# Patient Record
Sex: MALE | Race: WHITE | NOT HISPANIC OR LATINO | Employment: UNEMPLOYED | URBAN - METROPOLITAN AREA
[De-identification: names, ages, dates, MRNs, and addresses within clinical notes are randomized per-mention and may not be internally consistent; named-entity substitution may affect disease eponyms.]

---

## 2019-03-01 ENCOUNTER — HOSPITAL ENCOUNTER (EMERGENCY)
Facility: HOSPITAL | Age: 69
Discharge: HOME | End: 2019-03-01
Attending: EMERGENCY MEDICINE
Payer: MEDICARE

## 2019-03-01 VITALS
WEIGHT: 176 LBS | BODY MASS INDEX: 23.33 KG/M2 | RESPIRATION RATE: 20 BRPM | HEIGHT: 73 IN | TEMPERATURE: 98 F | OXYGEN SATURATION: 99 % | SYSTOLIC BLOOD PRESSURE: 125 MMHG | HEART RATE: 81 BPM | DIASTOLIC BLOOD PRESSURE: 75 MMHG

## 2019-03-01 DIAGNOSIS — S61.452A CAT BITE OF LEFT HAND, INITIAL ENCOUNTER: Primary | ICD-10-CM
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
## 2019-03-01 PROCEDURE — 99283 EMERGENCY DEPT VISIT LOW MDM: CPT

## 2019-03-01 RX ORDER — METFORMIN HYDROCHLORIDE 1000 MG/1
1000 TABLET ORAL 2 TIMES DAILY
COMMUNITY
End: 2022-04-25 | Stop reason: SDUPTHER

## 2019-03-01 RX ORDER — FENOFIBRATE 145 MG/1
145 TABLET, FILM COATED ORAL DAILY
COMMUNITY

## 2019-03-01 RX ORDER — ZOLPIDEM TARTRATE 10 MG/1
10 TABLET ORAL NIGHTLY
COMMUNITY
End: 2022-03-18 | Stop reason: SDUPTHER

## 2019-03-01 RX ORDER — AMOXICILLIN AND CLAVULANATE POTASSIUM 875; 125 MG/1; MG/1
1 TABLET, FILM COATED ORAL 2 TIMES DAILY
Qty: 14 TABLET | Refills: 0 | Status: ON HOLD | OUTPATIENT
Start: 2019-03-01 | End: 2019-08-20 | Stop reason: CLARIF

## 2019-03-01 RX ORDER — EFAVIRENZ 600 MG/1
600 TABLET, FILM COATED ORAL NIGHTLY
COMMUNITY
End: 2022-03-07 | Stop reason: SDUPTHER

## 2019-03-01 RX ORDER — CLONAZEPAM 0.5 MG/1
0.5 TABLET ORAL 2 TIMES DAILY
COMMUNITY
End: 2022-03-18 | Stop reason: SDUPTHER

## 2019-03-01 RX ORDER — SERTRALINE HYDROCHLORIDE 100 MG/1
100 TABLET, FILM COATED ORAL DAILY
COMMUNITY
End: 2022-07-12 | Stop reason: SDUPTHER

## 2019-03-01 NOTE — ED PROVIDER NOTES
Encounter Date: 3/1/2019       History     Chief Complaint   Patient presents with    Animal Bite     cat bite to left palm, stray cat     Brandon Parson is a 68 y.o male with PMHx including GERD and HIV. He presents to ED with bite from feral cat to left palm approx 2 hours prior to arrival  Full ROM of hand and digits  TD up to date          Review of patient's allergies indicates:   Allergen Reactions    Chantix [varenicline]      Past Medical History:   Diagnosis Date    Anxiety     Depression     GERD (gastroesophageal reflux disease)     HIV (human immunodeficiency virus infection)      History reviewed. No pertinent surgical history.  History reviewed. No pertinent family history.  Social History     Tobacco Use    Smoking status: Current Some Day Smoker   Substance Use Topics    Alcohol use: No     Frequency: Never    Drug use: No     Review of Systems   Constitutional: Negative for fever.   HENT: Negative for sore throat.    Respiratory: Negative for shortness of breath.    Cardiovascular: Negative for chest pain.   Gastrointestinal: Negative for nausea.   Genitourinary: Negative for dysuria.   Musculoskeletal: Negative for back pain.   Skin: Positive for wound (multiple puncture wound to left palm). Negative for rash.   Neurological: Negative for weakness.   Hematological: Does not bruise/bleed easily.   All other systems reviewed and are negative.      Physical Exam     Initial Vitals [03/01/19 1258]   BP Pulse Resp Temp SpO2   125/75 81 20 98.3 °F (36.8 °C) 99 %      MAP       --         Physical Exam    Nursing note and vitals reviewed.  Constitutional: He appears well-developed and well-nourished.   HENT:   Head: Normocephalic.   Eyes: Pupils are equal, round, and reactive to light.   Neck: Normal range of motion.   Cardiovascular: Normal rate and regular rhythm.   Pulmonary/Chest: Breath sounds normal.   Musculoskeletal: He exhibits tenderness.        Left hand: He exhibits tenderness. He  exhibits normal range of motion, no bony tenderness, normal capillary refill, no deformity and no swelling.        Hands:  Neurological: He is alert and oriented to person, place, and time.   Skin: Skin is warm and dry. There is erythema.   Psychiatric: He has a normal mood and affect. His behavior is normal. Judgment and thought content normal.         ED Course   Procedures  Labs Reviewed - No data to display       Imaging Results    None          Medical Decision Making:   Initial Assessment:   Patient with bit from feral cat to left palm approx 2 hours prior to arrival  Full ROM of hand and digits  TD up to date    1 cm area of erythema  Multiple puncture wounds consistent with cat bite  Full ROM hand and digits    Differential Diagnosis:   Wound infection  Bone injury    ED Management:  Wound cleaned and dressing applied  Will treat with Augmentin prophylactically    Discussed physical exam findings with patient  No acute emergent medical condition identified at this time to warrant further testing/diagnostics.  Plan to discharge patient home with instructions per AVS.  Follow-up with PCP in 2-5 days or return to ED if symptoms worsen.  Patient verbalized agreement to discharge treatment plan.                              Clinical Impression:       ICD-10-CM ICD-9-CM   1. Cat bite of left hand, initial encounter S61.452A 882.0    W55.01XA E906.3                                Bina Maravilla NP  03/01/19 3339

## 2019-03-01 NOTE — DISCHARGE INSTRUCTIONS
Keep wound clean with antimicrobial soap and apply thin layer of over the counter antibiotic ointment  Take medication as prescribed    Monitor for infection

## 2019-08-15 ENCOUNTER — HOSPITAL ENCOUNTER (EMERGENCY)
Facility: HOSPITAL | Age: 69
Discharge: ANOTHER HOSPITAL | End: 2019-08-16
Attending: EMERGENCY MEDICINE
Payer: MEDICARE

## 2019-08-15 DIAGNOSIS — L03.90 CELLULITIS: ICD-10-CM

## 2019-08-15 DIAGNOSIS — L97.522 DIABETIC ULCER OF TOE OF LEFT FOOT ASSOCIATED WITH TYPE 2 DIABETES MELLITUS, WITH FAT LAYER EXPOSED: Primary | ICD-10-CM

## 2019-08-15 DIAGNOSIS — E11.621 DIABETIC ULCER OF TOE OF LEFT FOOT ASSOCIATED WITH TYPE 2 DIABETES MELLITUS, WITH FAT LAYER EXPOSED: Primary | ICD-10-CM

## 2019-08-15 LAB
ALBUMIN SERPL BCP-MCNC: 4.1 G/DL (ref 3.5–5.2)
ALP SERPL-CCNC: 24 U/L (ref 55–135)
ALT SERPL W/O P-5'-P-CCNC: 16 U/L (ref 10–44)
ANION GAP SERPL CALC-SCNC: 9 MMOL/L (ref 8–16)
AST SERPL-CCNC: 16 U/L (ref 10–40)
BASOPHILS # BLD AUTO: 0.03 K/UL (ref 0–0.2)
BASOPHILS NFR BLD: 0.3 % (ref 0–1.9)
BILIRUB SERPL-MCNC: 0.5 MG/DL (ref 0.1–1)
BUN SERPL-MCNC: 33 MG/DL (ref 8–23)
CALCIUM SERPL-MCNC: 9.5 MG/DL (ref 8.7–10.5)
CHLORIDE SERPL-SCNC: 108 MMOL/L (ref 95–110)
CO2 SERPL-SCNC: 17 MMOL/L (ref 23–29)
CREAT SERPL-MCNC: 1.3 MG/DL (ref 0.5–1.4)
CRP SERPL-MCNC: 1.54 MG/DL (ref 0–0.75)
DIFFERENTIAL METHOD: ABNORMAL
EOSINOPHIL # BLD AUTO: 0.1 K/UL (ref 0–0.5)
EOSINOPHIL NFR BLD: 1.5 % (ref 0–8)
ERYTHROCYTE [DISTWIDTH] IN BLOOD BY AUTOMATED COUNT: 13.2 % (ref 11.5–14.5)
ERYTHROCYTE [SEDIMENTATION RATE] IN BLOOD BY WESTERGREN METHOD: 40 MM/HR (ref 0–10)
EST. GFR  (AFRICAN AMERICAN): >60 ML/MIN/1.73 M^2
EST. GFR  (NON AFRICAN AMERICAN): 55.7 ML/MIN/1.73 M^2
GLUCOSE SERPL-MCNC: 102 MG/DL (ref 70–110)
HCT VFR BLD AUTO: 34.8 % (ref 40–54)
HGB BLD-MCNC: 11.8 G/DL (ref 14–18)
IMM GRANULOCYTES # BLD AUTO: 0.04 K/UL (ref 0–0.04)
IMM GRANULOCYTES NFR BLD AUTO: 0.5 % (ref 0–0.5)
LYMPHOCYTES # BLD AUTO: 2.5 K/UL (ref 1–4.8)
LYMPHOCYTES NFR BLD: 28.1 % (ref 18–48)
MCH RBC QN AUTO: 31.3 PG (ref 27–31)
MCHC RBC AUTO-ENTMCNC: 33.9 G/DL (ref 32–36)
MCV RBC AUTO: 92 FL (ref 82–98)
MONOCYTES # BLD AUTO: 0.7 K/UL (ref 0.3–1)
MONOCYTES NFR BLD: 7.7 % (ref 4–15)
NEUTROPHILS # BLD AUTO: 5.5 K/UL (ref 1.8–7.7)
NEUTROPHILS NFR BLD: 61.9 % (ref 38–73)
NRBC BLD-RTO: 0 /100 WBC
PLATELET # BLD AUTO: 289 K/UL (ref 150–350)
PMV BLD AUTO: 9 FL (ref 9.2–12.9)
POTASSIUM SERPL-SCNC: 3.8 MMOL/L (ref 3.5–5.1)
PROT SERPL-MCNC: 8 G/DL (ref 6–8.4)
RBC # BLD AUTO: 3.77 M/UL (ref 4.6–6.2)
SODIUM SERPL-SCNC: 134 MMOL/L (ref 136–145)
WBC # BLD AUTO: 8.85 K/UL (ref 3.9–12.7)

## 2019-08-15 PROCEDURE — 87070 CULTURE OTHR SPECIMN AEROBIC: CPT

## 2019-08-15 PROCEDURE — 73630 X-RAY EXAM OF FOOT: CPT | Mod: 26,LT,, | Performed by: RADIOLOGY

## 2019-08-15 PROCEDURE — 87077 CULTURE AEROBIC IDENTIFY: CPT

## 2019-08-15 PROCEDURE — 85025 COMPLETE CBC W/AUTO DIFF WBC: CPT

## 2019-08-15 PROCEDURE — 73630 XR FOOT COMPLETE 3 VIEW LEFT: ICD-10-PCS | Mod: 26,LT,, | Performed by: RADIOLOGY

## 2019-08-15 PROCEDURE — 73630 X-RAY EXAM OF FOOT: CPT | Mod: TC,FY,LT

## 2019-08-15 PROCEDURE — 63600175 PHARM REV CODE 636 W HCPCS: Performed by: PHYSICIAN ASSISTANT

## 2019-08-15 PROCEDURE — 87186 SC STD MICRODIL/AGAR DIL: CPT | Mod: 59

## 2019-08-15 PROCEDURE — 99285 EMERGENCY DEPT VISIT HI MDM: CPT | Mod: 25

## 2019-08-15 PROCEDURE — 87147 CULTURE TYPE IMMUNOLOGIC: CPT

## 2019-08-15 PROCEDURE — 96365 THER/PROPH/DIAG IV INF INIT: CPT

## 2019-08-15 PROCEDURE — 85651 RBC SED RATE NONAUTOMATED: CPT

## 2019-08-15 PROCEDURE — 80053 COMPREHEN METABOLIC PANEL: CPT

## 2019-08-15 PROCEDURE — 86140 C-REACTIVE PROTEIN: CPT

## 2019-08-15 RX ORDER — CEFTRIAXONE 1 G/1
1 INJECTION, POWDER, FOR SOLUTION INTRAMUSCULAR; INTRAVENOUS
Status: DISCONTINUED | OUTPATIENT
Start: 2019-08-15 | End: 2019-08-15

## 2019-08-15 RX ADMIN — CEFTRIAXONE 1 G: 1 INJECTION, SOLUTION INTRAVENOUS at 07:08

## 2019-08-15 NOTE — ED NOTES
LEFT great toe twice the size of right great toe. Red, swollen, ulceration to plantar side of toe.

## 2019-08-15 NOTE — ED TRIAGE NOTES
Patient reports that he removed a callous from his left great toe x 2 weeks ago. The entire toe is red/swollen and painful now. Patient is HIV +.

## 2019-08-15 NOTE — ED PROVIDER NOTES
Encounter Date: 8/15/2019       History     Chief Complaint   Patient presents with    Cellulitis     Patient presents to ER with complaint of cellulitis to left great toe.  Patient states has been there for approximately 2 weeks and gotten worse with more swelling and more erythema patient states was trying to file a callus off the bottom of his toe stated now it is not healing stated now is toe is swollen and red and mildly painful.  Denies any radiation of pain bearing weight makes worse patient states does have history of HIV and diabetes.    The history is provided by the patient.     Review of patient's allergies indicates:   Allergen Reactions    Chantix [varenicline]      Past Medical History:   Diagnosis Date    Anxiety     Depression     GERD (gastroesophageal reflux disease)     HIV (human immunodeficiency virus infection)      History reviewed. No pertinent surgical history.  History reviewed. No pertinent family history.  Social History     Tobacco Use    Smoking status: Current Some Day Smoker    Smokeless tobacco: Never Used   Substance Use Topics    Alcohol use: No     Frequency: Never    Drug use: No     Review of Systems   Constitutional: Negative for appetite change and fever.   HENT: Negative for sore throat.    Respiratory: Negative for shortness of breath, wheezing and stridor.    Cardiovascular: Positive for leg swelling (Left). Negative for chest pain and palpitations.   Gastrointestinal: Negative for constipation, diarrhea, nausea and vomiting.   Genitourinary: Negative for dysuria.   Musculoskeletal: Negative for back pain.   Skin: Positive for color change ( left great toe and distal foot) and wound ( left great toe). Negative for rash.   Neurological: Negative for dizziness, syncope, weakness, light-headedness and headaches.   Hematological: Does not bruise/bleed easily.   All other systems reviewed and are negative.      Physical Exam     Initial Vitals [08/15/19 1608]   BP  Pulse Resp Temp SpO2   130/89 77 18 98.1 °F (36.7 °C) 100 %      MAP       --         Physical Exam    Nursing note and vitals reviewed.  Constitutional: He appears well-developed and well-nourished. He is not diaphoretic. No distress.   HENT:   Head: Atraumatic.   Right Ear: External ear normal.   Left Ear: External ear normal.   Nose: Nose normal.   Mouth/Throat: Oropharynx is clear and moist. No oropharyngeal exudate.   Eyes: Right eye exhibits no discharge. Left eye exhibits no discharge.   Neck: Normal range of motion. Neck supple.   Cardiovascular: Normal rate, regular rhythm, normal heart sounds and intact distal pulses. Exam reveals no gallop and no friction rub.    No murmur heard.  Pulmonary/Chest: Breath sounds normal. No respiratory distress. He has no wheezes. He has no rhonchi. He has no rales. He exhibits no tenderness.   Musculoskeletal: Normal range of motion. He exhibits edema (Left distal lower extremity) and tenderness ( left distal lower extremity to the plantar surface of the left great toe).        Right foot: There is tenderness and swelling. There is normal range of motion, normal capillary refill, no crepitus and no deformity.        Feet:    Neurological: He is alert and oriented to person, place, and time. GCS score is 15. GCS eye subscore is 4. GCS verbal subscore is 5. GCS motor subscore is 6.   Skin: Skin is warm and dry. Capillary refill takes less than 2 seconds. No rash noted. There is erythema ( left great toe and distal foot).   Skin ulceration to left great toe appears to have tunnel with purulent drainage unable to see how deep the tunnel goes at this time.   Psychiatric: He has a normal mood and affect. Thought content normal.         ED Course   Procedures  Labs Reviewed   CBC W/ AUTO DIFFERENTIAL - Abnormal; Notable for the following components:       Result Value    RBC 3.77 (*)     Hemoglobin 11.8 (*)     Hematocrit 34.8 (*)     Mean Corpuscular Hemoglobin 31.3 (*)     MPV  9.0 (*)     All other components within normal limits   COMPREHENSIVE METABOLIC PANEL - Abnormal; Notable for the following components:    Sodium 134 (*)     CO2 17 (*)     BUN, Bld 33 (*)     Alkaline Phosphatase 24 (*)     eGFR if non  55.7 (*)     All other components within normal limits   SEDIMENTATION RATE - Abnormal; Notable for the following components:    Sed Rate 40 (*)     All other components within normal limits   C-REACTIVE PROTEIN - Abnormal; Notable for the following components:    CRP 1.54 (*)     All other components within normal limits   CULTURE, AEROBIC  (SPECIFY SOURCE)          Imaging Results          X-Ray Foot Complete Left (Final result)  Result time 08/15/19 17:54:10    Final result by Mickey Han MD (08/15/19 17:54:10)                 Impression:      1. Mild-to-moderate degenerative osteoarthrosis of the 1st toe.  2. Small Achilles enthesophyte.      Electronically signed by: Mickey Han  Date:    08/15/2019  Time:    17:54             Narrative:    EXAMINATION:  XR FOOT COMPLETE 3 VIEW LEFT    CLINICAL HISTORY:  . Cellulitis, unspecified    TECHNIQUE:  AP, lateral, and oblique views of the left foot were performed.    COMPARISON:  None    FINDINGS:  No acute fracture or dislocation.  No significant soft tissue swelling.    Mild degenerative osteoarthrosis involving the 1st MTP joint.  Moderate degenerative osteoarthrosis involving the IP joint of the 1st toe.  The remaining joint spaces are preserved.    The tarsal bones are normal in appearance.  Normal tarsometatarsal alignment.  Small Achilles enthesophyte.                                 Medical Decision Making:   Differential Diagnosis:   Osteomyelitis ulcer cellulitis  Clinical Tests:   Lab Tests: Ordered and Reviewed  Radiological Study: Ordered and Reviewed  ED Management:  Discussed with patient plan of care patient states understand does not have any questions.    Dr. Martin saw and evaluated the  patient agrees with need for admission for IV antibiotics Dr. Martin spoke with Dr. Felix and stated does not feel comfortable with keeping this patient at this facility due to the fact we do not have Infectious Disease and the patient's history of diabetes and HIV    Spoke with transfer center will arrange transfer to higher level of care has infectious disease.    I spoke with Dr. Garcia stated accepts patient for transfer to [place] agrees with [facility].  Other:   I have discussed this case with another health care provider.                      Clinical Impression:       ICD-10-CM ICD-9-CM   1. Diabetic ulcer of toe of left foot associated with type 2 diabetes mellitus, with fat layer exposed E11.621 250.80    L97.522 707.15   2. Cellulitis L03.90 682.9                                FORTINO Tompkins  08/15/19 2001

## 2019-08-16 ENCOUNTER — TELEPHONE (OUTPATIENT)
Dept: PODIATRY | Facility: CLINIC | Age: 69
End: 2019-08-16

## 2019-08-16 ENCOUNTER — CLINICAL SUPPORT (OUTPATIENT)
Dept: SMOKING CESSATION | Facility: CLINIC | Age: 69
End: 2019-08-16

## 2019-08-16 ENCOUNTER — HOSPITAL ENCOUNTER (INPATIENT)
Facility: HOSPITAL | Age: 69
LOS: 5 days | Discharge: HOME HEALTH | DRG: 988 | End: 2019-08-21
Attending: FAMILY MEDICINE | Admitting: FAMILY MEDICINE
Payer: MEDICARE

## 2019-08-16 VITALS
HEIGHT: 73 IN | TEMPERATURE: 98 F | OXYGEN SATURATION: 98 % | WEIGHT: 200 LBS | BODY MASS INDEX: 26.51 KG/M2 | SYSTOLIC BLOOD PRESSURE: 118 MMHG | RESPIRATION RATE: 16 BRPM | DIASTOLIC BLOOD PRESSURE: 70 MMHG | HEART RATE: 82 BPM

## 2019-08-16 DIAGNOSIS — L97.526 DIABETIC ULCER OF TOE OF LEFT FOOT ASSOCIATED WITH TYPE 2 DIABETES MELLITUS, WITH BONE INVOLVEMENT WITHOUT EVIDENCE OF NECROSIS: Primary | ICD-10-CM

## 2019-08-16 DIAGNOSIS — L03.032 CELLULITIS OF GREAT TOE OF LEFT FOOT: ICD-10-CM

## 2019-08-16 DIAGNOSIS — E11.621 DIABETIC FOOT ULCER: ICD-10-CM

## 2019-08-16 DIAGNOSIS — F17.210 CIGARETTE SMOKER: Primary | ICD-10-CM

## 2019-08-16 DIAGNOSIS — E11.621 DIABETIC ULCER OF TOE OF LEFT FOOT ASSOCIATED WITH TYPE 2 DIABETES MELLITUS, WITH BONE INVOLVEMENT WITHOUT EVIDENCE OF NECROSIS: Primary | ICD-10-CM

## 2019-08-16 DIAGNOSIS — L97.509 DIABETIC FOOT ULCER: ICD-10-CM

## 2019-08-16 PROBLEM — E87.1 HYPONATREMIA: Status: ACTIVE | Noted: 2019-08-16

## 2019-08-16 PROBLEM — B20 HIV DISEASE: Status: ACTIVE | Noted: 2019-08-16

## 2019-08-16 PROBLEM — E11.9 DIABETES MELLITUS: Status: ACTIVE | Noted: 2019-08-16

## 2019-08-16 PROBLEM — L03.119 CELLULITIS OF FOOT: Status: ACTIVE | Noted: 2019-08-16

## 2019-08-16 PROBLEM — D64.9 ANEMIA: Status: ACTIVE | Noted: 2019-08-16

## 2019-08-16 PROBLEM — L03.119 CELLULITIS OF FOOT: Status: RESOLVED | Noted: 2019-08-16 | Resolved: 2019-08-16

## 2019-08-16 PROBLEM — N18.30 CHRONIC KIDNEY DISEASE, STAGE III (MODERATE): Status: ACTIVE | Noted: 2019-08-16

## 2019-08-16 PROBLEM — F32.A DEPRESSION: Status: ACTIVE | Noted: 2019-08-16

## 2019-08-16 LAB
ANION GAP SERPL CALC-SCNC: 9 MMOL/L (ref 8–16)
APTT BLDCRRT: 30.1 SEC (ref 21–32)
BASOPHILS # BLD AUTO: 0.01 K/UL (ref 0–0.2)
BASOPHILS NFR BLD: 0.1 % (ref 0–1.9)
BUN SERPL-MCNC: 26 MG/DL (ref 8–23)
CALCIUM SERPL-MCNC: 9.4 MG/DL (ref 8.7–10.5)
CHLORIDE SERPL-SCNC: 107 MMOL/L (ref 95–110)
CO2 SERPL-SCNC: 19 MMOL/L (ref 23–29)
CREAT SERPL-MCNC: 1.4 MG/DL (ref 0.5–1.4)
DIFFERENTIAL METHOD: ABNORMAL
EOSINOPHIL # BLD AUTO: 0.2 K/UL (ref 0–0.5)
EOSINOPHIL NFR BLD: 2.7 % (ref 0–8)
ERYTHROCYTE [DISTWIDTH] IN BLOOD BY AUTOMATED COUNT: 13.5 % (ref 11.5–14.5)
EST. GFR  (AFRICAN AMERICAN): 59 ML/MIN/1.73 M^2
EST. GFR  (NON AFRICAN AMERICAN): 51 ML/MIN/1.73 M^2
ESTIMATED AVG GLUCOSE: 166 MG/DL (ref 68–131)
GLUCOSE SERPL-MCNC: 161 MG/DL (ref 70–110)
GRAM STN SPEC: NORMAL
GRAM STN SPEC: NORMAL
HBA1C MFR BLD HPLC: 7.4 % (ref 4–5.6)
HCT VFR BLD AUTO: 33.4 % (ref 40–54)
HGB BLD-MCNC: 11 G/DL (ref 14–18)
INR PPP: 1 (ref 0.8–1.2)
LYMPHOCYTES # BLD AUTO: 2.3 K/UL (ref 1–4.8)
LYMPHOCYTES NFR BLD: 31.8 % (ref 18–48)
MCH RBC QN AUTO: 30.4 PG (ref 27–31)
MCHC RBC AUTO-ENTMCNC: 32.9 G/DL (ref 32–36)
MCV RBC AUTO: 92 FL (ref 82–98)
MONOCYTES # BLD AUTO: 0.7 K/UL (ref 0.3–1)
MONOCYTES NFR BLD: 9.3 % (ref 4–15)
NEUTROPHILS # BLD AUTO: 4 K/UL (ref 1.8–7.7)
NEUTROPHILS NFR BLD: 56.1 % (ref 38–73)
PLATELET # BLD AUTO: 264 K/UL (ref 150–350)
PMV BLD AUTO: 8.8 FL (ref 9.2–12.9)
POCT GLUCOSE: 165 MG/DL (ref 70–110)
POCT GLUCOSE: 172 MG/DL (ref 70–110)
POCT GLUCOSE: 251 MG/DL (ref 70–110)
POTASSIUM SERPL-SCNC: 3.9 MMOL/L (ref 3.5–5.1)
PROTHROMBIN TIME: 10.8 SEC (ref 9–12.5)
RBC # BLD AUTO: 3.62 M/UL (ref 4.6–6.2)
SODIUM SERPL-SCNC: 135 MMOL/L (ref 136–145)
WBC # BLD AUTO: 7.08 K/UL (ref 3.9–12.7)

## 2019-08-16 PROCEDURE — 99222 1ST HOSP IP/OBS MODERATE 55: CPT | Mod: 25,,, | Performed by: PODIATRIST

## 2019-08-16 PROCEDURE — 36415 COLL VENOUS BLD VENIPUNCTURE: CPT

## 2019-08-16 PROCEDURE — 87070 CULTURE OTHR SPECIMN AEROBIC: CPT | Mod: 59

## 2019-08-16 PROCEDURE — 99406 PT REFUSED TOBACCO CESSATION: ICD-10-PCS | Mod: S$GLB,,,

## 2019-08-16 PROCEDURE — 99999 PR PBB SHADOW E&M-EST. PATIENT-LVL I: ICD-10-PCS | Mod: PBBFAC,,,

## 2019-08-16 PROCEDURE — 87205 SMEAR GRAM STAIN: CPT

## 2019-08-16 PROCEDURE — A9585 GADOBUTROL INJECTION: HCPCS | Performed by: FAMILY MEDICINE

## 2019-08-16 PROCEDURE — 87186 SC STD MICRODIL/AGAR DIL: CPT

## 2019-08-16 PROCEDURE — 80048 BASIC METABOLIC PNL TOTAL CA: CPT

## 2019-08-16 PROCEDURE — 25000003 PHARM REV CODE 250: Performed by: NURSE PRACTITIONER

## 2019-08-16 PROCEDURE — 38221 DX BONE MARROW BIOPSIES: CPT | Performed by: PODIATRIST

## 2019-08-16 PROCEDURE — 99406 BEHAV CHNG SMOKING 3-10 MIN: CPT | Mod: S$GLB,,,

## 2019-08-16 PROCEDURE — 94761 N-INVAS EAR/PLS OXIMETRY MLT: CPT

## 2019-08-16 PROCEDURE — 87075 CULTR BACTERIA EXCEPT BLOOD: CPT

## 2019-08-16 PROCEDURE — 63600175 PHARM REV CODE 636 W HCPCS: Performed by: FAMILY MEDICINE

## 2019-08-16 PROCEDURE — 11000001 HC ACUTE MED/SURG PRIVATE ROOM

## 2019-08-16 PROCEDURE — 20220 PR BONE BIOPSY,TROCAR/NEEDLE SUPERF: ICD-10-PCS | Mod: ,,, | Performed by: PODIATRIST

## 2019-08-16 PROCEDURE — 87077 CULTURE AEROBIC IDENTIFY: CPT | Mod: 59

## 2019-08-16 PROCEDURE — 85025 COMPLETE CBC W/AUTO DIFF WBC: CPT

## 2019-08-16 PROCEDURE — 63600175 PHARM REV CODE 636 W HCPCS: Performed by: NURSE PRACTITIONER

## 2019-08-16 PROCEDURE — 83036 HEMOGLOBIN GLYCOSYLATED A1C: CPT

## 2019-08-16 PROCEDURE — 20220 BONE BIOPSY TROCAR/NDL SUPFC: CPT | Mod: ,,, | Performed by: PODIATRIST

## 2019-08-16 PROCEDURE — 87076 CULTURE ANAEROBE IDENT EACH: CPT

## 2019-08-16 PROCEDURE — 85730 THROMBOPLASTIN TIME PARTIAL: CPT

## 2019-08-16 PROCEDURE — 25500020 PHARM REV CODE 255: Performed by: FAMILY MEDICINE

## 2019-08-16 PROCEDURE — 99222 PR INITIAL HOSPITAL CARE,LEVL II: ICD-10-PCS | Mod: 25,,, | Performed by: PODIATRIST

## 2019-08-16 PROCEDURE — 27000221 HC OXYGEN, UP TO 24 HOURS

## 2019-08-16 PROCEDURE — 99999 PR PBB SHADOW E&M-EST. PATIENT-LVL I: CPT | Mod: PBBFAC,,,

## 2019-08-16 PROCEDURE — 85610 PROTHROMBIN TIME: CPT

## 2019-08-16 RX ORDER — AMOXICILLIN 500 MG
2 CAPSULE ORAL DAILY
COMMUNITY

## 2019-08-16 RX ORDER — INSULIN ASPART 100 [IU]/ML
0-5 INJECTION, SOLUTION INTRAVENOUS; SUBCUTANEOUS
Status: DISCONTINUED | OUTPATIENT
Start: 2019-08-16 | End: 2019-08-21 | Stop reason: HOSPADM

## 2019-08-16 RX ORDER — SODIUM CHLORIDE 9 MG/ML
1000 INJECTION, SOLUTION INTRAVENOUS
Status: DISCONTINUED | OUTPATIENT
Start: 2019-08-16 | End: 2019-08-16

## 2019-08-16 RX ORDER — EFAVIRENZ 200 MG/1
600 CAPSULE ORAL NIGHTLY
Status: DISCONTINUED | OUTPATIENT
Start: 2019-08-16 | End: 2019-08-21 | Stop reason: HOSPADM

## 2019-08-16 RX ORDER — ACETAMINOPHEN 325 MG/1
650 TABLET ORAL EVERY 4 HOURS PRN
Status: DISCONTINUED | OUTPATIENT
Start: 2019-08-16 | End: 2019-08-21 | Stop reason: HOSPADM

## 2019-08-16 RX ORDER — SODIUM CHLORIDE 9 MG/ML
1000 INJECTION, SOLUTION INTRAVENOUS CONTINUOUS
Status: ACTIVE | OUTPATIENT
Start: 2019-08-16 | End: 2019-08-16

## 2019-08-16 RX ORDER — INSULIN ASPART 100 [IU]/ML
16 INJECTION, SUSPENSION SUBCUTANEOUS DAILY
COMMUNITY
End: 2022-07-12 | Stop reason: SDUPTHER

## 2019-08-16 RX ORDER — INSULIN ASPART 100 [IU]/ML
18 INJECTION, SUSPENSION SUBCUTANEOUS NIGHTLY
COMMUNITY
End: 2022-07-12 | Stop reason: SDUPTHER

## 2019-08-16 RX ORDER — ONDANSETRON 2 MG/ML
4 INJECTION INTRAMUSCULAR; INTRAVENOUS EVERY 8 HOURS PRN
Status: DISCONTINUED | OUTPATIENT
Start: 2019-08-16 | End: 2019-08-21 | Stop reason: HOSPADM

## 2019-08-16 RX ORDER — IBUPROFEN 200 MG
24 TABLET ORAL
Status: DISCONTINUED | OUTPATIENT
Start: 2019-08-16 | End: 2019-08-21 | Stop reason: HOSPADM

## 2019-08-16 RX ORDER — SODIUM CHLORIDE 0.9 % (FLUSH) 0.9 %
10 SYRINGE (ML) INJECTION
Status: DISCONTINUED | OUTPATIENT
Start: 2019-08-16 | End: 2019-08-21 | Stop reason: HOSPADM

## 2019-08-16 RX ORDER — ZOLPIDEM TARTRATE 5 MG/1
10 TABLET ORAL NIGHTLY
Status: DISCONTINUED | OUTPATIENT
Start: 2019-08-16 | End: 2019-08-21 | Stop reason: HOSPADM

## 2019-08-16 RX ORDER — INSULIN ASPART 100 [IU]/ML
22 INJECTION, SUSPENSION SUBCUTANEOUS DAILY
Status: DISCONTINUED | OUTPATIENT
Start: 2019-08-16 | End: 2019-08-21 | Stop reason: HOSPADM

## 2019-08-16 RX ORDER — INSULIN ASPART 100 [IU]/ML
18 INJECTION, SUSPENSION SUBCUTANEOUS NIGHTLY
Status: DISCONTINUED | OUTPATIENT
Start: 2019-08-16 | End: 2019-08-16

## 2019-08-16 RX ORDER — SERTRALINE HYDROCHLORIDE 25 MG/1
25 TABLET, FILM COATED ORAL DAILY
Status: DISCONTINUED | OUTPATIENT
Start: 2019-08-16 | End: 2019-08-21 | Stop reason: HOSPADM

## 2019-08-16 RX ORDER — GADOBUTROL 604.72 MG/ML
8 INJECTION INTRAVENOUS
Status: COMPLETED | OUTPATIENT
Start: 2019-08-16 | End: 2019-08-16

## 2019-08-16 RX ORDER — BENAZEPRIL/HYDROCHLOROTHIAZIDE 20 MG-25MG
1 TABLET ORAL DAILY
COMMUNITY
End: 2022-02-23

## 2019-08-16 RX ORDER — ATORVASTATIN CALCIUM 10 MG/1
10 TABLET, FILM COATED ORAL DAILY
Status: DISCONTINUED | OUTPATIENT
Start: 2019-08-16 | End: 2019-08-21 | Stop reason: HOSPADM

## 2019-08-16 RX ORDER — CIPROFLOXACIN 2 MG/ML
400 INJECTION, SOLUTION INTRAVENOUS
Status: DISCONTINUED | OUTPATIENT
Start: 2019-08-16 | End: 2019-08-17

## 2019-08-16 RX ORDER — BENAZEPRIL HYDROCHLORIDE 5 MG/1
5 TABLET ORAL DAILY
Status: DISCONTINUED | OUTPATIENT
Start: 2019-08-16 | End: 2019-08-21 | Stop reason: HOSPADM

## 2019-08-16 RX ORDER — IBUPROFEN 200 MG
16 TABLET ORAL
Status: DISCONTINUED | OUTPATIENT
Start: 2019-08-16 | End: 2019-08-21 | Stop reason: HOSPADM

## 2019-08-16 RX ORDER — GLUCAGON 1 MG
1 KIT INJECTION
Status: DISCONTINUED | OUTPATIENT
Start: 2019-08-16 | End: 2019-08-21 | Stop reason: HOSPADM

## 2019-08-16 RX ORDER — INSULIN ASPART 100 [IU]/ML
18 INJECTION, SUSPENSION SUBCUTANEOUS NIGHTLY
Status: DISCONTINUED | OUTPATIENT
Start: 2019-08-17 | End: 2019-08-20

## 2019-08-16 RX ORDER — FENOFIBRATE 145 MG/1
145 TABLET, FILM COATED ORAL DAILY
Status: DISCONTINUED | OUTPATIENT
Start: 2019-08-16 | End: 2019-08-21 | Stop reason: HOSPADM

## 2019-08-16 RX ORDER — KETOROLAC TROMETHAMINE 30 MG/ML
15 INJECTION, SOLUTION INTRAMUSCULAR; INTRAVENOUS EVERY 6 HOURS PRN
Status: DISPENSED | OUTPATIENT
Start: 2019-08-16 | End: 2019-08-19

## 2019-08-16 RX ADMIN — PIPERACILLIN AND TAZOBACTAM 4.5 G: 4; .5 INJECTION, POWDER, LYOPHILIZED, FOR SOLUTION INTRAVENOUS; PARENTERAL at 04:08

## 2019-08-16 RX ADMIN — KETOROLAC TROMETHAMINE 15 MG: 30 INJECTION, SOLUTION INTRAMUSCULAR at 04:08

## 2019-08-16 RX ADMIN — CIPROFLOXACIN 400 MG: 2 INJECTION, SOLUTION INTRAVENOUS at 09:08

## 2019-08-16 RX ADMIN — ATORVASTATIN CALCIUM 10 MG: 10 TABLET, FILM COATED ORAL at 09:08

## 2019-08-16 RX ADMIN — BENAZEPRIL HYDROCHLORIDE 5 MG: 5 TABLET, COATED ORAL at 09:08

## 2019-08-16 RX ADMIN — FENOFIBRATE 145 MG: 145 TABLET ORAL at 09:08

## 2019-08-16 RX ADMIN — GADOBUTROL 8 ML: 604.72 INJECTION INTRAVENOUS at 12:08

## 2019-08-16 RX ADMIN — VANCOMYCIN HYDROCHLORIDE 2250 MG: 100 INJECTION, POWDER, LYOPHILIZED, FOR SOLUTION INTRAVENOUS at 09:08

## 2019-08-16 RX ADMIN — EMTRICITABINE AND TENOFOVIR ALAFENAMIDE 1 TABLET: 200; 25 TABLET ORAL at 10:08

## 2019-08-16 RX ADMIN — SERTRALINE HYDROCHLORIDE 25 MG: 25 TABLET ORAL at 09:08

## 2019-08-16 RX ADMIN — SODIUM CHLORIDE 1000 ML: 0.9 INJECTION, SOLUTION INTRAVENOUS at 08:08

## 2019-08-16 RX ADMIN — ZOLPIDEM TARTRATE 10 MG: 5 TABLET ORAL at 10:08

## 2019-08-16 RX ADMIN — EFAVIRENZ 600 MG: 200 CAPSULE ORAL at 10:08

## 2019-08-16 NOTE — ASSESSMENT & PLAN NOTE
Diabetic foot ulcer    WBC wnl, pt afebrile  Vancomycin IV and Cipro IV  Podiatry consult pending  Keep affected extremity elevated

## 2019-08-16 NOTE — CONSULTS
[facility] Disease Consult     Primary Team: Dr. Roman  Consultant Attending: Dr. Pham  Date of Admit: 8/16/2019    Reason for Consult     Diabetic foot ulcer with concern for osteomyelitis     History of Present Illness   Brandon Parson is a 68 y.o. male who  has a past medical history of Anxiety, Depression, Diabetes mellitus, GERD (gastroesophageal reflux disease), and HIV (human immunodeficiency virus infection).     Patient Admited for Left LE cellulitis. Consulted to Podiatry for Left toe wound.  Patient complains wound on plantar left hallux toe that he discovered about a month ago after he took off some callus. States he has been soaking it in epson salts and washing it with hydrogen peroxide.  Came to the ER with concerns of swelling and redness to the foot. Afebrile, WBC wnl, denies F/C/N/V/night sweats. Ulcer appearing to reach bone, MRI concerning for osteomyelitis. Pt admitted for antibiotic treatment and possible surgical management. ID consulted.    Pt seen and evaluated by ID at 1400. Pt sitting comfortably in bedside chair. Afebrile, VSS, NAD. Confirms history as given above. Pt with HIV on Efavirenz and Descovy(emtricitibine/tenofovir), states that the virus hasnt been detected in 4 years. Reports that this is the first foot ulcer that he has ever been aware of. Denies any fever, chills, or night sweats. Also denies any dysuria, diarrhea, or respiratory symptoms. Other than foot/toe, feels in usual state of health.  Denies CP/SOB, N/V, WL/WG, HA/dizziness.    Review of Systems   Constitutional: Negative for appetite change and fever.   HENT: Negative for sore throat.    Respiratory: Negative for shortness of breath, wheezing and stridor.   Cardiovascular: Positive for leg swelling (Left). Negative for chest pain and palpitations.   Gastrointestinal: Negative for constipation, diarrhea, nausea and vomiting.   Genitourinary: Negative for dysuria.   Musculoskeletal: Negative for back pain.    Skin: Positive for color change ( left great toe and distal foot) and wound ( left great toe). Negative for rash.   Neurological: Negative for dizziness, syncope, weakness, light-headedness and headaches.   Hematological: Does not bruise/bleed easily.   All other systems reviewed and are negative.    Allergies:  Review of patient's allergies indicates:   Allergen Reactions    Chantix [varenicline]        Medications:   In-Hospital Scheduled Medications:   atorvastatin  10 mg Oral Daily    benazepril  5 mg Oral Daily    ciprofloxacin  400 mg Intravenous Q12H    fenofibrate  145 mg Oral Daily    insulin aspart protamine-insulin aspart  22 Units Subcutaneous Daily    sertraline  25 mg Oral Daily    [START ON 8/17/2019] vancomycin (VANCOCIN) IVPB (custom)  1,750 mg Intravenous Q24H      In-Hospital PRN Medications:  acetaminophen, cadexomer iodine, Dextrose 10% Bolus, Dextrose 10% Bolus, glucagon (human recombinant), glucose, glucose, insulin aspart U-100, ketorolac, ondansetron, promethazine (PHENERGAN) IVPB, sodium chloride 0.9%   In-Hospital IV Infusion Medications:   sodium chloride 0.9% 1,000 mL (08/16/19 0800)     Relevant Home Medications:    Antibiotics and Day Number of Therapy:  Ceftriaxone:  8/15 once  Zosyn:  8/15 once  Cipro:  8/16 -   Vanc:  8/16 -     Past Medical History:  Past Medical History:   Diagnosis Date    Anxiety     Depression     Diabetes mellitus     GERD (gastroesophageal reflux disease)     HIV (human immunodeficiency virus infection)      Past Surgical History/ObGyn Hx if gender appropriate:  History reviewed. No pertinent surgical history.  Family History:  History reviewed. No pertinent family history.  Social History:  Social History     Tobacco Use    Smoking status: Current Some Day Smoker     Years: 49.00     Types: Cigarettes     Start date: 1970    Smokeless tobacco: Never Used    Tobacco comment: Handout provided for Ambulatory Smoking Cessation program  "  Substance Use Topics    Alcohol use: No     Frequency: Never    Drug use: No       Objective   Last 24 Hour Vital Signs:  BP  Min: 112/60  Max: 155/72  Temp  Av °F (36.7 °C)  Min: 97.4 °F (36.3 °C)  Max: 98.5 °F (36.9 °C)  Pulse  Av.9  Min: 58  Max: 82  Resp  Av.1  Min: 16  Max: 18  SpO2  Av %  Min: 98 %  Max: 100 %  Height  Av' 0.5" (184.2 cm)  Min: 6' (182.9 cm)  Max: 6' 1" (185.4 cm)  Weight  Av kg (185 lb 4.4 oz)  Min: 80 kg (176 lb 5.9 oz)  Max: 90.7 kg (200 lb)    Lines, Drains, Airway:  Peripheral IV left forearm    Physical Examination:  Examination  General: Well-developed, well- nourished. Calm, NAD.  HEENT: Normocephalic, atraumatic. PERRL, EOMI, MMM.  Neck: Full ROM, supple. No lymphadenopathy  Cardiac: RRR, no MRG  Pulmonary/Chest: CTAB, NLB. No w/r/r  GI/ Rectal: BS+ in all quadrants. NTND.   Msk: Normal ROM. No arthralgia or myalgia.  Lymph nodes: None palpable  Skin/ Extremities: Ulcer to left planar hallux, hyperkeratotic borders with minimal serosanguinous drainage but no purulence noted. Per podiatry note, probes to bone.  Neurology/Psych: AaOx3, mood/behavior normal    Laboratory:  CBC:   Lab Results   Component Value Date    WBC 7.08 2019    HGB 11.0 (L) 2019     2019    MCV 92 2019    RDW 13.5 2019       Estimated Creatinine Clearance: 54.7 mL/min (based on SCr of 1.4 mg/dL).      Microbiology Data:  Microbiology Results (last 7 days)     Procedure Component Value Units Date/Time    Culture, Anaerobe [[id number]] Collected:  19 0700    Order Status:  Sent Specimen:  Wound from Toe, Left Foot Updated:  19    Aerobic culture [[id number]] Collected:  19 0700    Order Status:  Sent Specimen:  Wound from Toe, Left Foot Updated:  19 4305            Other Results:    Radiology:  Xray foot 8/15  1. Mild-to-moderate degenerative osteoarthrosis of the 1st toe.  2. Small Achilles enthesophyte.    MRI foot " 8/16  Examination suggestive of osteomyelitis distal 1st phalanx, early changes of 1st interphalangeal joint septic arthritis not excluded.  Small soft tissue defect plantar aspect of the 1st toe.      Assessment     Brandon Parson is a 68 y.o. male with a past medical history of Anxiety, Depression, Diabetes mellitus, GERD (gastroesophageal reflux disease), and HIV (human immunodeficiency virus infection) who was admitted for antibiotic treatment and possible surgical management of a diabetic foot ulcer with concern for osteomyelitis.        Recommendations     - Continue IV vancomycin.  - Recommend switching IV cipro to IV cefepime and metronidazole  - Will follow-up on blood and wound cultures and adjust recommendations accordingly  - Awaiting podiatry recs following today's MRI result concerning for osteomyelitis.    Thank you for this consult. We will follow.      Rickey Mason JR., MD  [facility], PGY1

## 2019-08-16 NOTE — H&P
[facility] Medicine  History & Physical    Patient Name: Brandon Parson  MRN: [id number]  Admission Date: 8/16/2019  Attending Physician: Bethanie Baker*   Primary Care Provider: Dawn Helm MD         Patient information was obtained from patient, past medical records and ER records.     Subjective:     Principal Problem:Cellulitis of great toe of left foot    Chief Complaint: foot swelling.       HPI: Brandon Parson is a 68-year-old male with a past medical history of HIV, Diabetes mellitus, Depression, GERD, and Anxiety. He lives in [place], [place]. His PCP is Dr. Dawn Helm.    He presented to [facility] ED 8/15/2019 with a complaint of cellulitis to his left great toe.  Patient states has been there for approximately 2 weeks and gotten worse with more swelling and more erythema patient states was trying to file a callus off the bottom of his toe stated now it is not healing stated now is toe is swollen and red and mildly painful. ED workup revealed Hgb (11.8), Hct (34.8), CO2 (17), BUN (33), Alk Phos (24), Sed rate (40), CRP (1.54), foot x-ray revealed Mild-to-moderate degenerative osteoarthrosis of the 1st toe and Small Achilles enthesophyte. [facility] did not feel comfortable with keeping this patient at their facility due to the fact they do not have Infectious Disease and the patient's history of diabetes and HIV. Transferred to [facility] for further management.     Past Medical History:   Diagnosis Date    Anxiety     Depression     Diabetes mellitus     GERD (gastroesophageal reflux disease)     HIV (human immunodeficiency virus infection)        History reviewed. No pertinent surgical history.    Review of patient's allergies indicates:   Allergen Reactions    Chantix [varenicline]        Current Facility-Administered Medications on File Prior to Encounter   Medication    [COMPLETED] cefTRIAXone (ROCEPHIN) 1 g in dextrose 5 % 50 mL IVPB     [DISCONTINUED] cefTRIAXone injection 1 g    [DISCONTINUED] cefTRIAXone injection 1 g     Current Outpatient Medications on File Prior to Encounter   Medication Sig    atorvastatin calcium (ATORVASTATIN ORAL) Take by mouth.    benazepril HCl (BENAZEPRIL ORAL) Take by mouth.    CLONAZEPAM ORAL Take by mouth.    EFAVIRENZ ORAL Take by mouth.    emtricitabine/tenofov alafenam (DESCOVY ORAL) Take by mouth.    fenofibrate (TRICOR) 145 MG tablet Take 145 mg by mouth once daily.    insulin aspart protamine-insulin aspart (NOVOLOG 70/30) 100 unit/mL (70-30) InPn pen Inject 22 Units into the skin once daily.    metformin HCl (METFORMIN ORAL) Take by mouth.    sertraline HCl (SERTRALINE ORAL) Take by mouth.    zolpidem tartrate (AMBIEN ORAL) Take by mouth.    [DISCONTINUED] EFAVIRENZ ORAL Take by mouth.    amoxicillin-clavulanate 875-125mg (AUGMENTIN) 875-125 mg per tablet Take 1 tablet by mouth 2 (two) times daily.     Family History     None        Tobacco Use    Smoking status: Current Some Day Smoker     Types: Cigarettes    Smokeless tobacco: Never Used   Substance and Sexual Activity    Alcohol use: No     Frequency: Never    Drug use: No    Sexual activity: Not Currently     Review of Systems   Constitutional: Negative for chills and fever.   HENT: Negative for dental problem and nosebleeds.    Eyes: Negative for visual disturbance.   Respiratory: Negative for chest tightness and shortness of breath.    Cardiovascular: Positive for leg swelling. Negative for chest pain and palpitations.   Gastrointestinal: Negative for abdominal distention, abdominal pain, nausea and vomiting.   Endocrine: Negative for polydipsia and polyuria.   Genitourinary: Negative for dysuria, frequency and hematuria.   Musculoskeletal: Negative for back pain, gait problem and joint swelling.   Skin: Positive for color change and wound. Negative for rash.   Allergic/Immunologic: Negative for food allergies.   Neurological:  Negative for dizziness, tremors, seizures and headaches.   Psychiatric/Behavioral: Negative for agitation.     Objective:     Vital Signs (Most Recent):  Temp: 97.6 °F (36.4 °C) (08/16/19 0458)  Pulse: 62 (08/16/19 0458)  Resp: 18 (08/16/19 0458)  BP: (!) 119/56 (08/16/19 0458)  SpO2: 100 % (08/16/19 0315) Vital Signs (24h Range):  Temp:  [97.6 °F (36.4 °C)-98.5 °F (36.9 °C)] 97.6 °F (36.4 °C)  Pulse:  [62-82] 62  Resp:  [16-18] 18  SpO2:  [98 %-100 %] 100 %  BP: (118-155)/(56-89) 119/56     Weight: 80 kg (176 lb 5.9 oz)  Body mass index is 23.92 kg/m².    Physical Exam   Constitutional: He is oriented to person, place, and time. He appears well-developed and well-nourished. No distress.   HENT:   Head: Normocephalic and atraumatic.   Eyes: Pupils are equal, round, and reactive to light.   Neck: Normal range of motion. No JVD present.   Cardiovascular: Normal rate and regular rhythm.   Pulmonary/Chest: Effort normal and breath sounds normal. No respiratory distress.   Abdominal: Soft. Bowel sounds are normal. He exhibits no distension.   Musculoskeletal: Normal range of motion. He exhibits edema and tenderness.   He exhibits edema (Left distal lower extremity) and tenderness ( left distal lower extremity to the plantar surface of the left great toe).    Neurological: He is alert and oriented to person, place, and time.   Skin: Skin is warm and dry. Capillary refill takes less than 2 seconds. He is not diaphoretic. There is erythema.   Skin ulceration to left great toe appears to have tunnel with purulent drainage    Psychiatric: He has a normal mood and affect.         CRANIAL NERVES     CN III, IV, VI   Pupils are equal, round, and reactive to light.       Significant Labs:   A1C:   Recent Labs   Lab 08/16/19  0545   HGBA1C 7.4*     Bilirubin:   Recent Labs   Lab 08/15/19  1653   BILITOT 0.5     BMP:   Recent Labs   Lab 08/15/19  1653      *   K 3.8      CO2 17*   BUN 33*   CREATININE 1.3    CALCIUM 9.5     CBC:   Recent Labs   Lab 08/15/19  1653 08/16/19  0545   WBC 8.85 7.08   HGB 11.8* 11.0*   HCT 34.8* 33.4*    264     CMP:   Recent Labs   Lab 08/15/19  1653   *   K 3.8      CO2 17*      BUN 33*   CREATININE 1.3   CALCIUM 9.5   PROT 8.0   ALBUMIN 4.1   BILITOT 0.5   ALKPHOS 24*   AST 16   ALT 16   ANIONGAP 9   EGFRNONAA 55.7*     All pertinent labs within the past 24 hours have been reviewed.    Significant Imaging: I have reviewed all pertinent imaging results/findings within the past 24 hours.             Assessment/Plan:     * Cellulitis of great toe of left foot  Diabetic foot ulcer    WBC wnl, pt afebrile  Vancomycin IV and Cipro IV  Podiatry consult pending  Keep affected extremity elevated        Diabetes mellitus  Glucose (102) on admit  Hgb A1C (7.4)  Hold metformin  Low dose SSI  Accu checks AC and HS  Insulin aspart 22 units SQ daily        Hyponatremia  Na (134) on admit  Gentle hydration, NS 75ml/hr  NPO for possible intervention      Anemia  Hgb (11.8), Hct (34.8) on admit  Continue to monitor      Chronic kidney disease, stage III (moderate)  Cr (1.3)  Avoid Nephrotoxic agents  Strict I&Os  Continue to monitor      Depression  Anxiety    Calm on exam, continue home meds      HIV disease  Viral load unknown, follows Dr. Helm        VTE Risk Mitigation (From admission, onward)        Ordered     Place sequential compression device  Until discontinued      08/16/19 0343     Place sequential compression device  Until discontinued      08/16/19 0342     IP VTE HIGH RISK PATIENT  Once      08/16/19 0342             Suzanne Hebert, APRN, FNP-C  Department of Hospital Medicine   [facility]

## 2019-08-16 NOTE — HPI
Brandon Parson is a 68 y.o. male who  has a past medical history of Anxiety, Depression, Diabetes mellitus, GERD (gastroesophageal reflux disease), and HIV (human immunodeficiency virus infection).    Patient Admited for Left LE cellulitis.  Consulted to Podiatry for Left toe wound.  Patient complains wound on plantar left hallux toe that he discovered about a month ago after he took off some callus.  States he has been soaking it in epson salts and washing it with hydrogen peroxide.  Came to the ER with concerns of swelling and redness to the foot.  Otherwise denies F/C/N/V

## 2019-08-16 NOTE — ED NOTES
"[facility] here for transport. Paramedic Janee Wallace states  "He doesn't meet medical necessity. We are not able transport him unless the hospital is going to pay for it." Dada Graham, house supervisor notified.   "

## 2019-08-16 NOTE — SUBJECTIVE & OBJECTIVE
Scheduled Meds:   atorvastatin  10 mg Oral Daily    benazepril  5 mg Oral Daily    ciprofloxacin  400 mg Intravenous Q12H    fenofibrate  145 mg Oral Daily    insulin aspart protamine-insulin aspart  22 Units Subcutaneous Daily    sertraline  25 mg Oral Daily    [START ON 8/17/2019] vancomycin (VANCOCIN) IVPB (custom)  1,750 mg Intravenous Q24H    vancomycin (VANCOCIN) IVPB  2,250 mg Intravenous Once     Continuous Infusions:   sodium chloride 0.9% 1,000 mL (08/16/19 0800)     PRN Meds:acetaminophen, cadexomer iodine, Dextrose 10% Bolus, Dextrose 10% Bolus, glucagon (human recombinant), glucose, glucose, insulin aspart U-100, ketorolac, ondansetron, promethazine (PHENERGAN) IVPB, sodium chloride 0.9%    Review of patient's allergies indicates:   Allergen Reactions    Chantix [varenicline]         Past Medical History:   Diagnosis Date    Anxiety     Depression     Diabetes mellitus     GERD (gastroesophageal reflux disease)     HIV (human immunodeficiency virus infection)      History reviewed. No pertinent surgical history.    Family History     None        Tobacco Use    Smoking status: Current Some Day Smoker     Types: Cigarettes    Smokeless tobacco: Never Used   Substance and Sexual Activity    Alcohol use: No     Frequency: Never    Drug use: No    Sexual activity: Not Currently     Review of Systems   Constitutional: Negative for chills and fever.   Cardiovascular: Positive for leg swelling.   Gastrointestinal: Negative for nausea and vomiting.   Musculoskeletal: Negative for arthralgias and joint swelling.   Skin: Positive for wound. Negative for rash.   Psychiatric/Behavioral: Negative for agitation and confusion.     Objective:     Vital Signs (Most Recent):  Temp: 97.4 °F (36.3 °C) (08/16/19 0752)  Pulse: 61 (08/16/19 0752)  Resp: 18 (08/16/19 0752)  BP: 122/71 (08/16/19 0752)  SpO2: 100 % (08/16/19 0315) Vital Signs (24h Range):  Temp:  [97.4 °F (36.3 °C)-98.5 °F (36.9 °C)] 97.4 °F  (36.3 °C)  Pulse:  [61-82] 61  Resp:  [16-18] 18  SpO2:  [98 %-100 %] 100 %  BP: (118-155)/(56-89) 122/71     Weight: 80 kg (176 lb 5.9 oz)  Body mass index is 23.92 kg/m².    Foot Exam    General  Orientation: alert and oriented to person, place, and time   Affect: appropriate       Right Foot/Ankle     Inspection and Palpation  Ecchymosis: none  Tenderness: none   Swelling: none     Neurovascular  Dorsalis pedis: 2+  Posterior tibial: 2+  Saphenous nerve sensation: diminished  Tibial nerve sensation: diminished  Superficial peroneal nerve sensation: diminished  Deep peroneal nerve sensation: diminished  Sural nerve sensation: diminished      Left Foot/Ankle      Inspection and Palpation  Ecchymosis: none  Tenderness: none     Neurovascular  Dorsalis pedis: 2+  Posterior tibial: 2+  Saphenous nerve sensation: diminished  Tibial nerve sensation: diminished  Superficial peroneal nerve sensation: diminished  Deep peroneal nerve sensation: diminished  Sural nerve sensation: diminished            Laboratory:  A1C:   Recent Labs   Lab 08/16/19  0545   HGBA1C 7.4*     CBC:   Recent Labs   Lab 08/16/19  0545   WBC 7.08   RBC 3.62*   HGB 11.0*   HCT 33.4*      MCV 92   MCH 30.4   MCHC 32.9     CMP:   Recent Labs   Lab 08/15/19  1653 08/16/19  0545    161*   CALCIUM 9.5 9.4   ALBUMIN 4.1  --    PROT 8.0  --    * 135*   K 3.8 3.9   CO2 17* 19*    107   BUN 33* 26*   CREATININE 1.3 1.4   ALKPHOS 24*  --    ALT 16  --    AST 16  --    BILITOT 0.5  --      CRP:   Recent Labs   Lab 08/15/19  1653   CRP 1.54*     ESR:   Recent Labs   Lab 08/15/19  1653   SEDRATE 40*     Wound Cultures: No results for input(s): LABAERO in the last 4320 hours.  Microbiology Results (last 7 days)     Procedure Component Value Units Date/Time    Aerobic culture [[id number]] Collected:  08/16/19 0700    Order Status:  Sent Specimen:  Wound from Toe, Left Foot Updated:  08/16/19 0956    Culture, Anaerobe [[id number]] Collected:   08/16/19 0700    Order Status:  Sent Specimen:  Wound from Toe, Left Foot Updated:  08/16/19 0714        Specimen (12h ago, onward)    None          Diagnostic Results:  X-ray: 1. Mild-to-moderate degenerative osteoarthrosis of the 1st toe.  2. Small Achilles enthesophyte.    MRI:  ordered    Clinical Findings:  Ulcer Location: Left plantar hallux  Measurements:  Periwound: hyperkeratotic  Drainage: serosanguinous.  Base:  fibrogranular  Signs of infection: resolving erythema and edema.  Positive probe to bone.  Negative malodor, no purulence    Ankle dorsiflexion decreased at <10 degrees bilateral with moderate increase with knee flexion bilateral.    B/l 1st MTPJ with Decreased ROM with weight bearing.    Normal pedal hair growth, good cap refill, pedal pulses palpable    .

## 2019-08-16 NOTE — PLAN OF CARE
Outside Transfer Acceptance Note     Patients name:      Transferring Physician or Mid-Level provider/Clinic giving report: FORTINO Paz at [facility]       Accepting Physician for admission to hospital: Juan Jose Garcia MD        Date of acceptance:  08/15/2019       Reason for transfer:  Podiatry evaluation     Report from Physician/Mid-Level Provider:    HPI: 68M with DM2 on metformin, HIV on ART with undetectable viral load presented to ED c/o 2 weeks of ulcer to L great toe and increasing swelling, redness, and pain spreading up L foot after filing down a callus on the toe that then ulcerated and got infected.  Needs Podiatry evaluation. Got ceftriaxone.    VS: wnl    Labs: WBC 8, ESR 40, CRP 1.5    Radiographs: XR foot without any concerning findings.    To Do List upon arrival:  Podiatry consult, cover with Vancomyicn and Cipro, consider THADDEUS as likely has accelerated PAD given chronic HIV which along with DM could be impairing wound healing.

## 2019-08-16 NOTE — ASSESSMENT & PLAN NOTE
Glucose (102) on admit  Hgb A1C (7.4)  Hold metformin  Low dose SSI  Accu checks AC and HS  Insulin aspart 22 units SQ daily

## 2019-08-16 NOTE — PROGRESS NOTES
Pharmacokinetic Initial Assessment: IV Vancomycin    Assessment/Plan:    Initiate intravenous vancomycin with loading dose of 2250 mg once followed by a maintenance dose of vancomycin 1750mg IV every 24 hours  Desired empiric serum trough concentration is 10 to 15 mcg/mL  Draw vancomycin trough level 30 min prior to third dose on 8-18 at approximately 07:00   Pharmacy will continue to follow and monitor vancomycin.      Please contact pharmacy at extension [phone] with any questions regarding this assessment.     Thank you for the consult,   Rajiv Ruth       Patient brief summary:  Brandon Parson is a 68 y.o. male initiated on antimicrobial therapy with IV Vancomycin for treatment of suspected skin & soft tissue infection    Drug Allergies:   Review of patient's allergies indicates:   Allergen Reactions    Chantix [varenicline]        Actual Body Weight:   80 kg      Renal Function:   Estimated Creatinine Clearance: 58.9 mL/min (based on SCr of 1.3 mg/dL).,     Dialysis Method (if applicable)N/A     CBC (last 72 hours):  Recent Labs   Lab Result Units 08/15/19  1653 08/16/19  0545   WBC K/uL 8.85 7.08   Hemoglobin g/dL 11.8* 11.0*   Hemoglobin A1C %  --  7.4*   Hematocrit % 34.8* 33.4*   Platelets K/uL 289 264   Gran% % 61.9 56.1   Lymph% % 28.1 31.8   Mono% % 7.7 9.3   Eosinophil% % 1.5 2.7   Basophil% % 0.3 0.1   Differential Method  Automated Automated       Metabolic Panel (last 72 hours):  Recent Labs   Lab Result Units 08/15/19  1653   Sodium mmol/L 134*   Potassium mmol/L 3.8   Chloride mmol/L 108   CO2 mmol/L 17*   Glucose mg/dL 102   BUN, Bld mg/dL 33*   Creatinine mg/dL 1.3   Albumin g/dL 4.1   Total Bilirubin mg/dL 0.5   Alkaline Phosphatase U/L 24*   AST U/L 16   ALT U/L 16       Drug levels (last 3 results):  No results for input(s): VANCOMYCINRA, VANCOMYCINPE, VANCOMYCINTR in the last 72 hours.    Microbiologic Results:  Microbiology Results (last 7 days)       ** No results found for the last 168  hours. **

## 2019-08-16 NOTE — SUBJECTIVE & OBJECTIVE
Past Medical History:   Diagnosis Date    Anxiety     Depression     Diabetes mellitus     GERD (gastroesophageal reflux disease)     HIV (human immunodeficiency virus infection)        History reviewed. No pertinent surgical history.    Review of patient's allergies indicates:   Allergen Reactions    Chantix [varenicline]        Current Facility-Administered Medications on File Prior to Encounter   Medication    [COMPLETED] cefTRIAXone (ROCEPHIN) 1 g in dextrose 5 % 50 mL IVPB    [DISCONTINUED] cefTRIAXone injection 1 g    [DISCONTINUED] cefTRIAXone injection 1 g     Current Outpatient Medications on File Prior to Encounter   Medication Sig    atorvastatin calcium (ATORVASTATIN ORAL) Take by mouth.    benazepril HCl (BENAZEPRIL ORAL) Take by mouth.    CLONAZEPAM ORAL Take by mouth.    EFAVIRENZ ORAL Take by mouth.    emtricitabine/tenofov alafenam (DESCOVY ORAL) Take by mouth.    fenofibrate (TRICOR) 145 MG tablet Take 145 mg by mouth once daily.    insulin aspart protamine-insulin aspart (NOVOLOG 70/30) 100 unit/mL (70-30) InPn pen Inject 22 Units into the skin once daily.    metformin HCl (METFORMIN ORAL) Take by mouth.    sertraline HCl (SERTRALINE ORAL) Take by mouth.    zolpidem tartrate (AMBIEN ORAL) Take by mouth.    [DISCONTINUED] EFAVIRENZ ORAL Take by mouth.    amoxicillin-clavulanate 875-125mg (AUGMENTIN) 875-125 mg per tablet Take 1 tablet by mouth 2 (two) times daily.     Family History     None        Tobacco Use    Smoking status: Current Some Day Smoker     Types: Cigarettes    Smokeless tobacco: Never Used   Substance and Sexual Activity    Alcohol use: No     Frequency: Never    Drug use: No    Sexual activity: Not Currently     Review of Systems   Constitutional: Negative for chills and fever.   HENT: Negative for dental problem and nosebleeds.    Eyes: Negative for visual disturbance.   Respiratory: Negative for chest tightness and shortness of breath.     Cardiovascular: Positive for leg swelling. Negative for chest pain and palpitations.   Gastrointestinal: Negative for abdominal distention, abdominal pain, nausea and vomiting.   Endocrine: Negative for polydipsia and polyuria.   Genitourinary: Negative for dysuria, frequency and hematuria.   Musculoskeletal: Negative for back pain, gait problem and joint swelling.   Skin: Positive for color change and wound. Negative for rash.   Allergic/Immunologic: Negative for food allergies.   Neurological: Negative for dizziness, tremors, seizures and headaches.   Psychiatric/Behavioral: Negative for agitation.     Objective:     Vital Signs (Most Recent):  Temp: 97.6 °F (36.4 °C) (08/16/19 0458)  Pulse: 62 (08/16/19 0458)  Resp: 18 (08/16/19 0458)  BP: (!) 119/56 (08/16/19 0458)  SpO2: 100 % (08/16/19 0315) Vital Signs (24h Range):  Temp:  [97.6 °F (36.4 °C)-98.5 °F (36.9 °C)] 97.6 °F (36.4 °C)  Pulse:  [62-82] 62  Resp:  [16-18] 18  SpO2:  [98 %-100 %] 100 %  BP: (118-155)/(56-89) 119/56     Weight: 80 kg (176 lb 5.9 oz)  Body mass index is 23.92 kg/m².    Physical Exam   Constitutional: He is oriented to person, place, and time. He appears well-developed and well-nourished. No distress.   HENT:   Head: Normocephalic and atraumatic.   Eyes: Pupils are equal, round, and reactive to light.   Neck: Normal range of motion. No JVD present.   Cardiovascular: Normal rate and regular rhythm.   Pulmonary/Chest: Effort normal and breath sounds normal. No respiratory distress.   Abdominal: Soft. Bowel sounds are normal. He exhibits no distension.   Musculoskeletal: Normal range of motion. He exhibits edema and tenderness.   He exhibits edema (Left distal lower extremity) and tenderness ( left distal lower extremity to the plantar surface of the left great toe).    Neurological: He is alert and oriented to person, place, and time.   Skin: Skin is warm and dry. Capillary refill takes less than 2 seconds. He is not diaphoretic. There is  erythema.   Skin ulceration to left great toe appears to have tunnel with purulent drainage    Psychiatric: He has a normal mood and affect.         CRANIAL NERVES     CN III, IV, VI   Pupils are equal, round, and reactive to light.       Significant Labs:   A1C:   Recent Labs   Lab 08/16/19  0545   HGBA1C 7.4*     Bilirubin:   Recent Labs   Lab 08/15/19  1653   BILITOT 0.5     BMP:   Recent Labs   Lab 08/15/19  1653      *   K 3.8      CO2 17*   BUN 33*   CREATININE 1.3   CALCIUM 9.5     CBC:   Recent Labs   Lab 08/15/19  1653 08/16/19  0545   WBC 8.85 7.08   HGB 11.8* 11.0*   HCT 34.8* 33.4*    264     CMP:   Recent Labs   Lab 08/15/19  1653   *   K 3.8      CO2 17*      BUN 33*   CREATININE 1.3   CALCIUM 9.5   PROT 8.0   ALBUMIN 4.1   BILITOT 0.5   ALKPHOS 24*   AST 16   ALT 16   ANIONGAP 9   EGFRNONAA 55.7*     All pertinent labs within the past 24 hours have been reviewed.    Significant Imaging: I have reviewed all pertinent imaging results/findings within the past 24 hours.

## 2019-08-16 NOTE — PROGRESS NOTES
Smoking cessation education and materials provided.  Pt states that he is a light smoker- smokes socially and is not ready to quit.  Handout provided for Ambulatory Smoking Cessation program in the event pt should require resources in the future.

## 2019-08-16 NOTE — PLAN OF CARE
[place] met with pt for discharge planning. Pt lives with his sister. Pt independent with ADLS, no DME or HH noted. Pt will have assistance from his sister and niece upon discharge. Pt states he may have trouble affording his medications upon discharge. Pt may need home IV abx due to possible dx of Osteomyelitis. Pt states he would be willing do Home IV abx if needed at home. Pt's family to provide transportation on discharge.    Discharge brochure and blue discharge folder given to pt. [place] updated contact information on [employer].       08/16/19 1513   Discharge Assessment   Assessment Type Discharge Planning Assessment   Confirmed/corrected address and phone number on facesheet? Yes   Assessment information obtained from? Patient   Communicated expected length of stay with patient/caregiver yes   Prior to hospitilization cognitive status: Alert/Oriented   Prior to hospitalization functional status: Independent   Current cognitive status: Alert/Oriented   Current Functional Status: Independent   Lives With sibling(s)   Able to Return to Prior Arrangements yes   Who are your caregiver(s) and their phone number(s)? Zackery (friend) [phone]   Patient's perception of discharge disposition home or selfcare   Readmission Within the Last 30 Days no previous admission in last 30 days   Patient currently being followed by outpatient case management? No   Patient currently receives any other outside agency services? No   Equipment Currently Used at Home glucometer   Do you have any problems affording any of your prescribed medications? TBD   Is the patient taking medications as prescribed? yes   Does the patient have transportation home? Yes   Transportation Anticipated family or friend will provide   Does the patient receive services at the [facility]? No   Discharge Plan A Home with family;Home   Discharge Plan B Home Health   DME Needed Upon Discharge    (TBD)   Patient/Family in Agreement with Plan yes   Lizbet  Vincenzo RN-BC  Transitional Navigator  [phone]

## 2019-08-16 NOTE — ED PROVIDER NOTES
Encounter Date: 8/15/2019       History     Chief Complaint   Patient presents with    Cellulitis     Please see NP note for HPI.         Review of patient's allergies indicates:   Allergen Reactions    Chantix [varenicline]      Past Medical History:   Diagnosis Date    Anxiety     Depression     GERD (gastroesophageal reflux disease)     HIV (human immunodeficiency virus infection)      History reviewed. No pertinent surgical history.  History reviewed. No pertinent family history.  Social History     Tobacco Use    Smoking status: Current Some Day Smoker    Smokeless tobacco: Never Used   Substance Use Topics    Alcohol use: No     Frequency: Never    Drug use: No     Review of Systems   Skin: Positive for wound.   please see NP ROS as well     Physical Exam     Initial Vitals [08/15/19 1608]   BP Pulse Resp Temp SpO2   130/89 77 18 98.1 °F (36.7 °C) 100 %      MAP       --         Physical Exam    Nursing note and vitals reviewed.  Constitutional: He appears well-developed and well-nourished.   Skin:   Please see NP note for full physical exam.          ED Course   Procedures  Labs Reviewed   CBC W/ AUTO DIFFERENTIAL - Abnormal; Notable for the following components:       Result Value    RBC 3.77 (*)     Hemoglobin 11.8 (*)     Hematocrit 34.8 (*)     Mean Corpuscular Hemoglobin 31.3 (*)     MPV 9.0 (*)     All other components within normal limits   COMPREHENSIVE METABOLIC PANEL - Abnormal; Notable for the following components:    Sodium 134 (*)     CO2 17 (*)     BUN, Bld 33 (*)     Alkaline Phosphatase 24 (*)     eGFR if non  55.7 (*)     All other components within normal limits   SEDIMENTATION RATE - Abnormal; Notable for the following components:    Sed Rate 40 (*)     All other components within normal limits   C-REACTIVE PROTEIN - Abnormal; Notable for the following components:    CRP 1.54 (*)     All other components within normal limits   CULTURE, AEROBIC  (SPECIFY SOURCE)           Imaging Results          X-Ray Foot Complete Left (Final result)  Result time 08/15/19 17:54:10    Final result by Mickey Han MD (08/15/19 17:54:10)                 Impression:      1. Mild-to-moderate degenerative osteoarthrosis of the 1st toe.  2. Small Achilles enthesophyte.      Electronically signed by: Mickey Han  Date:    08/15/2019  Time:    17:54             Narrative:    EXAMINATION:  XR FOOT COMPLETE 3 VIEW LEFT    CLINICAL HISTORY:  . Cellulitis, unspecified    TECHNIQUE:  AP, lateral, and oblique views of the left foot were performed.    COMPARISON:  None    FINDINGS:  No acute fracture or dislocation.  No significant soft tissue swelling.    Mild degenerative osteoarthrosis involving the 1st MTP joint.  Moderate degenerative osteoarthrosis involving the IP joint of the 1st toe.  The remaining joint spaces are preserved.    The tarsal bones are normal in appearance.  Normal tarsometatarsal alignment.  Small Achilles enthesophyte.                                 Medical Decision Making:   ED Management:  Nursing supervisor spoke with Dada Vidal, he has authorized transfer via EMS at this time.                    ED Course as of Aug 16 0133   Fri Aug 16, 2019   0128 Paramedic [facility] with [facility] is refusing to transport patient, states he does not meet medical necessity. Nursing supervisor notified, he is contacting administration for further instruction.    [MD]   0131 Pt has no medical indication for IVF, has hep lock in place. I do not feel comfortable sending pt via private vehicle due to his indwelling hep lock.    [MD]      ED Course User Index  [MD] Ivonne Martin MD     Clinical Impression:       ICD-10-CM ICD-9-CM   1. Diabetic ulcer of toe of left foot associated with type 2 diabetes mellitus, with fat layer exposed E11.621 250.80    L97.522 707.15   2. Cellulitis L03.90 682.9                                Ivonne Martin MD  08/16/19 0136

## 2019-08-16 NOTE — ED NOTES
Patient reclined back in chair watching tv. Drink beverage and dinner tray offered, patient refused. No needs or concerns at this time. Will continue to monitor.

## 2019-08-16 NOTE — PLAN OF CARE
Problem: Adult Inpatient Plan of Care  Goal: Plan of Care Review  Outcome: Ongoing (interventions implemented as appropriate)  Pt arrived to unit via stretcher accompanied by ambulance. Pt able to ambulate from strecher to bed without   any assistance. Pt orientated to hospital bed room and equipment. Verbalized understanding. Safety precautions   enforced Pt reminded to call for assistance prior to getting OOB as needed. Verbalized understanding.  Call bell within reach. Will continue to montior.

## 2019-08-16 NOTE — ED NOTES
Patient sitting in chair. Updated patient on waiting for [facility] for transport. No needs or concerns at this time. Observed rise and fall of chest. Will continue to monitor.

## 2019-08-16 NOTE — ASSESSMENT & PLAN NOTE
. Brandon Parson is a 68 y.o. male with left hallux ulcer with cellulitis, and positive probe to bone , suspicious for osteomyelitis  -ESR: 40, CRP: 1.5, WBC: 7.08  -Imaging: Xray showing arthritis.  ESR, CRP, and only mildly elevated, WBC normal.  Will order MRI to assess for osteo.  If positive for osteo recommend consult to ID.  -obtained deep wound cultures down to bone.  -applied betadine wet to dry gauze dressigns, nursing to do daily iodosorb dressing changes  -pedal pulses palpable  -Will follow up with MRI results.  No plans for surgical intervention at this time

## 2019-08-16 NOTE — CONSULTS
[facility]  Podiatry  Consult Note    Patient Name: Brandon Parson  MRN: [id number]  Admission Date: 8/16/2019  Hospital Length of Stay: 0 days  Attending Physician: Bethanie Baker*  Primary Care Provider: Dawn Helm MD     Podiatry  Consult performed by: Chris Borjas MD  Consult ordered by: Suzanne Hebert, FLIP        Subjective:     History of Present Illness:  Brandon Parson is a 68 y.o. male who  has a past medical history of Anxiety, Depression, Diabetes mellitus, GERD (gastroesophageal reflux disease), and HIV (human immunodeficiency virus infection).    Patient Admited for Left LE cellulitis.  Consulted to Podiatry for Left toe wound.  Patient complains wound on plantar left hallux toe that he discovered about a month ago after he took off some callus.  States he has been soaking it in epson salts and washing it with hydrogen peroxide.  Came to the ER with concerns of swelling and redness to the foot.  Otherwise denies F/C/N/V    Scheduled Meds:   atorvastatin  10 mg Oral Daily    benazepril  5 mg Oral Daily    ciprofloxacin  400 mg Intravenous Q12H    fenofibrate  145 mg Oral Daily    insulin aspart protamine-insulin aspart  22 Units Subcutaneous Daily    sertraline  25 mg Oral Daily    [START ON 8/17/2019] vancomycin (VANCOCIN) IVPB (custom)  1,750 mg Intravenous Q24H    vancomycin (VANCOCIN) IVPB  2,250 mg Intravenous Once     Continuous Infusions:   sodium chloride 0.9% 1,000 mL (08/16/19 0800)     PRN Meds:acetaminophen, cadexomer iodine, Dextrose 10% Bolus, Dextrose 10% Bolus, glucagon (human recombinant), glucose, glucose, insulin aspart U-100, ketorolac, ondansetron, promethazine (PHENERGAN) IVPB, sodium chloride 0.9%    Review of patient's allergies indicates:   Allergen Reactions    Chantix [varenicline]         Past Medical History:   Diagnosis Date    Anxiety     Depression     Diabetes mellitus     GERD (gastroesophageal reflux disease)     HIV (human  immunodeficiency virus infection)      History reviewed. No pertinent surgical history.    Family History     None        Tobacco Use    Smoking status: Current Some Day Smoker     Types: Cigarettes    Smokeless tobacco: Never Used   Substance and Sexual Activity    Alcohol use: No     Frequency: Never    Drug use: No    Sexual activity: Not Currently     Review of Systems   Constitutional: Negative for chills and fever.   Cardiovascular: Positive for leg swelling.   Gastrointestinal: Negative for nausea and vomiting.   Musculoskeletal: Negative for arthralgias and joint swelling.   Skin: Positive for wound. Negative for rash.   Psychiatric/Behavioral: Negative for agitation and confusion.     Objective:     Vital Signs (Most Recent):  Temp: 97.4 °F (36.3 °C) (08/16/19 0752)  Pulse: 61 (08/16/19 0752)  Resp: 18 (08/16/19 0752)  BP: 122/71 (08/16/19 0752)  SpO2: 100 % (08/16/19 0315) Vital Signs (24h Range):  Temp:  [97.4 °F (36.3 °C)-98.5 °F (36.9 °C)] 97.4 °F (36.3 °C)  Pulse:  [61-82] 61  Resp:  [16-18] 18  SpO2:  [98 %-100 %] 100 %  BP: (118-155)/(56-89) 122/71     Weight: 80 kg (176 lb 5.9 oz)  Body mass index is 23.92 kg/m².    Foot Exam    General  Orientation: alert and oriented to person, place, and time   Affect: appropriate       Right Foot/Ankle     Inspection and Palpation  Ecchymosis: none  Tenderness: none   Swelling: none     Neurovascular  Dorsalis pedis: 2+  Posterior tibial: 2+  Saphenous nerve sensation: diminished  Tibial nerve sensation: diminished  Superficial peroneal nerve sensation: diminished  Deep peroneal nerve sensation: diminished  Sural nerve sensation: diminished      Left Foot/Ankle      Inspection and Palpation  Ecchymosis: none  Tenderness: none     Neurovascular  Dorsalis pedis: 2+  Posterior tibial: 2+  Saphenous nerve sensation: diminished  Tibial nerve sensation: diminished  Superficial peroneal nerve sensation: diminished  Deep peroneal nerve sensation: diminished  Sural  nerve sensation: diminished            Laboratory:  A1C:   Recent Labs   Lab 08/16/19  0545   HGBA1C 7.4*     CBC:   Recent Labs   Lab 08/16/19 0545   WBC 7.08   RBC 3.62*   HGB 11.0*   HCT 33.4*      MCV 92   MCH 30.4   MCHC 32.9     CMP:   Recent Labs   Lab 08/15/19  1653 08/16/19 0545    161*   CALCIUM 9.5 9.4   ALBUMIN 4.1  --    PROT 8.0  --    * 135*   K 3.8 3.9   CO2 17* 19*    107   BUN 33* 26*   CREATININE 1.3 1.4   ALKPHOS 24*  --    ALT 16  --    AST 16  --    BILITOT 0.5  --      CRP:   Recent Labs   Lab 08/15/19  1653   CRP 1.54*     ESR:   Recent Labs   Lab 08/15/19  1653   SEDRATE 40*     Wound Cultures: No results for input(s): LABAERO in the last 4320 hours.  Microbiology Results (last 7 days)     Procedure Component Value Units Date/Time    Aerobic culture [[id number]] Collected:  08/16/19 0700    Order Status:  Sent Specimen:  Wound from Toe, Left Foot Updated:  08/16/19 0956    Culture, Anaerobe [[id number]] Collected:  08/16/19 0700    Order Status:  Sent Specimen:  Wound from Toe, Left Foot Updated:  08/16/19 0714        Specimen (12h ago, onward)    None          Diagnostic Results:  X-ray: 1. Mild-to-moderate degenerative osteoarthrosis of the 1st toe.  2. Small Achilles enthesophyte.    MRI:  ordered    Clinical Findings:  Ulcer Location: Left plantar hallux  Measurements:  Periwound: hyperkeratotic  Drainage: serosanguinous.  Base:  fibrogranular  Signs of infection: resolving erythema and edema.  Positive probe to bone.  Negative malodor, no purulence    Ankle dorsiflexion decreased at <10 degrees bilateral with moderate increase with knee flexion bilateral.    B/l 1st MTPJ with Decreased ROM with weight bearing.    Normal pedal hair growth, good cap refill, pedal pulses palpable    .       Assessment/Plan:     Diabetes mellitus  Per primary      Chronic kidney disease, stage III (moderate)  Per primary      HIV disease  Per primary      Diabetic ulcer of toe of  left foot associated with type 2 diabetes mellitus, with bone involvement without evidence of necrosis  . Brandon Parson is a 68 y.o. male with left hallux ulcer with cellulitis, and positive probe to bone , suspicious for osteomyelitis  -ESR: 40, CRP: 1.5, WBC: 7.08  -Imaging: Xray showing arthritis.  ESR, CRP, and only mildly elevated, WBC normal.  Will order MRI to assess for osteo.  If positive for osteo recommend consult to ID.  -obtained deep wound cultures down to bone.  -applied betadine wet to dry gauze dressigns, nursing to do daily iodosorb dressing changes  -pedal pulses palpable  -Will follow up with MRI results.  No plans for surgical intervention at this time        Thank you for your consult. I will follow-up with patient. Please contact us if you have any additional questions.    Chris Smith MD  Podiatry  [facility]

## 2019-08-16 NOTE — TELEPHONE ENCOUNTER
----- Message from Karlene Bowens sent at 8/16/2019  8:18 AM CDT -----  CONSULTS  Patient:  Brandon Parson  Facility:  [facility]    Room /bed number:  Room 512  Referring MD:  Dr. Roman  Diagnosis:  Cellulitis of foot  Person calling & phone number:  Glynn   No. [phone]

## 2019-08-16 NOTE — PLAN OF CARE
Problem: Adult Inpatient Plan of Care  Goal: Plan of Care Review  Outcome: Ongoing (interventions implemented as appropriate)  Pt awake and alert, no complaints of pain throughout shift. 1500 dora diabetic diet, tolerated well. Dressing to L foot clean, dry, and intact. Up to toilet independently; urinal within reach. IV fluids and IV ABx running per orders. Pt down to MRI today; bone biopsy collected and sent down to lab. Safety maintained. Bed locked and in lowest position. Call light and personal items within reach. Advised pt to call for assistance, pt verbalized understanding.

## 2019-08-16 NOTE — HPI
Brandon Parson is a 68-year-old male with a past medical history of HIV, Diabetes mellitus, Depression, GERD, and Anxiety. He lives in [place], [place]. His PCP is Dr. Dawn Helm.    He presented to [facility] ED 8/15/2019 with a complaint of cellulitis to his left great toe.  Patient states has been there for approximately 2 weeks and gotten worse with more swelling and more erythema patient states was trying to file a callus off the bottom of his toe stated now it is not healing stated now is toe is swollen and red and mildly painful. ED workup revealed Hgb (11.8), Hct (34.8), CO2 (17), BUN (33), Alk Phos (24), Sed rate (40), CRP (1.54), foot x-ray revealed Mild-to-moderate degenerative osteoarthrosis of the 1st toe and Small Achilles enthesophyte. [facility] did not feel comfortable with keeping this patient at their facility due to the fact they do not have Infectious Disease and the patient's history of diabetes and HIV. Transferred to [facility] for further management.

## 2019-08-16 NOTE — PROCEDURES
08/16/2019  Procedure note:   Surgeon:  Dr. Vizcaino  Assisting Surgeon: Chris Borjas DPM, PGY3  Pre op diagnosis: osteomyelitis left hallux distal phalanx  Post op diagnosis: same    Anesthesia:   Hemostasis: direct pressure.   EBL: < 1ml      Procedure: bone biopsy left hallux distal phalanx    Findings:  After consent was signed and witnessed 5ml of 1% lidocaine was given locally.  Left great toe was prepped in a sterile manner.  A Jamshidi needle was used to biopsy the Left hallux distal phalanx adjacent to the plantar wound site.  Hard bone was noted, and sent for pathology and c&s.  Patient tolerated procedure well, and hemostasis was controlled with compression.  Foot was dressed with betadine wet to dry dressings.

## 2019-08-17 PROBLEM — M86.172 ACUTE OSTEOMYELITIS OF TOE, LEFT: Status: ACTIVE | Noted: 2019-08-17

## 2019-08-17 PROBLEM — E11.628 TYPE 2 DIABETES MELLITUS WITH SKIN COMPLICATION, WITHOUT LONG-TERM CURRENT USE OF INSULIN: Status: ACTIVE | Noted: 2019-08-16

## 2019-08-17 LAB
ANION GAP SERPL CALC-SCNC: 7 MMOL/L (ref 8–16)
BASOPHILS # BLD AUTO: 0.01 K/UL (ref 0–0.2)
BASOPHILS NFR BLD: 0.2 % (ref 0–1.9)
BUN SERPL-MCNC: 20 MG/DL (ref 8–23)
CALCIUM SERPL-MCNC: 9 MG/DL (ref 8.7–10.5)
CHLORIDE SERPL-SCNC: 108 MMOL/L (ref 95–110)
CO2 SERPL-SCNC: 21 MMOL/L (ref 23–29)
CREAT SERPL-MCNC: 1.2 MG/DL (ref 0.5–1.4)
DIFFERENTIAL METHOD: ABNORMAL
EOSINOPHIL # BLD AUTO: 0.1 K/UL (ref 0–0.5)
EOSINOPHIL NFR BLD: 2.1 % (ref 0–8)
ERYTHROCYTE [DISTWIDTH] IN BLOOD BY AUTOMATED COUNT: 13.4 % (ref 11.5–14.5)
EST. GFR  (AFRICAN AMERICAN): >60 ML/MIN/1.73 M^2
EST. GFR  (NON AFRICAN AMERICAN): >60 ML/MIN/1.73 M^2
GLUCOSE SERPL-MCNC: 167 MG/DL (ref 70–110)
HCT VFR BLD AUTO: 34.2 % (ref 40–54)
HGB BLD-MCNC: 11.3 G/DL (ref 14–18)
LYMPHOCYTES # BLD AUTO: 1.9 K/UL (ref 1–4.8)
LYMPHOCYTES NFR BLD: 28.5 % (ref 18–48)
MCH RBC QN AUTO: 30.2 PG (ref 27–31)
MCHC RBC AUTO-ENTMCNC: 33 G/DL (ref 32–36)
MCV RBC AUTO: 91 FL (ref 82–98)
MONOCYTES # BLD AUTO: 0.6 K/UL (ref 0.3–1)
MONOCYTES NFR BLD: 9.8 % (ref 4–15)
NEUTROPHILS # BLD AUTO: 3.9 K/UL (ref 1.8–7.7)
NEUTROPHILS NFR BLD: 59.4 % (ref 38–73)
PLATELET # BLD AUTO: 283 K/UL (ref 150–350)
PMV BLD AUTO: 9 FL (ref 9.2–12.9)
POCT GLUCOSE: 159 MG/DL (ref 70–110)
POCT GLUCOSE: 171 MG/DL (ref 70–110)
POCT GLUCOSE: 181 MG/DL (ref 70–110)
POCT GLUCOSE: 211 MG/DL (ref 70–110)
POTASSIUM SERPL-SCNC: 4.2 MMOL/L (ref 3.5–5.1)
RBC # BLD AUTO: 3.74 M/UL (ref 4.6–6.2)
SODIUM SERPL-SCNC: 136 MMOL/L (ref 136–145)
WBC # BLD AUTO: 6.56 K/UL (ref 3.9–12.7)

## 2019-08-17 PROCEDURE — S0030 INJECTION, METRONIDAZOLE: HCPCS | Performed by: NURSE PRACTITIONER

## 2019-08-17 PROCEDURE — 99233 PR SUBSEQUENT HOSPITAL CARE,LEVL III: ICD-10-PCS | Mod: ,,, | Performed by: PODIATRIST

## 2019-08-17 PROCEDURE — 11000001 HC ACUTE MED/SURG PRIVATE ROOM

## 2019-08-17 PROCEDURE — 94761 N-INVAS EAR/PLS OXIMETRY MLT: CPT

## 2019-08-17 PROCEDURE — 25000003 PHARM REV CODE 250: Performed by: NURSE PRACTITIONER

## 2019-08-17 PROCEDURE — 99233 SBSQ HOSP IP/OBS HIGH 50: CPT | Mod: ,,, | Performed by: PODIATRIST

## 2019-08-17 PROCEDURE — 63600175 PHARM REV CODE 636 W HCPCS: Performed by: NURSE PRACTITIONER

## 2019-08-17 PROCEDURE — 80048 BASIC METABOLIC PNL TOTAL CA: CPT

## 2019-08-17 PROCEDURE — 63600175 PHARM REV CODE 636 W HCPCS: Performed by: FAMILY MEDICINE

## 2019-08-17 PROCEDURE — 85025 COMPLETE CBC W/AUTO DIFF WBC: CPT

## 2019-08-17 PROCEDURE — 36415 COLL VENOUS BLD VENIPUNCTURE: CPT

## 2019-08-17 RX ORDER — RAMELTEON 8 MG/1
8 TABLET ORAL NIGHTLY PRN
Status: DISCONTINUED | OUTPATIENT
Start: 2019-08-17 | End: 2019-08-21 | Stop reason: HOSPADM

## 2019-08-17 RX ORDER — METRONIDAZOLE 500 MG/100ML
500 INJECTION, SOLUTION INTRAVENOUS
Status: DISCONTINUED | OUTPATIENT
Start: 2019-08-17 | End: 2019-08-21 | Stop reason: HOSPADM

## 2019-08-17 RX ADMIN — ZOLPIDEM TARTRATE 10 MG: 5 TABLET ORAL at 08:08

## 2019-08-17 RX ADMIN — METRONIDAZOLE 500 MG: 500 INJECTION, SOLUTION INTRAVENOUS at 05:08

## 2019-08-17 RX ADMIN — FENOFIBRATE 145 MG: 145 TABLET ORAL at 08:08

## 2019-08-17 RX ADMIN — CEFEPIME 2 G: 2 INJECTION, POWDER, FOR SOLUTION INTRAVENOUS at 10:08

## 2019-08-17 RX ADMIN — EFAVIRENZ 600 MG: 200 CAPSULE ORAL at 08:08

## 2019-08-17 RX ADMIN — ATORVASTATIN CALCIUM 10 MG: 10 TABLET, FILM COATED ORAL at 08:08

## 2019-08-17 RX ADMIN — INSULIN ASPART 1 UNITS: 100 INJECTION, SOLUTION INTRAVENOUS; SUBCUTANEOUS at 09:08

## 2019-08-17 RX ADMIN — BENAZEPRIL HYDROCHLORIDE 5 MG: 5 TABLET, COATED ORAL at 08:08

## 2019-08-17 RX ADMIN — INSULIN ASPART 22 UNITS: 100 INJECTION, SUSPENSION SUBCUTANEOUS at 08:08

## 2019-08-17 RX ADMIN — THERA TABS 1 TABLET: TAB at 03:08

## 2019-08-17 RX ADMIN — VANCOMYCIN HYDROCHLORIDE 1750 MG: 100 INJECTION, POWDER, LYOPHILIZED, FOR SOLUTION INTRAVENOUS at 12:08

## 2019-08-17 RX ADMIN — EMTRICITABINE AND TENOFOVIR ALAFENAMIDE 1 TABLET: 200; 25 TABLET ORAL at 08:08

## 2019-08-17 RX ADMIN — CEFEPIME 2 G: 2 INJECTION, POWDER, FOR SOLUTION INTRAVENOUS at 09:08

## 2019-08-17 RX ADMIN — INSULIN ASPART 18 UNITS: 100 INJECTION, SUSPENSION SUBCUTANEOUS at 09:08

## 2019-08-17 RX ADMIN — METRONIDAZOLE 500 MG: 500 INJECTION, SOLUTION INTRAVENOUS at 08:08

## 2019-08-17 RX ADMIN — SERTRALINE HYDROCHLORIDE 25 MG: 25 TABLET ORAL at 08:08

## 2019-08-17 NOTE — PLAN OF CARE
Problem: Adult Inpatient Plan of Care  Goal: Plan of Care Review  Outcome: Ongoing (interventions implemented as appropriate)  Pt AAOx4, no family members at bedside throughout shift. Pt denies pain, n/v/d, and SOB. Pt is tolerating diabetic diet well. Pt ambulating independently and voiding without difficulties. Dressing to left foot CDI. Blood glucose checks continued. Safety maintained, will cont to monitor.

## 2019-08-17 NOTE — PLAN OF CARE
Problem: Adult Inpatient Plan of Care  Goal: Plan of Care Review  Outcome: Ongoing (interventions implemented as appropriate)  Patient awake and alert.  No complaints of pain, numbness, or tingling of the feet. Toes of left foot warm to touch.  Patient was able to ambulate to the bathroom independently. Blood glucose checks within sliding scale limits.

## 2019-08-17 NOTE — ASSESSMENT & PLAN NOTE
Hgb A1C (7.4)  Hold metformin while in-patient  Low dose SSI  Accu checks AC and HS  Insulin aspart 22 units SQ daily  Diabetic Diet

## 2019-08-17 NOTE — ASSESSMENT & PLAN NOTE
Acute osteomyelitis of toe, left  Diabetic foot ulcer  WBC wnl, pt afebrile  Vancomycin IV, Cefepime, Flagyl per ID recs  Left 1st Toe Bone Bx and Culture by Dr. Vizcaino  Appreciate Podiatry and ID assistance  Keep affected extremity elevated

## 2019-08-17 NOTE — NURSING
NP Chairs notified of patient's home medication list completed and request of restarting his HIV meds, insulin, and ambien.  New orders received.

## 2019-08-17 NOTE — SUBJECTIVE & OBJECTIVE
Interval History:   Feeling well this morning.  Right foot dressing with only a small amount of drainage.     Review of Systems   Constitutional: Negative for chills and fever.   Respiratory: Negative for chest tightness and shortness of breath.    Cardiovascular: Negative for chest pain, palpitations and leg swelling (Leg swelling is down).   Gastrointestinal: Negative for abdominal distention, abdominal pain, nausea and vomiting.   Skin: Positive for color change and wound. Negative for rash.   Neurological: Negative for weakness.     Objective:     Vital Signs (Most Recent):  Temp: 96.7 °F (35.9 °C) (08/17/19 1230)  Pulse: 73 (08/17/19 1230)  Resp: 18 (08/17/19 1230)  BP: 119/61 (08/17/19 1230)  SpO2: 100 % (08/17/19 0755) Vital Signs (24h Range):  Temp:  [96.3 °F (35.7 °C)-98.2 °F (36.8 °C)] 96.7 °F (35.9 °C)  Pulse:  [49-73] 73  Resp:  [18] 18  SpO2:  [99 %-100 %] 100 %  BP: ()/(50-78) 119/61     Weight: 79.4 kg (175 lb 0.7 oz)  Body mass index is 23.74 kg/m².    Intake/Output Summary (Last 24 hours) at 8/17/2019 1435  Last data filed at 8/16/2019 2132  Gross per 24 hour   Intake 1700 ml   Output --   Net 1700 ml      Physical Exam   Constitutional: He is oriented to person, place, and time. No distress.   Eyes: Pupils are equal, round, and reactive to light.   Neck: Normal range of motion. No JVD present.   Cardiovascular: Normal rate and regular rhythm.   Pulmonary/Chest: Effort normal and breath sounds normal. No respiratory distress.   Abdominal: Soft. Bowel sounds are normal. He exhibits no distension.   Musculoskeletal: Normal range of motion. He exhibits tenderness. He exhibits no edema.   Left Foot dressing with scant drainage   Neurological: He is alert and oriented to person, place, and time.   Skin: Skin is warm and dry. Capillary refill takes less than 2 seconds. He is not diaphoretic.   Skin ulceration to left great toe appears to have tunnel with purulent drainage    Psychiatric: He has a  normal mood and affect.       Significant Labs:   A1C:   Recent Labs   Lab 08/16/19  0545   HGBA1C 7.4*     CBC:   Recent Labs   Lab 08/15/19  1653 08/16/19  0545 08/17/19  0450   WBC 8.85 7.08 6.56   HGB 11.8* 11.0* 11.3*   HCT 34.8* 33.4* 34.2*    264 283     CMP:   Recent Labs   Lab 08/15/19  1653 08/16/19  0545 08/17/19  0450   * 135* 136   K 3.8 3.9 4.2    107 108   CO2 17* 19* 21*    161* 167*   BUN 33* 26* 20   CREATININE 1.3 1.4 1.2   CALCIUM 9.5 9.4 9.0   PROT 8.0  --   --    ALBUMIN 4.1  --   --    BILITOT 0.5  --   --    ALKPHOS 24*  --   --    AST 16  --   --    ALT 16  --   --    ANIONGAP 9 9 7*   EGFRNONAA 55.7* 51* >60     Coagulation:   Recent Labs   Lab 08/16/19  0545   INR 1.0   APTT 30.1     POCT Glucose:   Recent Labs   Lab 08/16/19 2001 08/17/19  0542 08/17/19  1103   POCTGLUCOSE 172* 181* 159*     All pertinent labs within the past 24 hours have been reviewed.    Significant Imaging: I have reviewed and interpreted all pertinent imaging results/findings within the past 24 hours.     MRI Left Foot  Impression       Examination suggestive of osteomyelitis distal 1st phalanx, early changes of 1st interphalangeal joint septic arthritis not excluded.    Small soft tissue defect plantar aspect of the 1st toe.      Electronically signed by: Mary Cottrell MD  Date: 08/16/2019  Time: 12:39

## 2019-08-17 NOTE — PROGRESS NOTES
Brief ID Plan of Care Note:  - pt now growing Morganella, Group B Strep, and Staph aureus, susceptibilities pending.  - bone biopsy yesterday pending  - If osteo confirmed, will likely need 6 weeks of antibiotics  - continue IV vanc, cefepime, and flagyl for now  - Will give final recs following susceptibilities and bone biopsy results.    Thank you for the consult. We will continue to follow.

## 2019-08-17 NOTE — ASSESSMENT & PLAN NOTE
Viral load unknown, follows Dr. Helm  Continue home emtricitabine-tenofovir alafen (DESCOVY) 200-25 mg and EFAVIRENZ

## 2019-08-17 NOTE — PROGRESS NOTES
[facility] Hospital Medicine  Progress Note    Patient Name: Brandon Parson  MRN: [id number]  Patient Class: IP- Inpatient   Admission Date: 8/16/2019  Length of Stay: 1 days  Attending Physician: Bethanie Baker*  Primary Care Provider: Dawn Helm MD        Subjective:     Principal Problem:Cellulitis of great toe of left foot        HPI:  Brandon Parson is a 68-year-old male with a past medical history of HIV, Diabetes mellitus, Depression, GERD, and Anxiety. He lives in [place], [place]. His PCP is Dr. Dawn Helm.    He presented to [facility] ED 8/15/2019 with a complaint of cellulitis to his left great toe.  Patient states has been there for approximately 2 weeks and gotten worse with more swelling and more erythema patient states was trying to file a callus off the bottom of his toe stated now it is not healing stated now is toe is swollen and red and mildly painful. ED workup revealed Hgb (11.8), Hct (34.8), CO2 (17), BUN (33), Alk Phos (24), Sed rate (40), CRP (1.54), foot x-ray revealed Mild-to-moderate degenerative osteoarthrosis of the 1st toe and Small Achilles enthesophyte. [facility] did not feel comfortable with keeping this patient at their facility due to the fact they do not have Infectious Disease and the patient's history of diabetes and HIV. Transferred to [facility] for further management.     Overview/Hospital Course:  He was admitted to [facility] and started on Vancomycin and Ciprofloxacin as well as his home HIV medications. MRI of left foot suggestive of osteomyelitis distal 1st phalanx, and possible early changes of 1st interphalangeal joint septic arthritis  Dr. Vizcaino (Podiatry) was consulted and did a Bone Bx. ID was consulted and he was Abx were escalated from Cipro to Cefepime, Flagyl and Vanc per their recommendations.     Interval History:   Feeling well this morning.  Right foot dressing with only a small amount of drainage.     Review of Systems    Constitutional: Negative for chills and fever.   Respiratory: Negative for chest tightness and shortness of breath.    Cardiovascular: Negative for chest pain, palpitations and leg swelling (Leg swelling is down).   Gastrointestinal: Negative for abdominal distention, abdominal pain, nausea and vomiting.   Skin: Positive for color change and wound. Negative for rash.   Neurological: Negative for weakness.     Objective:     Vital Signs (Most Recent):  Temp: 96.7 °F (35.9 °C) (08/17/19 1230)  Pulse: 73 (08/17/19 1230)  Resp: 18 (08/17/19 1230)  BP: 119/61 (08/17/19 1230)  SpO2: 100 % (08/17/19 0755) Vital Signs (24h Range):  Temp:  [96.3 °F (35.7 °C)-98.2 °F (36.8 °C)] 96.7 °F (35.9 °C)  Pulse:  [49-73] 73  Resp:  [18] 18  SpO2:  [99 %-100 %] 100 %  BP: ()/(50-78) 119/61     Weight: 79.4 kg (175 lb 0.7 oz)  Body mass index is 23.74 kg/m².    Intake/Output Summary (Last 24 hours) at 8/17/2019 1435  Last data filed at 8/16/2019 2132  Gross per 24 hour   Intake 1700 ml   Output --   Net 1700 ml      Physical Exam   Constitutional: He is oriented to person, place, and time. No distress.   Eyes: Pupils are equal, round, and reactive to light.   Neck: Normal range of motion. No JVD present.   Cardiovascular: Normal rate and regular rhythm.   Pulmonary/Chest: Effort normal and breath sounds normal. No respiratory distress.   Abdominal: Soft. Bowel sounds are normal. He exhibits no distension.   Musculoskeletal: Normal range of motion. He exhibits tenderness. He exhibits no edema.   Left Foot dressing with scant drainage   Neurological: He is alert and oriented to person, place, and time.   Skin: Skin is warm and dry. Capillary refill takes less than 2 seconds. He is not diaphoretic.   Skin ulceration to left great toe appears to have tunnel with purulent drainage    Psychiatric: He has a normal mood and affect.       Significant Labs:   A1C:   Recent Labs   Lab 08/16/19  0545   HGBA1C 7.4*     CBC:   Recent Labs    Lab 08/15/19  1653 08/16/19  0545 08/17/19  0450   WBC 8.85 7.08 6.56   HGB 11.8* 11.0* 11.3*   HCT 34.8* 33.4* 34.2*    264 283     CMP:   Recent Labs   Lab 08/15/19  1653 08/16/19  0545 08/17/19  0450   * 135* 136   K 3.8 3.9 4.2    107 108   CO2 17* 19* 21*    161* 167*   BUN 33* 26* 20   CREATININE 1.3 1.4 1.2   CALCIUM 9.5 9.4 9.0   PROT 8.0  --   --    ALBUMIN 4.1  --   --    BILITOT 0.5  --   --    ALKPHOS 24*  --   --    AST 16  --   --    ALT 16  --   --    ANIONGAP 9 9 7*   EGFRNONAA 55.7* 51* >60     Coagulation:   Recent Labs   Lab 08/16/19  0545   INR 1.0   APTT 30.1     POCT Glucose:   Recent Labs   Lab 08/16/19 2001 08/17/19  0542 08/17/19  1103   POCTGLUCOSE 172* 181* 159*     All pertinent labs within the past 24 hours have been reviewed.    Significant Imaging: I have reviewed and interpreted all pertinent imaging results/findings within the past 24 hours.     MRI Left Foot  Impression       Examination suggestive of osteomyelitis distal 1st phalanx, early changes of 1st interphalangeal joint septic arthritis not excluded.    Small soft tissue defect plantar aspect of the 1st toe.      Electronically signed by: Mary Cottrell MD  Date: 08/16/2019  Time: 12:39           Assessment/Plan:      * Cellulitis of great toe of left foot  Acute osteomyelitis of toe, left  Diabetic foot ulcer  WBC wnl, pt afebrile  Vancomycin IV, Cefepime, Flagyl per ID recs  Left 1st Toe Bone Bx and Culture by Dr. Vizcaino  Appreciate Podiatry and ID assistance  Keep affected extremity elevated        Type 2 diabetes mellitus with skin complication, without long-term current use of insulin  Hgb A1C (7.4)  Hold metformin while in-patient  Low dose SSI  Accu checks AC and HS  Insulin aspart 22 units SQ daily  Diabetic Diet        Hyponatremia  Na (134) on admit normal today   Taking PO. No IV FLuids        Anemia  Hgb (11.8), Hct (34.8) on admit. Stable  Continue to monitor      Chronic kidney  disease, stage III (moderate)  At Baseline  Avoid Nephrotoxic agents  Strict I&Os  Continue to monitor      Depression  Anxiety  Calm on exam, continue home meds      HIV disease  Viral load unknown, follows Dr. Helm  Continue home emtricitabine-tenofovir alafen (DESCOVY) 200-25 mg and EFAVIRENZ         VTE Risk Mitigation (From admission, onward)        Ordered     Place sequential compression device  Until discontinued      08/16/19 0342     IP VTE HIGH RISK PATIENT  Once      08/16/19 0342                Belkys Asif NP  Department of Hospital Medicine   [facility]

## 2019-08-17 NOTE — PLAN OF CARE
Problem: Adult Inpatient Plan of Care  Goal: Plan of Care Review  Outcome: Ongoing (interventions implemented as appropriate)  Cued into patient's room.  Permission received per patient to turn camera to view patient.  Introduced as VN for night shift that will be working with floor nurse and nursing assistant.  Educated patient on VN's role in patient care. Plan of care reviewed with patient. Education per flowsheet.  Opportunity given for questions and questions answered.  Reviewed home meds and updated medication reconciliation.  Requesting home HIV meds, insulin, and ambien to be restarted; will notify NP Chairs.  Instructed to call for assistance.  Will cont to monitor.    Labs, notes, and orders reviewed.

## 2019-08-17 NOTE — HOSPITAL COURSE
He was admitted to [facility] and started on Vancomycin and Ciprofloxacin as well as his home HIV medications. MRI of left foot suggestive of osteomyelitis distal 1st phalanx, and possible early changes of 1st interphalangeal joint septic arthritis  Dr. Vizcaino (Podiatry) was consulted and did a Bone Bx. ID was consulted and he was Abx were escalated from Cipro to Cefepime, Flagyl and Vanc per their recommendations.  Wound Cultures Growing Staph, Strep B, and Morganella.  8/20/19 Pending final ID recommendations, PICC line ordered.  8/21/19 ID final recommendations give, patient switched to po Augmentin and Bactrim for three weeks.  Patient hemodynamically stable and ready to discharge home with home health and outpatient podiatry follow up.

## 2019-08-18 PROBLEM — E87.1 HYPONATREMIA: Status: RESOLVED | Noted: 2019-08-16 | Resolved: 2019-08-18

## 2019-08-18 LAB
ANION GAP SERPL CALC-SCNC: 10 MMOL/L (ref 8–16)
BASOPHILS # BLD AUTO: 0.01 K/UL (ref 0–0.2)
BASOPHILS NFR BLD: 0.2 % (ref 0–1.9)
BUN SERPL-MCNC: 20 MG/DL (ref 8–23)
CALCIUM SERPL-MCNC: 8.7 MG/DL (ref 8.7–10.5)
CHLORIDE SERPL-SCNC: 108 MMOL/L (ref 95–110)
CO2 SERPL-SCNC: 20 MMOL/L (ref 23–29)
CREAT SERPL-MCNC: 1.2 MG/DL (ref 0.5–1.4)
DIFFERENTIAL METHOD: ABNORMAL
EOSINOPHIL # BLD AUTO: 0.2 K/UL (ref 0–0.5)
EOSINOPHIL NFR BLD: 2.4 % (ref 0–8)
ERYTHROCYTE [DISTWIDTH] IN BLOOD BY AUTOMATED COUNT: 13.4 % (ref 11.5–14.5)
EST. GFR  (AFRICAN AMERICAN): >60 ML/MIN/1.73 M^2
EST. GFR  (NON AFRICAN AMERICAN): >60 ML/MIN/1.73 M^2
GLUCOSE SERPL-MCNC: 131 MG/DL (ref 70–110)
HCT VFR BLD AUTO: 35 % (ref 40–54)
HGB BLD-MCNC: 11.6 G/DL (ref 14–18)
LYMPHOCYTES # BLD AUTO: 2 K/UL (ref 1–4.8)
LYMPHOCYTES NFR BLD: 32 % (ref 18–48)
MCH RBC QN AUTO: 30.1 PG (ref 27–31)
MCHC RBC AUTO-ENTMCNC: 33.1 G/DL (ref 32–36)
MCV RBC AUTO: 91 FL (ref 82–98)
MONOCYTES # BLD AUTO: 0.6 K/UL (ref 0.3–1)
MONOCYTES NFR BLD: 10 % (ref 4–15)
NEUTROPHILS # BLD AUTO: 3.5 K/UL (ref 1.8–7.7)
NEUTROPHILS NFR BLD: 55.4 % (ref 38–73)
PLATELET # BLD AUTO: 281 K/UL (ref 150–350)
PMV BLD AUTO: 8.8 FL (ref 9.2–12.9)
POCT GLUCOSE: 111 MG/DL (ref 70–110)
POCT GLUCOSE: 138 MG/DL (ref 70–110)
POCT GLUCOSE: 146 MG/DL (ref 70–110)
POCT GLUCOSE: 268 MG/DL (ref 70–110)
POTASSIUM SERPL-SCNC: 3.7 MMOL/L (ref 3.5–5.1)
RBC # BLD AUTO: 3.85 M/UL (ref 4.6–6.2)
SODIUM SERPL-SCNC: 138 MMOL/L (ref 136–145)
VANCOMYCIN TROUGH SERPL-MCNC: 11.2 UG/ML (ref 10–22)
WBC # BLD AUTO: 6.31 K/UL (ref 3.9–12.7)

## 2019-08-18 PROCEDURE — 11000001 HC ACUTE MED/SURG PRIVATE ROOM

## 2019-08-18 PROCEDURE — 85025 COMPLETE CBC W/AUTO DIFF WBC: CPT

## 2019-08-18 PROCEDURE — 25000003 PHARM REV CODE 250: Performed by: NURSE PRACTITIONER

## 2019-08-18 PROCEDURE — 99233 PR SUBSEQUENT HOSPITAL CARE,LEVL III: ICD-10-PCS | Mod: ,,, | Performed by: PODIATRIST

## 2019-08-18 PROCEDURE — 80048 BASIC METABOLIC PNL TOTAL CA: CPT

## 2019-08-18 PROCEDURE — 63600175 PHARM REV CODE 636 W HCPCS: Performed by: FAMILY MEDICINE

## 2019-08-18 PROCEDURE — 94761 N-INVAS EAR/PLS OXIMETRY MLT: CPT

## 2019-08-18 PROCEDURE — 99233 SBSQ HOSP IP/OBS HIGH 50: CPT | Mod: ,,, | Performed by: PODIATRIST

## 2019-08-18 PROCEDURE — 80202 ASSAY OF VANCOMYCIN: CPT

## 2019-08-18 PROCEDURE — S0030 INJECTION, METRONIDAZOLE: HCPCS | Performed by: NURSE PRACTITIONER

## 2019-08-18 PROCEDURE — 36415 COLL VENOUS BLD VENIPUNCTURE: CPT

## 2019-08-18 PROCEDURE — 63600175 PHARM REV CODE 636 W HCPCS: Performed by: NURSE PRACTITIONER

## 2019-08-18 RX ADMIN — ZOLPIDEM TARTRATE 10 MG: 5 TABLET ORAL at 10:08

## 2019-08-18 RX ADMIN — BENAZEPRIL HYDROCHLORIDE 5 MG: 5 TABLET, COATED ORAL at 08:08

## 2019-08-18 RX ADMIN — ATORVASTATIN CALCIUM 10 MG: 10 TABLET, FILM COATED ORAL at 08:08

## 2019-08-18 RX ADMIN — CEFEPIME 2 G: 2 INJECTION, POWDER, FOR SOLUTION INTRAVENOUS at 10:08

## 2019-08-18 RX ADMIN — CEFEPIME 2 G: 2 INJECTION, POWDER, FOR SOLUTION INTRAVENOUS at 08:08

## 2019-08-18 RX ADMIN — METRONIDAZOLE 500 MG: 500 INJECTION, SOLUTION INTRAVENOUS at 05:08

## 2019-08-18 RX ADMIN — INSULIN ASPART 3 UNITS: 100 INJECTION, SOLUTION INTRAVENOUS; SUBCUTANEOUS at 12:08

## 2019-08-18 RX ADMIN — EFAVIRENZ 600 MG: 200 CAPSULE ORAL at 10:08

## 2019-08-18 RX ADMIN — METRONIDAZOLE 500 MG: 500 INJECTION, SOLUTION INTRAVENOUS at 09:08

## 2019-08-18 RX ADMIN — FENOFIBRATE 145 MG: 145 TABLET ORAL at 08:08

## 2019-08-18 RX ADMIN — SERTRALINE HYDROCHLORIDE 25 MG: 25 TABLET ORAL at 08:08

## 2019-08-18 RX ADMIN — VANCOMYCIN HYDROCHLORIDE 2000 MG: 1 INJECTION, POWDER, LYOPHILIZED, FOR SOLUTION INTRAVENOUS at 12:08

## 2019-08-18 RX ADMIN — INSULIN ASPART 18 UNITS: 100 INJECTION, SUSPENSION SUBCUTANEOUS at 10:08

## 2019-08-18 RX ADMIN — EMTRICITABINE AND TENOFOVIR ALAFENAMIDE 1 TABLET: 200; 25 TABLET ORAL at 10:08

## 2019-08-18 RX ADMIN — METRONIDAZOLE 500 MG: 500 INJECTION, SOLUTION INTRAVENOUS at 01:08

## 2019-08-18 RX ADMIN — THERA TABS 1 TABLET: TAB at 08:08

## 2019-08-18 RX ADMIN — INSULIN ASPART 22 UNITS: 100 INJECTION, SUSPENSION SUBCUTANEOUS at 08:08

## 2019-08-18 NOTE — ASSESSMENT & PLAN NOTE
. Brandon Parson is a 68 y.o. male with left hallux ulcer with cellulitis, and positive probe to bone , suspicious for osteomyelitis  -On admission ESR: 40, CRP: 1.5, WBC WNL. CRP ESR only mildly elevated  -WBC remains WNL. Pt afebrile.  -Imaging: Xray showing arthritis.  MRI  positive for osteo in the distal 1st phalanx with possible 1st IPJ septic arthritis.  -Pt growing Morganella, Group B strep, Staph aureus, susceptibilities pending. Continue IV vanc, cefepine, flagyl per ID reccs.   -Bone biopsy pending; pt will most likely need long term IV abx  -Pending susceptibilities and bone biopsy results for final IV abx reccs from ID  .  -applied betadine wet to dry gauze dressings, nursing to do daily iodosorb dressing changes  -Continue to offload left foot  -No plans for surgical intervention at this time

## 2019-08-18 NOTE — ASSESSMENT & PLAN NOTE
Acute osteomyelitis of toe, left  Diabetic foot ulcer  WBC wnl, pt afebrile  Wound Cultures growing: Staph, Strep B, and Morganella  Vancomycin IV, Cefepime, Flagyl per ID recs  Goal Vanc Trough 15-20. Vanc increased today. Trough tomorrow.   Left 1st Toe Bone Bx and Culture by Dr. Vizcaino  Follow Bone Cultures and Wound Sensitives.   Appreciate Podiatry and ID assistance  Keep affected extremity elevated

## 2019-08-18 NOTE — PLAN OF CARE
Problem: Adult Inpatient Plan of Care  Goal: Plan of Care Review  Patient is currently on room air with acceptable O2 saturations. Will continue to monitor.

## 2019-08-18 NOTE — PROGRESS NOTES
Pharmacokinetic Assessment Follow Up: IV Vancomycin    Vancomycin serum concentration assessment(s):    The trough level was drawn correctly and can be used to guide therapy at this time. The measurement is below the desired definitive target range of 15 to 20 mcg/mL.    Vancomycin Regimen Plan:    Change regimen to Vancomycin 2000 mg IV every 24 hours with next serum trough concentration measured at 0900 prior to next dose on 8-19-19     Drug levels (last 3 results):  Recent Labs   Lab Result Units 08/18/19  0652   Vancomycin-Trough ug/mL 11.2       Pharmacy will continue to follow and monitor vancomycin.    Please contact pharmacy at extension [phone] for questions regarding this assessment.    Thank you for the consult,   Tamir Ta       Patient brief summary:  Brandon Parson is a 68 y.o. male initiated on antimicrobial therapy with IV Vancomycin for treatment of skin & soft tissue infection/Osteomyelitis    The patient's current regimen is vancomycin 1750 mg q24    Drug Allergies:   Review of patient's allergies indicates:   Allergen Reactions    Chantix [varenicline]        Actual Body Weight:   78.4 kg    Renal Function:   Estimated Creatinine Clearance: 63.8 mL/min (based on SCr of 1.2 mg/dL).,     Dialysis Method (if applicable):  N/A    CBC (last 72 hours):  Recent Labs   Lab Result Units 08/15/19  1653 08/16/19  0545 08/17/19  0450 08/18/19  0427   WBC K/uL 8.85 7.08 6.56 6.31   Hemoglobin g/dL 11.8* 11.0* 11.3* 11.6*   Hemoglobin A1C %  --  7.4*  --   --    Hematocrit % 34.8* 33.4* 34.2* 35.0*   Platelets K/uL 289 264 283 281   Gran% % 61.9 56.1 59.4 55.4   Lymph% % 28.1 31.8 28.5 32.0   Mono% % 7.7 9.3 9.8 10.0   Eosinophil% % 1.5 2.7 2.1 2.4   Basophil% % 0.3 0.1 0.2 0.2   Differential Method  Automated Automated Automated Automated       Metabolic Panel (last 72 hours):  Recent Labs   Lab Result Units 08/15/19  1653 08/16/19  0545 08/17/19  0450 08/18/19  0427   Sodium mmol/L 134* 135* 136 138    Potassium mmol/L 3.8 3.9 4.2 3.7   Chloride mmol/L 108 107 108 108   CO2 mmol/L 17* 19* 21* 20*   Glucose mg/dL 102 161* 167* 131*   BUN, Bld mg/dL 33* 26* 20 20   Creatinine mg/dL 1.3 1.4 1.2 1.2   Albumin g/dL 4.1  --   --   --    Total Bilirubin mg/dL 0.5  --   --   --    Alkaline Phosphatase U/L 24*  --   --   --    AST U/L 16  --   --   --    ALT U/L 16  --   --   --        Vancomycin Administrations:  vancomycin given in the last 96 hours                     vancomycin (VANCOCIN) 1,750 mg in dextrose 5 % 500 mL IVPB (mg) 1,750 mg New Bag 08/17/19 1246    vancomycin (VANCOCIN) 2,250 mg in dextrose 5 % 500 mL IVPB (mg) 2,250 mg New Bag 08/16/19 0913                      Microbiologic Results:  Microbiology Results (last 7 days)       Procedure Component Value Units Date/Time    Aerobic culture [[id number]]  (Abnormal) Collected:  08/16/19 0700    Order Status:  Completed Specimen:  Wound from Toe, Left Foot Updated:  08/17/19 1339     Aerobic Bacterial Culture MORGANELLA MORGANII  Moderate  Susceptibility pending  Skin tommie also present      Aerobic culture [[id number]] Collected:  08/16/19 1711    Order Status:  Completed Specimen:  Bone from Toe, Left Foot Updated:  08/17/19 0741     Aerobic Bacterial Culture No growth    Gram stain [[id number]] Collected:  08/16/19 1711    Order Status:  Completed Specimen:  Bone from Toe, Left Foot Updated:  08/16/19 2125     Gram Stain Result Rare WBC's      No organisms seen    Culture, Anaerobe [[id number]] Collected:  08/16/19 1711    Order Status:  Sent Specimen:  Bone from Toe, Left Foot Updated:  08/16/19 1906    Culture, Anaerobe [[id number]] Collected:  08/16/19 0700    Order Status:  Sent Specimen:  Wound from Toe, Left Foot Updated:  08/16/19 0943

## 2019-08-18 NOTE — SUBJECTIVE & OBJECTIVE
Interval History:   Patient is feeling well today. Not much drainage from wound. Wound cultures grew Staph, Strep B, and Morganella. Increased Vanc Dose.     Review of Systems   Constitutional: Negative for chills and fever.   Respiratory: Negative for chest tightness and shortness of breath.    Cardiovascular: Negative for chest pain, palpitations and leg swelling (Leg swelling is down).   Gastrointestinal: Negative for abdominal distention, abdominal pain, nausea and vomiting.   Musculoskeletal: Negative for arthralgias and joint swelling.   Skin: Positive for color change and wound. Negative for rash.   Neurological: Negative for weakness.   Psychiatric/Behavioral: Negative for agitation and confusion.     Objective:     Vital Signs (Most Recent):  Temp: 98 °F (36.7 °C) (08/18/19 0808)  Pulse: 69 (08/18/19 0848)  Resp: 18 (08/18/19 0848)  BP: 128/65 (08/18/19 0808)  SpO2: 97 % (08/18/19 0848) Vital Signs (24h Range):  Temp:  [96.7 °F (35.9 °C)-98.7 °F (37.1 °C)] 98 °F (36.7 °C)  Pulse:  [56-73] 69  Resp:  [18] 18  SpO2:  [97 %-99 %] 97 %  BP: (112-138)/(61-70) 128/65     Weight: 78.4 kg (172 lb 13.5 oz)  Body mass index is 23.44 kg/m².    Intake/Output Summary (Last 24 hours) at 8/18/2019 0926  Last data filed at 8/18/2019 0600  Gross per 24 hour   Intake 1245 ml   Output 525 ml   Net 720 ml      Physical Exam   Constitutional: He is oriented to person, place, and time. No distress.   Eyes: Pupils are equal, round, and reactive to light.   Neck: Normal range of motion. No JVD present.   Cardiovascular: Normal rate and regular rhythm.   Pulmonary/Chest: Effort normal and breath sounds normal. No respiratory distress.   Abdominal: Soft. Bowel sounds are normal. He exhibits no distension.   Musculoskeletal: Normal range of motion. He exhibits tenderness. He exhibits no edema.   Left Foot dressing with scant drainage   Neurological: He is alert and oriented to person, place, and time.   Skin: Skin is warm and dry.  Capillary refill takes less than 2 seconds. He is not diaphoretic.   Left Foot Dressing dry. See picture from yesterdays dressing change.    Psychiatric: He has a normal mood and affect.           Significant Labs:   Blood Culture: No results for input(s): LABBLOO in the last 48 hours.  CBC:   Recent Labs   Lab 08/17/19 0450 08/18/19  0427   WBC 6.56 6.31   HGB 11.3* 11.6*   HCT 34.2* 35.0*    281     CMP:   Recent Labs   Lab 08/17/19 0450 08/18/19  0427    138   K 4.2 3.7    108   CO2 21* 20*   * 131*   BUN 20 20   CREATININE 1.2 1.2   CALCIUM 9.0 8.7   ANIONGAP 7* 10   EGFRNONAA >60 >60     POCT Glucose:   Recent Labs   Lab 08/17/19  1557 08/17/19 2017 08/18/19  0454   POCTGLUCOSE 171* 211* 138*     Vanc Trough: 11.2    Left 1st Toe Wound Culture:  Morganella Morganii, Staph Aureus, Strep Agalactiae (B)    All pertinent labs within the past 24 hours have been reviewed.    Significant Imaging:  No New

## 2019-08-18 NOTE — PLAN OF CARE
VN cued into patients room for rounding. VN role explained and informed pt that VN will be working alongside the bedside care team throughout the day. Pt verbalized understanding that VN is available for any questions and education, and nurse and PCT will continue hourly rounding at bedside. Fall education provided. Patient denies any pain at this time. States dressing to left foot was changed 8/17. Denies any drainage or discomfort. Allotted time given for questions - all questions answered. Will continue to monitor and intervene PRN.       08/18/19 1040   Type of Frequent Check   Type Other (see comments)  (VN rounds)   Safety/Activity   Patient Rounds bed in low position;bed wheels locked;visualized patient   Safety Promotion/Fall Prevention side rails raised x 2   Positioning   Body Position positioned/repositioned independently   Head of Bed (HOB) HOB elevated   Pain/Comfort/Sleep   Preferred Pain Scale number (Numeric Rating Pain Scale)   Pain Rating (0-10): Rest 0   Pain Rating (0-10): Activity 0   Sleep/Rest/Relaxation awake

## 2019-08-18 NOTE — PROGRESS NOTES
[facility]  Podiatry  Progress Note    Patient Name: Brandon Parson  MRN: [id number]  Admission Date: 8/16/2019  Hospital Length of Stay: 2 days  Attending Physician: Bethanie Baker*  Primary Care Provider: Dawn Helm MD     Subjective:     Interval History: Patient evaluated bedside 1 day s/p bone biopsy of the left distal phalanx of the hallux. No acute distress. Denies any pain. No other complaints. Dressings to left hallux remain c/d/i. Denies nausea, vomiting, fever, chills.         Scheduled Meds:   atorvastatin  10 mg Oral Daily    benazepril  5 mg Oral Daily    ceFEPime (MAXIPIME) IVPB  2 g Intravenous Q12H    efavirenz  600 mg Oral QHS    emtricitabine-tenofovir alafen  1 tablet Oral QHS    fenofibrate  145 mg Oral Daily    insulin aspart protamine-insulin aspart  22 Units Subcutaneous Daily    insulin aspart protamine-insulin aspart  18 Units Subcutaneous QHS    metronidazole  500 mg Intravenous Q8H    multivitamin  1 tablet Oral Daily    sertraline  25 mg Oral Daily    vancomycin (VANCOCIN) IVPB (custom)  1,750 mg Intravenous Q24H    zolpidem  10 mg Oral QHS     Continuous Infusions:  PRN Meds:acetaminophen, cadexomer iodine, Dextrose 10% Bolus, Dextrose 10% Bolus, glucagon (human recombinant), glucose, glucose, insulin aspart U-100, ketorolac, ondansetron, promethazine (PHENERGAN) IVPB, ramelteon, sodium chloride 0.9%    Review of Systems   Constitutional: Negative for chills and fever.   Cardiovascular: Positive for leg swelling.   Gastrointestinal: Negative for nausea and vomiting.   Musculoskeletal: Negative for arthralgias and joint swelling.   Skin: Positive for wound. Negative for rash.   Psychiatric/Behavioral: Negative for agitation and confusion.     Objective:     Vital Signs (Most Recent):  Temp: 97.8 °F (36.6 °C) (08/18/19 0034)  Pulse: 60 (08/18/19 0034)  Resp: 18 (08/18/19 0034)  BP: 118/67 (08/18/19 0034)  SpO2: 98 % (08/18/19 0047) Vital Signs  (24h Range):  Temp:  [96.3 °F (35.7 °C)-98.7 °F (37.1 °C)] 97.8 °F (36.6 °C)  Pulse:  [60-73] 60  Resp:  [18] 18  SpO2:  [98 %-100 %] 98 %  BP: (112-138)/(61-68) 118/67     Weight: 79.4 kg (175 lb 0.7 oz)  Body mass index is 23.74 kg/m².    Foot Exam    General  Orientation: alert and oriented to person, place, and time   Affect: appropriate       Right Foot/Ankle     Inspection and Palpation  Ecchymosis: none  Tenderness: none   Swelling: none     Neurovascular  Dorsalis pedis: 2+  Posterior tibial: 2+  Saphenous nerve sensation: diminished  Tibial nerve sensation: diminished  Superficial peroneal nerve sensation: diminished  Deep peroneal nerve sensation: diminished  Sural nerve sensation: diminished      Left Foot/Ankle      Inspection and Palpation  Ecchymosis: none  Tenderness: none     Neurovascular  Dorsalis pedis: 2+  Posterior tibial: 2+  Saphenous nerve sensation: diminished  Tibial nerve sensation: diminished  Superficial peroneal nerve sensation: diminished  Deep peroneal nerve sensation: diminished  Sural nerve sensation: diminished            Laboratory:  A1C:   Recent Labs   Lab 08/16/19  0545   HGBA1C 7.4*     CBC:   Recent Labs   Lab 08/17/19  0450   WBC 6.56   RBC 3.74*   HGB 11.3*   HCT 34.2*      MCV 91   MCH 30.2   MCHC 33.0     CMP:   Recent Labs   Lab 08/15/19  1653  08/17/19  0450      < > 167*   CALCIUM 9.5   < > 9.0   ALBUMIN 4.1  --   --    PROT 8.0  --   --    *   < > 136   K 3.8   < > 4.2   CO2 17*   < > 21*      < > 108   BUN 33*   < > 20   CREATININE 1.3   < > 1.2   ALKPHOS 24*  --   --    ALT 16  --   --    AST 16  --   --    BILITOT 0.5  --   --     < > = values in this interval not displayed.     CRP:   Recent Labs   Lab 08/15/19  1653   CRP 1.54*     ESR:   Recent Labs   Lab 08/15/19  1653   SEDRATE 40*     Wound Cultures:   Recent Labs   Lab 08/15/19  1755 08/16/19  0700 08/16/19  1711   LABAERO MORGANELLA MORGANII  Moderate  Susceptibility pending  *   STAPHYLOCOCCUS AUREUS  Moderate  Susceptibility pending  *  STREPTOCOCCUS AGALACTIAE (GROUP B)  Moderate  Beta-hemolytic streptococci are routinely susceptible to   penicillins,cephalosporins and carbapenems.  Susceptibility testing not routinely performed  Skin tommie also present  * MORGANELLA MORGANII  Moderate  Susceptibility pending  Skin tommie also present  * No growth     Microbiology Results (last 7 days)     Procedure Component Value Units Date/Time    Aerobic culture [[id number]]  (Abnormal) Collected:  08/16/19 0700    Order Status:  Completed Specimen:  Wound from Toe, Left Foot Updated:  08/17/19 1339     Aerobic Bacterial Culture MORGANELLA MORGANII  Moderate  Susceptibility pending  Skin tommie also present      Aerobic culture [[id number]] Collected:  08/16/19 1711    Order Status:  Completed Specimen:  Bone from Toe, Left Foot Updated:  08/17/19 0741     Aerobic Bacterial Culture No growth    Gram stain [[id number]] Collected:  08/16/19 1711    Order Status:  Completed Specimen:  Bone from Toe, Left Foot Updated:  08/16/19 2125     Gram Stain Result Rare WBC's      No organisms seen    Culture, Anaerobe [[id number]] Collected:  08/16/19 1711    Order Status:  Sent Specimen:  Bone from Toe, Left Foot Updated:  08/16/19 1906    Culture, Anaerobe [[id number]] Collected:  08/16/19 0700    Order Status:  Sent Specimen:  Wound from Toe, Left Foot Updated:  08/16/19 0958        Specimen (12h ago, onward)    None          Diagnostic Results:  I have reviewed all pertinent imaging results/findings within the past 24 hours.   MRI left forefoot 8/16/2019:  Abnormal edema signal involving the distal 1st phalanx concerning for osteomyelitis. The proximal 1st phalanx appear spared, lower changes of a septic arthritis cannot be excluded.     Clinical Findings:  Ulcer location: left plantar hallux  Measurements:  Periwound: viable; intact;   Drainage: serous  Base:fibrogranular  Signs of infection: resolving erythema and  edema. Positive probe to bone. No malodor or purulence.     Ankle DF <10 degrees bilateral with moderate increase with knee flexion bilateral.  Decreased 1st MTPJ ROM with loading. Palpable pedal pulses.             Assessment/Plan:     Type 2 diabetes mellitus with skin complication, without long-term current use of insulin  Per primary      Chronic kidney disease, stage III (moderate)  Per primary      HIV disease  Per primary      Diabetic ulcer of toe of left foot associated with type 2 diabetes mellitus, with bone involvement without evidence of necrosis  . Brandon Parson is a 68 y.o. male with left hallux ulcer with cellulitis, and positive probe to bone , suspicious for osteomyelitis  -On admission ESR: 40, CRP: 1.5, WBC WNL. CRP ESR only mildly elevated  -WBC remains WNL. Pt afebrile.  -Imaging: Xray showing arthritis.  MRI  positive for osteo in the distal 1st phalanx with possible 1st IPJ septic arthritis.  -Pt growing Morganella, Group B strep, Staph aureus, susceptibilities pending. Continue IV vanc, cefepine, flagyl per ID reccs.   -Bone biopsy pending; pt will most likely need long term IV abx  -Pending susceptibilities and bone biopsy results for final IV abx reccs from ID  .  -applied betadine wet to dry gauze dressings, nursing to do daily iodosorb dressing changes  -Continue to offload left foot  -No plans for surgical intervention at this time      Gloria Dennis MD  Podiatry  [facility]

## 2019-08-18 NOTE — SUBJECTIVE & OBJECTIVE
Subjective:     Interval History: Patient evaluated bedside 1 day s/p bone biopsy of the left distal phalanx of the hallux. No acute distress. Denies any pain. No other complaints. Dressings to left hallux remain c/d/i. Denies nausea, vomiting, fever, chills.         Scheduled Meds:   atorvastatin  10 mg Oral Daily    benazepril  5 mg Oral Daily    ceFEPime (MAXIPIME) IVPB  2 g Intravenous Q12H    efavirenz  600 mg Oral QHS    emtricitabine-tenofovir alafen  1 tablet Oral QHS    fenofibrate  145 mg Oral Daily    insulin aspart protamine-insulin aspart  22 Units Subcutaneous Daily    insulin aspart protamine-insulin aspart  18 Units Subcutaneous QHS    metronidazole  500 mg Intravenous Q8H    multivitamin  1 tablet Oral Daily    sertraline  25 mg Oral Daily    vancomycin (VANCOCIN) IVPB (custom)  1,750 mg Intravenous Q24H    zolpidem  10 mg Oral QHS     Continuous Infusions:  PRN Meds:acetaminophen, cadexomer iodine, Dextrose 10% Bolus, Dextrose 10% Bolus, glucagon (human recombinant), glucose, glucose, insulin aspart U-100, ketorolac, ondansetron, promethazine (PHENERGAN) IVPB, ramelteon, sodium chloride 0.9%    Review of Systems   Constitutional: Negative for chills and fever.   Cardiovascular: Positive for leg swelling.   Gastrointestinal: Negative for nausea and vomiting.   Musculoskeletal: Negative for arthralgias and joint swelling.   Skin: Positive for wound. Negative for rash.   Psychiatric/Behavioral: Negative for agitation and confusion.     Objective:     Vital Signs (Most Recent):  Temp: 97.8 °F (36.6 °C) (08/18/19 0034)  Pulse: 60 (08/18/19 0034)  Resp: 18 (08/18/19 0034)  BP: 118/67 (08/18/19 0034)  SpO2: 98 % (08/18/19 0047) Vital Signs (24h Range):  Temp:  [96.3 °F (35.7 °C)-98.7 °F (37.1 °C)] 97.8 °F (36.6 °C)  Pulse:  [60-73] 60  Resp:  [18] 18  SpO2:  [98 %-100 %] 98 %  BP: (112-138)/(61-68) 118/67     Weight: 79.4 kg (175 lb 0.7 oz)  Body mass index is 23.74 kg/m².    Foot  Exam    General  Orientation: alert and oriented to person, place, and time   Affect: appropriate       Right Foot/Ankle     Inspection and Palpation  Ecchymosis: none  Tenderness: none   Swelling: none     Neurovascular  Dorsalis pedis: 2+  Posterior tibial: 2+  Saphenous nerve sensation: diminished  Tibial nerve sensation: diminished  Superficial peroneal nerve sensation: diminished  Deep peroneal nerve sensation: diminished  Sural nerve sensation: diminished      Left Foot/Ankle      Inspection and Palpation  Ecchymosis: none  Tenderness: none     Neurovascular  Dorsalis pedis: 2+  Posterior tibial: 2+  Saphenous nerve sensation: diminished  Tibial nerve sensation: diminished  Superficial peroneal nerve sensation: diminished  Deep peroneal nerve sensation: diminished  Sural nerve sensation: diminished            Laboratory:  A1C:   Recent Labs   Lab 08/16/19  0545   HGBA1C 7.4*     CBC:   Recent Labs   Lab 08/17/19  0450   WBC 6.56   RBC 3.74*   HGB 11.3*   HCT 34.2*      MCV 91   MCH 30.2   MCHC 33.0     CMP:   Recent Labs   Lab 08/15/19  1653  08/17/19  0450      < > 167*   CALCIUM 9.5   < > 9.0   ALBUMIN 4.1  --   --    PROT 8.0  --   --    *   < > 136   K 3.8   < > 4.2   CO2 17*   < > 21*      < > 108   BUN 33*   < > 20   CREATININE 1.3   < > 1.2   ALKPHOS 24*  --   --    ALT 16  --   --    AST 16  --   --    BILITOT 0.5  --   --     < > = values in this interval not displayed.     CRP:   Recent Labs   Lab 08/15/19  1653   CRP 1.54*     ESR:   Recent Labs   Lab 08/15/19  1653   SEDRATE 40*     Wound Cultures:   Recent Labs   Lab 08/15/19  1755 08/16/19  0700 08/16/19  1711   LABAERO MORGANELLA MORGANII  Moderate  Susceptibility pending  *  STAPHYLOCOCCUS AUREUS  Moderate  Susceptibility pending  *  STREPTOCOCCUS AGALACTIAE (GROUP B)  Moderate  Beta-hemolytic streptococci are routinely susceptible to   penicillins,cephalosporins and carbapenems.  Susceptibility testing not routinely  performed  Skin tommie also present  * MORGANELLA MORGANII  Moderate  Susceptibility pending  Skin tommie also present  * No growth     Microbiology Results (last 7 days)     Procedure Component Value Units Date/Time    Aerobic culture [[id number]]  (Abnormal) Collected:  08/16/19 0700    Order Status:  Completed Specimen:  Wound from Toe, Left Foot Updated:  08/17/19 1339     Aerobic Bacterial Culture MORGANELLA MORGANII  Moderate  Susceptibility pending  Skin tommie also present      Aerobic culture [[id number]] Collected:  08/16/19 1711    Order Status:  Completed Specimen:  Bone from Toe, Left Foot Updated:  08/17/19 0741     Aerobic Bacterial Culture No growth    Gram stain [[id number]] Collected:  08/16/19 1711    Order Status:  Completed Specimen:  Bone from Toe, Left Foot Updated:  08/16/19 2125     Gram Stain Result Rare WBC's      No organisms seen    Culture, Anaerobe [[id number]] Collected:  08/16/19 1711    Order Status:  Sent Specimen:  Bone from Toe, Left Foot Updated:  08/16/19 1906    Culture, Anaerobe [[id number]] Collected:  08/16/19 0700    Order Status:  Sent Specimen:  Wound from Toe, Left Foot Updated:  08/16/19 0958        Specimen (12h ago, onward)    None          Diagnostic Results:  I have reviewed all pertinent imaging results/findings within the past 24 hours.   MRI left forefoot 8/16/2019:  Abnormal edema signal involving the distal 1st phalanx concerning for osteomyelitis. The proximal 1st phalanx appear spared, lower changes of a septic arthritis cannot be excluded.     Clinical Findings:  Ulcer location: left plantar hallux  Measurements:  Periwound: viable; intact;   Drainage: serous  Base:fibrogranular  Signs of infection: resolving erythema and edema. Positive probe to bone. No malodor or purulence.     Ankle DF <10 degrees bilateral with moderate increase with knee flexion bilateral.  Decreased 1st MTPJ ROM with loading. Palpable pedal pulses.

## 2019-08-18 NOTE — PROGRESS NOTES
[facility] Hospital Medicine  Progress Note    Patient Name: Brandon Parson  MRN: [id number]  Patient Class: IP- Inpatient   Admission Date: 8/16/2019  Length of Stay: 2 days  Attending Physician: Bethanie Baker*  Primary Care Provider: Dawn Helm MD        Subjective:     Principal Problem:Cellulitis of great toe of left foot        HPI:  Brandon Parson is a 68-year-old male with a past medical history of HIV, Diabetes mellitus, Depression, GERD, and Anxiety. He lives in [place], [place]. His PCP is Dr. Dawn Helm.    He presented to [facility] ED 8/15/2019 with a complaint of cellulitis to his left great toe.  Patient states has been there for approximately 2 weeks and gotten worse with more swelling and more erythema patient states was trying to file a callus off the bottom of his toe stated now it is not healing stated now is toe is swollen and red and mildly painful. ED workup revealed Hgb (11.8), Hct (34.8), CO2 (17), BUN (33), Alk Phos (24), Sed rate (40), CRP (1.54), foot x-ray revealed Mild-to-moderate degenerative osteoarthrosis of the 1st toe and Small Achilles enthesophyte. [facility] did not feel comfortable with keeping this patient at their facility due to the fact they do not have Infectious Disease and the patient's history of diabetes and HIV. Transferred to [facility] for further management.     Overview/Hospital Course:  He was admitted to [facility] and started on Vancomycin and Ciprofloxacin as well as his home HIV medications. MRI of left foot suggestive of osteomyelitis distal 1st phalanx, and possible early changes of 1st interphalangeal joint septic arthritis  Dr. Vizcaino (Podiatry) was consulted and did a Bone Bx. ID was consulted and he was Abx were escalated from Cipro to Cefepime, Flagyl and Vanc per their recommendations.  Wound Cultures Growing Staph, Strep B, and Morganella.      Interval History:   Patient is feeling well today. Not much drainage  from wound. Wound cultures grew Staph, Strep B, and Morganella. Increased Vanc Dose.     Review of Systems   Constitutional: Negative for chills and fever.   Respiratory: Negative for chest tightness and shortness of breath.    Cardiovascular: Negative for chest pain, palpitations and leg swelling (Leg swelling is down).   Gastrointestinal: Negative for abdominal distention, abdominal pain, nausea and vomiting.   Musculoskeletal: Negative for arthralgias and joint swelling.   Skin: Positive for color change and wound. Negative for rash.   Neurological: Negative for weakness.   Psychiatric/Behavioral: Negative for agitation and confusion.     Objective:     Vital Signs (Most Recent):  Temp: 98 °F (36.7 °C) (08/18/19 0808)  Pulse: 69 (08/18/19 0848)  Resp: 18 (08/18/19 0848)  BP: 128/65 (08/18/19 0808)  SpO2: 97 % (08/18/19 0848) Vital Signs (24h Range):  Temp:  [96.7 °F (35.9 °C)-98.7 °F (37.1 °C)] 98 °F (36.7 °C)  Pulse:  [56-73] 69  Resp:  [18] 18  SpO2:  [97 %-99 %] 97 %  BP: (112-138)/(61-70) 128/65     Weight: 78.4 kg (172 lb 13.5 oz)  Body mass index is 23.44 kg/m².    Intake/Output Summary (Last 24 hours) at 8/18/2019 0926  Last data filed at 8/18/2019 0600  Gross per 24 hour   Intake 1245 ml   Output 525 ml   Net 720 ml      Physical Exam   Constitutional: He is oriented to person, place, and time. No distress.   Eyes: Pupils are equal, round, and reactive to light.   Neck: Normal range of motion. No JVD present.   Cardiovascular: Normal rate and regular rhythm.   Pulmonary/Chest: Effort normal and breath sounds normal. No respiratory distress.   Abdominal: Soft. Bowel sounds are normal. He exhibits no distension.   Musculoskeletal: Normal range of motion. He exhibits tenderness. He exhibits no edema.   Left Foot dressing with scant drainage   Neurological: He is alert and oriented to person, place, and time.   Skin: Skin is warm and dry. Capillary refill takes less than 2 seconds. He is not diaphoretic.    Left Foot Dressing dry. See picture from yesterdays dressing change.    Psychiatric: He has a normal mood and affect.           Significant Labs:   Blood Culture: No results for input(s): LABBLOO in the last 48 hours.  CBC:   Recent Labs   Lab 08/17/19  0450 08/18/19  0427   WBC 6.56 6.31   HGB 11.3* 11.6*   HCT 34.2* 35.0*    281     CMP:   Recent Labs   Lab 08/17/19  0450 08/18/19  0427    138   K 4.2 3.7    108   CO2 21* 20*   * 131*   BUN 20 20   CREATININE 1.2 1.2   CALCIUM 9.0 8.7   ANIONGAP 7* 10   EGFRNONAA >60 >60     POCT Glucose:   Recent Labs   Lab 08/17/19  1557 08/17/19 2017 08/18/19 0454   POCTGLUCOSE 171* 211* 138*     Vanc Trough: 11.2    Left 1st Toe Wound Culture:  Morganella Morganii, Staph Aureus, Strep Agalactiae (B)    All pertinent labs within the past 24 hours have been reviewed.    Significant Imaging:  No New      Assessment/Plan:      * Cellulitis of great toe of left foot  Acute osteomyelitis of toe, left  Diabetic foot ulcer  WBC wnl, pt afebrile  Wound Cultures growing: Staph, Strep B, and Morganella  Vancomycin IV, Cefepime, Flagyl per ID recs  Goal Vanc Trough 15-20. Vanc increased today. Trough tomorrow.   Left 1st Toe Bone Bx and Culture by Dr. Vizcaino  Follow Bone Cultures and Wound Sensitives.   Appreciate Podiatry and ID assistance  Keep affected extremity elevated        Type 2 diabetes mellitus with skin complication, without long-term current use of insulin  Hgb A1C (7.4). POC Glucoses at goal  Hold metformin while in-patient  Low dose SSI  Accu checks AC and HS  Insulin aspart 22 units SQ daily  Diabetic Diet        Anemia  Hgb (11.8), Hct (34.8) on admit. Stable  Continue to monitor      Chronic kidney disease, stage III (moderate)  At Baseline  Avoid Nephrotoxic agents  Strict I&Os  Continue to monitor      Depression  Anxiety  Calm on exam, continue home meds      HIV disease  Viral load unknown, follows Dr. Helm  Continue home  emtricitabine-tenofovir alafen (DESCOVY) 200-25 mg and EFAVIRENZ         VTE Risk Mitigation (From admission, onward)        Ordered     Place sequential compression device  Until discontinued      08/16/19 0342     IP VTE HIGH RISK PATIENT  Once      08/16/19 0342                Belkys Asif NP  Department of Hospital Medicine   [facility]

## 2019-08-18 NOTE — PLAN OF CARE
Problem: Adult Inpatient Plan of Care  Goal: Plan of Care Review  Outcome: Ongoing (interventions implemented as appropriate)  Patient awake and alert. Scheduled medications administered per MD order. Denies any pain, nausea, or discomfort. Antibiotic therapy in progress. Afebrile. No adverse reactions to antibiotics. Voiding without difficulty. Dressing to left foot dry, clean, and intact. Blood glucose monitored. Prn and scheduled insulin administered. Patient safety maintained. Bed in lowest position. Call light in reach. Instructed to call for assistance. Will continue to monitor.

## 2019-08-18 NOTE — PROGRESS NOTES
[facility] Infect[facility] Disease Progress Note     Primary Team: Dr. Roman  Consultant Attending: Dr. Pham  Date of Admit: 8/16/2019    Summary of history     Brandon Parson is a 68 y.o. male who  has a past medical history of Anxiety, Depression, Diabetes mellitus, GERD (gastroesophageal reflux disease), and HIV (human immunodeficiency virus infection).     Patient Admited for Left LE cellulitis. Consulted to Podiatry for Left toe wound.  Patient complains wound on plantar left hallux toe that he discovered about a month ago after he took off some callus. States he has been soaking it in epson salts and washing it with hydrogen peroxide.  Came to the ER with concerns of swelling and redness to the foot. Afebrile, WBC wnl, denies F/C/N/V/night sweats. Ulcer appearing to reach bone, MRI concerning for osteomyelitis. Pt admitted for antibiotic treatment and possible surgical management. ID consulted.     Patient confirmed history as given above. Pt with HIV on Efavirenz and Descovy(emtricitibine/tenofovir), states that the virus hasnt been detected in 4 years. Reports that this is the first foot ulcer that he has ever been aware of.    Subjective     Patient is doing well today and has no complaints. No systemic symptoms noted.     Current Medications:     Infusions:       Scheduled:   atorvastatin  10 mg Oral Daily    benazepril  5 mg Oral Daily    ceFEPime (MAXIPIME) IVPB  2 g Intravenous Q12H    efavirenz  600 mg Oral QHS    emtricitabine-tenofovir alafen  1 tablet Oral QHS    fenofibrate  145 mg Oral Daily    insulin aspart protamine-insulin aspart  22 Units Subcutaneous Daily    insulin aspart protamine-insulin aspart  18 Units Subcutaneous QHS    metronidazole  500 mg Intravenous Q8H    multivitamin  1 tablet Oral Daily    sertraline  25 mg Oral Daily    vancomycin (VANCOCIN) IVPB  2,000 mg Intravenous Q24H    zolpidem  10 mg Oral QHS        PRN:  acetaminophen, cadexomer iodine, Dextrose 10% Bolus,  Dextrose 10% Bolus, glucagon (human recombinant), glucose, glucose, insulin aspart U-100, ketorolac, ondansetron, promethazine (PHENERGAN) IVPB, ramelteon, sodium chloride 0.9%    Antibiotics and Day Number of Therapy:  Vanc -current  Zosyn 8/15, one time  Metronidazole -current  Ciprofloxacin , one time  Ceftriaxone 8/15, one time  Cefepime -current  Objective   Last 24 Hour Vital Signs:  BP  Min: 112/64  Max: 138/67  Temp  Av.9 °F (36.6 °C)  Min: 97.3 °F (36.3 °C)  Max: 98.7 °F (37.1 °C)  Pulse  Av.5  Min: 56  Max: 69  Resp  Av  Min: 18  Max: 18  SpO2  Av.2 %  Min: 97 %  Max: 99 %  Weight  Av.4 kg (172 lb 13.5 oz)  Min: 78.4 kg (172 lb 13.5 oz)  Max: 78.4 kg (172 lb 13.5 oz)    Lines, drains, airway:  Peripheral IV, right antecubital    Physical Examination:  Examination  General: well appearing, comfortable, bandaged left foot noted  HEENT: normal oral mucosa, normal dentition, conjunctiva normal, pupils normal, extraocular motion normal  Neck: no thyromegaly, no JVD   Cardiac: no murmurs, pulse regular    Pulmonary/Chest: chest clear, no respiratory distress   GI/Rectal: no organomegaly, no masses, non tender, normal bowel sounds, rectal exam deferred   : deferred   Msk: normal motor screening exam  Vascular: normal peripheral perfusion   Lymph nodes: none palpated  Skin/ Extremities: no rash, no pedal edema, no ulceration    Neurology/ Mental status: alert oriented     Lab data:  CBC:   Lab Results   Component Value Date    WBC 6.31 2019    HGB 11.6 (L) 2019     2019    MCV 91 2019    RDW 13.4 2019       Estimated Creatinine Clearance: 63.8 mL/min (based on SCr of 1.2 mg/dL).    Microbiology Data  Microbiology Results (last 7 days)     Procedure Component Value Units Date/Time    Aerobic culture [[id number]]  (Abnormal)  (Susceptibility) Collected:  19 0700    Order Status:  Completed Specimen:  Wound from Toe, Left Foot  Updated:  08/18/19 1320     Aerobic Bacterial Culture MORGANELLA MORGANII  Moderate  Skin tommie also present      Aerobic culture [[id number]] Collected:  08/16/19 1711    Order Status:  Completed Specimen:  Bone from Toe, Left Foot Updated:  08/17/19 0741     Aerobic Bacterial Culture No growth    Gram stain [[id number]] Collected:  08/16/19 1711    Order Status:  Completed Specimen:  Bone from Toe, Left Foot Updated:  08/16/19 2125     Gram Stain Result Rare WBC's      No organisms seen    Culture, Anaerobe [[id number]] Collected:  08/16/19 1711    Order Status:  Sent Specimen:  Bone from Toe, Left Foot Updated:  08/16/19 1906    Culture, Anaerobe [[id number]] Collected:  08/16/19 0700    Order Status:  Sent Specimen:  Wound from Toe, Left Foot Updated:  08/16/19 0958          Assessment     Brandon Parson is a 68 y.o.male with past medical history as above. Patient has an infection of the distal 1st phalanx of left foot, imaging suggestive of osteomyelitis and patient had a bone biopsy performed by Podiatry service on 8/16, pathology results pending. We are continuing empiric coverage cefepime, metronidazole and vancomycin.      Recommendations     Infection of left 1st toe  - Osteomyelitis vs soft tissue infection  - Continue IV vancomycin, cefepime and metronidazole  - Morganella, staph aureus and Group B strep growing thus far in aerobic left toe wound cultures from 8/15 and 8/16  - Awaiting results of bone biopsy, if determined to have bone involvement 6 weeks of antibiotics needed    Thank you for this consult  Carl Ingram  [employer] ID Fellow

## 2019-08-18 NOTE — ASSESSMENT & PLAN NOTE
Hgb A1C (7.4). POC Glucoses at goal  Hold metformin while in-patient  Low dose SSI  Accu checks AC and HS  Insulin aspart 22 units SQ daily  Diabetic Diet

## 2019-08-19 LAB
ANION GAP SERPL CALC-SCNC: 8 MMOL/L (ref 8–16)
BACTERIA SPEC AEROBE CULT: ABNORMAL
BACTERIA SPEC AEROBE CULT: NORMAL
BASOPHILS # BLD AUTO: 0.01 K/UL (ref 0–0.2)
BASOPHILS NFR BLD: 0.1 % (ref 0–1.9)
BUN SERPL-MCNC: 20 MG/DL (ref 8–23)
CALCIUM SERPL-MCNC: 8.7 MG/DL (ref 8.7–10.5)
CHLORIDE SERPL-SCNC: 111 MMOL/L (ref 95–110)
CO2 SERPL-SCNC: 19 MMOL/L (ref 23–29)
CREAT SERPL-MCNC: 1.2 MG/DL (ref 0.5–1.4)
DIFFERENTIAL METHOD: ABNORMAL
EOSINOPHIL # BLD AUTO: 0.2 K/UL (ref 0–0.5)
EOSINOPHIL NFR BLD: 2.8 % (ref 0–8)
ERYTHROCYTE [DISTWIDTH] IN BLOOD BY AUTOMATED COUNT: 13.4 % (ref 11.5–14.5)
EST. GFR  (AFRICAN AMERICAN): >60 ML/MIN/1.73 M^2
EST. GFR  (NON AFRICAN AMERICAN): >60 ML/MIN/1.73 M^2
GLUCOSE SERPL-MCNC: 89 MG/DL (ref 70–110)
HCT VFR BLD AUTO: 34.8 % (ref 40–54)
HGB BLD-MCNC: 11.5 G/DL (ref 14–18)
LYMPHOCYTES # BLD AUTO: 2.1 K/UL (ref 1–4.8)
LYMPHOCYTES NFR BLD: 30.5 % (ref 18–48)
MCH RBC QN AUTO: 30.2 PG (ref 27–31)
MCHC RBC AUTO-ENTMCNC: 33 G/DL (ref 32–36)
MCV RBC AUTO: 91 FL (ref 82–98)
MONOCYTES # BLD AUTO: 0.8 K/UL (ref 0.3–1)
MONOCYTES NFR BLD: 11.3 % (ref 4–15)
NEUTROPHILS # BLD AUTO: 3.7 K/UL (ref 1.8–7.7)
NEUTROPHILS NFR BLD: 55.3 % (ref 38–73)
PLATELET # BLD AUTO: 258 K/UL (ref 150–350)
PMV BLD AUTO: 9 FL (ref 9.2–12.9)
POCT GLUCOSE: 100 MG/DL (ref 70–110)
POCT GLUCOSE: 148 MG/DL (ref 70–110)
POCT GLUCOSE: 183 MG/DL (ref 70–110)
POCT GLUCOSE: 186 MG/DL (ref 70–110)
POTASSIUM SERPL-SCNC: 3.8 MMOL/L (ref 3.5–5.1)
RBC # BLD AUTO: 3.81 M/UL (ref 4.6–6.2)
SODIUM SERPL-SCNC: 138 MMOL/L (ref 136–145)
VANCOMYCIN TROUGH SERPL-MCNC: 11.8 UG/ML (ref 10–22)
WBC # BLD AUTO: 6.81 K/UL (ref 3.9–12.7)

## 2019-08-19 PROCEDURE — 94761 N-INVAS EAR/PLS OXIMETRY MLT: CPT

## 2019-08-19 PROCEDURE — 25000003 PHARM REV CODE 250: Performed by: NURSE PRACTITIONER

## 2019-08-19 PROCEDURE — 80048 BASIC METABOLIC PNL TOTAL CA: CPT

## 2019-08-19 PROCEDURE — 11000001 HC ACUTE MED/SURG PRIVATE ROOM

## 2019-08-19 PROCEDURE — S0030 INJECTION, METRONIDAZOLE: HCPCS | Performed by: NURSE PRACTITIONER

## 2019-08-19 PROCEDURE — 63600175 PHARM REV CODE 636 W HCPCS: Performed by: FAMILY MEDICINE

## 2019-08-19 PROCEDURE — 63600175 PHARM REV CODE 636 W HCPCS: Performed by: NURSE PRACTITIONER

## 2019-08-19 PROCEDURE — 36415 COLL VENOUS BLD VENIPUNCTURE: CPT

## 2019-08-19 PROCEDURE — 80202 ASSAY OF VANCOMYCIN: CPT

## 2019-08-19 PROCEDURE — 85025 COMPLETE CBC W/AUTO DIFF WBC: CPT

## 2019-08-19 RX ADMIN — INSULIN ASPART 18 UNITS: 100 INJECTION, SUSPENSION SUBCUTANEOUS at 09:08

## 2019-08-19 RX ADMIN — CEFEPIME 2 G: 2 INJECTION, POWDER, FOR SOLUTION INTRAVENOUS at 08:08

## 2019-08-19 RX ADMIN — BENAZEPRIL HYDROCHLORIDE 5 MG: 5 TABLET, COATED ORAL at 08:08

## 2019-08-19 RX ADMIN — ATORVASTATIN CALCIUM 10 MG: 10 TABLET, FILM COATED ORAL at 08:08

## 2019-08-19 RX ADMIN — ZOLPIDEM TARTRATE 10 MG: 5 TABLET ORAL at 09:08

## 2019-08-19 RX ADMIN — EMTRICITABINE AND TENOFOVIR ALAFENAMIDE 1 TABLET: 200; 25 TABLET ORAL at 09:08

## 2019-08-19 RX ADMIN — VANCOMYCIN HYDROCHLORIDE 2000 MG: 1 INJECTION, POWDER, LYOPHILIZED, FOR SOLUTION INTRAVENOUS at 11:08

## 2019-08-19 RX ADMIN — SERTRALINE HYDROCHLORIDE 25 MG: 25 TABLET ORAL at 08:08

## 2019-08-19 RX ADMIN — INSULIN ASPART 22 UNITS: 100 INJECTION, SUSPENSION SUBCUTANEOUS at 08:08

## 2019-08-19 RX ADMIN — THERA TABS 1 TABLET: TAB at 08:08

## 2019-08-19 RX ADMIN — EFAVIRENZ 600 MG: 200 CAPSULE ORAL at 09:08

## 2019-08-19 RX ADMIN — METRONIDAZOLE 500 MG: 500 INJECTION, SOLUTION INTRAVENOUS at 08:08

## 2019-08-19 RX ADMIN — METRONIDAZOLE 500 MG: 500 INJECTION, SOLUTION INTRAVENOUS at 12:08

## 2019-08-19 RX ADMIN — METRONIDAZOLE 500 MG: 500 INJECTION, SOLUTION INTRAVENOUS at 04:08

## 2019-08-19 RX ADMIN — CEFEPIME 2 G: 2 INJECTION, POWDER, FOR SOLUTION INTRAVENOUS at 09:08

## 2019-08-19 RX ADMIN — FENOFIBRATE 145 MG: 145 TABLET ORAL at 08:08

## 2019-08-19 NOTE — ASSESSMENT & PLAN NOTE
Hgb A1C (7.4).   Current BS 89  POC glucose checks ac meals and nightly  Low dose SSI PRN  Hypoglycemia protocol PRN  Diabetic Diet  Insulin aspart 22 units SQ daily

## 2019-08-19 NOTE — PROGRESS NOTES
"[facility] Disease Progress Note     Primary Team: Dr. Roman  Consultant Attending: Dr. Pham  Date of Admit: 8/16/2019    Summary of history   Brandon Parson is a 68 y.o. male who  has a past medical history of Anxiety, Depression, Diabetes mellitus, GERD (gastroesophageal reflux disease), and HIV (human immunodeficiency virus infection).     Patient Admited for Left LE cellulitis. Consulted to Podiatry for Left toe wound.  Patient complains wound on plantar left hallux toe that he discovered about a month ago after he took off some callus. States he has been soaking it in epson salts and washing it with hydrogen peroxide.  Came to the ER with concerns of swelling and redness to the foot. Afebrile, WBC wnl, denies F/C/N/V/night sweats. Ulcer appearing to reach bone, MRI concerning for osteomyelitis. Pt admitted for antibiotic treatment and possible surgical management. ID consulted.     Patient confirmed history as given above. Pt with HIV on Efavirenz and Descovy(emtricitibine/tenofovir), states that the virus hasnt been detected in 4 years. Reports that this is the first foot ulcer that he has ever been aware of.      Subjective     Pt seen and evaluated at 1130. Pt remains afebrile and with stable vitals. Continues to feel "great," and in usual state of health. Has no questions or complaints at this time, feels ready to go as soon as possible.   Denies any CP/SOB, N/V, F/C, HA/dizziness.  All other systems reviewed are negative.    Current Medications:     Infusions:       Scheduled:   atorvastatin  10 mg Oral Daily    benazepril  5 mg Oral Daily    ceFEPime (MAXIPIME) IVPB  2 g Intravenous Q12H    efavirenz  600 mg Oral QHS    emtricitabine-tenofovir alafen  1 tablet Oral QHS    fenofibrate  145 mg Oral Daily    insulin aspart protamine-insulin aspart  22 Units Subcutaneous Daily    insulin aspart protamine-insulin aspart  18 Units Subcutaneous QHS    metronidazole  500 mg Intravenous Q8H    " multivitamin  1 tablet Oral Daily    sertraline  25 mg Oral Daily    vancomycin (VANCOCIN) IVPB  2,000 mg Intravenous Q24H    zolpidem  10 mg Oral QHS        PRN:  acetaminophen, cadexomer iodine, Dextrose 10% Bolus, Dextrose 10% Bolus, glucagon (human recombinant), glucose, glucose, insulin aspart U-100, ondansetron, promethazine (PHENERGAN) IVPB, ramelteon, sodium chloride 0.9%    Antibiotics and Day Number of Therapy:  Vanc -current  Zosyn 8/15, one time  Metronidazole -current  Ciprofloxacin , one time  Ceftriaxone 8/15, one time  Cefepime -current    Objective   Last 24 Hour Vital Signs:  BP  Min: 116/63  Max: 135/63  Temp  Av.5 °F (36.4 °C)  Min: 97 °F (36.1 °C)  Max: 97.9 °F (36.6 °C)  Pulse  Av.7  Min: 50  Max: 101  Resp  Av.4  Min: 15  Max: 20  SpO2  Av.7 %  Min: 97 %  Max: 98 %    Lines, drains, airway:  Peripheral IV, right antecubital    Physical Examination:  Examination  General: Well-developed, well- nourished. Calm, NAD.  HEENT: Normocephalic, atraumatic. PERRL, EOMI, MMM.  Neck: Full ROM, supple. No lymphadenopathy  Cardiac: RRR, no MRG  Pulmonary/Chest: CTAB, NLB. No w/r/r  GI/ Rectal: BS+ in all quadrants. NTND.   Msk: Normal ROM. No arthralgia or myalgia.  Lymph nodes: None palpable  Skin/ Extremities: Ulcer to left planar hallux, hyperkeratotic borders with minimal serosanguinous drainage but no purulence noted. Per podiatry note, probes to bone.  Neurology/Psych: AaOx3, mood/behavior normal    Lab data:  CBC:   Lab Results   Component Value Date    WBC 6.81 2019    HGB 11.5 (L) 2019     2019    MCV 91 2019    RDW 13.4 2019       Estimated Creatinine Clearance: 63.8 mL/min (based on SCr of 1.2 mg/dL).    Microbiology Data  Microbiology Results (last 7 days)     Procedure Component Value Units Date/Time    Aerobic culture [[id number]]  (Abnormal)  (Susceptibility) Collected:  19 0700    Order Status:  Completed  Specimen:  Wound from Toe, Left Foot Updated:  08/19/19 1105     Aerobic Bacterial Culture MORGANELLA MORGANII  Moderate        STAPHYLOCOCCUS AUREUS  Many  Susceptibility pending        ENTEROCOCCUS SPECIES  Many  Identification and susceptibility pending  Skin tommie also present      Aerobic culture [[id number]] Collected:  08/16/19 1711    Order Status:  Completed Specimen:  Bone from Toe, Left Foot Updated:  08/19/19 1029     Aerobic Bacterial Culture Skin tommie,  no predominant organism    Gram stain [[id number]] Collected:  08/16/19 1711    Order Status:  Completed Specimen:  Bone from Toe, Left Foot Updated:  08/16/19 2125     Gram Stain Result Rare WBC's      No organisms seen    Culture, Anaerobe [[id number]] Collected:  08/16/19 1711    Order Status:  Sent Specimen:  Bone from Toe, Left Foot Updated:  08/16/19 1906    Culture, Anaerobe [[id number]] Collected:  08/16/19 0700    Order Status:  Sent Specimen:  Wound from Toe, Left Foot Updated:  08/16/19 0958        Susceptibility      Morganella morganii Staphylococcus aureus     CULTURE, AEROBIC  (SPECIFY SOURCE) CULTURE, AEROBIC  (SPECIFY SOURCE)     Amp/Sulbactam >16/8 mcg/mL Resistant       Cefepime <=8 mcg/mL Sensitive       Ceftriaxone <=8 mcg/mL Sensitive       Ciprofloxacin <=1 mcg/mL Sensitive       Clindamycin   <=0.5 mcg/mL Sensitive     Erythromycin   <=0.5 mcg/mL Sensitive     Gentamicin <=4 mcg/mL Sensitive       Levofloxacin <=2 mcg/mL Sensitive       Oxacillin   0.5 mcg/mL Sensitive     Penicillin   2 mcg/mL Resistant     Piperacillin/Tazo <=16 mcg/mL Sensitive       Tetracycline   <=4 mcg/mL Sensitive     Tobramycin <=4 mcg/mL Sensitive       Trimeth/Sulfa <=2/38 mcg/mL Sensitive <=0.5/9.5 m... Sensitive        Other Results:  Xray foot 8/15  1. Mild-to-moderate degenerative osteoarthrosis of the 1st toe.  2. Small Achilles enthesophyte.     MRI foot 8/16  Examination suggestive of osteomyelitis distal 1st phalanx, early changes of 1st  interphalangeal joint septic arthritis not excluded.  Small soft tissue defect plantar aspect of the 1st toe.    Assessment     Brandon Parson is a 68 y.o.male with past medical history as above. Patient has an infection of the distal 1st phalanx of left foot, imaging suggestive of osteomyelitis and patient had a bone biopsy performed by Podiatry service on 8/16, pathology results pending. We are continuing empiric coverage cefepime, metronidazole and vancomycin.     Recommendations     - Bone cultures NGTD at three days  - Wound cultures now growing Morganella morganii, Staph aureus, Group B Strep, and enterococcus (speciation and susceptibilities pending)  - Will give final recs pending remaining susceptibilities  - Will likely be recommending two-week course of antibiotics with close followup and potential for extension to 6 weeks should bone cultures reveal any organisms    Thank you for this consult. We will follow.    Rickey Mason Jr., MD  \[facility]\"" IM Intern, PGY1

## 2019-08-19 NOTE — PLAN OF CARE
Problem: Adult Inpatient Plan of Care  Goal: Plan of Care Review  Outcome: Ongoing (interventions implemented as appropriate)  Patient AAOx4, VSS, Blood glucose monitored throughout the day.  Patient denies any pain. Patient tolerating diet, no nausea or vomiting. Patient tolerating activity, free from falls. Dressing changed on L foot per MD order. Clean, dry, and intact. Patient in NAD. Receiving IV antibiotics per MD order. Call bell in reach. Safety maintained, will continue to monitor.     Problem: Diabetes Comorbidity  Goal: Blood Glucose Level Within Desired Range  Outcome: Ongoing (interventions implemented as appropriate)       Problem: Pain Acute  Goal: Optimal Pain Control  Outcome: Ongoing (interventions implemented as appropriate)       Problem: Fall Injury Risk  Goal: Absence of Fall and Fall-Related Injury  Outcome: Ongoing (interventions implemented as appropriate)       Problem: Wound  Goal: Optimal Wound Healing  Outcome: Ongoing (interventions implemented as appropriate)       Problem: Infection  Goal: Infection Symptom Resolution  Outcome: Ongoing (interventions implemented as appropriate)

## 2019-08-19 NOTE — PROGRESS NOTES
Pharmacokinetic Assessment Follow Up: IV Vancomycin    Vancomycin Regimen Plan:    Vancomycin trough ordered this morning was only afterone  dose of 2000mg thus cannot use to guide therapy appropriateness at this time.  Continue regimen to Vancomycin 2000 mg IV every 24 hours with next serum trough concentration measured at 0930 prior to 4th dose on 8/21     Drug levels (last 3 results):  Recent Labs   Lab Result Units 08/18/19  0652 08/19/19  0921   Vancomycin-Trough ug/mL 11.2 11.8       Pharmacy will continue to follow and monitor vancomycin.    Please contact pharmacy at extension [phone] for questions regarding this assessment.    Thank you for the consult,   Liana Murray       Patient brief summary:  Brandon Parson is a 68 y.o. male initiated on antimicrobial therapy with IV Vancomycin for treatment of skin & soft tissue infection    The patient's current regimen is 8042    Drug Allergies:   Review of patient's allergies indicates:   Allergen Reactions    Chantix [varenicline]        Actual Body Weight:   78kg    Renal Function:   Estimated Creatinine Clearance: 63.8 mL/min (based on SCr of 1.2 mg/dL).,     Dialysis Method (if applicable):  N/A    CBC (last 72 hours):  Recent Labs   Lab Result Units 08/17/19  0450 08/18/19  0427 08/19/19  0436   WBC K/uL 6.56 6.31 6.81   Hemoglobin g/dL 11.3* 11.6* 11.5*   Hematocrit % 34.2* 35.0* 34.8*   Platelets K/uL 283 281 258   Gran% % 59.4 55.4 55.3   Lymph% % 28.5 32.0 30.5   Mono% % 9.8 10.0 11.3   Eosinophil% % 2.1 2.4 2.8   Basophil% % 0.2 0.2 0.1   Differential Method  Automated Automated Automated       Metabolic Panel (last 72 hours):  Recent Labs   Lab Result Units 08/17/19  0450 08/18/19  0427 08/19/19  0436   Sodium mmol/L 136 138 138   Potassium mmol/L 4.2 3.7 3.8   Chloride mmol/L 108 108 111*   CO2 mmol/L 21* 20* 19*   Glucose mg/dL 167* 131* 89   BUN, Bld mg/dL 20 20 20   Creatinine mg/dL 1.2 1.2 1.2       Vancomycin Administrations:  vancomycin given in the  last 96 hours                     vancomycin (VANCOCIN) 2,000 mg in dextrose 5 % 500 mL IVPB (mg) 2,000 mg New Bag 08/19/19 1130     2,000 mg New Bag 08/18/19 1219    vancomycin (VANCOCIN) 1,750 mg in dextrose 5 % 500 mL IVPB (mg) 1,750 mg New Bag 08/17/19 1246                      Microbiologic Results:  Microbiology Results (last 7 days)       Procedure Component Value Units Date/Time    Aerobic culture [[id number]]  (Abnormal)  (Susceptibility) Collected:  08/16/19 0700    Order Status:  Completed Specimen:  Wound from Toe, Left Foot Updated:  08/19/19 1105     Aerobic Bacterial Culture MORGANELLA MORGANII  Moderate        STAPHYLOCOCCUS AUREUS  Many  Susceptibility pending        ENTEROCOCCUS SPECIES  Many  Identification and susceptibility pending  Skin tommie also present      Aerobic culture [[id number]] Collected:  08/16/19 1711    Order Status:  Completed Specimen:  Bone from Toe, Left Foot Updated:  08/19/19 1029     Aerobic Bacterial Culture Skin tommie,  no predominant organism    Gram stain [[id number]] Collected:  08/16/19 1711    Order Status:  Completed Specimen:  Bone from Toe, Left Foot Updated:  08/16/19 2125     Gram Stain Result Rare WBC's      No organisms seen    Culture, Anaerobe [[id number]] Collected:  08/16/19 1711    Order Status:  Sent Specimen:  Bone from Toe, Left Foot Updated:  08/16/19 1906    Culture, Anaerobe [[id number]] Collected:  08/16/19 0700    Order Status:  Sent Specimen:  Wound from Toe, Left Foot Updated:  08/16/19 0997

## 2019-08-19 NOTE — PLAN OF CARE
Problem: Adult Inpatient Plan of Care  Goal: Plan of Care Review  Outcome: Ongoing (interventions implemented as appropriate)  Patient awake and alert.  No complaints of pain, numbness, or tingling of the feet.  Patient was able to ambulate to the bathroom independently. Was given 2 units of sliding scale insulin x1 due to blood sugar being high.

## 2019-08-19 NOTE — SUBJECTIVE & OBJECTIVE
Interval History: Pleasant, no complaints or concerns    Review of Systems   Constitutional: Negative for chills and fever.   HENT: Negative for congestion, postnasal drip and rhinorrhea.    Eyes: Negative for visual disturbance.   Respiratory: Negative for shortness of breath.    Cardiovascular: Negative for chest pain.   Gastrointestinal: Negative for abdominal distention and abdominal pain.   Genitourinary: Negative for difficulty urinating.   Musculoskeletal: Negative for arthralgias and myalgias.   Skin: Positive for wound.   Neurological: Negative for headaches.   Psychiatric/Behavioral: Negative for agitation.     Objective:     Vital Signs (Most Recent):  Temp: 97.7 °F (36.5 °C) (08/19/19 1157)  Pulse: (!) 50 (08/19/19 1157)  Resp: 16 (08/19/19 1157)  BP: 120/88 (08/19/19 1157)  SpO2: 97 % (08/19/19 1209) Vital Signs (24h Range):  Temp:  [97 °F (36.1 °C)-97.9 °F (36.6 °C)] 97.7 °F (36.5 °C)  Pulse:  [] 50  Resp:  [15-20] 16  SpO2:  [97 %-98 %] 97 %  BP: (116-135)/(63-88) 120/88     Weight: 78.4 kg (172 lb 13.5 oz)  Body mass index is 23.44 kg/m².    Intake/Output Summary (Last 24 hours) at 8/19/2019 1340  Last data filed at 8/18/2019 1900  Gross per 24 hour   Intake 855 ml   Output 150 ml   Net 705 ml      Physical Exam   Constitutional: He is oriented to person, place, and time. He appears well-developed and well-nourished.   HENT:   Head: Normocephalic.   Eyes: Pupils are equal, round, and reactive to light. EOM are normal.   Neck: Normal range of motion. Neck supple.   Cardiovascular: Normal rate.   Pulmonary/Chest: Effort normal and breath sounds normal.   Abdominal: Soft. Bowel sounds are normal.   Musculoskeletal: Normal range of motion.   Neurological: He is alert and oriented to person, place, and time.   Skin: Skin is warm and dry.   Psychiatric: He has a normal mood and affect.       Significant Labs:   Blood Culture: No results for input(s): LABBLOO in the last 48 hours.  BMP:   Recent Labs    Lab 08/19/19  0436   GLU 89      K 3.8   *   CO2 19*   BUN 20   CREATININE 1.2   CALCIUM 8.7     CBC:   Recent Labs   Lab 08/18/19 0427 08/19/19  0436   WBC 6.31 6.81   HGB 11.6* 11.5*   HCT 35.0* 34.8*    258     CMP:   Recent Labs   Lab 08/18/19 0427 08/19/19  0436    138   K 3.7 3.8    111*   CO2 20* 19*   * 89   BUN 20 20   CREATININE 1.2 1.2   CALCIUM 8.7 8.7   ANIONGAP 10 8   EGFRNONAA >60 >60     Significant Imaging: I have reviewed all pertinent imaging results/findings within the past 24 hours.

## 2019-08-19 NOTE — PLAN OF CARE
TN met with patient for continued discharge planning, pt will need home IV abx upon discharge, pt aware,  [facility] on board and have accepted patient. Bone bx results pending. Awaiting final rec from I/D.       08/19/19 4921   Discharge Reassessment   Assessment Type Discharge Planning Reassessment   Provided patient/caregiver education on the expected discharge date and the discharge plan Yes   Do you have any problems affording any of your prescribed medications? TBD   Discharge Plan A Home Health;Home   Discharge Plan B Home Health;Home   DME Needed Upon Discharge  none   Patient choice form signed by patient/caregiver No   Anticipated Discharge Disposition Home-Health

## 2019-08-19 NOTE — PROGRESS NOTES
[facility] Hospital Medicine  Progress Note    Patient Name: Brandon Parson  MRN: [id number]  Patient Class: IP- Inpatient   Admission Date: 8/16/2019  Length of Stay: 3 days  Attending Physician: Bethanie Baker*  Primary Care Provider: Dawn Helm MD        Subjective:     Principal Problem:Cellulitis of great toe of left foot        HPI:  Brandon Parson is a 68-year-old male with a past medical history of HIV, Diabetes mellitus, Depression, GERD, and Anxiety. He lives in [place], [place]. His PCP is Dr. Dawn Helm.    He presented to [facility] ED 8/15/2019 with a complaint of cellulitis to his left great toe.  Patient states has been there for approximately 2 weeks and gotten worse with more swelling and more erythema patient states was trying to file a callus off the bottom of his toe stated now it is not healing stated now is toe is swollen and red and mildly painful. ED workup revealed Hgb (11.8), Hct (34.8), CO2 (17), BUN (33), Alk Phos (24), Sed rate (40), CRP (1.54), foot x-ray revealed Mild-to-moderate degenerative osteoarthrosis of the 1st toe and Small Achilles enthesophyte. [facility] did not feel comfortable with keeping this patient at their facility due to the fact they do not have Infectious Disease and the patient's history of diabetes and HIV. Transferred to [facility] for further management.     Overview/Hospital Course:  He was admitted to [facility] and started on Vancomycin and Ciprofloxacin as well as his home HIV medications. MRI of left foot suggestive of osteomyelitis distal 1st phalanx, and possible early changes of 1st interphalangeal joint septic arthritis  Dr. Vizcaino (Podiatry) was consulted and did a Bone Bx. ID was consulted and he was Abx were escalated from Cipro to Cefepime, Flagyl and Vanc per their recommendations.  Wound Cultures Growing Staph, Strep B, and Morganella.  8/19/19 Bone biopsy pending; pt will most likely need long term IV abx.   Pending bone biopsy results for final IV abx recs from ID.    Interval History: Pleasant, no complaints or concerns    Review of Systems   Constitutional: Negative for chills and fever.   HENT: Negative for congestion, postnasal drip and rhinorrhea.    Eyes: Negative for visual disturbance.   Respiratory: Negative for shortness of breath.    Cardiovascular: Negative for chest pain.   Gastrointestinal: Negative for abdominal distention and abdominal pain.   Genitourinary: Negative for difficulty urinating.   Musculoskeletal: Negative for arthralgias and myalgias.   Skin: Positive for wound.   Neurological: Negative for headaches.   Psychiatric/Behavioral: Negative for agitation.     Objective:     Vital Signs (Most Recent):  Temp: 97.7 °F (36.5 °C) (08/19/19 1157)  Pulse: (!) 50 (08/19/19 1157)  Resp: 16 (08/19/19 1157)  BP: 120/88 (08/19/19 1157)  SpO2: 97 % (08/19/19 1209) Vital Signs (24h Range):  Temp:  [97 °F (36.1 °C)-97.9 °F (36.6 °C)] 97.7 °F (36.5 °C)  Pulse:  [] 50  Resp:  [15-20] 16  SpO2:  [97 %-98 %] 97 %  BP: (116-135)/(63-88) 120/88     Weight: 78.4 kg (172 lb 13.5 oz)  Body mass index is 23.44 kg/m².    Intake/Output Summary (Last 24 hours) at 8/19/2019 1340  Last data filed at 8/18/2019 1900  Gross per 24 hour   Intake 855 ml   Output 150 ml   Net 705 ml      Physical Exam   Constitutional: He is oriented to person, place, and time. He appears well-developed and well-nourished.   HENT:   Head: Normocephalic.   Eyes: Pupils are equal, round, and reactive to light. EOM are normal.   Neck: Normal range of motion. Neck supple.   Cardiovascular: Normal rate.   Pulmonary/Chest: Effort normal and breath sounds normal.   Abdominal: Soft. Bowel sounds are normal.   Musculoskeletal: Normal range of motion.   Neurological: He is alert and oriented to person, place, and time.   Skin: Skin is warm and dry.   Psychiatric: He has a normal mood and affect.       Significant Labs:   Blood Culture: No results  for input(s): LABBLOO in the last 48 hours.  BMP:   Recent Labs   Lab 08/19/19  0436   GLU 89      K 3.8   *   CO2 19*   BUN 20   CREATININE 1.2   CALCIUM 8.7     CBC:   Recent Labs   Lab 08/18/19  0427 08/19/19 0436   WBC 6.31 6.81   HGB 11.6* 11.5*   HCT 35.0* 34.8*    258     CMP:   Recent Labs   Lab 08/18/19  0427 08/19/19  0436    138   K 3.7 3.8    111*   CO2 20* 19*   * 89   BUN 20 20   CREATININE 1.2 1.2   CALCIUM 8.7 8.7   ANIONGAP 10 8   EGFRNONAA >60 >60     Significant Imaging: I have reviewed all pertinent imaging results/findings within the past 24 hours.      Assessment/Plan:      * Cellulitis of great toe of left foot  Acute osteomyelitis of toe, left  Diabetic foot ulcer  WBC wnl, pt afebrile  Wound Cultures growing: Staph, Strep B, and Morganella  Vancomycin IV, Cefepime, Flagyl per ID recs  Left 1st Toe Bone Bx and Culture by Dr. Vizcaino  Follow Bone Cultures and Wound Sensitives, pending ID final recommendations.   Appreciate Podiatry and ID assistance  Keep affected extremity elevated        Type 2 diabetes mellitus with skin complication, without long-term current use of insulin  Hgb A1C (7.4).   Current BS 89  POC glucose checks ac meals and nightly  Low dose SSI PRN  Hypoglycemia protocol PRN  Diabetic Diet  Insulin aspart 22 units SQ daily          Anemia  Hgb 11.5, stable, monitor  Continue to monitor      Chronic kidney disease, stage III (moderate)  At Baseline  Avoid Nephrotoxic agents  Renally dose medications  Strict I&Os  Continue to monitor      Depression  Anxiety  Calm on exam, continue home meds      HIV disease  Viral load unknown, follows Dr. Helm  Continue home emtricitabine-tenofovir alafen (DESCOVY) 200-25 mg and EFAVIRENZ         VTE Risk Mitigation (From admission, onward)        Ordered     Place sequential compression device  Until discontinued      08/16/19 0342     IP VTE HIGH RISK PATIENT  Once      08/16/19 0342                 Laverne Beth NP  Department of Hospital Medicine   [facility]

## 2019-08-19 NOTE — PLAN OF CARE
Home IV abx referral sent to [email or web address] home infusion; TN also spoke with Merritt from [email or web address] regarding pt referral.       08/19/19 1251   Post-Acute Status   Post-Acute Authorization Medications   Medication Status Rx fund Referral

## 2019-08-19 NOTE — NURSING
VN cued into room for rounding, VIP model explained, VN working alongside bedside treatment team. Pt sitting up in bed, IV fluids infusing. Plan of care reviewed with patient. Patient verbalized understanding. Pt informed of fall risk and fall precautions, call light within reach, 2x bed rails, pt wearing slip resistant sock to Right foot, Left foot dressing clean, BLE elevated. Patient notified to ask staff for assistance and pt verbalized complete understanding. Will continue to monitor as needed.

## 2019-08-19 NOTE — PLAN OF CARE
Problem: Adult Inpatient Plan of Care  Goal: Plan of Care Review  Outcome: Ongoing (interventions implemented as appropriate)  Pt on RA with documented sats.  Will continue to monitor.  \

## 2019-08-19 NOTE — PLAN OF CARE
Problem: Adult Inpatient Plan of Care  Goal: Plan of Care Review  Outcome: Ongoing (interventions implemented as appropriate)  Pt vitals were maintained, pt gets up with minimal assist, pt tolerated IV antibiotics well. Pt denied any pain. Mo other complaints from pt

## 2019-08-19 NOTE — ASSESSMENT & PLAN NOTE
At Baseline  Avoid Nephrotoxic agents  Renally dose medications  Strict I&Os  Continue to monitor

## 2019-08-19 NOTE — ASSESSMENT & PLAN NOTE
Acute osteomyelitis of toe, left  Diabetic foot ulcer  WBC wnl, pt afebrile  Wound Cultures growing: Staph, Strep B, and Morganella  Vancomycin IV, Cefepime, Flagyl per ID recs  Left 1st Toe Bone Bx and Culture by Dr. Vizcaino  Follow Bone Cultures and Wound Sensitives, pending ID final recommendations.   Appreciate Podiatry and ID assistance  Keep affected extremity elevated

## 2019-08-20 LAB
ANION GAP SERPL CALC-SCNC: 9 MMOL/L (ref 8–16)
BACTERIA SPEC AEROBE CULT: ABNORMAL
BACTERIA SPEC ANAEROBE CULT: ABNORMAL
BACTERIA SPEC ANAEROBE CULT: NORMAL
BASOPHILS # BLD AUTO: 0.02 K/UL (ref 0–0.2)
BASOPHILS NFR BLD: 0.3 % (ref 0–1.9)
BUN SERPL-MCNC: 19 MG/DL (ref 8–23)
CALCIUM SERPL-MCNC: 8.8 MG/DL (ref 8.7–10.5)
CHLORIDE SERPL-SCNC: 110 MMOL/L (ref 95–110)
CO2 SERPL-SCNC: 19 MMOL/L (ref 23–29)
CREAT SERPL-MCNC: 1.1 MG/DL (ref 0.5–1.4)
DIFFERENTIAL METHOD: ABNORMAL
EOSINOPHIL # BLD AUTO: 0.2 K/UL (ref 0–0.5)
EOSINOPHIL NFR BLD: 2.8 % (ref 0–8)
ERYTHROCYTE [DISTWIDTH] IN BLOOD BY AUTOMATED COUNT: 13.4 % (ref 11.5–14.5)
EST. GFR  (AFRICAN AMERICAN): >60 ML/MIN/1.73 M^2
EST. GFR  (NON AFRICAN AMERICAN): >60 ML/MIN/1.73 M^2
GLUCOSE SERPL-MCNC: 149 MG/DL (ref 70–110)
HCT VFR BLD AUTO: 35 % (ref 40–54)
HGB BLD-MCNC: 11.5 G/DL (ref 14–18)
LYMPHOCYTES # BLD AUTO: 1.7 K/UL (ref 1–4.8)
LYMPHOCYTES NFR BLD: 27.7 % (ref 18–48)
MCH RBC QN AUTO: 30.2 PG (ref 27–31)
MCHC RBC AUTO-ENTMCNC: 32.9 G/DL (ref 32–36)
MCV RBC AUTO: 92 FL (ref 82–98)
MONOCYTES # BLD AUTO: 0.7 K/UL (ref 0.3–1)
MONOCYTES NFR BLD: 11.4 % (ref 4–15)
NEUTROPHILS # BLD AUTO: 3.5 K/UL (ref 1.8–7.7)
NEUTROPHILS NFR BLD: 57.8 % (ref 38–73)
PLATELET # BLD AUTO: 260 K/UL (ref 150–350)
PMV BLD AUTO: 9 FL (ref 9.2–12.9)
POCT GLUCOSE: 142 MG/DL (ref 70–110)
POCT GLUCOSE: 193 MG/DL (ref 70–110)
POCT GLUCOSE: 196 MG/DL (ref 70–110)
POCT GLUCOSE: 88 MG/DL (ref 70–110)
POTASSIUM SERPL-SCNC: 3.9 MMOL/L (ref 3.5–5.1)
RBC # BLD AUTO: 3.81 M/UL (ref 4.6–6.2)
SODIUM SERPL-SCNC: 138 MMOL/L (ref 136–145)
WBC # BLD AUTO: 6.13 K/UL (ref 3.9–12.7)

## 2019-08-20 PROCEDURE — 63600175 PHARM REV CODE 636 W HCPCS: Performed by: NURSE PRACTITIONER

## 2019-08-20 PROCEDURE — 85025 COMPLETE CBC W/AUTO DIFF WBC: CPT

## 2019-08-20 PROCEDURE — 11000001 HC ACUTE MED/SURG PRIVATE ROOM

## 2019-08-20 PROCEDURE — 25000003 PHARM REV CODE 250: Performed by: NURSE PRACTITIONER

## 2019-08-20 PROCEDURE — 63600175 PHARM REV CODE 636 W HCPCS: Performed by: FAMILY MEDICINE

## 2019-08-20 PROCEDURE — S0030 INJECTION, METRONIDAZOLE: HCPCS | Performed by: NURSE PRACTITIONER

## 2019-08-20 PROCEDURE — 36415 COLL VENOUS BLD VENIPUNCTURE: CPT

## 2019-08-20 PROCEDURE — 80048 BASIC METABOLIC PNL TOTAL CA: CPT

## 2019-08-20 PROCEDURE — 25000003 PHARM REV CODE 250: Performed by: HOSPITALIST

## 2019-08-20 PROCEDURE — 94761 N-INVAS EAR/PLS OXIMETRY MLT: CPT

## 2019-08-20 RX ORDER — AMOXICILLIN AND CLAVULANATE POTASSIUM 875; 125 MG/1; MG/1
1 TABLET, FILM COATED ORAL EVERY 12 HOURS
Status: DISCONTINUED | OUTPATIENT
Start: 2019-08-20 | End: 2019-08-21 | Stop reason: HOSPADM

## 2019-08-20 RX ORDER — SULFAMETHOXAZOLE AND TRIMETHOPRIM 800; 160 MG/1; MG/1
1 TABLET ORAL 2 TIMES DAILY
Status: DISCONTINUED | OUTPATIENT
Start: 2019-08-20 | End: 2019-08-21 | Stop reason: HOSPADM

## 2019-08-20 RX ADMIN — THERA TABS 1 TABLET: TAB at 09:08

## 2019-08-20 RX ADMIN — SULFAMETHOXAZOLE AND TRIMETHOPRIM 1 TABLET: 800; 160 TABLET ORAL at 10:08

## 2019-08-20 RX ADMIN — AMOXICILLIN AND CLAVULANATE POTASSIUM 1 TABLET: 875; 125 TABLET, FILM COATED ORAL at 10:08

## 2019-08-20 RX ADMIN — CEFEPIME 2 G: 2 INJECTION, POWDER, FOR SOLUTION INTRAVENOUS at 10:08

## 2019-08-20 RX ADMIN — ZOLPIDEM TARTRATE 10 MG: 5 TABLET ORAL at 10:08

## 2019-08-20 RX ADMIN — ATORVASTATIN CALCIUM 10 MG: 10 TABLET, FILM COATED ORAL at 09:08

## 2019-08-20 RX ADMIN — VANCOMYCIN HYDROCHLORIDE 2000 MG: 1 INJECTION, POWDER, LYOPHILIZED, FOR SOLUTION INTRAVENOUS at 12:08

## 2019-08-20 RX ADMIN — METRONIDAZOLE 500 MG: 500 INJECTION, SOLUTION INTRAVENOUS at 01:08

## 2019-08-20 RX ADMIN — BENAZEPRIL HYDROCHLORIDE 5 MG: 5 TABLET, COATED ORAL at 12:08

## 2019-08-20 RX ADMIN — METRONIDAZOLE 500 MG: 500 INJECTION, SOLUTION INTRAVENOUS at 05:08

## 2019-08-20 RX ADMIN — METRONIDAZOLE 500 MG: 500 INJECTION, SOLUTION INTRAVENOUS at 09:08

## 2019-08-20 RX ADMIN — INSULIN ASPART 22 UNITS: 100 INJECTION, SUSPENSION SUBCUTANEOUS at 12:08

## 2019-08-20 RX ADMIN — EMTRICITABINE AND TENOFOVIR ALAFENAMIDE 1 TABLET: 200; 25 TABLET ORAL at 10:08

## 2019-08-20 RX ADMIN — FENOFIBRATE 145 MG: 145 TABLET ORAL at 09:08

## 2019-08-20 RX ADMIN — SERTRALINE HYDROCHLORIDE 25 MG: 25 TABLET ORAL at 09:08

## 2019-08-20 RX ADMIN — EFAVIRENZ 600 MG: 200 CAPSULE ORAL at 10:08

## 2019-08-20 NOTE — PLAN OF CARE
TN met with pt for continued discharging, still awaiting final ID recs for possible home IV abx. Pt still agreeable to home IV abx if recommended. [facility] home infusion still on board.

## 2019-08-20 NOTE — PROGRESS NOTES
Brief ID Plan of Care Note:  - Bone cultures final with NGTD, path specimen seemingly not sent  - Podiatry with low suspicion for osteo  - Wound cultures now growing Morganella morganii, MSSA, Group B Strep, Enterococcus faecalis, and Bacteroides vulgatus  - Have reviewed all sensitivities for above  - Final recommendations are to discharge on a three-week course of the following:   Augmentin 875-125 mg, 1 tablet PO BID   Bactrim-DS (800-160mg), 1 tablet PO BID  - ID is signing off, please call with any questions.    Thank you for this consult.    Rickey Mason Jr., MD  [employer] IM Intern, PGY1

## 2019-08-20 NOTE — SUBJECTIVE & OBJECTIVE
Interval History: Resting in bed, no complaints or concerns.    Review of Systems   Constitutional: Negative for chills and fever.   HENT: Negative for congestion, postnasal drip and rhinorrhea.    Respiratory: Negative for cough and shortness of breath.    Cardiovascular: Negative for chest pain.   Gastrointestinal: Negative for abdominal distention and abdominal pain.   Genitourinary: Negative for difficulty urinating.   Musculoskeletal: Negative for arthralgias and myalgias.   Skin: Negative for color change.   Neurological: Negative for weakness, light-headedness and headaches.   Hematological: Does not bruise/bleed easily.   Psychiatric/Behavioral: Negative for agitation.     Objective:     Vital Signs (Most Recent):  Temp: 98 °F (36.7 °C) (08/20/19 1223)  Pulse: (!) 53 (08/20/19 1223)  Resp: 18 (08/20/19 1223)  BP: 120/65 (08/20/19 1223)  SpO2: 99 % (08/20/19 1249) Vital Signs (24h Range):  Temp:  [97.4 °F (36.3 °C)-98.1 °F (36.7 °C)] 98 °F (36.7 °C)  Pulse:  [49-60] 53  Resp:  [16-18] 18  SpO2:  [97 %-100 %] 99 %  BP: (110-129)/(58-70) 120/65     Weight: 75.7 kg (166 lb 14.2 oz)  Body mass index is 22.63 kg/m².    Intake/Output Summary (Last 24 hours) at 8/20/2019 1357  Last data filed at 8/20/2019 1200  Gross per 24 hour   Intake 1360 ml   Output 1175 ml   Net 185 ml      Physical Exam   Constitutional: He is oriented to person, place, and time. He appears well-developed and well-nourished.   HENT:   Head: Normocephalic and atraumatic.   Eyes: EOM are normal.   Neck: Normal range of motion. Neck supple.   Cardiovascular: Regular rhythm.   Pulmonary/Chest: Effort normal and breath sounds normal.   Abdominal: Soft. Bowel sounds are normal.   Musculoskeletal: Normal range of motion. He exhibits no edema.   Neurological: He is alert and oriented to person, place, and time.   Skin: Skin is warm and dry.   Psychiatric: He has a normal mood and affect.       Significant Labs:   BMP:   Recent Labs   Lab  08/20/19  0806   *      K 3.9      CO2 19*   BUN 19   CREATININE 1.1   CALCIUM 8.8     CBC:   Recent Labs   Lab 08/19/19  0436 08/20/19  0806   WBC 6.81 6.13   HGB 11.5* 11.5*   HCT 34.8* 35.0*    260     CMP:   Recent Labs   Lab 08/19/19  0436 08/20/19  0806    138   K 3.8 3.9   * 110   CO2 19* 19*   GLU 89 149*   BUN 20 19   CREATININE 1.2 1.1   CALCIUM 8.7 8.8   ANIONGAP 8 9   EGFRNONAA >60 >60       Significant Imaging: I have reviewed all pertinent imaging results/findings within the past 24 hours.

## 2019-08-20 NOTE — PROGRESS NOTES
Pharmacy New Medication Education    Patient and/or Caregiver ACCEPTED medication education.    Pharmacy has provided education on the name, indication, and possible side effects of the medication(s) prescribed, using teach-back method.     Learners of pharmacy medication education includes:  patient    Medication Indication Side Effects   cefepime infection nausea, vomiting and diarrhea    vancomycin infection nausea, vomiting and diarrhea        The following medications have also been discussed, during this admission.     Current Facility-Administered Medications   Medication Frequency    acetaminophen tablet 650 mg Q4H PRN    atorvastatin tablet 10 mg Daily    benazepril tablet 5 mg Daily    cadexomer iodine 0.9 % gel Daily PRN    cefepime 2 g in dextrose 5% 50 mL IVPB (ready to mix system) Q12H    dextrose 10% (D10W) Bolus PRN    dextrose 10% (D10W) Bolus PRN    efavirenz capsule 600 mg QHS    emtricitabine-tenofovir alafen 200-25 mg Tab 1 tablet QHS    fenofibrate tablet 145 mg Daily    glucagon (human recombinant) injection 1 mg PRN    glucose chewable tablet 16 g PRN    glucose chewable tablet 24 g PRN    insulin aspart protamine-insulin aspart (NovoLOG 70/30) injection Daily    insulin aspart U-100 pen 0-5 Units QID (AC + HS) PRN    metronidazole IVPB 500 mg Q8H    multivitamin tablet 1 tablet Daily    ondansetron injection 4 mg Q8H PRN    promethazine (PHENERGAN) 6.25 mg in dextrose 5 % 50 mL IVPB Q6H PRN    ramelteon tablet 8 mg Nightly PRN    sertraline tablet 25 mg Daily    sodium chloride 0.9% flush 10 mL PRN    vancomycin (VANCOCIN) 2,000 mg in dextrose 5 % 500 mL IVPB Q24H    zolpidem tablet 10 mg QHS          Thank you  Liana Murray, SantanaD

## 2019-08-20 NOTE — PROGRESS NOTES
[facility] Hospital Medicine  Progress Note    Patient Name: Brandon Parson  MRN: [id number]  Patient Class: IP- Inpatient   Admission Date: 8/16/2019  Length of Stay: 4 days  Attending Physician: Joseph Sandhu DO  Primary Care Provider: Dawn Helm MD        Subjective:     Principal Problem:Cellulitis of great toe of left foot        HPI:  Brandon Parson is a 68-year-old male with a past medical history of HIV, Diabetes mellitus, Depression, GERD, and Anxiety. He lives in [place], [place]. His PCP is Dr. Dawn Helm.    He presented to [facility] ED 8/15/2019 with a complaint of cellulitis to his left great toe.  Patient states has been there for approximately 2 weeks and gotten worse with more swelling and more erythema patient states was trying to file a callus off the bottom of his toe stated now it is not healing stated now is toe is swollen and red and mildly painful. ED workup revealed Hgb (11.8), Hct (34.8), CO2 (17), BUN (33), Alk Phos (24), Sed rate (40), CRP (1.54), foot x-ray revealed Mild-to-moderate degenerative osteoarthrosis of the 1st toe and Small Achilles enthesophyte. [facility] did not feel comfortable with keeping this patient at their facility due to the fact they do not have Infectious Disease and the patient's history of diabetes and HIV. Transferred to [facility] for further management.     Overview/Hospital Course:  He was admitted to [facility] and started on Vancomycin and Ciprofloxacin as well as his home HIV medications. MRI of left foot suggestive of osteomyelitis distal 1st phalanx, and possible early changes of 1st interphalangeal joint septic arthritis  Dr. Vizcaino (Podiatry) was consulted and did a Bone Bx. ID was consulted and he was Abx were escalated from Cipro to Cefepime, Flagyl and Vanc per their recommendations.  Wound Cultures Growing Staph, Strep B, and Morganella.  8/20/19 Pending final ID recommendations, PICC line ordered.    Interval History:  Resting in bed, no complaints or concerns.    Review of Systems   Constitutional: Negative for chills and fever.   HENT: Negative for congestion, postnasal drip and rhinorrhea.    Respiratory: Negative for cough and shortness of breath.    Cardiovascular: Negative for chest pain.   Gastrointestinal: Negative for abdominal distention and abdominal pain.   Genitourinary: Negative for difficulty urinating.   Musculoskeletal: Negative for arthralgias and myalgias.   Skin: Negative for color change.   Neurological: Negative for weakness, light-headedness and headaches.   Hematological: Does not bruise/bleed easily.   Psychiatric/Behavioral: Negative for agitation.     Objective:     Vital Signs (Most Recent):  Temp: 98 °F (36.7 °C) (08/20/19 1223)  Pulse: (!) 53 (08/20/19 1223)  Resp: 18 (08/20/19 1223)  BP: 120/65 (08/20/19 1223)  SpO2: 99 % (08/20/19 1249) Vital Signs (24h Range):  Temp:  [97.4 °F (36.3 °C)-98.1 °F (36.7 °C)] 98 °F (36.7 °C)  Pulse:  [49-60] 53  Resp:  [16-18] 18  SpO2:  [97 %-100 %] 99 %  BP: (110-129)/(58-70) 120/65     Weight: 75.7 kg (166 lb 14.2 oz)  Body mass index is 22.63 kg/m².    Intake/Output Summary (Last 24 hours) at 8/20/2019 1357  Last data filed at 8/20/2019 1200  Gross per 24 hour   Intake 1360 ml   Output 1175 ml   Net 185 ml      Physical Exam   Constitutional: He is oriented to person, place, and time. He appears well-developed and well-nourished.   HENT:   Head: Normocephalic and atraumatic.   Eyes: EOM are normal.   Neck: Normal range of motion. Neck supple.   Cardiovascular: Regular rhythm.   Pulmonary/Chest: Effort normal and breath sounds normal.   Abdominal: Soft. Bowel sounds are normal.   Musculoskeletal: Normal range of motion. He exhibits no edema.   Neurological: He is alert and oriented to person, place, and time.   Skin: Skin is warm and dry.   Psychiatric: He has a normal mood and affect.       Significant Labs:   BMP:   Recent Labs   Lab 08/20/19  0806   *   NA  138   K 3.9      CO2 19*   BUN 19   CREATININE 1.1   CALCIUM 8.8     CBC:   Recent Labs   Lab 08/19/19  0436 08/20/19  0806   WBC 6.81 6.13   HGB 11.5* 11.5*   HCT 34.8* 35.0*    260     CMP:   Recent Labs   Lab 08/19/19  0436 08/20/19  0806    138   K 3.8 3.9   * 110   CO2 19* 19*   GLU 89 149*   BUN 20 19   CREATININE 1.2 1.1   CALCIUM 8.7 8.8   ANIONGAP 8 9   EGFRNONAA >60 >60       Significant Imaging: I have reviewed all pertinent imaging results/findings within the past 24 hours.      Assessment/Plan:      * Cellulitis of great toe of left foot  Acute osteomyelitis of toe, left  Diabetic foot ulcer  Toe ulcer  WBC wnl, pt afebrile  Wound Cultures growing: Staph, Strep B, and Morganella  Vancomycin IV, Cefepime, Flagyl per ID recs  Left 1st Toe Bone Bx and Culture by Dr. Vizcaino  Follow Bone Cultures and Wound Sensitives, pending ID final recommendations.   Appreciate Podiatry and ID assistance  Keep affected extremity elevated        Type 2 diabetes mellitus with skin complication, without long-term current use of insulin  Diabetic ulcer of toe of left foot associated with type 2 diabetes mellitus, with bone involvement without evidence of necrosis      Hgb A1C (7.4).   Current   POC glucose checks ac meals and nightly  Low dose SSI PRN  Hypoglycemia protocol PRN  Diabetic Diet  Insulin aspart 22 units SQ daily          Anemia  Hgb 11.5, stable, monitor  Continue to monitor      Chronic kidney disease, stage III (moderate)  Creatinine 1.1  Avoid Nephrotoxic agents  Renally dose medications  Strict I&Os  Continue to monitor      Depression  Anxiety    Stable, continue home meds      HIV disease  Continue home emtricitabine-tenofovir alafen (DESCOVY) 200-25 mg and EFAVIRENZ         VTE Risk Mitigation (From admission, onward)        Ordered     Place sequential compression device  Until discontinued      08/16/19 0342     IP VTE HIGH RISK PATIENT  Once      08/16/19 0342                 Laverne Beth NP  Department of Hospital Medicine   [facility]

## 2019-08-20 NOTE — PHYSICIAN QUERY
"PT Name: Brandon Parson  MR #: [id number]    Physician Query Form - Hematology Clarification      CDS/: Karon Velazquez RN CDI             Contact information: [email or web address]    This form is a permanent document in the medical record.      Query Date: August 20, 2019    By submitting this query, we are merely seeking further clarification of documentation. Please utilize your independent clinical judgment when addressing the question(s) below.    The Medical record contains the following:   Indicators  Supporting Clinical Findings Location in Medical Record   X "Anemia" documented Anemia  Hgb (11.8), Hct (34.8) on admit  Continue to monitor   Hospital medicine H&P  8/16 649 pm  J Chairs NP / ZAHRA Roman MD   X H & H = 8/16 - 11.0/33.4  8/18 - 11.6/35.0  8/19 - 11.5/34.8  8/19 - 11.5/35.0   Labs   X BP =                     HR= /72 initial vital signs 8/16  HR 67  initial vital signs 8/16   Vitals 8/16    "GI bleeding" documented      Acute bleeding (Non GI site)      Transfusion(s)      Treatment:     X Other:  Chronic kidney disease, stage III (moderate)  Cr (1.3)  Avoid Nephrotoxic agents  Strict I&Os  Continue to monitor  GERD  HIV Hospital medicine H&P  8/16 649 pm  J Chairs FLIP / ZAHRA Roman MD     Provider, please specify diagnosis or diagnoses associated with above clinical findings.    [  ] Chronic blood loss anemia     [  x] Anemia of chronic disease ( Specify chronic disease)       [  ] CKD (specify stage):     [  ] Other (Specify):   [  ] Clinically Undetermined       [  ] Other Hematological Diagnosis (please specify):     [  ] Clinically Undetermined       Please document in your progress notes daily for the duration of treatment, until resolved, and include in your discharge summary.                                                                                                      "

## 2019-08-20 NOTE — ASSESSMENT & PLAN NOTE
Diabetic ulcer of toe of left foot associated with type 2 diabetes mellitus, with bone involvement without evidence of necrosis      Hgb A1C (7.4).   Current   POC glucose checks ac meals and nightly  Low dose SSI PRN  Hypoglycemia protocol PRN  Diabetic Diet  Insulin aspart 22 units SQ daily

## 2019-08-20 NOTE — PLAN OF CARE
Problem: Adult Inpatient Plan of Care  Goal: Plan of Care Review  Outcome: Ongoing (interventions implemented as appropriate)  Patient awake and alert. Scheduled medications administered per MD order. Denies any pain or nausea. Dressing to left foot clean, dry and intact. Voiding without difficulty. Patient safety maintained. Bed in lowest position. Side rails raised x2. Call light in reach. Instructed to call for assistance. Will continue to monitor.

## 2019-08-20 NOTE — ASSESSMENT & PLAN NOTE
Acute osteomyelitis of toe, left  Diabetic foot ulcer  Toe ulcer  WBC wnl, pt afebrile  Wound Cultures growing: Staph, Strep B, and Morganella  Vancomycin IV, Cefepime, Flagyl per ID recs  Left 1st Toe Bone Bx and Culture by Dr. Vizcaino  Follow Bone Cultures and Wound Sensitives, pending ID final recommendations.   Appreciate Podiatry and ID assistance  Keep affected extremity elevated

## 2019-08-20 NOTE — ASSESSMENT & PLAN NOTE
Creatinine 1.1  Avoid Nephrotoxic agents  Renally dose medications  Strict I&Os  Continue to monitor

## 2019-08-21 ENCOUNTER — TELEPHONE (OUTPATIENT)
Dept: PODIATRY | Facility: CLINIC | Age: 69
End: 2019-08-21

## 2019-08-21 VITALS
OXYGEN SATURATION: 98 % | BODY MASS INDEX: 22.54 KG/M2 | RESPIRATION RATE: 16 BRPM | HEIGHT: 72 IN | WEIGHT: 166.44 LBS | TEMPERATURE: 98 F | DIASTOLIC BLOOD PRESSURE: 62 MMHG | HEART RATE: 58 BPM | SYSTOLIC BLOOD PRESSURE: 113 MMHG

## 2019-08-21 LAB
ANION GAP SERPL CALC-SCNC: 7 MMOL/L (ref 8–16)
BASOPHILS # BLD AUTO: 0.02 K/UL (ref 0–0.2)
BASOPHILS NFR BLD: 0.3 % (ref 0–1.9)
BUN SERPL-MCNC: 19 MG/DL (ref 8–23)
CALCIUM SERPL-MCNC: 8.6 MG/DL (ref 8.7–10.5)
CHLORIDE SERPL-SCNC: 112 MMOL/L (ref 95–110)
CO2 SERPL-SCNC: 18 MMOL/L (ref 23–29)
CREAT SERPL-MCNC: 1.1 MG/DL (ref 0.5–1.4)
DIFFERENTIAL METHOD: ABNORMAL
EOSINOPHIL # BLD AUTO: 0.2 K/UL (ref 0–0.5)
EOSINOPHIL NFR BLD: 2.9 % (ref 0–8)
ERYTHROCYTE [DISTWIDTH] IN BLOOD BY AUTOMATED COUNT: 13.5 % (ref 11.5–14.5)
EST. GFR  (AFRICAN AMERICAN): >60 ML/MIN/1.73 M^2
EST. GFR  (NON AFRICAN AMERICAN): >60 ML/MIN/1.73 M^2
GLUCOSE SERPL-MCNC: 166 MG/DL (ref 70–110)
HCT VFR BLD AUTO: 33.3 % (ref 40–54)
HGB BLD-MCNC: 11.2 G/DL (ref 14–18)
LYMPHOCYTES # BLD AUTO: 2 K/UL (ref 1–4.8)
LYMPHOCYTES NFR BLD: 32.3 % (ref 18–48)
MCH RBC QN AUTO: 30.4 PG (ref 27–31)
MCHC RBC AUTO-ENTMCNC: 33.6 G/DL (ref 32–36)
MCV RBC AUTO: 90 FL (ref 82–98)
MONOCYTES # BLD AUTO: 0.6 K/UL (ref 0.3–1)
MONOCYTES NFR BLD: 9.1 % (ref 4–15)
NEUTROPHILS # BLD AUTO: 3.5 K/UL (ref 1.8–7.7)
NEUTROPHILS NFR BLD: 55.4 % (ref 38–73)
PLATELET # BLD AUTO: 244 K/UL (ref 150–350)
PMV BLD AUTO: 9.1 FL (ref 9.2–12.9)
POCT GLUCOSE: 146 MG/DL (ref 70–110)
POCT GLUCOSE: 151 MG/DL (ref 70–110)
POTASSIUM SERPL-SCNC: 3.9 MMOL/L (ref 3.5–5.1)
RBC # BLD AUTO: 3.69 M/UL (ref 4.6–6.2)
SODIUM SERPL-SCNC: 137 MMOL/L (ref 136–145)
WBC # BLD AUTO: 6.26 K/UL (ref 3.9–12.7)

## 2019-08-21 PROCEDURE — S0030 INJECTION, METRONIDAZOLE: HCPCS | Performed by: NURSE PRACTITIONER

## 2019-08-21 PROCEDURE — 85025 COMPLETE CBC W/AUTO DIFF WBC: CPT

## 2019-08-21 PROCEDURE — 36415 COLL VENOUS BLD VENIPUNCTURE: CPT

## 2019-08-21 PROCEDURE — 63600175 PHARM REV CODE 636 W HCPCS: Performed by: NURSE PRACTITIONER

## 2019-08-21 PROCEDURE — 94761 N-INVAS EAR/PLS OXIMETRY MLT: CPT

## 2019-08-21 PROCEDURE — 25000003 PHARM REV CODE 250: Performed by: NURSE PRACTITIONER

## 2019-08-21 PROCEDURE — 80048 BASIC METABOLIC PNL TOTAL CA: CPT

## 2019-08-21 PROCEDURE — 25000003 PHARM REV CODE 250: Performed by: HOSPITALIST

## 2019-08-21 RX ORDER — AMOXICILLIN AND CLAVULANATE POTASSIUM 875; 125 MG/1; MG/1
1 TABLET, FILM COATED ORAL EVERY 12 HOURS
Qty: 42 TABLET | Refills: 0 | Status: SHIPPED | OUTPATIENT
Start: 2019-08-21 | End: 2019-09-11

## 2019-08-21 RX ORDER — SULFAMETHOXAZOLE AND TRIMETHOPRIM 800; 160 MG/1; MG/1
1 TABLET ORAL 2 TIMES DAILY
Qty: 42 TABLET | Refills: 0 | Status: SHIPPED | OUTPATIENT
Start: 2019-08-21 | End: 2019-09-11

## 2019-08-21 RX ADMIN — THERA TABS 1 TABLET: TAB at 09:08

## 2019-08-21 RX ADMIN — SULFAMETHOXAZOLE AND TRIMETHOPRIM 1 TABLET: 800; 160 TABLET ORAL at 09:08

## 2019-08-21 RX ADMIN — BENAZEPRIL HYDROCHLORIDE 5 MG: 5 TABLET, COATED ORAL at 09:08

## 2019-08-21 RX ADMIN — CEFEPIME 2 G: 2 INJECTION, POWDER, FOR SOLUTION INTRAVENOUS at 09:08

## 2019-08-21 RX ADMIN — INSULIN ASPART 22 UNITS: 100 INJECTION, SUSPENSION SUBCUTANEOUS at 09:08

## 2019-08-21 RX ADMIN — SERTRALINE HYDROCHLORIDE 25 MG: 25 TABLET ORAL at 09:08

## 2019-08-21 RX ADMIN — FENOFIBRATE 145 MG: 145 TABLET ORAL at 09:08

## 2019-08-21 RX ADMIN — ATORVASTATIN CALCIUM 10 MG: 10 TABLET, FILM COATED ORAL at 09:08

## 2019-08-21 RX ADMIN — METRONIDAZOLE 500 MG: 500 INJECTION, SOLUTION INTRAVENOUS at 09:08

## 2019-08-21 RX ADMIN — METRONIDAZOLE 500 MG: 500 INJECTION, SOLUTION INTRAVENOUS at 12:08

## 2019-08-21 RX ADMIN — AMOXICILLIN AND CLAVULANATE POTASSIUM 1 TABLET: 875; 125 TABLET, FILM COATED ORAL at 09:08

## 2019-08-21 NOTE — PLAN OF CARE
Problem: Adult Inpatient Plan of Care  Goal: Plan of Care Review  Outcome: Ongoing (interventions implemented as appropriate)  Mr. Parson is resting and medications were given per orders in MAR. No complaints of N/V/PAIN during shift. Dressing to LLE remains CDI. Antibiotics infusing. Blood glucose monitoring with sliding scale orders in place. Safety maintained, remained free from falls, and explained that hourly rounding will continue.

## 2019-08-21 NOTE — ASSESSMENT & PLAN NOTE
Acute osteomyelitis of toe, left  Diabetic foot ulcer  Toe ulcer  WBC wnl, pt afebrile  Wound Cultures growing: Staph, Strep B, and Morganella  Left 1st Toe Bone Bx and Culture by Dr. Vizcaino  Keep affected extremity elevated  Appreciate ID final recommendations  Switched to po Augmentin and Bactrim x 3 weeks

## 2019-08-21 NOTE — ASSESSMENT & PLAN NOTE
Diabetic ulcer of toe of left foot associated with type 2 diabetes mellitus, with bone involvement without evidence of necrosis    Hgb A1C (7.4).   Current   POC glucose checks ac meals and nightly  Continue home regimen  Home health to assist with education and medication management  Diabetic Diet

## 2019-08-21 NOTE — PLAN OF CARE
TAB referral/orders signed by MD faxed to [facility] per Tia at [facility] request.       08/21/19 9491   Post-Acute Status   Post-Acute Authorization Home Health/Hospice   Home Health/Hospice Status Referrals Sent

## 2019-08-21 NOTE — NURSING
IV removed with catheter tip intact, pressure dressing applied. L foot dressing changed prior to D/C home. VN to review d/c instructions. Pt received prescriptions from [facility], beside delivery.

## 2019-08-21 NOTE — DISCHARGE SUMMARY
[facility] Hospital Medicine  Discharge Summary      Patient Name: Brandon Parson  MRN: [id number]  Admission Date: 8/16/2019  Hospital Length of Stay: 5 days  Discharge Date and Time:  08/21/2019 6:40 PM  Attending Physician: No att. providers found   Discharging Provider: Laverne Beth NP  Primary Care Provider: Dawn Helm MD      HPI:   Brandon Parson is a 68-year-old male with a past medical history of HIV, Diabetes mellitus, Depression, GERD, and Anxiety. He lives in [place], [place]. His PCP is Dr. Dawn Helm.    He presented to [facility] ED 8/15/2019 with a complaint of cellulitis to his left great toe.  Patient states has been there for approximately 2 weeks and gotten worse with more swelling and more erythema patient states was trying to file a callus off the bottom of his toe stated now it is not healing stated now is toe is swollen and red and mildly painful. ED workup revealed Hgb (11.8), Hct (34.8), CO2 (17), BUN (33), Alk Phos (24), Sed rate (40), CRP (1.54), foot x-ray revealed Mild-to-moderate degenerative osteoarthrosis of the 1st toe and Small Achilles enthesophyte. [facility] did not feel comfortable with keeping this patient at their facility due to the fact they do not have Infectious Disease and the patient's history of diabetes and HIV. Transferred to [facility] for further management.     * No surgery found *      Hospital Course:   He was admitted to [facility] and started on Vancomycin and Ciprofloxacin as well as his home HIV medications. MRI of left foot suggestive of osteomyelitis distal 1st phalanx, and possible early changes of 1st interphalangeal joint septic arthritis  Dr. Vizcaino (Podiatry) was consulted and did a Bone Bx. ID was consulted and he was Abx were escalated from Cipro to Cefepime, Flagyl and Vanc per their recommendations.  Wound Cultures Growing Staph, Strep B, and Morganella.  8/20/19 Pending final ID recommendations, PICC line ordered.   8/21/19 ID final recommendations received, patient switched to po Augmentin and Bactrim for three weeks.  Patient hemodynamically stable and ready to discharge home with home health and outpatient podiatry follow up.     Consults:   Consults (From admission, onward)        Status Ordering Provider     Inpatient consult to Infectious Diseases  Once     Provider:  (Not yet assigned)    Completed TYREE NORTON     Inpatient consult to Infectious Diseases  Once     Provider:  Sylvia Tirado MD    Completed COBY GRANT     Podiatry  Once     Provider:  (Not yet assigned)    Completed TERESA WATSON          * Cellulitis of great toe of left foot  Acute osteomyelitis of toe, left  Diabetic foot ulcer  Toe ulcer  WBC wnl, pt afebrile  Wound Cultures growing: Staph, Strep B, and Morganella  Left 1st Toe Bone Bx and Culture by Dr. Carrillo Moreno affected extremity elevated  Appreciate ID final recommendations  Switched to po Augmentin and Bactrim x 3 weeks        Type 2 diabetes mellitus with skin complication, without long-term current use of insulin  Diabetic ulcer of toe of left foot associated with type 2 diabetes mellitus, with bone involvement without evidence of necrosis    Hgb A1C (7.4).   Current   POC glucose checks ac meals and nightly  Continue home regimen  Home health to assist with education and medication management  Diabetic Diet            Anemia  Hgb 11.5 >11.2  Stable, no visual signs of bleeding      Chronic kidney disease, stage III (moderate)  Creatinine 1.1  Avoid Nephrotoxic agents  Renally dose medications      Depression  Anxiety    Stable, continue home meds      HIV disease  Continue home emtricitabine-tenofovir alafen (DESCOVY) 200-25 mg and EFAVIRENZ       Hyponatremia-resolved as of 8/18/2019  Resolved          Final Active Diagnoses:    Diagnosis Date Noted POA    PRINCIPAL PROBLEM:  Cellulitis of great toe of left foot [L03.032] 08/16/2019 Yes    Acute  osteomyelitis of toe, left [M86.172] 08/17/2019 Yes    Diabetic ulcer of toe of left foot associated with type 2 diabetes mellitus, with bone involvement without evidence of necrosis [E11.621, L97.526] 08/16/2019 Yes    HIV disease [B20] 08/16/2019 Yes    Depression [F32.9] 08/16/2019 Yes    Chronic kidney disease, stage III (moderate) [N18.3] 08/16/2019 Yes    Anemia [D64.9] 08/16/2019 Yes    Type 2 diabetes mellitus with skin complication, without long-term current use of insulin [E11.628] 08/16/2019 Yes    Toe ulcer [L97.509] 08/16/2019 Yes      Problems Resolved During this Admission:    Diagnosis Date Noted Date Resolved POA    Cellulitis of foot [L03.119] 08/16/2019 08/16/2019 Yes    Hyponatremia [E87.1] 08/16/2019 08/18/2019 Yes       Discharged Condition: stable    Disposition: Home-Health Care [facility]    Follow Up:  Follow-up Information     Dawn Helm MD On 8/27/2019.    Specialty:  Infectious Diseases  Why:  time: 2:30pm Hospital follow up  Contact information:  [address]  [facility] [address]  [phone]             Slim Vizcaino DPM.    Specialty:  Podiatry  Why:  Message sent to Dr. Vizcaino 's office to schedule hospital follow up, Office to call patient with follow up appointment  Contact information:  [address]  [facility] [id number] [phone]             [facility].    Specialties:  DME Provider, Home Health Services  Contact information:  [address]  [facility] [id number] [phone]                 Patient Instructions:      Diet diabetic     Notify your health care provider if you experience any of the following:  persistent nausea and vomiting or diarrhea     Notify your health care provider if you experience any of the following:  temperature >100.4     Notify your health care provider if you experience any of the following:  severe uncontrolled pain     Notify your health care provider if you experience any of the  following:  redness, tenderness, or signs of infection (pain, swelling, redness, odor or green/yellow discharge around incision site)     Notify your health care provider if you experience any of the following:  difficulty breathing or increased cough     Notify your health care provider if you experience any of the following:  severe persistent headache     Notify your health care provider if you experience any of the following:  worsening rash     Notify your health care provider if you experience any of the following:  persistent dizziness, light-headedness, or visual disturbances     Notify your health care provider if you experience any of the following:  increased confusion or weakness     Activity as tolerated       Significant Diagnostic Studies: Labs:   BMP:   Recent Labs   Lab 08/20/19  0806 08/21/19  0434   * 166*    137   K 3.9 3.9    112*   CO2 19* 18*   BUN 19 19   CREATININE 1.1 1.1   CALCIUM 8.8 8.6*   , CMP   Recent Labs   Lab 08/20/19  0806 08/21/19  0434    137   K 3.9 3.9    112*   CO2 19* 18*   * 166*   BUN 19 19   CREATININE 1.1 1.1   CALCIUM 8.8 8.6*   ANIONGAP 9 7*   ESTGFRAFRICA >60 >60   EGFRNONAA >60 >60    and CBC   Recent Labs   Lab 08/20/19  0806 08/21/19  0434   WBC 6.13 6.26   HGB 11.5* 11.2*   HCT 35.0* 33.3*    244       Pending Diagnostic Studies:     None         Medications:  Reconciled Home Medications:      Medication List      START taking these medications    amoxicillin-clavulanate 875-125mg 875-125 mg per tablet  Commonly known as:  AUGMENTIN  Take 1 tablet by mouth every 12 (twelve) hours. for 21 days     sulfamethoxazole-trimethoprim 800-160mg 800-160 mg Tab  Commonly known as:  BACTRIM DS  Take 1 tablet by mouth 2 (two) times daily. for 21 days        CONTINUE taking these medications    AMBIEN ORAL  Take 10 mg by mouth every evening.     ATORVASTATIN ORAL  Take 40 mg by mouth once daily.     benazepril-hydrochlorthiazide  20-25 mg Tab  Commonly known as:  LOTENSIN HCT  Take 1 tablet by mouth once daily.     CLONAZEPAM ORAL  Take 1 mg by mouth 3 (three) times daily as needed.     DESCOVY 200-25 mg Tab  Generic drug:  emtricitabine-tenofovir alafen  Take 1 tablet by mouth every evening.     EFAVIRENZ ORAL  Take 600 mg by mouth every evening.     fenofibrate 145 MG tablet  Commonly known as:  TRICOR  Take 145 mg by mouth once daily.     fish oil-omega-3 fatty acids 300-1,000 mg capsule  Take 2 capsules by mouth once daily.     * insulin aspart protamine-insulin aspart 100 unit/mL (70-30) Inpn pen  Commonly known as:  NovoLOG 70/30  Inject 22 Units into the skin once daily.     * insulin aspart protamine-insulin aspart 100 unit/mL (70-30) Inpn pen  Commonly known as:  NovoLOG 70/30  Inject 18 Units into the skin nightly.     METFORMIN ORAL  Take 1,000 mg by mouth 2 (two) times daily.     multivitamin tablet  Commonly known as:  THERAGRAN  Take 1 tablet by mouth once daily.     SERTRALINE ORAL  Take 100 mg by mouth once daily.         * This list has 2 medication(s) that are the same as other medications prescribed for you. Read the directions carefully, and ask your doctor or other care provider to review them with you.                Indwelling Lines/Drains at time of discharge:   Lines/Drains/Airways          None          Time spent on the discharge of patient: 45 minutes  Patient was seen and examined on the date of discharge and determined to be suitable for discharge.         Laverne Beth NP  Department of Hospital Medicine  [facility]

## 2019-08-21 NOTE — PLAN OF CARE
Discharge teaching given via VN. Pt demonstrated understanding utilizing the teachback method. All questions answered. Floor nurse notified. Awaiting his ride home.

## 2019-08-21 NOTE — NURSING
Notified Charge Nurse that patient had PICC line orders in place 1236PM; however, no PICC was placed. Later on Contacted FLIP Delgado while in patient's room about orders for patient to receive PICC Line. Informed that to hold off on placement and wait until morning for TEAM to clarify order. Upon exiting room, PICC line nurse was waiting outside for placement. FLIP Delgado and BIRD Oconnor noted that TEAM recommended PO medications on D/C. It was noted that Consult for PICC LINE Team was D/C; however, orders were still in place for insertion and x-ray that would follow PICC Line placement. No PICC line was placed since FLIP Delgado stated to wait for clarification from TEAMS in the morning.

## 2019-08-21 NOTE — PLAN OF CARE
Progress notes reviewed. Rounds completed. Introduced self as VN for this shift. Educated pt on VN's role in pt care. Educated pt about VTE and fall precautions.  Opportunity given for pt's questions. No questions or concerns expressed at this time.      08/21/19 0986   Type of Frequent Check   Type Other (see comments)  (vn rounds)   Safety/Activity   Patient Rounds visualized patient   Safety Promotion/Fall Prevention Fall Risk reviewed with patient/family;instructed to call staff for mobility   Activity Management sitting at edge of bed - L2   Pain/Comfort/Sleep   Pain Rating (0-10): Rest 0

## 2019-08-21 NOTE — PLAN OF CARE
Discharge order noted, pt accepted by [facility], no DME noted.     Discharge rounds on patient. Discussed followup appointments, blue discharge folder, discharge nurse will go over home medications and reasons for medications and final discharge instructions. All patient/caregiver questions answered. Patient verbalized understanding.    Future Appointments   Date Time Provider Department Center   9/12/2019  8:30 AM Slim Vizcaino DPM [facility]        08/21/19 1503   Final Note   Assessment Type Final Discharge Note   Anticipated Discharge Disposition Home-Health  ([employer])   What phone number can be called within the next 1-3 days to see how you are doing after discharge? [phone]   Hospital Follow Up  Appt(s) scheduled? Yes   Discharge plans and expectations educations in teach back method with documentation complete? Yes   Right Care Referral Info   Post Acute Recommendation No Care   Facility Name [facility]   Lizbet Loya RN-BC  Transitional Navigator  [phone]

## 2019-08-21 NOTE — PLAN OF CARE
[facility] HOME HEALTH ORDERS  FACE TO FACE ENCOUNTER    Patient Name: Brandon Parson  YOB: 1950    PCP: Dawn Helm MD   PCP Address: [address] [place], [place] [address] [place] [facility] [address]  PCP Phone Number: [phone]  PCP Fax: [phone]    Encounter Date: 08/21/2019    Admit to Home Health    Diagnoses:  Active Hospital Problems    Diagnosis  POA    *Cellulitis of great toe of left foot [L03.032]  Yes    Acute osteomyelitis of toe, left [M86.172]  Yes    Diabetic ulcer of toe of left foot associated with type 2 diabetes mellitus, with bone involvement without evidence of necrosis [E11.621, L97.526]  Yes    HIV disease [B20]  Yes    Depression [F32.9]  Yes    Chronic kidney disease, stage III (moderate) [N18.3]  Yes    Anemia [D64.9]  Yes    Type 2 diabetes mellitus with skin complication, without long-term current use of insulin [E11.628]  Yes    Toe ulcer [L97.509]  Yes      Resolved Hospital Problems    Diagnosis Date Resolved POA    Cellulitis of foot [L03.119] 08/16/2019 Yes    Hyponatremia [E87.1] 08/18/2019 Yes       No future appointments.  Follow-up Information     Dawn Helm MD On 8/27/2019.    Specialty:  Infectious Diseases  Why:  time: 2:30pm Hospital follow up  Contact information:  [facility] [address] [place] [address] [phone]                     I have seen and examined this patient face to face today. My clinical findings that support the need for the home health skilled services and home bound status are the following:  Requiring assistive device to leave home due to unsteady gait caused by  Surgery.    Allergies:  Review of patient's allergies indicates:   Allergen Reactions    Chantix [varenicline]        Diet: diabetic diet: 2000 calorie    Activities: activity as tolerated    Nursing:   SN to complete comprehensive assessment including routine vital signs. Instruct on disease process and s/s of complications to report to MD.  Review/verify medication list sent home with the patient at time of discharge  and instruct patient/caregiver as needed. Frequency may be adjusted depending on start of care date.    Notify MD if SBP > 160 or < 90; DBP > 90 or < 50; HR > 120 or < 50; Temp > 101; Other:            MISCELLANEOUS CARE:  Diabetic Care:   SN to perform and educate Diabetic management with blood glucose monitoring:, Fingerstick blood sugar AC and HS and Report CBG < 60 or > 350 to physician.    WOUND CARE ORDERS  yes:  Surgical Wound:  Location: left foot,  apply the following to wound: iodosorb dressing changes daily  -Continue to offload left foot      Medications: Review discharge medications with patient and family and provide education.      Current Discharge Medication List      START taking these medications    Details   amoxicillin-clavulanate 875-125mg (AUGMENTIN) 875-125 mg per tablet Take 1 tablet by mouth every 12 (twelve) hours. for 21 days  Qty: 42 tablet, Refills: 0      sulfamethoxazole-trimethoprim 800-160mg (BACTRIM DS) 800-160 mg Tab Take 1 tablet by mouth 2 (two) times daily. for 21 days  Qty: 42 tablet, Refills: 0         CONTINUE these medications which have NOT CHANGED    Details   atorvastatin calcium (ATORVASTATIN ORAL) Take 40 mg by mouth once daily.       benazepril-hydrochlorthiazide (LOTENSIN HCT) 20-25 mg Tab Take 1 tablet by mouth once daily.      CLONAZEPAM ORAL Take 1 mg by mouth 3 (three) times daily as needed.       EFAVIRENZ ORAL Take 600 mg by mouth every evening.       emtricitabine-tenofovir alafen (DESCOVY) 200-25 mg Tab Take 1 tablet by mouth every evening.       fenofibrate (TRICOR) 145 MG tablet Take 145 mg by mouth once daily.      fish oil-omega-3 fatty acids 300-1,000 mg capsule Take 2 capsules by mouth once daily.      !! insulin aspart protamine-insulin aspart (NOVOLOG 70/30) 100 unit/mL (70-30) InPn pen Inject 22 Units into the skin once daily.      !! insulin aspart protamine-insulin  aspart (NOVOLOG 70/30) 100 unit/mL (70-30) InPn pen Inject 18 Units into the skin nightly.      metformin HCl (METFORMIN ORAL) Take 1,000 mg by mouth 2 (two) times daily.       multivitamin (THERAGRAN) tablet Take 1 tablet by mouth once daily.      sertraline HCl (SERTRALINE ORAL) Take 100 mg by mouth once daily.       zolpidem tartrate (AMBIEN ORAL) Take 10 mg by mouth every evening.        !! - Potential duplicate medications found. Please discuss with provider.      STOP taking these medications       benazepril HCl (BENAZEPRIL ORAL) Comments:   Reason for Stopping:               I certify that this patient is confined to his home and needs intermittent skilled nursing care     ARUN Jimenez FNP-C   Nurse Practitioner- Hospitalist  Department of [facility] Medicine  [facility]

## 2019-08-21 NOTE — TELEPHONE ENCOUNTER
Spoke with pt and offered him an appt for 8/30/19 pt stated he will call back to see if this is ok with his ride.

## 2019-08-21 NOTE — TELEPHONE ENCOUNTER
----- Message from Annita Kelly sent at 8/21/2019 10:23 AM CDT -----  Contact: Self  Pt is calling to speak with Staff because he is scheduled to be seen 9/12/19 for a diabetic foot ulcer and would like to be seen sooner.    He can be reached at [phone].    Thank you.

## 2019-08-21 NOTE — NURSING
Consulted for PICC placement. Upon arrival to unit, informed that PICC consult was discontinued. Informed staff that if a decision was made that the pt would need a PICC in the future to call us back. Thank you for this consult.

## 2019-08-21 NOTE — PLAN OF CARE
Problem: Adult Inpatient Plan of Care  Goal: Plan of Care Review  Outcome: Ongoing (interventions implemented as appropriate)  Pt AAOx4, VSS, NAD. No complaints of pain, N/V, or headache.IV antibiotics infusing per MAR. Dressing on left foot changed. Tolerating diet. Blood glucose monitored, scheduled insulin given per MAR. No other complaints at this time. Safety maintained. Will continue to monitor.

## 2019-08-22 ENCOUNTER — PATIENT OUTREACH (OUTPATIENT)
Dept: ADMINISTRATIVE | Facility: CLINIC | Age: 69
End: 2019-08-22

## 2019-08-22 NOTE — TELEPHONE ENCOUNTER
C3 nurse attempted to contact patient. The following occurred:   C3 nurse attempted to contact Brandon Parson  for a TCC post hospital discharge follow up call. The patient is unable to conduct the call @ this time. The patient requested a callback.    The patient has a scheduled HOSFU appointment with Dawn Helm MD on 8/27/2019 AT 2:30pm

## 2019-08-23 NOTE — PATIENT INSTRUCTIONS
Discharge Instructions for Cellulitis  You have been diagnosed with cellulitis. This is an infection in the deepest layer of the skin. In some cases, the infection also affects the muscle. Cellulitis is caused by bacteria. The bacteria can enter the body through broken skin. This can happen with a cut, scratch, animal bite, or an insect bite that has been scratched. You may have been treated in the hospital with antibiotics and fluids. You will likely be given a prescription for antibiotics to take at home. This sheet will help you take care of yourself at home.  Home care  When you are home:  · Take the prescribed antibiotic medicine you are given as directed until it is gone. Take it even if you feel better. It treats the infection and stops it from returning. Not taking all the medicine can make future infections hard to treat.  · Keep the infected area clean.  · When possible, raise the infected area above the level of your heart. This helps keep swelling down.  · Talk with your healthcare provider if you are in pain. Ask what kind of over-the-counter medicine you can take for pain.  · Apply clean bandages as advised.  · Take your temperature once a day for a week.  · Wash your hands often to prevent spreading the infection.  In the future, wash your hands before and after you touch cuts, scratches, or bandages. This will help prevent infection.   When to call your healthcare provider  Call your healthcare provider immediately if you have any of the following:  · Difficulty or pain when moving the joints above or below the infected area  · Discharge or pus draining from the area  · Fever of 100.4°F (38°C) or higher, or as directed by your healthcare provider  · Pain that gets worse in or around the infected   · Redness that gets worse in or around the infected area, particularly if the area of redness expands to a wider area  · Shaking chills  · Swelling of the infected area  · Vomiting   Date Last Reviewed:  8/1/2016  © 1499-3327 [employer], [employer]. [address] [place] [address], [place] [address]. All rights reserved. This information is not intended as a substitute for professional medical care. Always follow your healthcare professional's instructions.

## 2019-10-07 ENCOUNTER — EXTERNAL HOME HEALTH (OUTPATIENT)
Dept: HOME HEALTH SERVICES | Facility: HOSPITAL | Age: 69
End: 2019-10-07
Payer: MEDICAID

## 2022-02-22 ENCOUNTER — HOSPITAL ENCOUNTER (INPATIENT)
Facility: HOSPITAL | Age: 72
LOS: 1 days | Discharge: HOME | DRG: 315 | End: 2022-02-23
Attending: EMERGENCY MEDICINE | Admitting: FAMILY MEDICINE
Payer: MEDICARE

## 2022-02-22 DIAGNOSIS — E86.0 DEHYDRATION: ICD-10-CM

## 2022-02-22 DIAGNOSIS — R55 SYNCOPE: ICD-10-CM

## 2022-02-22 DIAGNOSIS — K40.90 RIGHT INGUINAL HERNIA: ICD-10-CM

## 2022-02-22 DIAGNOSIS — N17.9 ACUTE KIDNEY INJURY: Primary | ICD-10-CM

## 2022-02-22 DIAGNOSIS — R07.9 CHEST PAIN: ICD-10-CM

## 2022-02-22 DIAGNOSIS — L97.526 DIABETIC ULCER OF TOE OF LEFT FOOT ASSOCIATED WITH TYPE 2 DIABETES MELLITUS, WITH BONE INVOLVEMENT WITHOUT EVIDENCE OF NECROSIS: ICD-10-CM

## 2022-02-22 DIAGNOSIS — I95.9 HYPOTENSION: ICD-10-CM

## 2022-02-22 DIAGNOSIS — E11.621 DIABETIC ULCER OF TOE OF LEFT FOOT ASSOCIATED WITH TYPE 2 DIABETES MELLITUS, WITH BONE INVOLVEMENT WITHOUT EVIDENCE OF NECROSIS: ICD-10-CM

## 2022-02-22 LAB
ALBUMIN SERPL BCP-MCNC: 3.8 G/DL (ref 3.5–5.2)
ALLENS TEST: ABNORMAL
ALP SERPL-CCNC: 28 U/L (ref 55–135)
ALT SERPL W/O P-5'-P-CCNC: 18 U/L (ref 10–44)
ANION GAP SERPL CALC-SCNC: 11 MMOL/L (ref 8–16)
AST SERPL-CCNC: 20 U/L (ref 10–40)
BASOPHILS # BLD AUTO: 0.03 K/UL (ref 0–0.2)
BASOPHILS NFR BLD: 0.5 % (ref 0–1.9)
BILIRUB SERPL-MCNC: 0.6 MG/DL (ref 0.1–1)
BILIRUB UR QL STRIP: NEGATIVE
BNP SERPL-MCNC: 17 PG/ML (ref 0–99)
BUN SERPL-MCNC: 55 MG/DL (ref 8–23)
CALCIUM SERPL-MCNC: 9.3 MG/DL (ref 8.7–10.5)
CHLORIDE SERPL-SCNC: 112 MMOL/L (ref 95–110)
CK MB SERPL-MCNC: 5.4 NG/ML (ref 0.1–6.5)
CK SERPL-CCNC: 81 U/L (ref 20–200)
CLARITY UR: CLEAR
CO2 SERPL-SCNC: 13 MMOL/L (ref 23–29)
COLOR UR: YELLOW
CREAT SERPL-MCNC: 2.1 MG/DL (ref 0.5–1.4)
DELSYS: ABNORMAL
DIFFERENTIAL METHOD: ABNORMAL
EOSINOPHIL # BLD AUTO: 0.2 K/UL (ref 0–0.5)
EOSINOPHIL NFR BLD: 2.7 % (ref 0–8)
ERYTHROCYTE [DISTWIDTH] IN BLOOD BY AUTOMATED COUNT: 13.8 % (ref 11.5–14.5)
ERYTHROCYTE [SEDIMENTATION RATE] IN BLOOD BY WESTERGREN METHOD: 19 MM/H
EST. GFR  (AFRICAN AMERICAN): 35.3 ML/MIN/1.73 M^2
EST. GFR  (NON AFRICAN AMERICAN): 30.5 ML/MIN/1.73 M^2
FIO2: 21
GLUCOSE SERPL-MCNC: 187 MG/DL (ref 70–110)
GLUCOSE SERPL-MCNC: 187 MG/DL (ref 70–110)
GLUCOSE UR QL STRIP: NEGATIVE
HCO3 UR-SCNC: 17.4 MMOL/L (ref 24–28)
HCT VFR BLD AUTO: 32.1 % (ref 40–54)
HGB BLD-MCNC: 10.5 G/DL (ref 14–18)
HGB UR QL STRIP: NEGATIVE
IMM GRANULOCYTES # BLD AUTO: 0.02 K/UL (ref 0–0.04)
IMM GRANULOCYTES NFR BLD AUTO: 0.3 % (ref 0–0.5)
KETONES UR QL STRIP: NEGATIVE
LACTATE SERPL-SCNC: 1.1 MMOL/L (ref 0.5–1.9)
LEUKOCYTE ESTERASE UR QL STRIP: NEGATIVE
LYMPHOCYTES # BLD AUTO: 2.4 K/UL (ref 1–4.8)
LYMPHOCYTES NFR BLD: 36.5 % (ref 18–48)
MCH RBC QN AUTO: 32 PG (ref 27–31)
MCHC RBC AUTO-ENTMCNC: 32.7 G/DL (ref 32–36)
MCV RBC AUTO: 98 FL (ref 82–98)
MODE: ABNORMAL
MONOCYTES # BLD AUTO: 0.5 K/UL (ref 0.3–1)
MONOCYTES NFR BLD: 7.6 % (ref 4–15)
NEUTROPHILS # BLD AUTO: 3.5 K/UL (ref 1.8–7.7)
NEUTROPHILS NFR BLD: 52.4 % (ref 38–73)
NITRITE UR QL STRIP: NEGATIVE
NRBC BLD-RTO: 0 /100 WBC
OB PNL STL: NEGATIVE
PCO2 BLDA: 36.9 MMHG (ref 35–45)
PH SMN: 7.28 [PH] (ref 7.35–7.45)
PH UR STRIP: 6 [PH] (ref 5–8)
PLATELET # BLD AUTO: 262 K/UL (ref 150–450)
PMV BLD AUTO: 9.9 FL (ref 9.2–12.9)
PO2 BLDA: 21 MMHG (ref 40–60)
POC BE: -9 MMOL/L
POC PCO2 TEMP: 36.9 MMHG
POC PH TEMP: 7.28
POC PO2 TEMP: 21 MMHG
POC SATURATED O2: 28 % (ref 95–100)
POC TCO2: 18 MMOL/L (ref 24–29)
POC TEMPERATURE: ABNORMAL
POTASSIUM SERPL-SCNC: 4.7 MMOL/L (ref 3.5–5.1)
PROCALCITONIN SERPL IA-MCNC: <0.05 NG/ML (ref 0–0.5)
PROT SERPL-MCNC: 7.1 G/DL (ref 6–8.4)
PROT UR QL STRIP: ABNORMAL
RBC # BLD AUTO: 3.28 M/UL (ref 4.6–6.2)
SAMPLE: ABNORMAL
SITE: ABNORMAL
SODIUM SERPL-SCNC: 136 MMOL/L (ref 136–145)
SP GR UR STRIP: 1.01 (ref 1–1.03)
SP02: 100
TROPONIN I SERPL DL<=0.01 NG/ML-MCNC: <0.03 NG/ML
TROPONIN I SERPL DL<=0.01 NG/ML-MCNC: <0.03 NG/ML
TSH SERPL DL<=0.005 MIU/L-ACNC: 3.41 UIU/ML (ref 0.34–5.6)
URN SPEC COLLECT METH UR: ABNORMAL
UROBILINOGEN UR STRIP-ACNC: NEGATIVE EU/DL
WBC # BLD AUTO: 6.58 K/UL (ref 3.9–12.7)

## 2022-02-22 PROCEDURE — 84484 ASSAY OF TROPONIN QUANT: CPT | Performed by: EMERGENCY MEDICINE

## 2022-02-22 PROCEDURE — 96360 HYDRATION IV INFUSION INIT: CPT

## 2022-02-22 PROCEDURE — 93010 EKG 12-LEAD: ICD-10-PCS | Mod: ,,, | Performed by: INTERNAL MEDICINE

## 2022-02-22 PROCEDURE — 93010 ELECTROCARDIOGRAM REPORT: CPT | Mod: ,,, | Performed by: INTERNAL MEDICINE

## 2022-02-22 PROCEDURE — 99285 EMERGENCY DEPT VISIT HI MDM: CPT | Mod: 25

## 2022-02-22 PROCEDURE — 87040 BLOOD CULTURE FOR BACTERIA: CPT | Mod: 59 | Performed by: EMERGENCY MEDICINE

## 2022-02-22 PROCEDURE — 83880 ASSAY OF NATRIURETIC PEPTIDE: CPT | Performed by: EMERGENCY MEDICINE

## 2022-02-22 PROCEDURE — 85025 COMPLETE CBC W/AUTO DIFF WBC: CPT | Performed by: NURSE PRACTITIONER

## 2022-02-22 PROCEDURE — 80053 COMPREHEN METABOLIC PANEL: CPT | Performed by: NURSE PRACTITIONER

## 2022-02-22 PROCEDURE — 93005 ELECTROCARDIOGRAM TRACING: CPT | Performed by: INTERNAL MEDICINE

## 2022-02-22 PROCEDURE — 63600175 PHARM REV CODE 636 W HCPCS: Performed by: EMERGENCY MEDICINE

## 2022-02-22 PROCEDURE — 82553 CREATINE MB FRACTION: CPT | Performed by: EMERGENCY MEDICINE

## 2022-02-22 PROCEDURE — U0002 COVID-19 LAB TEST NON-CDC: HCPCS | Performed by: FAMILY MEDICINE

## 2022-02-22 PROCEDURE — 82272 OCCULT BLD FECES 1-3 TESTS: CPT | Performed by: EMERGENCY MEDICINE

## 2022-02-22 PROCEDURE — 83605 ASSAY OF LACTIC ACID: CPT | Performed by: EMERGENCY MEDICINE

## 2022-02-22 PROCEDURE — 81003 URINALYSIS AUTO W/O SCOPE: CPT | Performed by: EMERGENCY MEDICINE

## 2022-02-22 PROCEDURE — 82962 GLUCOSE BLOOD TEST: CPT

## 2022-02-22 PROCEDURE — 82550 ASSAY OF CK (CPK): CPT | Performed by: EMERGENCY MEDICINE

## 2022-02-22 PROCEDURE — 84145 PROCALCITONIN (PCT): CPT | Performed by: EMERGENCY MEDICINE

## 2022-02-22 PROCEDURE — 84443 ASSAY THYROID STIM HORMONE: CPT | Performed by: EMERGENCY MEDICINE

## 2022-02-22 PROCEDURE — 21000000 HC CCU ICU ROOM CHARGE

## 2022-02-22 RX ORDER — MAGNESIUM SULFATE HEPTAHYDRATE 40 MG/ML
2 INJECTION, SOLUTION INTRAVENOUS
Status: DISCONTINUED | OUTPATIENT
Start: 2022-02-23 | End: 2022-02-23 | Stop reason: HOSPADM

## 2022-02-22 RX ORDER — ZOLPIDEM TARTRATE 5 MG/1
10 TABLET ORAL NIGHTLY PRN
Status: CANCELLED | OUTPATIENT
Start: 2022-02-23

## 2022-02-22 RX ORDER — IPRATROPIUM BROMIDE AND ALBUTEROL SULFATE 2.5; .5 MG/3ML; MG/3ML
3 SOLUTION RESPIRATORY (INHALATION) EVERY 4 HOURS PRN
Status: DISCONTINUED | OUTPATIENT
Start: 2022-02-23 | End: 2022-02-23 | Stop reason: HOSPADM

## 2022-02-22 RX ORDER — ASPIRIN 81 MG/1
81 TABLET ORAL DAILY
Status: ON HOLD | COMMUNITY
End: 2022-02-23 | Stop reason: HOSPADM

## 2022-02-22 RX ORDER — POTASSIUM CHLORIDE 20 MEQ/1
20 TABLET, EXTENDED RELEASE ORAL
Status: DISCONTINUED | OUTPATIENT
Start: 2022-02-23 | End: 2022-02-23 | Stop reason: HOSPADM

## 2022-02-22 RX ORDER — MAGNESIUM SULFATE HEPTAHYDRATE 40 MG/ML
4 INJECTION, SOLUTION INTRAVENOUS
Status: DISCONTINUED | OUTPATIENT
Start: 2022-02-23 | End: 2022-02-23 | Stop reason: HOSPADM

## 2022-02-22 RX ORDER — SODIUM CHLORIDE 9 MG/ML
INJECTION, SOLUTION INTRAVENOUS CONTINUOUS
Status: DISCONTINUED | OUTPATIENT
Start: 2022-02-23 | End: 2022-02-23

## 2022-02-22 RX ORDER — ONDANSETRON 2 MG/ML
4 INJECTION INTRAMUSCULAR; INTRAVENOUS EVERY 6 HOURS PRN
Status: DISCONTINUED | OUTPATIENT
Start: 2022-02-23 | End: 2022-02-23 | Stop reason: HOSPADM

## 2022-02-22 RX ORDER — AMOXICILLIN 250 MG
1 CAPSULE ORAL 2 TIMES DAILY PRN
Status: DISCONTINUED | OUTPATIENT
Start: 2022-02-23 | End: 2022-02-23 | Stop reason: HOSPADM

## 2022-02-22 RX ORDER — ACETAMINOPHEN 325 MG/1
650 TABLET ORAL EVERY 8 HOURS PRN
Status: DISCONTINUED | OUTPATIENT
Start: 2022-02-23 | End: 2022-02-23 | Stop reason: HOSPADM

## 2022-02-22 RX ORDER — SODIUM CHLORIDE 0.9 % (FLUSH) 0.9 %
10 SYRINGE (ML) INJECTION
Status: DISCONTINUED | OUTPATIENT
Start: 2022-02-23 | End: 2022-02-23 | Stop reason: HOSPADM

## 2022-02-22 RX ORDER — SERTRALINE HYDROCHLORIDE 50 MG/1
100 TABLET, FILM COATED ORAL DAILY
Status: DISCONTINUED | OUTPATIENT
Start: 2022-02-23 | End: 2022-02-23 | Stop reason: HOSPADM

## 2022-02-22 RX ORDER — POTASSIUM CHLORIDE 20 MEQ/1
40 TABLET, EXTENDED RELEASE ORAL
Status: DISCONTINUED | OUTPATIENT
Start: 2022-02-23 | End: 2022-02-23 | Stop reason: HOSPADM

## 2022-02-22 RX ORDER — MAGNESIUM SULFATE 1 G/100ML
1 INJECTION INTRAVENOUS
Status: DISCONTINUED | OUTPATIENT
Start: 2022-02-23 | End: 2022-02-23 | Stop reason: HOSPADM

## 2022-02-22 RX ORDER — ATORVASTATIN CALCIUM 40 MG/1
40 TABLET, FILM COATED ORAL DAILY
Status: DISCONTINUED | OUTPATIENT
Start: 2022-02-23 | End: 2022-02-23 | Stop reason: HOSPADM

## 2022-02-22 RX ORDER — IBUPROFEN 200 MG
24 TABLET ORAL
Status: DISCONTINUED | OUTPATIENT
Start: 2022-02-23 | End: 2022-02-23 | Stop reason: HOSPADM

## 2022-02-22 RX ORDER — TALC
6 POWDER (GRAM) TOPICAL NIGHTLY PRN
Status: DISCONTINUED | OUTPATIENT
Start: 2022-02-23 | End: 2022-02-23 | Stop reason: HOSPADM

## 2022-02-22 RX ORDER — ENOXAPARIN SODIUM 100 MG/ML
40 INJECTION SUBCUTANEOUS EVERY 24 HOURS
Status: DISCONTINUED | OUTPATIENT
Start: 2022-02-23 | End: 2022-02-23 | Stop reason: HOSPADM

## 2022-02-22 RX ORDER — SIMETHICONE 80 MG
1 TABLET,CHEWABLE ORAL 4 TIMES DAILY PRN
Status: DISCONTINUED | OUTPATIENT
Start: 2022-02-23 | End: 2022-02-23 | Stop reason: HOSPADM

## 2022-02-22 RX ORDER — NALOXONE HCL 0.4 MG/ML
0.02 VIAL (ML) INJECTION
Status: DISCONTINUED | OUTPATIENT
Start: 2022-02-23 | End: 2022-02-23 | Stop reason: HOSPADM

## 2022-02-22 RX ORDER — IBUPROFEN 200 MG
16 TABLET ORAL
Status: DISCONTINUED | OUTPATIENT
Start: 2022-02-23 | End: 2022-02-23 | Stop reason: HOSPADM

## 2022-02-22 RX ORDER — GLUCAGON 1 MG
1 KIT INJECTION
Status: DISCONTINUED | OUTPATIENT
Start: 2022-02-23 | End: 2022-02-23 | Stop reason: HOSPADM

## 2022-02-22 RX ORDER — ASPIRIN 81 MG/1
81 TABLET ORAL DAILY
Status: DISCONTINUED | OUTPATIENT
Start: 2022-02-23 | End: 2022-02-23 | Stop reason: HOSPADM

## 2022-02-22 RX ORDER — POLYETHYLENE GLYCOL 3350 17 G/17G
17 POWDER, FOR SOLUTION ORAL 2 TIMES DAILY PRN
Status: DISCONTINUED | OUTPATIENT
Start: 2022-02-23 | End: 2022-02-23 | Stop reason: HOSPADM

## 2022-02-22 RX ORDER — EFAVIRENZ 600 MG/1
600 TABLET, FILM COATED ORAL NIGHTLY
Status: DISCONTINUED | OUTPATIENT
Start: 2022-02-23 | End: 2022-02-23 | Stop reason: HOSPADM

## 2022-02-22 RX ORDER — INSULIN ASPART 100 [IU]/ML
1-10 INJECTION, SOLUTION INTRAVENOUS; SUBCUTANEOUS
Status: DISCONTINUED | OUTPATIENT
Start: 2022-02-23 | End: 2022-02-23 | Stop reason: HOSPADM

## 2022-02-22 RX ADMIN — SODIUM CHLORIDE, SODIUM LACTATE, POTASSIUM CHLORIDE, AND CALCIUM CHLORIDE 1000 ML: .6; .31; .03; .02 INJECTION, SOLUTION INTRAVENOUS at 05:02

## 2022-02-23 ENCOUNTER — CLINICAL SUPPORT (OUTPATIENT)
Dept: CARDIOLOGY | Facility: HOSPITAL | Age: 72
DRG: 315 | End: 2022-02-23
Attending: FAMILY MEDICINE
Payer: MEDICARE

## 2022-02-23 ENCOUNTER — TELEPHONE (OUTPATIENT)
Dept: BARIATRICS | Facility: CLINIC | Age: 72
End: 2022-02-23
Payer: MEDICARE

## 2022-02-23 VITALS
HEART RATE: 67 BPM | SYSTOLIC BLOOD PRESSURE: 121 MMHG | BODY MASS INDEX: 22.27 KG/M2 | WEIGHT: 164.44 LBS | OXYGEN SATURATION: 100 % | DIASTOLIC BLOOD PRESSURE: 65 MMHG | TEMPERATURE: 98 F | RESPIRATION RATE: 18 BRPM | HEIGHT: 72 IN

## 2022-02-23 PROBLEM — R55 SYNCOPE: Status: RESOLVED | Noted: 2022-02-22 | Resolved: 2022-02-23

## 2022-02-23 PROBLEM — I95.9 HYPOTENSION: Status: ACTIVE | Noted: 2022-02-23

## 2022-02-23 PROBLEM — I95.9 HYPOTENSION: Status: RESOLVED | Noted: 2022-02-22 | Resolved: 2022-02-23

## 2022-02-23 PROBLEM — I95.9 HYPOTENSION: Status: RESOLVED | Noted: 2022-02-23 | Resolved: 2022-02-23

## 2022-02-23 LAB
ALBUMIN SERPL BCP-MCNC: 3.4 G/DL (ref 3.5–5.2)
ALP SERPL-CCNC: 26 U/L (ref 55–135)
ALT SERPL W/O P-5'-P-CCNC: 18 U/L (ref 10–44)
AMPHET+METHAMPHET UR QL: NEGATIVE
ANION GAP SERPL CALC-SCNC: 7 MMOL/L (ref 8–16)
AORTIC ROOT ANNULUS: 3.02 CM
AORTIC VALVE CUSP SEPERATION: 1.85 CM
AST SERPL-CCNC: 17 U/L (ref 10–40)
AV INDEX (PROSTH): 0.72
AV MEAN GRADIENT: 4 MMHG
AV PEAK GRADIENT: 7 MMHG
AV VALVE AREA: 2.46 CM2
AV VELOCITY RATIO: 69.13
B-OH-BUTYR BLD STRIP-SCNC: 0.3 MMOL/L (ref 0–0.5)
BARBITURATES UR QL SCN>200 NG/ML: NEGATIVE
BASOPHILS # BLD AUTO: 0.02 K/UL (ref 0–0.2)
BASOPHILS NFR BLD: 0.5 % (ref 0–1.9)
BENZODIAZ UR QL SCN>200 NG/ML: NEGATIVE
BILIRUB SERPL-MCNC: 0.6 MG/DL (ref 0.1–1)
BSA FOR ECHO PROCEDURE: 1.95 M2
BUN SERPL-MCNC: 41 MG/DL (ref 8–23)
BZE UR QL SCN: NEGATIVE
CALCIUM SERPL-MCNC: 8.9 MG/DL (ref 8.7–10.5)
CANNABINOIDS UR QL SCN: ABNORMAL
CHLORIDE SERPL-SCNC: 113 MMOL/L (ref 95–110)
CO2 SERPL-SCNC: 17 MMOL/L (ref 23–29)
CORTIS SERPL-MCNC: 20.2 UG/DL
CREAT SERPL-MCNC: 1.6 MG/DL (ref 0.5–1.4)
CREAT UR-MCNC: 76 MG/DL (ref 23–375)
CV ECHO LV RWT: 0.46 CM
DIFFERENTIAL METHOD: ABNORMAL
DOP CALC AO PEAK VEL: 1.28 M/S
DOP CALC AO VTI: 24.71 CM
DOP CALC LVOT AREA: 3.4 CM2
DOP CALC LVOT DIAMETER: 2.09 CM
DOP CALC LVOT PEAK VEL: 88.49 M/S
DOP CALC LVOT STROKE VOLUME: 60.83 CM3
DOP CALCLVOT PEAK VEL VTI: 17.74 CM
E WAVE DECELERATION TIME: 203.05 MSEC
E/A RATIO: 1.16
E/E' RATIO: 11 M/S
ECHO LV POSTERIOR WALL: 1.05 CM (ref 0.6–1.1)
EJECTION FRACTION: 58 %
EOSINOPHIL # BLD AUTO: 0.2 K/UL (ref 0–0.5)
EOSINOPHIL NFR BLD: 3.9 % (ref 0–8)
ERYTHROCYTE [DISTWIDTH] IN BLOOD BY AUTOMATED COUNT: 13.6 % (ref 11.5–14.5)
EST. GFR  (AFRICAN AMERICAN): 49 ML/MIN/1.73 M^2
EST. GFR  (NON AFRICAN AMERICAN): 42.4 ML/MIN/1.73 M^2
ESTIMATED AVG GLUCOSE: 143 MG/DL (ref 68–131)
ETHANOL SERPL-MCNC: <5 MG/DL
FRACTIONAL SHORTENING: 25 % (ref 28–44)
GLUCOSE SERPL-MCNC: 143 MG/DL (ref 70–110)
GLUCOSE SERPL-MCNC: 166 MG/DL (ref 70–110)
GLUCOSE SERPL-MCNC: 200 MG/DL (ref 70–110)
HBA1C MFR BLD: 6.6 % (ref 4.5–6.2)
HCT VFR BLD AUTO: 30 % (ref 40–54)
HGB BLD-MCNC: 10 G/DL (ref 14–18)
IMM GRANULOCYTES # BLD AUTO: 0.01 K/UL (ref 0–0.04)
IMM GRANULOCYTES NFR BLD AUTO: 0.2 % (ref 0–0.5)
INTERVENTRICULAR SEPTUM: 1.02 CM (ref 0.6–1.1)
LEFT ATRIUM SIZE: 3.91 CM
LEFT INTERNAL DIMENSION IN SYSTOLE: 3.4 CM (ref 2.1–4)
LEFT VENTRICLE MASS INDEX: 83 G/M2
LEFT VENTRICULAR INTERNAL DIMENSION IN DIASTOLE: 4.55 CM (ref 3.5–6)
LEFT VENTRICULAR MASS: 163.61 G
LV LATERAL E/E' RATIO: 14.67 M/S
LV SEPTAL E/E' RATIO: 8.8 M/S
LYMPHOCYTES # BLD AUTO: 1.8 K/UL (ref 1–4.8)
LYMPHOCYTES NFR BLD: 42.2 % (ref 18–48)
MAGNESIUM SERPL-MCNC: 1.7 MG/DL (ref 1.6–2.6)
MCH RBC QN AUTO: 31.4 PG (ref 27–31)
MCHC RBC AUTO-ENTMCNC: 33.3 G/DL (ref 32–36)
MCV RBC AUTO: 94 FL (ref 82–98)
MONOCYTES # BLD AUTO: 0.4 K/UL (ref 0.3–1)
MONOCYTES NFR BLD: 8.7 % (ref 4–15)
MV PEAK A VEL: 0.76 M/S
MV PEAK E VEL: 0.88 M/S
NEUTROPHILS # BLD AUTO: 1.9 K/UL (ref 1.8–7.7)
NEUTROPHILS NFR BLD: 44.5 % (ref 38–73)
NRBC BLD-RTO: 0 /100 WBC
OPIATES UR QL SCN: NEGATIVE
PCP UR QL SCN>25 NG/ML: NEGATIVE
PISA TR MAX VEL: 2.48 M/S
PLATELET # BLD AUTO: 224 K/UL (ref 150–450)
PMV BLD AUTO: 9.5 FL (ref 9.2–12.9)
POTASSIUM SERPL-SCNC: 3.9 MMOL/L (ref 3.5–5.1)
PROT SERPL-MCNC: 6.7 G/DL (ref 6–8.4)
RA PRESSURE: 3 MMHG
RBC # BLD AUTO: 3.18 M/UL (ref 4.6–6.2)
RIGHT VENTRICULAR END-DIASTOLIC DIMENSION: 204 CM
SARS-COV-2 RDRP RESP QL NAA+PROBE: NEGATIVE
SODIUM SERPL-SCNC: 137 MMOL/L (ref 136–145)
TDI LATERAL: 0.06 M/S
TDI SEPTAL: 0.1 M/S
TDI: 0.08 M/S
TOXICOLOGY INFORMATION: ABNORMAL
TR MAX PG: 25 MMHG
TROPONIN I SERPL DL<=0.01 NG/ML-MCNC: <0.03 NG/ML
TV REST PULMONARY ARTERY PRESSURE: 28 MMHG
WBC # BLD AUTO: 4.36 K/UL (ref 3.9–12.7)

## 2022-02-23 PROCEDURE — 85025 COMPLETE CBC W/AUTO DIFF WBC: CPT | Performed by: FAMILY MEDICINE

## 2022-02-23 PROCEDURE — 83036 HEMOGLOBIN GLYCOSYLATED A1C: CPT | Performed by: FAMILY MEDICINE

## 2022-02-23 PROCEDURE — 80307 DRUG TEST PRSMV CHEM ANLYZR: CPT | Performed by: FAMILY MEDICINE

## 2022-02-23 PROCEDURE — 82010 KETONE BODYS QUAN: CPT | Performed by: FAMILY MEDICINE

## 2022-02-23 PROCEDURE — 63600175 PHARM REV CODE 636 W HCPCS: Performed by: FAMILY MEDICINE

## 2022-02-23 PROCEDURE — 82077 ASSAY SPEC XCP UR&BREATH IA: CPT | Performed by: FAMILY MEDICINE

## 2022-02-23 PROCEDURE — 93306 TTE W/DOPPLER COMPLETE: CPT

## 2022-02-23 PROCEDURE — 93306 TTE W/DOPPLER COMPLETE: CPT | Mod: 26,,, | Performed by: INTERNAL MEDICINE

## 2022-02-23 PROCEDURE — 97597 DBRDMT OPN WND 1ST 20 CM/<: CPT | Mod: ,,, | Performed by: PODIATRIST

## 2022-02-23 PROCEDURE — 86361 T CELL ABSOLUTE COUNT: CPT | Performed by: FAMILY MEDICINE

## 2022-02-23 PROCEDURE — 99222 PR INITIAL HOSPITAL CARE,LEVL II: ICD-10-PCS | Mod: 25,,, | Performed by: PODIATRIST

## 2022-02-23 PROCEDURE — 99900035 HC TECH TIME PER 15 MIN (STAT)

## 2022-02-23 PROCEDURE — 93306 ECHO (CUPID ONLY): ICD-10-PCS | Mod: 26,,, | Performed by: INTERNAL MEDICINE

## 2022-02-23 PROCEDURE — 94761 N-INVAS EAR/PLS OXIMETRY MLT: CPT

## 2022-02-23 PROCEDURE — 36415 COLL VENOUS BLD VENIPUNCTURE: CPT | Performed by: FAMILY MEDICINE

## 2022-02-23 PROCEDURE — 82533 TOTAL CORTISOL: CPT | Performed by: INTERNAL MEDICINE

## 2022-02-23 PROCEDURE — 80053 COMPREHEN METABOLIC PANEL: CPT | Performed by: FAMILY MEDICINE

## 2022-02-23 PROCEDURE — 36415 COLL VENOUS BLD VENIPUNCTURE: CPT | Performed by: INTERNAL MEDICINE

## 2022-02-23 PROCEDURE — 99222 1ST HOSP IP/OBS MODERATE 55: CPT | Mod: 25,,, | Performed by: PODIATRIST

## 2022-02-23 PROCEDURE — 84484 ASSAY OF TROPONIN QUANT: CPT | Performed by: FAMILY MEDICINE

## 2022-02-23 PROCEDURE — 99900031 HC PATIENT EDUCATION (STAT)

## 2022-02-23 PROCEDURE — 83735 ASSAY OF MAGNESIUM: CPT | Performed by: FAMILY MEDICINE

## 2022-02-23 PROCEDURE — 25000003 PHARM REV CODE 250: Performed by: FAMILY MEDICINE

## 2022-02-23 PROCEDURE — 97597 PR DEBRIDEMENT OPEN WOUND 20 SQ CM<: ICD-10-PCS | Mod: ,,, | Performed by: PODIATRIST

## 2022-02-23 RX ORDER — ASPIRIN 325 MG
325 TABLET ORAL DAILY
Qty: 30 TABLET | Refills: 0 | Status: SHIPPED | OUTPATIENT
Start: 2022-02-23 | End: 2024-03-18

## 2022-02-23 RX ADMIN — PIPERACILLIN AND TAZOBACTAM 3.38 G: 3; .375 INJECTION, POWDER, LYOPHILIZED, FOR SOLUTION INTRAVENOUS; PARENTERAL at 02:02

## 2022-02-23 RX ADMIN — EFAVIRENZ 600 MG: 600 TABLET, FILM COATED ORAL at 01:02

## 2022-02-23 RX ADMIN — ATORVASTATIN CALCIUM 40 MG: 40 TABLET, FILM COATED ORAL at 08:02

## 2022-02-23 RX ADMIN — SERTRALINE HYDROCHLORIDE 100 MG: 50 TABLET ORAL at 08:02

## 2022-02-23 RX ADMIN — SODIUM CHLORIDE: 0.9 INJECTION, SOLUTION INTRAVENOUS at 02:02

## 2022-02-23 RX ADMIN — ASPIRIN 81 MG: 81 TABLET, DELAYED RELEASE ORAL at 08:02

## 2022-02-23 NOTE — H&P
"[facility] Medicine   History & Physical   Patient Name: Brandon Parson  MRN: [id number]  Admission Date: 2/22/2022  4:03 PM  Attending Physician: Kiara Jack MD  Primary Care Provider: Teresita Last NP  Face-to-Face encounter date: 02/22/2022    Patient information was obtained from patient, past medical records, ER physician, and ER records.     HISTORY OF PRESENT ILLNESS:     Brandon Parson is a 71 y.o. White male   With PMH of HIV (with undetectable viral load),  HTN, DM2,  who presents with syncope.    Onset today  Occurred 3x   Witnessed by a friend who is not present at bedside  Unsure how long LOC persisted  No tongue biting  No b/b incontinence  No CP  No SOB  No palpitations or heart racing  No headaches  No n/v  No fever or chills  No abdominal pain  +diarrhea with watery stool at least 4x a day, occurring "for a while"  +progressively worsening generalized weakness x weeks  +intermittent syncopal episodes previously  He has not followed with a physician for this before  No focal weakness, no numbness    BP 66/45 at presentation to ER today  Pt reports he doesn't check his BP at home  He reports compliance with his HTN and other medication  He says he "couldn't tell me" what his BP was during his last physician visit  Reports he saw his PCP 3 months ago  BP responded with IVFs in the ER  BP now 117/72    REVIEW OF SYSTEMS:     All systems reviewed and are negative except as noted per above.    PAST MEDICAL HISTORY:     Past Medical History:   Diagnosis Date    Anxiety     Depression     Diabetes mellitus     GERD (gastroesophageal reflux disease)     HIV (human immunodeficiency virus infection)        PAST SURGICAL HISTORY:   None significant.    ALLERGIES:   Chantix [varenicline]    FAMILY HISTORY:   HTN    SOCIAL HISTORY:     Social History     Tobacco Use    Smoking status: Current Some Day Smoker     Years: 49.00     Types: Cigarettes     Start date: 1970    " Smokeless tobacco: Never Used    Tobacco comment: Handout provided for Ambulatory Smoking Cessation program   Substance Use Topics    Alcohol use: No        Social History     Substance and Sexual Activity   Sexual Activity Not Currently        HOME MEDICATIONS:     Prior to Admission medications    Medication Sig Start Date End Date Taking? Authorizing Provider   aspirin (ECOTRIN) 81 MG EC tablet Take 81 mg by mouth once daily.   Yes Historical Provider   atorvastatin calcium (ATORVASTATIN ORAL) Take 40 mg by mouth once daily.    Yes Historical Provider   benazepril-hydrochlorthiazide (LOTENSIN HCT) 20-25 mg Tab Take 1 tablet by mouth once daily.   Yes Historical Provider   clonazePAM (KLONOPIN) 0.5 MG tablet Take 1 mg by mouth 3 (three) times daily as needed.    Yes Historical Provider   efavirenz (SUSTIVA) 600 mg Tab Take 600 mg by mouth every evening.    Yes Historical Provider   emtricitabine-tenofovir alafen (DESCOVY) 200-25 mg Tab Take 1 tablet by mouth every evening.    Yes Historical Provider   fenofibrate (TRICOR) 145 MG tablet Take 145 mg by mouth once daily.   Yes Historical Provider   fish oil-omega-3 fatty acids 300-1,000 mg capsule Take 2 capsules by mouth once daily.   Yes Historical Provider   insulin aspart protamine-insulin aspart (NOVOLOG 70/30) 100 unit/mL (70-30) InPn pen Inject 16 Units into the skin once daily.   Yes Historical Provider   insulin aspart protamine-insulin aspart (NOVOLOG 70/30) 100 unit/mL (70-30) InPn pen Inject 18 Units into the skin nightly.   Yes Historical Provider   metFORMIN (GLUCOPHAGE) 1000 MG tablet Take 1,000 mg by mouth 2 (two) times daily.    Yes Historical Provider   multivitamin (THERAGRAN) tablet Take 1 tablet by mouth once daily.   Yes Historical Provider   sertraline (ZOLOFT) 100 MG tablet Take 100 mg by mouth once daily.    Yes Historical Provider   zolpidem (AMBIEN) 10 mg Tab Take 10 mg by mouth every evening.    Yes Historical Provider       PHYSICAL  EXAM:     /72   Pulse 71   Temp 98.5 °F (36.9 °C) (Oral)   Resp 18   Ht 6' (1.829 m)   Wt 72.6 kg (160 lb)   SpO2 98%   BMI 21.70 kg/m²     Gen: alert, responsive  HEENT:  Eyes - no pallor  External ears with no lesions  Nares patent  Mouth - lips chapped  CV: RRR  Lungs: CTA B/L  Abd: +BS, soft, NT, ND  Ext: no atrophy or edema; L great toe ulceration, appears to be well-healing, without surrounding erythema or edema  Skin: warm, dry  Neuro: alert, responds appropriately, MAEW, PERRL  Psych: pleasant     LABS AND IMAGING:     Labs Reviewed   CBC W/ AUTO DIFFERENTIAL - Abnormal; Notable for the following components:       Result Value    RBC 3.28 (*)     Hemoglobin 10.5 (*)     Hematocrit 32.1 (*)     MCH 32.0 (*)     All other components within normal limits   COMPREHENSIVE METABOLIC PANEL - Abnormal; Notable for the following components:    Chloride 112 (*)     CO2 13 (*)     Glucose 187 (*)     BUN 55 (*)     Creatinine 2.1 (*)     Alkaline Phosphatase 28 (*)     eGFR if  35.3 (*)     eGFR if non  30.5 (*)     All other components within normal limits   URINALYSIS, REFLEX TO URINE CULTURE - Abnormal; Notable for the following components:    Protein, UA Trace (*)     All other components within normal limits    Narrative:     Specimen Source->Urine   POCT GLUCOSE - Abnormal; Notable for the following components:    POC Glucose 187 (*)     All other components within normal limits   ISTAT PROCEDURE - Abnormal; Notable for the following components:    POC PH 7.281 (*)     POC PO2 21 (*)     POC HCO3 17.4 (*)     POC SATURATED O2 28 (*)     POC TCO2 18 (*)     All other components within normal limits   CULTURE, BLOOD   CULTURE, BLOOD   CULTURE, STOOL   TROPONIN I   TROPONIN I   B-TYPE NATRIURETIC PEPTIDE   CK   CK-MB   LACTIC ACID, PLASMA   PROCALCITONIN   TSH   CK   TSH   CK-MB   OCCULT BLOOD X 1, STOOL   BETA - HYDROXYBUTYRATE, SERUM   ALCOHOL,MEDICAL (ETHANOL)   DRUG  SCREEN PANEL, URINE EMERGENCY   SARS-COV-2 RNA AMPLIFICATION, QUAL   WBC, STOOL   STOOL EXAM-OVA,CYSTS,PARASITES   HEMOGLOBIN A1C   T-HELPER CELLS (CD4) COUNT     Imaging Results          CT Head Without Contrast (In process)                X-Ray Toe 2 or More Views Left (In process)                US Carotid Bilateral (In process)                X-Ray Chest AP Portable (Final result)  Result time 02/22/22 17:33:45    Final result by Rashaun Barrios MD (02/22/22 17:33:45)                 Narrative:    Chest single view    Clinical data:Chest pain.    FINDINGS: 2 AP views demonstrate no cardiac, pulmonary, or osseous abnormalities.    IMPRESSION:  1. Normal chest single view.    Electronically signed by:  Rashaun Barrios MD  2/22/2022 5:33 PM CST Workstation: [id number]                                ASSESSMENT & PLAN:   Brandon Parson is a 71 y.o. male admitted for    Active Hospital Problems    Diagnosis  POA    Hypotension [I95.9]  Unknown    Syncope [R55]  Yes    Diabetic ulcer of toe of left foot associated with type 2 diabetes mellitus, with bone involvement without evidence of necrosis [E11.621, L97.526]  Yes    HIV disease [B20]  Yes    Chronic kidney disease, stage III (moderate) [N18.30]  Yes    Type 2 diabetes mellitus with skin complication, without long-term current use of insulin [E11.628]  Yes      Resolved Hospital Problems   No resolved problems to display.        Plan    Syncope  Hypotension  MACIEL on CKD  Diarrhea   Metabolic acidosis  Diabetic ulcer  - IVFs  - empiric zosyn  - infection workup in progress  - XR L toe  - hold home HTN medication  - trending BMP  - CT head, echo, carotid US  - EKG reviewed  - trending troponin  - telemetry  - podiatry consulted, thank you    DM2  - SSI    Chronic conditions as noted above/below; home medications reviewed personally by me and restarted as appropriate  Electrolyte derangement:  Trending BMP; Mg; replacement prn  DVT ppx: lovenox  DNR  confirmed with pt and his sister at bedside    Kiara Jack MD  [facility] Hospitalist  02/22/2022

## 2022-02-23 NOTE — TELEPHONE ENCOUNTER
----- Message from Adri Monzon sent at 2/23/2022  1:50 PM CST -----  Regarding: advice  Contact: Fransisca/Edenilson/[phone]  Type: Needs Medical Advice  Who Called:  Fransisca/Edenilson/[phone]    Additional Information: Would like to make an appointment in [place] for a hernia in his stomach. I tried to schedule it but Dr. Ramirez's name would only come up in [place]. Please call to advise.

## 2022-02-23 NOTE — NURSING
Pt transferred to room from ER via stretcher. Pt able to ambulate with standby assist to bathroom and into the bed. Sister at bedside. Pt oriented to room. VSS, pt oriented to room, bed alarm on, and call light in reach. Pt instructed to call if assistance is needed.

## 2022-02-23 NOTE — ED PROVIDER NOTES
Encounter Date: 2/22/2022       History     Chief Complaint   Patient presents with    Fatigue     Family received call that pt hadnt been feeling well. Pt found on the floor of the porch.     71-year-old male with history of depression, anxiety, insulin-dependent diabetes, gastroesophageal reflux, HIV with undetectable viral load over last 5 years.  Patient presents emergency department with complaint of syncopal episodes x3 which was noted today by family.  Patient has been have been generalized weakness and increased fatigue over last 3 weeks.  According to patient's sisters he has been much more tired with his daily activities.  Patient denies any history of nausea, vomiting, fever, hematemesis, no melena, no headache, no chest pain, no shortness of breath, no abdominal pain.  Patient does admit that he has had some decreased p.o. intake however.  Upon arrival to the emergency department patient found with low blood pressure 80/60 with heart rate of 105.         Review of patient's allergies indicates:   Allergen Reactions    Chantix [varenicline]      Past Medical History:   Diagnosis Date    Anxiety     Depression     Diabetes mellitus     GERD (gastroesophageal reflux disease)     HIV (human immunodeficiency virus infection)      History reviewed. No pertinent surgical history.  No family history on file.  Social History     Tobacco Use    Smoking status: Current Some Day Smoker     Years: 49.00     Types: Cigarettes     Start date: 1970    Smokeless tobacco: Never Used    Tobacco comment: Handout provided for Ambulatory Smoking Cessation program   Substance Use Topics    Alcohol use: No    Drug use: No     Review of Systems   Constitutional: Positive for fatigue. Negative for fever.   HENT: Negative for sore throat.    Respiratory: Negative for chest tightness and shortness of breath.    Cardiovascular: Negative for chest pain and palpitations.   Gastrointestinal: Negative for abdominal pain,  anal bleeding, blood in stool, nausea and vomiting.   Genitourinary: Negative for decreased urine volume and dysuria.   Musculoskeletal: Negative for back pain.   Skin: Negative for rash.   Neurological: Positive for syncope, weakness and light-headedness. Negative for tremors, seizures, facial asymmetry, speech difficulty and headaches.   Hematological: Does not bruise/bleed easily.       Physical Exam     Initial Vitals   BP Pulse Resp Temp SpO2   02/22/22 1606 02/22/22 1606 02/22/22 1606 02/22/22 1630 02/22/22 1606   (!) 66/45 62 18 98.5 °F (36.9 °C) 97 %      MAP       --                Physical Exam    Nursing note and vitals reviewed.  Constitutional: He is not diaphoretic. No distress.   Frail appearing male no acute distress   HENT:   Head: Normocephalic and atraumatic.   Nose: Nose normal.   Dry mucous membrane   Eyes: Conjunctivae and EOM are normal. Pupils are equal, round, and reactive to light. No scleral icterus.   Neck: Neck supple.   Normal range of motion.  Cardiovascular: Normal rate, regular rhythm, normal heart sounds and intact distal pulses. Exam reveals no gallop and no friction rub.    No murmur heard.  Pulmonary/Chest: No stridor. No respiratory distress.   Course bilateral breath sounds no adventitious sounds   Abdominal: Abdomen is soft. Bowel sounds are normal. He exhibits no mass. There is no abdominal tenderness. Hernia confirmed positive in the right inguinal area. There is no rebound and no guarding.   Genitourinary:    Penis and rectum normal.   Rectum:      Guaiac result negative.      No rectal mass.   Guaiac negative stool. : Acceptable.Prostate is enlarged. Prostate is not tender. Circumcised.   Musculoskeletal:         General: No tenderness or edema. Normal range of motion.      Cervical back: Normal range of motion and neck supple.     Lymphadenopathy:     He has no cervical adenopathy. No inguinal adenopathy noted on the right or left side.   Neurological: He  is alert and oriented to person, place, and time. He has normal strength and normal reflexes. No cranial nerve deficit or sensory deficit. GCS score is 15. GCS eye subscore is 4. GCS verbal subscore is 5. GCS motor subscore is 6.   Skin: Skin is warm and dry. Capillary refill takes less than 2 seconds. No rash noted.   Psychiatric: He has a normal mood and affect. His behavior is normal. Judgment and thought content normal.         ED Course   Procedures  Labs Reviewed   CBC W/ AUTO DIFFERENTIAL - Abnormal; Notable for the following components:       Result Value    RBC 3.28 (*)     Hemoglobin 10.5 (*)     Hematocrit 32.1 (*)     MCH 32.0 (*)     All other components within normal limits   COMPREHENSIVE METABOLIC PANEL - Abnormal; Notable for the following components:    Chloride 112 (*)     CO2 13 (*)     Glucose 187 (*)     BUN 55 (*)     Creatinine 2.1 (*)     Alkaline Phosphatase 28 (*)     eGFR if  35.3 (*)     eGFR if non  30.5 (*)     All other components within normal limits   URINALYSIS, REFLEX TO URINE CULTURE - Abnormal; Notable for the following components:    Protein, UA Trace (*)     All other components within normal limits    Narrative:     Specimen Source->Urine   POCT GLUCOSE - Abnormal; Notable for the following components:    POC Glucose 187 (*)     All other components within normal limits   ISTAT PROCEDURE - Abnormal; Notable for the following components:    POC PH 7.281 (*)     POC PO2 21 (*)     POC HCO3 17.4 (*)     POC SATURATED O2 28 (*)     POC TCO2 18 (*)     All other components within normal limits   CULTURE, BLOOD   CULTURE, BLOOD   TROPONIN I   TROPONIN I   B-TYPE NATRIURETIC PEPTIDE   CK   CK-MB   LACTIC ACID, PLASMA   PROCALCITONIN   TSH   CK   TSH   CK-MB   OCCULT BLOOD X 1, STOOL   BETA - HYDROXYBUTYRATE, SERUM   ALCOHOL,MEDICAL (ETHANOL)   DRUG SCREEN PANEL, URINE EMERGENCY        ECG Results          EKG and show to ED MD (In process)  Result  time 02/22/22 16:41:26    In process by Interface, Lab In [facility] (02/22/22 16:41:26)                 Narrative:    Test Reason : R55,    Vent. Rate : 062 BPM     Atrial Rate : 062 BPM     P-R Int : 218 ms          QRS Dur : 110 ms      QT Int : 430 ms       P-R-T Axes : 061 -53 073 degrees     QTc Int : 436 ms    Sinus rhythm with 1st degree A-V block  Low voltage QRS  Incomplete right bundle branch block  Left anterior fascicular block  Abnormal ECG  No previous ECGs available    Referred By: [email or web address]   SELF           Confirmed By:                             Imaging Results          X-Ray Chest AP Portable (Final result)  Result time 02/22/22 17:33:45    Final result by Rashaun Barrios MD (02/22/22 17:33:45)                 Narrative:    Chest single view    Clinical data:Chest pain.    FINDINGS: 2 AP views demonstrate no cardiac, pulmonary, or osseous abnormalities.    IMPRESSION:  1. Normal chest single view.    Electronically signed by:  Rashaun Barrios MD  2/22/2022 5:33 PM CST Workstation: [id number]                               Medications   lactated ringers bolus 1,000 mL (has no administration in time range)   lactated ringers bolus 1,000 mL (0 mLs Intravenous Stopped 2/22/22 1840)     Medical Decision Making:   Initial Assessment:   71-year-old male with history of depression, anxiety, insulin-dependent diabetes, gastroesophageal reflux, HIV with undetectable viral load over last 5 years.  Patient presents emergency department with complaint of syncopal episodes x3 which was noted today by family.  Patient has been have been generalized weakness and increased fatigue over last 3 weeks.  According to patient's sisters he has been much more tired with his daily activities.  Patient denies any history of nausea, vomiting, fever, hematemesis, no melena, no headache, no chest pain, no shortness of breath, no abdominal pain.  Patient does admit that he has had some decreased p.o. intake however.  Upon  arrival to the emergency department patient found with low blood pressure 80/60 with heart rate of 105.         Differential Diagnosis:   Viral syndrome, acute kidney injury secondary to prerenal azotemia (dehydration), GI bleed,  Clinical Tests:   Lab Tests: Ordered and Reviewed  Radiological Study: Ordered and Reviewed  Medical Tests: Ordered and Reviewed             ED Course as of 02/22/22 2311 Tue Feb 22, 2022   5723 Patient seen evaluated emergency department.  Patient upon arrival to emergency department with history of recurrent syncope x3 today.  Patient found with blood pressure 66/45 at time of triage.  Patient was found to be orthostatic.  Patient with acute kidney injury with BUN 55, creatinine 2.1, bicarb 13.  Patient was given IV hydration with overall improvement in blood pressure.  Patient was guaiac negative.  At this time patient will be given IV hydration overnight and repeat CMP in a.m..  Patient remained hemodynamically adequate.  Awaiting admission to hospitalist service. [RM]      ED Course User Index  [RM] Brandon Arora MD             Clinical Impression:   Final diagnoses:  [R55] Syncope  [N17.9] Acute kidney injury (Primary)  [E86.0] Dehydration  [K40.90] Right inguinal hernia          ED Disposition Condition    Admit               Brandon Arora MD  02/22/22 2252       Brandon Arora MD  02/22/22 2311

## 2022-02-23 NOTE — PROGRESS NOTES
Patient wheeled off unit to personal vehicle. IV and tele removed. Patient had discharge instructions in hand and verbalized understanding of dc paperwork.

## 2022-02-23 NOTE — CONSULTS
[facility]  Podiatry  Consult Note    Patient Name: Brandon Parson  MRN: [id number]  Admission Date: 2/22/2022  Hospital Length of Stay: 1 days  Attending Physician: Álvaro Tirado MD  Primary Care Provider: Teresita Last NP     Inpatient consult to Podiatry  Consult performed by: Ernst Grijalva DPM  Consult ordered by: Kiara Jack MD        Subjective:  Ulcer plantar left 1st toe     History of Present Illness:  Patient has history of ulcer left 1st toe with infection and with base have been bone infection treated approximately 8 years ago now with buildup of keratotic tissue with subdermal bleeding and early ulcer formation.    Scheduled Meds:   aspirin  81 mg Oral Daily    atorvastatin  40 mg Oral Daily    efavirenz  600 mg Oral QHS    emtricitabine-tenofovir alafen  1 tablet Oral QHS    enoxaparin  40 mg Subcutaneous Daily    sertraline  100 mg Oral Daily     Continuous Infusions:   sodium chloride 0.9% 75 mL/hr at 02/23/22 0232     PRN Meds:acetaminophen, albuterol-ipratropium, dextrose 50%, dextrose 50%, glucagon (human recombinant), glucose, glucose, insulin aspart U-100, magnesium sulfate IVPB, magnesium sulfate IVPB, magnesium sulfate IVPB, magnesium sulfate IVPB, melatonin, naloxone, ondansetron, polyethylene glycol, potassium chloride, potassium chloride, potassium chloride, potassium chloride, senna-docusate 8.6-50 mg, simethicone, sodium chloride 0.9%    Review of patient's allergies indicates:   Allergen Reactions    Chantix [varenicline]         Past Medical History:   Diagnosis Date    Anxiety     Depression     Diabetes mellitus     GERD (gastroesophageal reflux disease)     HIV (human immunodeficiency virus infection)      History reviewed. No pertinent surgical history.    Family History    None       Tobacco Use    Smoking status: Current Some Day Smoker     Years: 49.00     Types: Cigarettes     Start date: 1970    Smokeless tobacco: Never Used    Tobacco  comment: Handout provided for Ambulatory Smoking Cessation program   Substance and Sexual Activity    Alcohol use: No    Drug use: No    Sexual activity: Not Currently     Review of Systems  Objective:     Vital Signs (Most Recent):  Temp: 98.5 °F (36.9 °C) (02/23/22 0735)  Pulse: 64 (02/23/22 0735)  Resp: 18 (02/23/22 0735)  BP: 113/64 (02/23/22 0735)  SpO2: 100 % (02/23/22 0735) Vital Signs (24h Range):  Temp:  [98.4 °F (36.9 °C)-98.5 °F (36.9 °C)] 98.5 °F (36.9 °C)  Pulse:  [62-73] 64  Resp:  [18-20] 18  SpO2:  [95 %-100 %] 100 %  BP: ()/(45-72) 113/64     Weight: 74.6 kg (164 lb 7.4 oz)  Body mass index is 22.31 kg/m².    Foot Exam  Vascular:  Palpable pedal pulses  Neurological:  Decreased sensation but pain sensation intact  Integument:  Grade 0 ulcer with callus tissue dry subdermal hemorrhage with no signs of infection plantar 1st toe approximately 2 cm in diameter and 1 mm deep              Laboratory:  All pertinent labs reviewed within the last 24 hours.    Diagnostic Results:  None    Clinical Findings:  There was mild an ulceration at the plantar hallux.    Assessment/Plan:  1. Diabetes with neuropathy  2. Grade 0 ulcer plantar left 1st toe      Active Diagnoses:    Diagnosis Date Noted POA    Hypotension [I95.9] 02/22/2022 Unknown    Syncope [R55] 02/22/2022 Yes    Diabetic ulcer of toe of left foot associated with type 2 diabetes mellitus, with bone involvement without evidence of necrosis [E11.621, L97.526] 08/16/2019 Yes    HIV disease [B20] 08/16/2019 Yes    Chronic kidney disease, stage III (moderate) [N18.30] 08/16/2019 Yes    Type 2 diabetes mellitus with skin complication, without long-term current use of insulin [E11.628] 08/16/2019 Yes      Problems Resolved During this Admission:       1. Evaluate the patient discussed with him the previous history of the ulceration with infection explained to him today he has a bed callus with superficial ulceration with dry subdermal  hemorrhage with no signs of infection.  I discussed with him debridement at the bedside which she consented to I used a disposable 10 blade and debrided the keratotic tissue down to normal tissue in the plantar aspect left 1st toe.  There was no bleeding no purulent drainage encountered.  No dressing was required.    2. When he is discharged she should follow-up in 1 of the clinics to have the foot evaluated periodic debridement.  No surgical procedures needed today.    Thank you for your consult. I will sign off. Please contact us if you have any additional questions.    Ernst Grijalva, ZHAOM  Podiatry  [facility]

## 2022-02-24 ENCOUNTER — OFFICE VISIT (OUTPATIENT)
Dept: SURGERY | Facility: CLINIC | Age: 72
End: 2022-02-24
Payer: MEDICARE

## 2022-02-24 VITALS
HEIGHT: 72 IN | SYSTOLIC BLOOD PRESSURE: 121 MMHG | WEIGHT: 159.31 LBS | TEMPERATURE: 99 F | RESPIRATION RATE: 16 BRPM | BODY MASS INDEX: 21.58 KG/M2 | HEART RATE: 68 BPM | DIASTOLIC BLOOD PRESSURE: 64 MMHG

## 2022-02-24 DIAGNOSIS — Z01.818 PRE-OP EVALUATION: Primary | ICD-10-CM

## 2022-02-24 DIAGNOSIS — K40.90 NON-RECURRENT UNILATERAL INGUINAL HERNIA WITHOUT OBSTRUCTION OR GANGRENE: ICD-10-CM

## 2022-02-24 LAB
BASOPHILS # BLD AUTO: 0 X10E3/UL (ref 0–0.2)
BASOPHILS NFR BLD AUTO: 0 %
CD3+CD4+ CELLS # BLD: 1216 /UL (ref 359–1519)
CD3+CD4+ CELLS NFR BLD: 60.8 % (ref 30.8–58.5)
EOSINOPHIL # BLD AUTO: 0.2 X10E3/UL (ref 0–0.4)
EOSINOPHIL NFR BLD AUTO: 3 %
ERYTHROCYTE [DISTWIDTH] IN BLOOD BY AUTOMATED COUNT: 13.1 % (ref 11.6–15.4)
HCT VFR BLD AUTO: 31.3 % (ref 37.5–51)
HGB BLD-MCNC: 10.1 G/DL (ref 13–17.7)
IMM GRANULOCYTES # BLD AUTO: 0 X10E3/UL (ref 0–0.1)
IMM GRANULOCYTES NFR BLD AUTO: 0 %
LYMPHOCYTES # BLD AUTO: 2 X10E3/UL (ref 0.7–3.1)
LYMPHOCYTES NFR BLD AUTO: 46 %
MCH RBC QN AUTO: 32.6 PG (ref 26.6–33)
MCHC RBC AUTO-ENTMCNC: 32.3 G/DL (ref 31.5–35.7)
MCV RBC AUTO: 101 FL (ref 79–97)
MONOCYTES # BLD AUTO: 0.4 X10E3/UL (ref 0.1–0.9)
MONOCYTES NFR BLD AUTO: 9 %
NEUTROPHILS # BLD AUTO: 1.9 X10E3/UL (ref 1.4–7)
NEUTROPHILS NFR BLD AUTO: 42 %
PLATELET # BLD AUTO: 227 X10E3/UL (ref 150–450)
RBC # BLD AUTO: 3.1 X10E6/UL (ref 4.14–5.8)
WBC # BLD AUTO: 4.5 X10E3/UL (ref 3.4–10.8)

## 2022-02-24 PROCEDURE — 1126F PR PAIN SEVERITY QUANTIFIED, NO PAIN PRESENT: ICD-10-PCS | Mod: CPTII,S$GLB,, | Performed by: SURGERY

## 2022-02-24 PROCEDURE — 3288F FALL RISK ASSESSMENT DOCD: CPT | Mod: CPTII,S$GLB,, | Performed by: SURGERY

## 2022-02-24 PROCEDURE — 99999 PR PBB SHADOW E&M-EST. PATIENT-LVL V: CPT | Mod: PBBFAC,,, | Performed by: SURGERY

## 2022-02-24 PROCEDURE — 3288F PR FALLS RISK ASSESSMENT DOCUMENTED: ICD-10-PCS | Mod: CPTII,S$GLB,, | Performed by: SURGERY

## 2022-02-24 PROCEDURE — 4010F ACE/ARB THERAPY RXD/TAKEN: CPT | Mod: CPTII,S$GLB,, | Performed by: SURGERY

## 2022-02-24 PROCEDURE — 3008F BODY MASS INDEX DOCD: CPT | Mod: CPTII,S$GLB,, | Performed by: SURGERY

## 2022-02-24 PROCEDURE — 99999 PR PBB SHADOW E&M-EST. PATIENT-LVL V: ICD-10-PCS | Mod: PBBFAC,,, | Performed by: SURGERY

## 2022-02-24 PROCEDURE — 1159F PR MEDICATION LIST DOCUMENTED IN MEDICAL RECORD: ICD-10-PCS | Mod: CPTII,S$GLB,, | Performed by: SURGERY

## 2022-02-24 PROCEDURE — 4010F PR ACE/ARB THEARPY RXD/TAKEN: ICD-10-PCS | Mod: CPTII,S$GLB,, | Performed by: SURGERY

## 2022-02-24 PROCEDURE — 1100F PTFALLS ASSESS-DOCD GE2>/YR: CPT | Mod: CPTII,S$GLB,, | Performed by: SURGERY

## 2022-02-24 PROCEDURE — 1111F PR DISCHARGE MEDS RECONCILED W/ CURRENT OUTPATIENT MED LIST: ICD-10-PCS | Mod: CPTII,S$GLB,, | Performed by: SURGERY

## 2022-02-24 PROCEDURE — 1100F PR PT FALLS ASSESS DOC 2+ FALLS/FALL W/INJURY/YR: ICD-10-PCS | Mod: CPTII,S$GLB,, | Performed by: SURGERY

## 2022-02-24 PROCEDURE — 3078F PR MOST RECENT DIASTOLIC BLOOD PRESSURE < 80 MM HG: ICD-10-PCS | Mod: CPTII,S$GLB,, | Performed by: SURGERY

## 2022-02-24 PROCEDURE — 3078F DIAST BP <80 MM HG: CPT | Mod: CPTII,S$GLB,, | Performed by: SURGERY

## 2022-02-24 PROCEDURE — 3044F HG A1C LEVEL LT 7.0%: CPT | Mod: CPTII,S$GLB,, | Performed by: SURGERY

## 2022-02-24 PROCEDURE — 3074F PR MOST RECENT SYSTOLIC BLOOD PRESSURE < 130 MM HG: ICD-10-PCS | Mod: CPTII,S$GLB,, | Performed by: SURGERY

## 2022-02-24 PROCEDURE — 99204 PR OFFICE/OUTPT VISIT, NEW, LEVL IV, 45-59 MIN: ICD-10-PCS | Mod: S$GLB,,, | Performed by: SURGERY

## 2022-02-24 PROCEDURE — 3044F PR MOST RECENT HEMOGLOBIN A1C LEVEL <7.0%: ICD-10-PCS | Mod: CPTII,S$GLB,, | Performed by: SURGERY

## 2022-02-24 PROCEDURE — 99204 OFFICE O/P NEW MOD 45 MIN: CPT | Mod: S$GLB,,, | Performed by: SURGERY

## 2022-02-24 PROCEDURE — 1111F DSCHRG MED/CURRENT MED MERGE: CPT | Mod: CPTII,S$GLB,, | Performed by: SURGERY

## 2022-02-24 PROCEDURE — 1126F AMNT PAIN NOTED NONE PRSNT: CPT | Mod: CPTII,S$GLB,, | Performed by: SURGERY

## 2022-02-24 PROCEDURE — 3074F SYST BP LT 130 MM HG: CPT | Mod: CPTII,S$GLB,, | Performed by: SURGERY

## 2022-02-24 PROCEDURE — 3008F PR BODY MASS INDEX (BMI) DOCUMENTED: ICD-10-PCS | Mod: CPTII,S$GLB,, | Performed by: SURGERY

## 2022-02-24 PROCEDURE — 1159F MED LIST DOCD IN RCRD: CPT | Mod: CPTII,S$GLB,, | Performed by: SURGERY

## 2022-02-24 NOTE — PROGRESS NOTES
Initial Consult    No chief complaint on file.      History of Present Illness:  Patient is a 71 y.o. male who is referred for inguinal hernia.  Presents as a follow-up from the hospital.  He was found have a right inguinal hernia.  He was admitted for dehydration from diarrhea.  He has a history of HIV.  He has not any treatment for this currently as he describes.    He reports having the hernia for many years.  He had a repair long time ago and then it returned.  It does cause him some pain.  No GI complaints no nausea no vomiting.    Review of patient's allergies indicates:   Allergen Reactions    Chantix [varenicline]        Current Outpatient Medications   Medication Sig Dispense Refill    aspirin 325 MG tablet Take 1 tablet (325 mg total) by mouth once daily. 30 tablet 0    atorvastatin calcium (ATORVASTATIN ORAL) Take 40 mg by mouth once daily.       clonazePAM (KLONOPIN) 0.5 MG tablet Take 1 mg by mouth 3 (three) times daily as needed.       efavirenz (SUSTIVA) 600 mg Tab Take 600 mg by mouth every evening.       emtricitabine-tenofovir alafen (DESCOVY) 200-25 mg Tab Take 1 tablet by mouth every evening.       fenofibrate (TRICOR) 145 MG tablet Take 145 mg by mouth once daily.      fish oil-omega-3 fatty acids 300-1,000 mg capsule Take 2 capsules by mouth once daily.      insulin aspart protamine-insulin aspart (NOVOLOG 70/30) 100 unit/mL (70-30) InPn pen Inject 16 Units into the skin once daily.      insulin aspart protamine-insulin aspart (NOVOLOG 70/30) 100 unit/mL (70-30) InPn pen Inject 18 Units into the skin nightly.      metFORMIN (GLUCOPHAGE) 1000 MG tablet Take 1,000 mg by mouth 2 (two) times daily.       multivitamin (THERAGRAN) tablet Take 1 tablet by mouth once daily.      sertraline (ZOLOFT) 100 MG tablet Take 100 mg by mouth once daily.       zolpidem (AMBIEN) 10 mg Tab Take 10 mg by mouth every evening.        No current facility-administered medications for this visit.        Past Medical History:   Diagnosis Date    Anxiety     Depression     Diabetes mellitus     GERD (gastroesophageal reflux disease)     HIV (human immunodeficiency virus infection)      No past surgical history on file.  No family history on file.  Social History     Tobacco Use    Smoking status: Current Some Day Smoker     Years: 49.00     Types: Cigarettes     Start date: 1970    Smokeless tobacco: Never Used    Tobacco comment: Handout provided for Ambulatory Smoking Cessation program   Substance Use Topics    Alcohol use: No    Drug use: No          Review of Systems:  Review of Systems   Gastrointestinal: Positive for diarrhea.       Physical:     Vital Signs (Most Recent)  Temp: 98.6 °F (37 °C) (02/24/22 1315)  Pulse: 68 (02/24/22 1315)  Resp: 16 (02/24/22 1315)  BP: 121/64 (02/24/22 1315)  6' (1.829 m)  72.3 kg (159 lb 4.5 oz)     Physical Exam:  Physical Exam  Abdominal:          Comments: Large incarcerated right inguinal hernia         ASSESSMENT/PLAN:        1. Pre-op evaluation  Ambulatory referral/consult to Cardiology   2. Non-recurrent unilateral inguinal hernia without obstruction or gangrene  CT Abdomen Pelvis  Without Contrast    Ambulatory referral/consult to Infectious Disease     He has a history of HIV along with a recurrent large incarcerated right inguinal hernia.  I would like to get a CT scan to evaluate the contents of the hernia.     In the meantime I would like to get him to see cardiology for clearance.  I would also like him to see an infectious disease doctor to evaluate his diarrhea as well as his HIV in the setting of elective major surgery.    He absolutely needs to stop smoking I discussed this with him and his sister.  They are in agreement that he will stop next week.

## 2022-02-24 NOTE — HOSPITAL COURSE
Patient with H/o HIV on HAART admitted with hypotension from diarrhhea and was managed conservatively  Patient was evaluated by Podiatrist for superficial callous of Toe and will again see pt in Office  Regarding Inguinal hernia and Carotid stenosis pt will see Surgeon/Vascular surgeon as an OP  Pts condition came back to baseline and was discharged back to home

## 2022-02-24 NOTE — PATIENT INSTRUCTIONS
STOP smoking  See cardiology  See infectious disease  See Vascular surgery  CT scan of abdomen and pelvis for hernia  Follow up with me in 1 month  Find a PCP

## 2022-02-24 NOTE — PLAN OF CARE
02/24/22 0931   Final Note   Assessment Type Final Discharge Note   Anticipated Discharge Disposition Home   What phone number can be called within the next 1-3 days to see how you are doing after discharge? [phone]   Post-Acute Status   Post-Acute Authorization Other   Other Status No Post-Acute Service Needs   Discharge Delays None known at this time

## 2022-02-24 NOTE — DISCHARGE SUMMARY
[facility] Medicine  Discharge Summary      Patient Name: Brandon Parson  MRN: [id number]  Patient Class: IP- Inpatient  Admission Date: 2/22/2022  Hospital Length of Stay: 1 days  Discharge Date and Time:  02/23/2022 8:58 PM  Attending Physician: No att. providers found   Discharging Provider: Álvaro Tirado MD  Primary Care Provider: Teresita Last NP      HPI:   No notes on file    * No surgery found *      Hospital Course:   Patient with H/o HIV on HAART admitted with hypotension from diarrhhea and was managed conservatively  Patient was evaluated by Podiatrist for superficial callous of Toe and will again see pt in Office  Regarding Inguinal hernia and Carotid stenosis pt will see Surgeon/Vascular surgeon as an OP  Pts condition came back to baseline and was discharged back to home        Goals of Care Treatment Preferences:  Code Status: DNR      Consults:   Consults (From admission, onward)        Status Ordering Provider     Inpatient consult to Podiatry  Once        Provider:  Ernst Grijalva DPM    Completed MANDY HANNA          No new Assessment & Plan notes have been filed under this hospital service since the last note was generated.  Service: Hospital Medicine    Final Active Diagnoses:    Diagnosis Date Noted POA    Diabetic ulcer of toe of left foot associated with type 2 diabetes mellitus, with bone involvement without evidence of necrosis [E11.621, L97.526] 08/16/2019 Yes    HIV disease [B20] 08/16/2019 Yes    Chronic kidney disease, stage III (moderate) [N18.30] 08/16/2019 Yes    Type 2 diabetes mellitus with skin complication, without long-term current use of insulin [E11.628] 08/16/2019 Yes      Problems Resolved During this Admission:    Diagnosis Date Noted Date Resolved POA    PRINCIPAL PROBLEM:  Hypotension [I95.9] 02/23/2022 02/23/2022 Unknown    Hypotension [I95.9] 02/22/2022 02/23/2022 Unknown    Syncope [R55] 02/22/2022 02/23/2022 Yes       Discharged  Condition: good    Disposition: Home or Self Care    Follow Up:   Follow-up Information     Ernst Grijalva DPM Follow up in 1 week(s).    Specialties: Podiatry, Surgery  Contact information:  [address]  [address]  [place] [address]  [phone]             Mack Ramirez MD Follow up in 1 month(s).    Specialties: General Surgery, Bariatrics, Surgery  Contact information:  [address]  [address]  [place] [address]  [phone]             Rickey Sepulveda MD Follow up in 2 week(s).    Specialties: Vascular Surgery, Cardiology  Contact information:  [address]  [address]  [place] [address]  [phone]                       Patient Instructions:      Diet diabetic       Significant Diagnostic Studies: Labs:   CMP   Recent Labs   Lab 02/22/22  1632 02/23/22 0614    137   K 4.7 3.9   * 113*   CO2 13* 17*   * 143*   BUN 55* 41*   CREATININE 2.1* 1.6*   CALCIUM 9.3 8.9   PROT 7.1 6.7   ALBUMIN 3.8 3.4*   BILITOT 0.6 0.6   ALKPHOS 28* 26*   AST 20 17   ALT 18 18   ANIONGAP 11 7*   ESTGFRAFRICA 35.3* 49.0*   EGFRNONAA 30.5* 42.4*    and CBC   Recent Labs   Lab 02/22/22  1632 02/23/22 0614   WBC 6.58 4.36   HGB 10.5* 10.0*   HCT 32.1* 30.0*    224       Pending Diagnostic Studies:     Procedure Component Value Units Date/Time    CD4 T-[place] Cells [[id number]] Collected: 02/23/22 0614    Order Status: Sent Lab Status: In process Updated: 02/23/22 0625    Specimen: Blood          Medications:  Reconciled Home Medications:      Medication List      START taking these medications    aspirin 325 MG tablet  Take 1 tablet (325 mg total) by mouth once daily.  Replaces: aspirin 81 MG EC tablet        CONTINUE taking these medications    ATORVASTATIN ORAL  Take 40 mg by mouth once daily.     clonazePAM 0.5 MG tablet  Commonly known as: KlonoPIN  Take 1 mg by mouth 3 (three) times daily as needed.     efavirenz 600 mg Tab  Commonly known as: SUSTIVA  Take 600 mg by mouth every  evening.     emtricitabine-tenofovir alafen 200-25 mg Tab  Commonly known as: DESCOVY  Take 1 tablet by mouth every evening.     fenofibrate 145 MG tablet  Commonly known as: TRICOR  Take 145 mg by mouth once daily.     fish oil-omega-3 fatty acids 300-1,000 mg capsule  Take 2 capsules by mouth once daily.     * insulin aspart protamine-insulin aspart 100 unit/mL (70-30) Inpn pen  Commonly known as: NovoLOG 70/30  Inject 16 Units into the skin once daily.     * insulin aspart protamine-insulin aspart 100 unit/mL (70-30) Inpn pen  Commonly known as: NovoLOG 70/30  Inject 18 Units into the skin nightly.     metFORMIN 1000 MG tablet  Commonly known as: GLUCOPHAGE  Take 1,000 mg by mouth 2 (two) times daily.     multivitamin tablet  Commonly known as: THERAGRAN  Take 1 tablet by mouth once daily.     sertraline 100 MG tablet  Commonly known as: ZOLOFT  Take 100 mg by mouth once daily.     zolpidem 10 mg Tab  Commonly known as: AMBIEN  Take 10 mg by mouth every evening.         * This list has 2 medication(s) that are the same as other medications prescribed for you. Read the directions carefully, and ask your doctor or other care provider to review them with you.            STOP taking these medications    aspirin 81 MG EC tablet  Commonly known as: ECOTRIN  Replaced by: aspirin 325 MG tablet     benazepril-hydrochlorthiazide 20-25 mg Tab  Commonly known as: LOTENSIN HCT            Indwelling Lines/Drains at time of discharge:   Lines/Drains/Airways     None               Physical Exam   Constitutional: He is oriented to person, place, and time.   Cardiovascular: Normal rate.   Neurological: He is alert and oriented to person, place, and time.     Time spent on the discharge of patient: 45  minutes         Álvaro Tirado MD  Department of Hospital Medicine  [facility]

## 2022-02-25 ENCOUNTER — TELEPHONE (OUTPATIENT)
Dept: FAMILY MEDICINE | Facility: CLINIC | Age: 72
End: 2022-02-25
Payer: MEDICARE

## 2022-02-27 LAB
BACTERIA BLD CULT: NORMAL
BACTERIA BLD CULT: NORMAL

## 2022-03-03 ENCOUNTER — OFFICE VISIT (OUTPATIENT)
Dept: CARDIOLOGY | Facility: CLINIC | Age: 72
End: 2022-03-03
Payer: MEDICARE

## 2022-03-03 VITALS
SYSTOLIC BLOOD PRESSURE: 134 MMHG | DIASTOLIC BLOOD PRESSURE: 74 MMHG | HEART RATE: 68 BPM | BODY MASS INDEX: 21.74 KG/M2 | HEIGHT: 72 IN | WEIGHT: 160.5 LBS

## 2022-03-03 DIAGNOSIS — Z01.810 PREOPERATIVE CARDIOVASCULAR EXAMINATION: Primary | ICD-10-CM

## 2022-03-03 DIAGNOSIS — R55 SYNCOPE, UNSPECIFIED SYNCOPE TYPE: ICD-10-CM

## 2022-03-03 DIAGNOSIS — R07.89 ATYPICAL CHEST PAIN: ICD-10-CM

## 2022-03-03 DIAGNOSIS — E78.2 MIXED HYPERLIPIDEMIA: ICD-10-CM

## 2022-03-03 DIAGNOSIS — Z01.818 PRE-OP EVALUATION: ICD-10-CM

## 2022-03-03 PROCEDURE — 1126F AMNT PAIN NOTED NONE PRSNT: CPT | Mod: CPTII,S$GLB,, | Performed by: INTERNAL MEDICINE

## 2022-03-03 PROCEDURE — 3044F PR MOST RECENT HEMOGLOBIN A1C LEVEL <7.0%: ICD-10-PCS | Mod: CPTII,S$GLB,, | Performed by: INTERNAL MEDICINE

## 2022-03-03 PROCEDURE — 3075F SYST BP GE 130 - 139MM HG: CPT | Mod: CPTII,S$GLB,, | Performed by: INTERNAL MEDICINE

## 2022-03-03 PROCEDURE — 4010F PR ACE/ARB THEARPY RXD/TAKEN: ICD-10-PCS | Mod: CPTII,S$GLB,, | Performed by: INTERNAL MEDICINE

## 2022-03-03 PROCEDURE — 1100F PTFALLS ASSESS-DOCD GE2>/YR: CPT | Mod: CPTII,S$GLB,, | Performed by: INTERNAL MEDICINE

## 2022-03-03 PROCEDURE — 3008F BODY MASS INDEX DOCD: CPT | Mod: CPTII,S$GLB,, | Performed by: INTERNAL MEDICINE

## 2022-03-03 PROCEDURE — 99204 OFFICE O/P NEW MOD 45 MIN: CPT | Mod: S$GLB,,, | Performed by: INTERNAL MEDICINE

## 2022-03-03 PROCEDURE — 99999 PR PBB SHADOW E&M-EST. PATIENT-LVL III: CPT | Mod: PBBFAC,,, | Performed by: INTERNAL MEDICINE

## 2022-03-03 PROCEDURE — 3078F PR MOST RECENT DIASTOLIC BLOOD PRESSURE < 80 MM HG: ICD-10-PCS | Mod: CPTII,S$GLB,, | Performed by: INTERNAL MEDICINE

## 2022-03-03 PROCEDURE — 1111F DSCHRG MED/CURRENT MED MERGE: CPT | Mod: CPTII,S$GLB,, | Performed by: INTERNAL MEDICINE

## 2022-03-03 PROCEDURE — 1111F PR DISCHARGE MEDS RECONCILED W/ CURRENT OUTPATIENT MED LIST: ICD-10-PCS | Mod: CPTII,S$GLB,, | Performed by: INTERNAL MEDICINE

## 2022-03-03 PROCEDURE — 1100F PR PT FALLS ASSESS DOC 2+ FALLS/FALL W/INJURY/YR: ICD-10-PCS | Mod: CPTII,S$GLB,, | Performed by: INTERNAL MEDICINE

## 2022-03-03 PROCEDURE — 99999 PR PBB SHADOW E&M-EST. PATIENT-LVL III: ICD-10-PCS | Mod: PBBFAC,,, | Performed by: INTERNAL MEDICINE

## 2022-03-03 PROCEDURE — 1126F PR PAIN SEVERITY QUANTIFIED, NO PAIN PRESENT: ICD-10-PCS | Mod: CPTII,S$GLB,, | Performed by: INTERNAL MEDICINE

## 2022-03-03 PROCEDURE — 3078F DIAST BP <80 MM HG: CPT | Mod: CPTII,S$GLB,, | Performed by: INTERNAL MEDICINE

## 2022-03-03 PROCEDURE — 1159F PR MEDICATION LIST DOCUMENTED IN MEDICAL RECORD: ICD-10-PCS | Mod: CPTII,S$GLB,, | Performed by: INTERNAL MEDICINE

## 2022-03-03 PROCEDURE — 3044F HG A1C LEVEL LT 7.0%: CPT | Mod: CPTII,S$GLB,, | Performed by: INTERNAL MEDICINE

## 2022-03-03 PROCEDURE — 3288F FALL RISK ASSESSMENT DOCD: CPT | Mod: CPTII,S$GLB,, | Performed by: INTERNAL MEDICINE

## 2022-03-03 PROCEDURE — 99204 PR OFFICE/OUTPT VISIT, NEW, LEVL IV, 45-59 MIN: ICD-10-PCS | Mod: S$GLB,,, | Performed by: INTERNAL MEDICINE

## 2022-03-03 PROCEDURE — 3288F PR FALLS RISK ASSESSMENT DOCUMENTED: ICD-10-PCS | Mod: CPTII,S$GLB,, | Performed by: INTERNAL MEDICINE

## 2022-03-03 PROCEDURE — 1159F MED LIST DOCD IN RCRD: CPT | Mod: CPTII,S$GLB,, | Performed by: INTERNAL MEDICINE

## 2022-03-03 PROCEDURE — 3008F PR BODY MASS INDEX (BMI) DOCUMENTED: ICD-10-PCS | Mod: CPTII,S$GLB,, | Performed by: INTERNAL MEDICINE

## 2022-03-03 PROCEDURE — 4010F ACE/ARB THERAPY RXD/TAKEN: CPT | Mod: CPTII,S$GLB,, | Performed by: INTERNAL MEDICINE

## 2022-03-03 PROCEDURE — 3075F PR MOST RECENT SYSTOLIC BLOOD PRESS GE 130-139MM HG: ICD-10-PCS | Mod: CPTII,S$GLB,, | Performed by: INTERNAL MEDICINE

## 2022-03-03 NOTE — PROGRESS NOTES
Subjective:    Patient ID:  Brandon Parson is a 71 y.o. male who presents for evaluation of Pre-op Exam      Problem List Items Addressed This Visit        Cardiac/Vascular    Mixed hyperlipidemia      Other Visit Diagnoses     Preoperative cardiovascular examination    -  Primary    Pre-op evaluation        Syncope, unspecified syncope type              HPI    Referred by Dr. Ramirez for preoperative cardiovascular evaluation prior to inguinal hernia surgery.    The patient states that he feels OK.  Had recent episode of syncope thought to be related to dehydration.    CT scan of the neck ordered with Dr. Sepulveda with Vascular surgery to evaluate carotid stenosis    No chest pain.  No shortness of breath.  Uses walker for activity  METs - Unable to be assessed     Had recent echocardiogram that showed preserved ejection fraction without acute abnormalities.    Personal history of heart attack or stroke - None that he is aware of  Family history of heart disease - dad and older brother with heart issues. Dad with mitral valve issues      Past Medical History:   Diagnosis Date    Anxiety     Depression     Diabetes mellitus     GERD (gastroesophageal reflux disease)     HIV (human immunodeficiency virus infection)        No past surgical history on file.    No family history on file.    Social History     Socioeconomic History    Marital status: Single   Tobacco Use    Smoking status: Former Smoker     Years: 49.00     Types: Cigarettes     Start date:      Quit date: 2022     Years since quittin.1    Smokeless tobacco: Never Used    Tobacco comment: Handout provided for Ambulatory Smoking Cessation program   Substance and Sexual Activity    Alcohol use: No    Drug use: No    Sexual activity: Not Currently       Review of patient's allergies indicates:   Allergen Reactions    Chantix [varenicline]        Review of Systems   Constitutional: Negative for decreased appetite, fever and  malaise/fatigue.   Eyes: Negative for blurred vision.   Cardiovascular: Negative for chest pain, dyspnea on exertion, irregular heartbeat and leg swelling.   Respiratory: Negative for cough, hemoptysis, shortness of breath and wheezing.    Endocrine: Negative for cold intolerance and heat intolerance.   Hematologic/Lymphatic: Negative for bleeding problem.   Musculoskeletal: Negative for muscle weakness and myalgias.   Gastrointestinal: Negative for abdominal pain, constipation and diarrhea.   Genitourinary: Negative for bladder incontinence.   Neurological: Negative for dizziness and weakness.   Psychiatric/Behavioral: Negative for depression.        Objective:     Vitals:    03/03/22 1444   BP: 134/74   BP Location: Left arm   Patient Position: Sitting   BP Method: Large (Automatic)   Pulse: 68   Weight: 72.8 kg (160 lb 7.9 oz)   Height: 6' (1.829 m)        Physical Exam  Constitutional:       Appearance: He is well-developed.   HENT:      Head: Normocephalic and atraumatic.   Neck:      Vascular: No JVD.   Cardiovascular:      Rate and Rhythm: Normal rate and regular rhythm.      Heart sounds: Normal heart sounds. No murmur heard.    No friction rub. No gallop.   Pulmonary:      Effort: Pulmonary effort is normal. No respiratory distress.      Breath sounds: Normal breath sounds. No wheezing or rales.   Abdominal:      General: Bowel sounds are normal.      Palpations: Abdomen is soft.      Tenderness: There is no abdominal tenderness. There is no guarding or rebound.   Musculoskeletal:      Cervical back: Normal range of motion and neck supple.   Skin:     General: Skin is warm and dry.   Neurological:      Mental Status: He is alert and oriented to person, place, and time.   Psychiatric:         Behavior: Behavior normal.             Current Outpatient Medications on File Prior to Visit   Medication Sig    aspirin 325 MG tablet Take 1 tablet (325 mg total) by mouth once daily.    atorvastatin calcium  (ATORVASTATIN ORAL) Take 40 mg by mouth once daily.     clonazePAM (KLONOPIN) 0.5 MG tablet Take 1 mg by mouth 3 (three) times daily as needed.     efavirenz (SUSTIVA) 600 mg Tab Take 600 mg by mouth every evening.     emtricitabine-tenofovir alafen (DESCOVY) 200-25 mg Tab Take 1 tablet by mouth every evening.     fenofibrate (TRICOR) 145 MG tablet Take 145 mg by mouth once daily.    fish oil-omega-3 fatty acids 300-1,000 mg capsule Take 2 capsules by mouth once daily.    insulin aspart protamine-insulin aspart (NOVOLOG 70/30) 100 unit/mL (70-30) InPn pen Inject 16 Units into the skin once daily.    insulin aspart protamine-insulin aspart (NOVOLOG 70/30) 100 unit/mL (70-30) InPn pen Inject 18 Units into the skin nightly.    metFORMIN (GLUCOPHAGE) 1000 MG tablet Take 1,000 mg by mouth 2 (two) times daily.     multivitamin (THERAGRAN) tablet Take 1 tablet by mouth once daily.    sertraline (ZOLOFT) 100 MG tablet Take 100 mg by mouth once daily.     zolpidem (AMBIEN) 10 mg Tab Take 10 mg by mouth every evening.     [DISCONTINUED] benazepril-hydrochlorthiazide (LOTENSIN HCT) 20-25 mg Tab Take 1 tablet by mouth once daily.     No current facility-administered medications on file prior to visit.       Lipid Panel:   Lab Results   Component Value Date    CHOL 217 (H) 10/12/2005    HDL 31.0 (L) 10/12/2005    LDLCALC 137.2 (H) 10/12/2005    TRIG 244 (H) 10/12/2005    CHOLHDL 14.3 (L) 10/12/2005         The ASCVD Risk score (Basim DC Jr., et al., 2013) failed to calculate for the following reasons:    Cannot find a previous HDL lab    Cannot find a previous total cholesterol lab    All pertinent labs, imaging, and EKGs reviewed.  Patient's most recent EKG tracing was personally interpreted by this provider.    Assessment:       1. Preoperative cardiovascular examination    2. Mixed hyperlipidemia    3. Pre-op evaluation    4. Syncope, unspecified syncope type         Plan:     Symptoms OK today  BP/Pulse OK  today  METs - Unable to be assessed   Recent echo reviewed    Nuclear stress test   Okay to hold aspirin 5-7 days prior to procedure, restart as soon as safe postprocedure   Continue atorvastatin 40 mg PO Daily  Would see what Dr. Sepulveda from Vascular surgery recommends regarding carotid stenosis, would recommend resolution of that issues prior to surgery    Continue other cardiac medications  Mediterranean Diet/Cardiovascular Exercise Program    As long as the above studies are without acute issues, with the above recommendations, the patient is optimized for the above mentioned procedure.    Patient queried and all questions were answered.    F/u in 6 months to reassess        Signed:    Paolo Webb MD  3/3/2022 8:56 AM

## 2022-03-04 ENCOUNTER — TELEPHONE (OUTPATIENT)
Dept: CARDIOLOGY | Facility: CLINIC | Age: 72
End: 2022-03-04
Payer: MEDICARE

## 2022-03-04 NOTE — TELEPHONE ENCOUNTER
----- Message from Rickey Chris sent at 3/4/2022  2:32 PM CST -----  Type: Needs Medical Advice  Who Called:  Tanja mccormick/ [employer]    Best Call Back Number: [phone]     Additional Information: Need additional information in regards to the PA.  Please advise -- Thank you

## 2022-03-07 ENCOUNTER — OFFICE VISIT (OUTPATIENT)
Dept: INFECTIOUS DISEASES | Facility: CLINIC | Age: 72
End: 2022-03-07
Payer: MEDICARE

## 2022-03-07 ENCOUNTER — LAB VISIT (OUTPATIENT)
Dept: LAB | Facility: HOSPITAL | Age: 72
End: 2022-03-07
Attending: INTERNAL MEDICINE
Payer: MEDICARE

## 2022-03-07 VITALS
OXYGEN SATURATION: 97 % | TEMPERATURE: 98 F | HEART RATE: 71 BPM | BODY MASS INDEX: 22.03 KG/M2 | WEIGHT: 162.63 LBS | DIASTOLIC BLOOD PRESSURE: 58 MMHG | SYSTOLIC BLOOD PRESSURE: 122 MMHG | HEIGHT: 72 IN

## 2022-03-07 DIAGNOSIS — R63.4 WEIGHT LOSS: ICD-10-CM

## 2022-03-07 DIAGNOSIS — Z12.11 ENCOUNTER FOR SCREENING COLONOSCOPY: ICD-10-CM

## 2022-03-07 DIAGNOSIS — R53.83 FATIGUE, UNSPECIFIED TYPE: ICD-10-CM

## 2022-03-07 DIAGNOSIS — Z71.89 EDUCATED ABOUT COVID-19 VIRUS INFECTION: ICD-10-CM

## 2022-03-07 DIAGNOSIS — N18.30 STAGE 3 CHRONIC KIDNEY DISEASE, UNSPECIFIED WHETHER STAGE 3A OR 3B CKD: ICD-10-CM

## 2022-03-07 DIAGNOSIS — B20 HUMAN IMMUNODEFICIENCY VIRUS I INFECTION: ICD-10-CM

## 2022-03-07 DIAGNOSIS — B20 HUMAN IMMUNODEFICIENCY VIRUS I INFECTION: Primary | ICD-10-CM

## 2022-03-07 DIAGNOSIS — K40.90 NON-RECURRENT UNILATERAL INGUINAL HERNIA WITHOUT OBSTRUCTION OR GANGRENE: ICD-10-CM

## 2022-03-07 LAB — 25(OH)D3+25(OH)D2 SERPL-MCNC: 30 NG/ML (ref 30–96)

## 2022-03-07 PROCEDURE — 1100F PR PT FALLS ASSESS DOC 2+ FALLS/FALL W/INJURY/YR: ICD-10-PCS | Mod: S$GLB,,, | Performed by: INTERNAL MEDICINE

## 2022-03-07 PROCEDURE — 36415 COLL VENOUS BLD VENIPUNCTURE: CPT | Performed by: INTERNAL MEDICINE

## 2022-03-07 PROCEDURE — 86480 TB TEST CELL IMMUN MEASURE: CPT | Performed by: INTERNAL MEDICINE

## 2022-03-07 PROCEDURE — 99205 OFFICE O/P NEW HI 60 MIN: CPT | Mod: S$GLB,,, | Performed by: INTERNAL MEDICINE

## 2022-03-07 PROCEDURE — 84403 ASSAY OF TOTAL TESTOSTERONE: CPT | Performed by: INTERNAL MEDICINE

## 2022-03-07 PROCEDURE — 3044F PR MOST RECENT HEMOGLOBIN A1C LEVEL <7.0%: ICD-10-PCS | Mod: S$GLB,,, | Performed by: INTERNAL MEDICINE

## 2022-03-07 PROCEDURE — 1111F DSCHRG MED/CURRENT MED MERGE: CPT | Mod: S$GLB,,, | Performed by: INTERNAL MEDICINE

## 2022-03-07 PROCEDURE — 4010F ACE/ARB THERAPY RXD/TAKEN: CPT | Mod: S$GLB,,, | Performed by: INTERNAL MEDICINE

## 2022-03-07 PROCEDURE — 3288F FALL RISK ASSESSMENT DOCD: CPT | Mod: S$GLB,,, | Performed by: INTERNAL MEDICINE

## 2022-03-07 PROCEDURE — 4010F PR ACE/ARB THEARPY RXD/TAKEN: ICD-10-PCS | Mod: S$GLB,,, | Performed by: INTERNAL MEDICINE

## 2022-03-07 PROCEDURE — 1126F AMNT PAIN NOTED NONE PRSNT: CPT | Mod: S$GLB,,, | Performed by: INTERNAL MEDICINE

## 2022-03-07 PROCEDURE — 82306 VITAMIN D 25 HYDROXY: CPT | Performed by: INTERNAL MEDICINE

## 2022-03-07 PROCEDURE — 86803 HEPATITIS C AB TEST: CPT | Performed by: INTERNAL MEDICINE

## 2022-03-07 PROCEDURE — 1159F MED LIST DOCD IN RCRD: CPT | Mod: S$GLB,,, | Performed by: INTERNAL MEDICINE

## 2022-03-07 PROCEDURE — 3044F HG A1C LEVEL LT 7.0%: CPT | Mod: S$GLB,,, | Performed by: INTERNAL MEDICINE

## 2022-03-07 PROCEDURE — 1126F PR PAIN SEVERITY QUANTIFIED, NO PAIN PRESENT: ICD-10-PCS | Mod: S$GLB,,, | Performed by: INTERNAL MEDICINE

## 2022-03-07 PROCEDURE — 99205 PR OFFICE/OUTPT VISIT, NEW, LEVL V, 60-74 MIN: ICD-10-PCS | Mod: S$GLB,,, | Performed by: INTERNAL MEDICINE

## 2022-03-07 PROCEDURE — 3288F PR FALLS RISK ASSESSMENT DOCUMENTED: ICD-10-PCS | Mod: S$GLB,,, | Performed by: INTERNAL MEDICINE

## 2022-03-07 PROCEDURE — 1111F PR DISCHARGE MEDS RECONCILED W/ CURRENT OUTPATIENT MED LIST: ICD-10-PCS | Mod: S$GLB,,, | Performed by: INTERNAL MEDICINE

## 2022-03-07 PROCEDURE — 1100F PTFALLS ASSESS-DOCD GE2>/YR: CPT | Mod: S$GLB,,, | Performed by: INTERNAL MEDICINE

## 2022-03-07 PROCEDURE — 86704 HEP B CORE ANTIBODY TOTAL: CPT | Performed by: INTERNAL MEDICINE

## 2022-03-07 PROCEDURE — 1159F PR MEDICATION LIST DOCUMENTED IN MEDICAL RECORD: ICD-10-PCS | Mod: S$GLB,,, | Performed by: INTERNAL MEDICINE

## 2022-03-07 PROCEDURE — 86708 HEPATITIS A ANTIBODY: CPT | Performed by: INTERNAL MEDICINE

## 2022-03-07 PROCEDURE — 87536 HIV-1 QUANT&REVRSE TRNSCRPJ: CPT | Performed by: INTERNAL MEDICINE

## 2022-03-07 RX ORDER — EFAVIRENZ 600 MG/1
600 TABLET, FILM COATED ORAL NIGHTLY
Qty: 30 TABLET | Refills: 5 | Status: SHIPPED | OUTPATIENT
Start: 2022-03-07 | End: 2022-03-18 | Stop reason: SDUPTHER

## 2022-03-07 NOTE — PROGRESS NOTES
Subjective:       Patient ID: Brandon Parson is a 71 y.o. male.    Chief Complaint:: New patient for HIV     HPI Transferring care to [facility]. Followed by Dr. Dawn Helm/Berhane for 20 years.   Gananda he was HIV positive over 20 yrs ago. He was admitted to [facility] for pneumonia (pneumocystis?). Does not recall his first T cell count. Last CD4 1215, 2/22/22,  But last viral load was 30 on 9/17/21.     He has been under Dr. Dawn Helm's care at [facility], then [facility], then [facility]). Reviewed available records in care everywhere.   He has been on Descovy/Sustiva for many years, cannot recall regimens prior to this  No episodes of shingles, received 2 shingles vaccines 2020  No history of hepatitis   Treated for latent TB 2006-07  No other STDs  Diabetes, carotid  Vascular disease. Recently admitted for syncope. Stopped smoking 2 months ago  DANK on TID clonazepam an dhs ambien, for years. (sister concerned about this). He lives with sister near [place], spends time with another sister in [place] and a friend on the [facility]. Does not drive. Has limited his life greatly due to COVID.   Preparing for an inguinal hernia repair    Current Outpatient Medications:     aspirin 325 MG tablet, Take 1 tablet (325 mg total) by mouth once daily., Disp: 30 tablet, Rfl: 0    atorvastatin calcium (ATORVASTATIN ORAL), Take 40 mg by mouth once daily. , Disp: , Rfl:     clonazePAM (KLONOPIN) 0.5 MG tablet, Take 1 mg by mouth 3 (three) times daily as needed. , Disp: , Rfl:     efavirenz (SUSTIVA) 600 mg Tab, Take 1 tablet (600 mg total) by mouth every evening., Disp: 30 tablet, Rfl: 5    emtricitabine-tenofovir alafen (DESCOVY) 200-25 mg Tab, Take 1 tablet by mouth every evening., Disp: 30 tablet, Rfl: 5    fenofibrate (TRICOR) 145 MG tablet, Take 145 mg by mouth once daily., Disp: , Rfl:     fish oil-omega-3 fatty acids 300-1,000 mg capsule, Take 2 capsules by mouth once daily., Disp: , Rfl:     insulin  aspart protamine-insulin aspart (NOVOLOG 70/30) 100 unit/mL (70-30) InPn pen, Inject 16 Units into the skin once daily., Disp: , Rfl:     insulin aspart protamine-insulin aspart (NOVOLOG 70/30) 100 unit/mL (70-30) InPn pen, Inject 18 Units into the skin nightly., Disp: , Rfl:     metFORMIN (GLUCOPHAGE) 1000 MG tablet, Take 1,000 mg by mouth 2 (two) times daily. , Disp: , Rfl:     multivitamin (THERAGRAN) tablet, Take 1 tablet by mouth once daily., Disp: , Rfl:     sertraline (ZOLOFT) 100 MG tablet, Take 100 mg by mouth once daily. , Disp: , Rfl:     zolpidem (AMBIEN) 10 mg Tab, Take 10 mg by mouth every evening. , Disp: , Rfl:   Review of patient's allergies indicates:   Allergen Reactions    Chantix [varenicline]      Past Medical History:   Diagnosis Date    Anxiety     Depression     Diabetes mellitus     GERD (gastroesophageal reflux disease)     HIV (human immunodeficiency virus infection)     Osteomyelitis of great toe of left foot 2019   pneumonia , probably pneumocystis  LTBI treated - with 9 months of INH  Diabetic foot ulcer  Inguinal hernia repair    History reviewed. No pertinent surgical history.  Social History     Socioeconomic History    Marital status: Single   Tobacco Use    Smoking status: Former Smoker     Years: 49.00     Types: Cigarettes     Start date:      Quit date: 2022     Years since quittin.1    Smokeless tobacco: Never Used    Tobacco comment: Handout provided for Ambulatory Smoking Cessation program   Substance and Sexual Activity    Alcohol use: No    Drug use: No    Sexual activity: Not Currently     History reviewed. No pertinent family history.    Travel History: [place], [place]    Vaccine History: see immunization tab. Pneumovax , . HAV and HBV vax -  Advanced Directive:   Safer Sex: abstinent since diagnosis  Bone Density:   Colonoscopy: , and 2 tubulovillous adenomas 2016,  postponed due to COVID   at public  [employer], retired 2000 at the time of illness    Review of Systems    Constitutional: No fever, chills, sweats, fatigue, weakness,  weight loss    Eyes: No change in vision, loss of vision, diplopia, photophobia. UTD eye exam    ENT: No sinus drainage, sore throat, mouth pain, or lesions. UTD dental exam    Cardiovascular: No chest pain, TRIANA, palpitations or pedal edema. Going to have a nuclear stress soon.    Respiratory: No shortness of breath, TRIANA, cough, wheeze, sputum, pleurisy, or hemoptysis    Gastrointestinal: No abdominal pain, nausea, vomiting, diarrhea, constipation, blood in stool, or focal abd pain.   Has an inguinal hernia that needs repair. He is having a CT abd/pelvis this week    Genitourinary: No dysuria, hematuria, incontinence,     Musculoskeletal: No new pain, joint swelling, or injuries    Integumentary: No new rashes, lesions, or wounds    Neurological: No dizziness, vertigo, unusual headaches, neuropathy, or falls. Having a CT of the carotid vessels and f/u Dr. Sepulveda.    Psychiatric: No anxiety, depression, memory loss, sleep disturbance or substance abuse    Endocrine: Blood sugars controlled, . Thyroid normal    Lymphatic: No lymphadenopathy, blood loss, anemia, or malignancy    Objective:      Blood pressure (!) 122/58, pulse 71, temperature 98.1 °F (36.7 °C), height 6' (1.829 m), weight 73.8 kg (162 lb 9.6 oz), SpO2 97 %. Body mass index is 22.05 kg/m².  Physical Exam      General: Alert and attentive, cooperative and in no distress. Thin.     Eyes: Pupils equal, round, reactive to light, anicteric, EOMI    Neck: Supple, non-tender, no thyromegaly or masses    ENT: EAC patent, TM normal, nares patent, no oral lesions,  , no thrush    Cardiovascular: Regular rate and rhythm, no murmurs, rubs, or gallop    Respiratory: Lungs clear without wheezes, no rales, rub or rhonci    Gastrointestinal: Active bowel sounds, soft, no mass or organomegaly, no tenderness or  distention    Genitourinary: No discharges, external lesions,  , flank tenderness. Large right inguinal hernia    Vascular: No peripheral edema, phlebitis, pulses normal. Warm and well perfused    Musculoskeletal: Ambulates without difficulty, no acute arthritis, synovitis or myositis. reduced muscle bulk and strength (likely due to isolation, fear, of COVID and decreased motivation)    Integumentary: Skin without rashes, lesions, or wounds    AnusRectum: DAVID 2/22/22 ED    Neurological: Normal LOC, cranial nerves, speech, reflexes, normal gait    Psychiatric: Normal mood, speech, demeanor    Lymphatic: No cervical, supraclavicular, axillary, or inguinal lymphadenopathy      Wound:     HIV Table:   DATE  result  Toxoplasma IgG   Positive  HLA     Negative (hop)  RPR   2018  Non reactive  Hep A total  3/16/09 positive  Hep B sAg  Hep B sAb  4/2007  Pos 37.4  Hep B cAb total  Hep B DNA  Hep C Ab  Hep C RNA  Chlamy/GC  2018  Negative  TB gold     Ppd pos 7/31/06 treated with 9 mo INH  Vitamin D  CD4   2/22/22 1215  PSA   9/2021  0.5  Hem A1c  9/2021  6.4%  UA prot  9/2021  neg      Recent Diagnostics:   2/22 echocardiogram  · There is no evidence of intracardiac shunting.  · The left ventricle is normal in size with concentric remodeling and normal systolic function.  · The estimated ejection fraction is 58%.  · Normal left ventricular diastolic function.  · Normal right ventricular size with normal right ventricular systolic function.  · Mild left atrial enlargement.  · Mild mitral regurgitation.  · Normal central venous pressure (3 mmHg).  · The estimated PA systolic pressure is 28 mmHg.        Assessment and Plan:           Human immunodeficiency virus I infection  -     HIV RNA, Quantitative, PCR; Future; Expected date: 03/07/2022  -     Hepatitis C Antibody; Future; Expected date: 03/07/2022  -     Hepatitis A Antibody, Total; Future; Expected date: 03/07/2022  -     QuantiFERON-TB Gold Plus; Future; Expected  date: 03/07/2022  -     Vitamin D; Future; Expected date: 03/07/2022  -     Testosterone; Future; Expected date: 03/07/2022  -     Hepatitis B Core Antibody, Total; Future; Expected date: 03/07/2022    Weight loss  -     Testosterone; Future; Expected date: 03/07/2022    Stage 3 chronic kidney disease, unspecified whether stage 3a or 3b CKD   -     Vitamin D; Future; Expected date: 03/07/2022    Non-recurrent unilateral inguinal hernia without obstruction or gangrene  -     Ambulatory referral/consult to Infectious Disease    Encounter for screening colonoscopy  -     Ambulatory referral/consult to Gastroenterology; Future; Expected date: 03/14/2022    Fatigue, unspecified type  -     Testosterone; Future; Expected date: 03/07/2022    Educated about COVID-19 virus infection    Other orders  -     emtricitabine-tenofovir alafen (DESCOVY) 200-25 mg Tab; Take 1 tablet by mouth every evening.  Dispense: 30 tablet; Refill: 5  -     efavirenz (SUSTIVA) 600 mg Tab; Take 1 tablet (600 mg total) by mouth every evening.  Dispense: 30 tablet; Refill: 5      Lab today  Walk the dog 30 minutes a day    Continue Descovy and Sustiva    Referral to gastroenterology for screening colonoscopy, Dr. Faiza Solitario. [phone]  History of tubular adenomatous polyp 2016    Vitamin D 5000 IU per day  Zinc 30 mg elemental or 220 mg of sulfate  Vitamin C 500-1000 mg per day  Quercetin 500 mg twice a day  The above protects you from severe COVID  Call me if you test positive.    Continue aspirin 325 mg unless Dr. Sepulveda tells you to stop and then 5-10 days before an operation or at the direction of your surgeon.       I would encourage you to try to get off the middle of the day clonazepam    Return in 2 months    This note was created using Dragon voice recognition software that occasionally misinterpreted phrases or words.

## 2022-03-07 NOTE — PATIENT INSTRUCTIONS
Lab today  Walk the dog 30 minutes a day    Continue Descovy and Sustiva    Referral to gastroenterology for screening colonoscopy, Dr. Faiza Solitario. [phone]  History of tubular adenomatous polyp 2016    Vitamin D 5000 IU per day  Zinc 30 mg elemental or 220 mg of sulfate  Vitamin C 500-1000 mg per day  Quercetin 500 mg twice a day  The above protects you from severe COVID  Call me if you test positive.    Continue aspirin 325 mg unless Dr. Sepulveda tells you to stop and then 5-10 days before an operation or at the direction of your surgeon.       I would encourage you to try to get off the middle of the day clonazepam    Return in 2 months

## 2022-03-09 LAB
HAV AB SER QL IA: POSITIVE
HBV CORE AB SERPL QL IA: NEGATIVE
HCV AB S/CO SERPL IA: 0.1 S/CO RATIO (ref 0–0.9)
TESTOST SERPL-MCNC: 538 NG/DL (ref 264–916)

## 2022-03-10 ENCOUNTER — HOSPITAL ENCOUNTER (OUTPATIENT)
Dept: RADIOLOGY | Facility: HOSPITAL | Age: 72
Discharge: HOME | End: 2022-03-10
Attending: SURGERY
Payer: MEDICARE

## 2022-03-10 DIAGNOSIS — K40.90 NON-RECURRENT UNILATERAL INGUINAL HERNIA WITHOUT OBSTRUCTION OR GANGRENE: ICD-10-CM

## 2022-03-10 LAB
GAMMA INTERFERON BACKGROUND BLD IA-ACNC: 0.09 IU/ML
HIV1 RNA # SERPL NAA+PROBE: <20 COPIES/ML
HIV1 RNA SERPL NAA+PROBE-LOG#: NORMAL LOG10COPY/ML
M TB IFN-G BLD-IMP: POSITIVE
M TB IFN-G CD4+ BCKGRND COR BLD-ACNC: 0.54 IU/ML
M TB IFN-G CD4+CD8+ BCKGRND COR BLD-ACNC: 0.53 IU/ML
MITOGEN IGNF BLD-ACNC: >10 IU/ML
QUANTIFERON CRITERIA: ABNORMAL

## 2022-03-10 PROCEDURE — 74176 CT ABD & PELVIS W/O CONTRAST: CPT | Mod: TC,PO

## 2022-03-10 PROCEDURE — 74176 CT ABDOMEN PELVIS WITHOUT CONTRAST: ICD-10-PCS | Mod: 26,,, | Performed by: RADIOLOGY

## 2022-03-10 PROCEDURE — 25500020 PHARM REV CODE 255: Mod: PO | Performed by: SURGERY

## 2022-03-10 PROCEDURE — A9698 NON-RAD CONTRAST MATERIALNOC: HCPCS | Mod: PO | Performed by: SURGERY

## 2022-03-10 PROCEDURE — 74176 CT ABD & PELVIS W/O CONTRAST: CPT | Mod: 26,,, | Performed by: RADIOLOGY

## 2022-03-10 RX ADMIN — IOHEXOL 1000 ML: 9 SOLUTION ORAL at 04:03

## 2022-03-11 ENCOUNTER — LAB VISIT (OUTPATIENT)
Dept: LAB | Facility: HOSPITAL | Age: 72
End: 2022-03-11
Attending: STUDENT IN AN ORGANIZED HEALTH CARE EDUCATION/TRAINING PROGRAM
Payer: MEDICARE

## 2022-03-11 ENCOUNTER — OFFICE VISIT (OUTPATIENT)
Dept: FAMILY MEDICINE | Facility: CLINIC | Age: 72
End: 2022-03-11
Payer: MEDICARE

## 2022-03-11 VITALS
WEIGHT: 161.81 LBS | SYSTOLIC BLOOD PRESSURE: 122 MMHG | DIASTOLIC BLOOD PRESSURE: 62 MMHG | BODY MASS INDEX: 21.92 KG/M2 | HEIGHT: 72 IN

## 2022-03-11 DIAGNOSIS — E11.628 TYPE 2 DIABETES MELLITUS WITH OTHER SKIN COMPLICATION, WITHOUT LONG-TERM CURRENT USE OF INSULIN: ICD-10-CM

## 2022-03-11 DIAGNOSIS — N17.9 AKI (ACUTE KIDNEY INJURY): ICD-10-CM

## 2022-03-11 DIAGNOSIS — Z09 HOSPITAL DISCHARGE FOLLOW-UP: Primary | ICD-10-CM

## 2022-03-11 LAB
ANION GAP SERPL CALC-SCNC: 9 MMOL/L (ref 8–16)
BUN SERPL-MCNC: 16 MG/DL (ref 8–23)
CALCIUM SERPL-MCNC: 9.1 MG/DL (ref 8.7–10.5)
CHLORIDE SERPL-SCNC: 108 MMOL/L (ref 95–110)
CHOLEST SERPL-MCNC: 127 MG/DL (ref 120–199)
CHOLEST/HDLC SERPL: 3.3 {RATIO} (ref 2–5)
CO2 SERPL-SCNC: 21 MMOL/L (ref 23–29)
CREAT SERPL-MCNC: 1.1 MG/DL (ref 0.5–1.4)
EST. GFR  (AFRICAN AMERICAN): >60 ML/MIN/1.73 M^2
EST. GFR  (NON AFRICAN AMERICAN): >60 ML/MIN/1.73 M^2
GLUCOSE SERPL-MCNC: 52 MG/DL (ref 70–110)
HDLC SERPL-MCNC: 39 MG/DL (ref 40–75)
HDLC SERPL: 30.7 % (ref 20–50)
LDLC SERPL CALC-MCNC: 72 MG/DL (ref 63–159)
NONHDLC SERPL-MCNC: 88 MG/DL
POTASSIUM SERPL-SCNC: 4 MMOL/L (ref 3.5–5.1)
SODIUM SERPL-SCNC: 138 MMOL/L (ref 136–145)
TRIGL SERPL-MCNC: 80 MG/DL (ref 30–150)

## 2022-03-11 PROCEDURE — 3061F PR NEG MICROALBUMINURIA RESULT DOCUMENTED/REVIEW: ICD-10-PCS | Mod: CPTII,S$GLB,, | Performed by: STUDENT IN AN ORGANIZED HEALTH CARE EDUCATION/TRAINING PROGRAM

## 2022-03-11 PROCEDURE — 4010F PR ACE/ARB THEARPY RXD/TAKEN: ICD-10-PCS | Mod: CPTII,S$GLB,, | Performed by: STUDENT IN AN ORGANIZED HEALTH CARE EDUCATION/TRAINING PROGRAM

## 2022-03-11 PROCEDURE — 3044F PR MOST RECENT HEMOGLOBIN A1C LEVEL <7.0%: ICD-10-PCS | Mod: CPTII,S$GLB,, | Performed by: STUDENT IN AN ORGANIZED HEALTH CARE EDUCATION/TRAINING PROGRAM

## 2022-03-11 PROCEDURE — 99999 PR PBB SHADOW E&M-EST. PATIENT-LVL IV: CPT | Mod: PBBFAC,,, | Performed by: STUDENT IN AN ORGANIZED HEALTH CARE EDUCATION/TRAINING PROGRAM

## 2022-03-11 PROCEDURE — 3074F SYST BP LT 130 MM HG: CPT | Mod: CPTII,S$GLB,, | Performed by: STUDENT IN AN ORGANIZED HEALTH CARE EDUCATION/TRAINING PROGRAM

## 2022-03-11 PROCEDURE — 36415 COLL VENOUS BLD VENIPUNCTURE: CPT | Mod: PO | Performed by: STUDENT IN AN ORGANIZED HEALTH CARE EDUCATION/TRAINING PROGRAM

## 2022-03-11 PROCEDURE — 3288F FALL RISK ASSESSMENT DOCD: CPT | Mod: CPTII,S$GLB,, | Performed by: STUDENT IN AN ORGANIZED HEALTH CARE EDUCATION/TRAINING PROGRAM

## 2022-03-11 PROCEDURE — 1111F DSCHRG MED/CURRENT MED MERGE: CPT | Mod: CPTII,S$GLB,, | Performed by: STUDENT IN AN ORGANIZED HEALTH CARE EDUCATION/TRAINING PROGRAM

## 2022-03-11 PROCEDURE — 1111F PR DISCHARGE MEDS RECONCILED W/ CURRENT OUTPATIENT MED LIST: ICD-10-PCS | Mod: CPTII,S$GLB,, | Performed by: STUDENT IN AN ORGANIZED HEALTH CARE EDUCATION/TRAINING PROGRAM

## 2022-03-11 PROCEDURE — 1159F PR MEDICATION LIST DOCUMENTED IN MEDICAL RECORD: ICD-10-PCS | Mod: CPTII,S$GLB,, | Performed by: STUDENT IN AN ORGANIZED HEALTH CARE EDUCATION/TRAINING PROGRAM

## 2022-03-11 PROCEDURE — 1125F AMNT PAIN NOTED PAIN PRSNT: CPT | Mod: CPTII,S$GLB,, | Performed by: STUDENT IN AN ORGANIZED HEALTH CARE EDUCATION/TRAINING PROGRAM

## 2022-03-11 PROCEDURE — 4010F ACE/ARB THERAPY RXD/TAKEN: CPT | Mod: CPTII,S$GLB,, | Performed by: STUDENT IN AN ORGANIZED HEALTH CARE EDUCATION/TRAINING PROGRAM

## 2022-03-11 PROCEDURE — 1100F PTFALLS ASSESS-DOCD GE2>/YR: CPT | Mod: CPTII,S$GLB,, | Performed by: STUDENT IN AN ORGANIZED HEALTH CARE EDUCATION/TRAINING PROGRAM

## 2022-03-11 PROCEDURE — 3078F PR MOST RECENT DIASTOLIC BLOOD PRESSURE < 80 MM HG: ICD-10-PCS | Mod: CPTII,S$GLB,, | Performed by: STUDENT IN AN ORGANIZED HEALTH CARE EDUCATION/TRAINING PROGRAM

## 2022-03-11 PROCEDURE — 3078F DIAST BP <80 MM HG: CPT | Mod: CPTII,S$GLB,, | Performed by: STUDENT IN AN ORGANIZED HEALTH CARE EDUCATION/TRAINING PROGRAM

## 2022-03-11 PROCEDURE — 3288F PR FALLS RISK ASSESSMENT DOCUMENTED: ICD-10-PCS | Mod: CPTII,S$GLB,, | Performed by: STUDENT IN AN ORGANIZED HEALTH CARE EDUCATION/TRAINING PROGRAM

## 2022-03-11 PROCEDURE — 3066F NEPHROPATHY DOC TX: CPT | Mod: CPTII,S$GLB,, | Performed by: STUDENT IN AN ORGANIZED HEALTH CARE EDUCATION/TRAINING PROGRAM

## 2022-03-11 PROCEDURE — 99214 OFFICE O/P EST MOD 30 MIN: CPT | Mod: S$GLB,,, | Performed by: STUDENT IN AN ORGANIZED HEALTH CARE EDUCATION/TRAINING PROGRAM

## 2022-03-11 PROCEDURE — 3061F NEG MICROALBUMINURIA REV: CPT | Mod: CPTII,S$GLB,, | Performed by: STUDENT IN AN ORGANIZED HEALTH CARE EDUCATION/TRAINING PROGRAM

## 2022-03-11 PROCEDURE — 1125F PR PAIN SEVERITY QUANTIFIED, PAIN PRESENT: ICD-10-PCS | Mod: CPTII,S$GLB,, | Performed by: STUDENT IN AN ORGANIZED HEALTH CARE EDUCATION/TRAINING PROGRAM

## 2022-03-11 PROCEDURE — 99214 PR OFFICE/OUTPT VISIT, EST, LEVL IV, 30-39 MIN: ICD-10-PCS | Mod: S$GLB,,, | Performed by: STUDENT IN AN ORGANIZED HEALTH CARE EDUCATION/TRAINING PROGRAM

## 2022-03-11 PROCEDURE — 99999 PR PBB SHADOW E&M-EST. PATIENT-LVL IV: ICD-10-PCS | Mod: PBBFAC,,, | Performed by: STUDENT IN AN ORGANIZED HEALTH CARE EDUCATION/TRAINING PROGRAM

## 2022-03-11 PROCEDURE — 1160F RVW MEDS BY RX/DR IN RCRD: CPT | Mod: CPTII,S$GLB,, | Performed by: STUDENT IN AN ORGANIZED HEALTH CARE EDUCATION/TRAINING PROGRAM

## 2022-03-11 PROCEDURE — 1159F MED LIST DOCD IN RCRD: CPT | Mod: CPTII,S$GLB,, | Performed by: STUDENT IN AN ORGANIZED HEALTH CARE EDUCATION/TRAINING PROGRAM

## 2022-03-11 PROCEDURE — 3074F PR MOST RECENT SYSTOLIC BLOOD PRESSURE < 130 MM HG: ICD-10-PCS | Mod: CPTII,S$GLB,, | Performed by: STUDENT IN AN ORGANIZED HEALTH CARE EDUCATION/TRAINING PROGRAM

## 2022-03-11 PROCEDURE — 1100F PR PT FALLS ASSESS DOC 2+ FALLS/FALL W/INJURY/YR: ICD-10-PCS | Mod: CPTII,S$GLB,, | Performed by: STUDENT IN AN ORGANIZED HEALTH CARE EDUCATION/TRAINING PROGRAM

## 2022-03-11 PROCEDURE — 3008F PR BODY MASS INDEX (BMI) DOCUMENTED: ICD-10-PCS | Mod: CPTII,S$GLB,, | Performed by: STUDENT IN AN ORGANIZED HEALTH CARE EDUCATION/TRAINING PROGRAM

## 2022-03-11 PROCEDURE — 3008F BODY MASS INDEX DOCD: CPT | Mod: CPTII,S$GLB,, | Performed by: STUDENT IN AN ORGANIZED HEALTH CARE EDUCATION/TRAINING PROGRAM

## 2022-03-11 PROCEDURE — 80048 BASIC METABOLIC PNL TOTAL CA: CPT | Performed by: STUDENT IN AN ORGANIZED HEALTH CARE EDUCATION/TRAINING PROGRAM

## 2022-03-11 PROCEDURE — 80061 LIPID PANEL: CPT | Performed by: STUDENT IN AN ORGANIZED HEALTH CARE EDUCATION/TRAINING PROGRAM

## 2022-03-11 PROCEDURE — 3066F PR DOCUMENTATION OF TREATMENT FOR NEPHROPATHY: ICD-10-PCS | Mod: CPTII,S$GLB,, | Performed by: STUDENT IN AN ORGANIZED HEALTH CARE EDUCATION/TRAINING PROGRAM

## 2022-03-11 PROCEDURE — 1160F PR REVIEW ALL MEDS BY PRESCRIBER/CLIN PHARMACIST DOCUMENTED: ICD-10-PCS | Mod: CPTII,S$GLB,, | Performed by: STUDENT IN AN ORGANIZED HEALTH CARE EDUCATION/TRAINING PROGRAM

## 2022-03-11 PROCEDURE — 3044F HG A1C LEVEL LT 7.0%: CPT | Mod: CPTII,S$GLB,, | Performed by: STUDENT IN AN ORGANIZED HEALTH CARE EDUCATION/TRAINING PROGRAM

## 2022-03-11 RX ORDER — GLUCOSAM/CHON-MSM1/C/MANG/BOSW 500-416.6
TABLET ORAL
COMMUNITY
Start: 2022-02-22

## 2022-03-11 RX ORDER — DEXTROSE 4 G
TABLET,CHEWABLE ORAL
COMMUNITY

## 2022-03-11 NOTE — PATIENT INSTRUCTIONS
If you are due for any health screening(s) below please notify me so we can arrange them to be ordered and scheduled to maintain your health.     Health Maintenance   Topic Date Due    Eye Exam  Never done    TETANUS VACCINE  Never done    Lipid Panel  10/12/2006    Abdominal Aortic Aneurysm Screening  Never done    Hemoglobin A1c  08/23/2022    Foot Exam  02/23/2023    Low Dose Statin  03/11/2023    Hepatitis C Screening  Completed                  Diabetic Retinal Eye Exam    Diabetes is the #1 cause of blindness in the [place] - early detection before signs or symptoms develop can prevent debilitating blindness.    Once-a-year screening is recommended for all diabetic patients. This exam can prevent and treat diabetes complications in the eye before developing symptoms. This can be done with a special camera is used to take photographs of the back of your eye without having to dilate them, or you can see an eye doctor for a full dilated exam.    Although you are still overdue for this important screening, due to the COVID-19 pandemic, we recommend scheduling it in the near future.

## 2022-03-11 NOTE — PROGRESS NOTES
[facility] NOTE    Patient Name: Brandon Parson  YOB: 1950    PRESENTING HISTORY   Chief Complaint:   Chief Complaint   Patient presents with    Follow-up     Hospital follow up        History of Present Illness:  Mr. Brandon Parson is a 71 y.o. male here for hospital f/u.     He developed severe diarrhea, hypovolemia, MACIEL.   Gradually improved. Cause unclear.     He has not been thriving at home. Has essentially been shut in his house for 2 years. Now out and family is helping him.     T2DM- .   A1c onctrolled.   16, 18 units 70/30.     Wants to wean off of klonopin. See plan below.   Klonopin weaning plan- Will go very slow to maximize chances of succes. At next fill 0.5mg BID, 0.25 mg mid day. Will gradually reduce. At next will probably reduce to 0.25, 0.25, 0.5. Then 0.25, 0.25, 0.25. Then reduce to BID. Then either daily or QHS dosing.     HIV- follows with Dr. Hoover                                             Review of Systems   All other systems reviewed and are negative.        PAST HISTORY:     Past Medical History:   Diagnosis Date    Anxiety     Depression     Diabetes mellitus     GERD (gastroesophageal reflux disease)     HIV (human immunodeficiency virus infection)     Osteomyelitis of great toe of left foot 2019       History reviewed. No pertinent surgical history.    History reviewed. No pertinent family history.    Social History     Socioeconomic History    Marital status: Single   Tobacco Use    Smoking status: Former Smoker     Years: 49.00     Types: Cigarettes     Start date:      Quit date: 2022     Years since quittin.1    Smokeless tobacco: Never Used    Tobacco comment: Handout provided for Ambulatory Smoking Cessation program   Substance and Sexual Activity    Alcohol use: No    Drug use: No    Sexual activity: Not Currently       MEDICATIONS & ALLERGIES:     Current Outpatient Medications on File Prior to Visit    Medication Sig    aspirin 325 MG tablet Take 1 tablet (325 mg total) by mouth once daily.    atorvastatin calcium (ATORVASTATIN ORAL) Take 40 mg by mouth once daily.     blood sugar diagnostic Strp TEST 2 TIMES DAILY    blood-glucose meter Misc USE TO TEST BLOOD SUGAR 2 TO 3 TIMES DAILY    clonazePAM (KLONOPIN) 0.5 MG tablet Take 0.5 mg by mouth 2 (two) times daily.    efavirenz (SUSTIVA) 600 mg Tab Take 1 tablet (600 mg total) by mouth every evening.    emtricitabine-tenofovir alafen (DESCOVY) 200-25 mg Tab Take 1 tablet by mouth every evening.    fenofibrate (TRICOR) 145 MG tablet Take 145 mg by mouth once daily.    fish oil-omega-3 fatty acids 300-1,000 mg capsule Take 2 capsules by mouth once daily.    insulin aspart protamine-insulin aspart (NOVOLOG 70/30) 100 unit/mL (70-30) InPn pen Inject 16 Units into the skin once daily.    insulin aspart protamine-insulin aspart (NOVOLOG 70/30) 100 unit/mL (70-30) InPn pen Inject 18 Units into the skin nightly.    metFORMIN (GLUCOPHAGE) 1000 MG tablet Take 1,000 mg by mouth 2 (two) times daily.     multivitamin (THERAGRAN) tablet Take 1 tablet by mouth once daily.    sertraline (ZOLOFT) 100 MG tablet Take 100 mg by mouth once daily.     TRUEPLUS LANCETS 30 gauge Misc USE TWICE DAILY TO TEST BLOOD SUGAR    zolpidem (AMBIEN) 10 mg Tab Take 10 mg by mouth every evening.     [DISCONTINUED] benazepril-hydrochlorthiazide (LOTENSIN HCT) 20-25 mg Tab Take 1 tablet by mouth once daily.     Current Facility-Administered Medications on File Prior to Visit   Medication    [COMPLETED] iohexoL (OMNIPAQUE 9) oral solution 1,000 mL       Review of patient's allergies indicates:   Allergen Reactions    Chantix [varenicline]        OBJECTIVE:   Vital Signs:  Vitals:    03/11/22 1053   BP: 122/62   Weight: 73.4 kg (161 lb 13.1 oz)   Height: 6' (1.829 m)       No results found for this or any previous visit (from the past 24 hour(s)).      Physical Exam  Vitals and  nursing note reviewed.   Constitutional:       General: He is not in acute distress.     Appearance: He is not toxic-appearing or diaphoretic.   HENT:      Head: Normocephalic and atraumatic.      Right Ear: External ear normal.      Left Ear: External ear normal.   Eyes:      General: No scleral icterus.     Conjunctiva/sclera: Conjunctivae normal.      Pupils: Pupils are equal, round, and reactive to light.   Neck:      Thyroid: No thyromegaly.      Vascular: No carotid bruit.   Cardiovascular:      Rate and Rhythm: Normal rate and regular rhythm.      Heart sounds: Normal heart sounds. No murmur heard.  Pulmonary:      Effort: Pulmonary effort is normal. No respiratory distress.      Breath sounds: Normal breath sounds. No wheezing or rales.   Musculoskeletal:         General: No tenderness or deformity. Normal range of motion.      Cervical back: Normal range of motion and neck supple.      Right lower leg: No edema.      Left lower leg: No edema.   Lymphadenopathy:      Cervical: No cervical adenopathy.   Skin:     General: Skin is warm and dry.      Findings: No erythema or rash.   Neurological:      Mental Status: He is alert and oriented to person, place, and time.      Gait: Gait is intact.   Psychiatric:         Mood and Affect: Mood and affect normal.         Cognition and Memory: Memory normal.         Judgment: Judgment normal.         ASSESSMENT & PLAN:     Hospital discharge follow-up  Reviewed hospital stay.   No further f/u needed.     MACIEL (acute kidney injury)  -     Basic Metabolic Panel; Future; Expected date: 03/11/2022  Recheck    Type 2 diabetes mellitus with other skin complication, without long-term current use of insulin  -     Microalbumin/Creatinine Ratio, Urine; Future; Expected date: 03/11/2022  -     Lipid Panel; Future; Expected date: 03/11/2022  -     Ambulatory referral/consult to Optometry; Future; Expected date: 03/18/2022  Continue current medicines       Francois Eugene MD    Internal Medicine    This note was created using Dragon voice recognition software that occasionally misinterprets phrases or words.

## 2022-03-18 ENCOUNTER — TELEPHONE (OUTPATIENT)
Dept: FAMILY MEDICINE | Facility: CLINIC | Age: 72
End: 2022-03-18
Payer: MEDICARE

## 2022-03-18 ENCOUNTER — TELEPHONE (OUTPATIENT)
Dept: SURGERY | Facility: CLINIC | Age: 72
End: 2022-03-18
Payer: MEDICARE

## 2022-03-18 DIAGNOSIS — B20 HIV DISEASE: Primary | ICD-10-CM

## 2022-03-18 RX ORDER — EFAVIRENZ 600 MG/1
600 TABLET, FILM COATED ORAL NIGHTLY
Qty: 30 TABLET | Refills: 5 | Status: SHIPPED | OUTPATIENT
Start: 2022-03-18 | End: 2022-08-08 | Stop reason: SDUPTHER

## 2022-03-18 NOTE — TELEPHONE ENCOUNTER
I spoke with pt via phone, message has been handled and routed in a different encounter.   I called pharmacy tech states that the last time the medication was refilled was written from [facility].   Ambien- 02/22/2022 30 day supply   Klonopin- 02/18/2022 30 day supply     ----- Message from Kayleigh Spangler sent at 3/18/2022  1:57 PM CDT -----  Contact: patient  Type:  RX Refill Request    Who Called:  patient  Refill or New Rx:  refill  RX Name and Strength:  clonazePAM (KLONOPIN) 0.5 MG tablet,       zolpidem (AMBIEN) 10 mg Tab  How is the patient currently taking it? (ex. 1XDay):  as directed  Is this a 30 day or 90 day RX:  30  Preferred Pharmacy with phone number:    [facility] [place], [place] - [address] [facility] [address] [address]  Phone: [phone] Fax: [phone]  Local or Mail Order:  local  Ordering Provider:  Jabier  Best Call Back Number:  [phone] (home) [phone] (work)  Additional Information:  patient says this was not in the bag that he picked up

## 2022-03-18 NOTE — TELEPHONE ENCOUNTER
----- Message from Kofi Chang sent at 3/18/2022 11:53 AM CDT -----  Type:  RX Refill Request    Who Called:  Patient  Refill or New Rx:  New  RX Name and Strength:   atorvastatin calcium (ATORVASTATIN ORAL 40 MG  1Day, 30 days    clonazePAM (KLONOPIN) 0.5 MG tablet  1 in AM, .5 at Noon 1 at PM , 30 days    fenofibrate (TRICOR) 145 MG tablet  1XDay, 30 days    fish oil-omega-3 fatty acids 300-1,000 mg capsule  2XDay , 30 days    insulin aspart protamine-insulin aspart (NOVOLOG 70/30) 100 unit/mL (70-30) InPn pen  16 units in AM 18 units in PM    metFORMIN (GLUCOPHAGE) 1000 MG tablet  2XDay, 30 days    multivitamin (THERAGRAN) tablet  1XDay, 30 days    sertraline (ZOLOFT) 100 MG tablet  1XDay, 30 days    zolpidem (AMBIEN) 10 mg Tab  1XDay, I  PM, 30 days      Preferred Pharmacy with phone number:         [place], [place] - [address] [address]  [address] [address]  Phone: [phone] Fax: [phone]      Local or Mail Order:  Local  Ordering Provider: Jabier Baltazar Call Back Number:  [phone]  Additional Information:

## 2022-03-18 NOTE — TELEPHONE ENCOUNTER
No new care gaps identified.  Powered by [employer]. Reference number: [id number].   3/18/2022 2:55:12 PM CDT

## 2022-03-18 NOTE — TELEPHONE ENCOUNTER
----- Message from Sylvia Breaux sent at 3/18/2022  9:58 AM CDT -----  Contact: sister Gomez  Type: Needs Medical Advice    Who Called:  sister Gomez    Best Call Back Number: [phone]    Additional Information: Please call back regarding pt's appt on 4/8.  Thanks.

## 2022-03-18 NOTE — TELEPHONE ENCOUNTER
----- Message from Rashida Rao sent at 3/18/2022  1:33 PM CDT -----  Regarding: pt called  Name of Who is Calling: DIONI SAN [[id number]      What is the request in detail: pt sent over a message about his medication and would like you to  please disregard. He just received the medication in the mail.      Can the clinic reply by [facility]: No      What [facility] to [facility] Back [facility]:[phone]

## 2022-03-18 NOTE — TELEPHONE ENCOUNTER
pt is scheduled for sx on 4/20, will postpone seeing md for his hernia. they wanted to know if there was anything outstanding on his ct that he may need to come in to discuss. message sent to carla gonzalez.

## 2022-03-21 RX ORDER — CLONAZEPAM 0.5 MG/1
0.5 TABLET ORAL 3 TIMES DAILY
Qty: 75 TABLET | Refills: 0 | Status: SHIPPED | OUTPATIENT
Start: 2022-03-21 | End: 2022-04-19

## 2022-03-21 RX ORDER — ZOLPIDEM TARTRATE 5 MG/1
5 TABLET ORAL NIGHTLY PRN
Qty: 30 TABLET | Refills: 2 | Status: SHIPPED | OUTPATIENT
Start: 2022-03-21 | End: 2022-06-03

## 2022-03-21 RX ORDER — AMOXICILLIN 500 MG
2 CAPSULE ORAL DAILY
OUTPATIENT
Start: 2022-03-21

## 2022-03-23 ENCOUNTER — TELEPHONE (OUTPATIENT)
Dept: FAMILY MEDICINE | Facility: CLINIC | Age: 72
End: 2022-03-23
Payer: MEDICARE

## 2022-03-24 ENCOUNTER — CLINICAL SUPPORT (OUTPATIENT)
Dept: CARDIOLOGY | Facility: HOSPITAL | Age: 72
End: 2022-03-24
Attending: INTERNAL MEDICINE
Payer: MEDICARE

## 2022-03-24 ENCOUNTER — HOSPITAL ENCOUNTER (OUTPATIENT)
Dept: RADIOLOGY | Facility: HOSPITAL | Age: 72
Discharge: HOME | End: 2022-03-24
Attending: INTERNAL MEDICINE
Payer: MEDICARE

## 2022-03-24 VITALS — BODY MASS INDEX: 21.81 KG/M2 | HEIGHT: 72 IN | WEIGHT: 161 LBS

## 2022-03-24 DIAGNOSIS — R07.89 ATYPICAL CHEST PAIN: ICD-10-CM

## 2022-03-24 LAB
CV PHARM DOSE: 0.4 MG
CV STRESS BASE HR: 60 BPM
DIASTOLIC BLOOD PRESSURE: 55 MMHG
NUC REST EJECTION FRACTION: 66
OHS CV CPX 1 MINUTE RECOVERY HEART RATE: 86 BPM
OHS CV CPX 85 PERCENT MAX PREDICTED HEART RATE MALE: 126
OHS CV CPX MAX PREDICTED HEART RATE: 148
OHS CV CPX PATIENT IS FEMALE: 0
OHS CV CPX PATIENT IS MALE: 1
OHS CV CPX PEAK DIASTOLIC BLOOD PRESSURE: 63 MMHG
OHS CV CPX PEAK HEAR RATE: 95 BPM
OHS CV CPX PEAK RATE PRESSURE PRODUCT: NORMAL
OHS CV CPX PEAK SYSTOLIC BLOOD PRESSURE: 144 MMHG
OHS CV CPX PERCENT MAX PREDICTED HEART RATE ACHIEVED: 64
OHS CV CPX RATE PRESSURE PRODUCT PRESENTING: 8160
OHS CV PHARM TIME: 1351 MIN
SYSTOLIC BLOOD PRESSURE: 136 MMHG

## 2022-03-24 PROCEDURE — 63600175 PHARM REV CODE 636 W HCPCS: Mod: PO | Performed by: INTERNAL MEDICINE

## 2022-03-24 PROCEDURE — 93017 CV STRESS TEST TRACING ONLY: CPT | Mod: PO

## 2022-03-24 PROCEDURE — 93016 CV STRESS TEST SUPVJ ONLY: CPT | Mod: ,,, | Performed by: INTERNAL MEDICINE

## 2022-03-24 PROCEDURE — A9502 TC99M TETROFOSMIN: HCPCS | Mod: PO

## 2022-03-24 PROCEDURE — 93018 CV STRESS TEST I&R ONLY: CPT | Mod: ,,, | Performed by: INTERNAL MEDICINE

## 2022-03-24 PROCEDURE — 78452 HT MUSCLE IMAGE SPECT MULT: CPT | Mod: 26,,, | Performed by: INTERNAL MEDICINE

## 2022-03-24 PROCEDURE — 93018 PR CARDIAC STRESS TST,INTERP/REPT ONLY: ICD-10-PCS | Mod: ,,, | Performed by: INTERNAL MEDICINE

## 2022-03-24 PROCEDURE — 93016 STRESS TEST WITH MYOCARDIAL PERFUSION (CUPID ONLY): ICD-10-PCS | Mod: ,,, | Performed by: INTERNAL MEDICINE

## 2022-03-24 PROCEDURE — 78452 STRESS TEST WITH MYOCARDIAL PERFUSION (CUPID ONLY): ICD-10-PCS | Mod: 26,,, | Performed by: INTERNAL MEDICINE

## 2022-03-24 RX ORDER — REGADENOSON 0.08 MG/ML
0.4 INJECTION, SOLUTION INTRAVENOUS ONCE
Status: COMPLETED | OUTPATIENT
Start: 2022-03-24 | End: 2022-03-24

## 2022-03-24 RX ADMIN — REGADENOSON 0.4 MG: 0.08 INJECTION, SOLUTION INTRAVENOUS at 01:03

## 2022-03-25 ENCOUNTER — TELEPHONE (OUTPATIENT)
Dept: CARDIOLOGY | Facility: CLINIC | Age: 72
End: 2022-03-25
Payer: MEDICARE

## 2022-03-25 NOTE — TELEPHONE ENCOUNTER
----- Message from Malika Wilde sent at 3/25/2022 11:15 AM CDT -----  Contact: Patti  Type: Needs Medical Advice    Who: Called:  Patti with Dr Sepulveda Office     Best Call Back Number: [phone]    Inquiry/Question: Calling nurse back for medical clearance she re faxed the medical clearance she originally sent it on 03/17 but she stating if you need something different please give her a call  Thank you~

## 2022-03-28 ENCOUNTER — TELEPHONE (OUTPATIENT)
Dept: CARDIOLOGY | Facility: CLINIC | Age: 72
End: 2022-03-28
Payer: MEDICARE

## 2022-03-28 NOTE — TELEPHONE ENCOUNTER
Please advise: is pt acceptable risk for right carotid endarectomy under general anesthesia    last OV 3/3    Pt taking aspirin

## 2022-04-18 DIAGNOSIS — Z79.899 CHRONIC PRESCRIPTION BENZODIAZEPINE USE: Primary | ICD-10-CM

## 2022-04-18 NOTE — TELEPHONE ENCOUNTER
No new care gaps identified.  Powered by [employer]. Reference number: [id number].   4/18/2022 11:37:18 AM CDT

## 2022-04-19 RX ORDER — CLONAZEPAM 0.25 MG/1
TABLET, ORALLY DISINTEGRATING ORAL
Qty: 120 TABLET | Refills: 0 | Status: SHIPPED | OUTPATIENT
Start: 2022-04-19 | End: 2022-07-11

## 2022-04-25 RX ORDER — METFORMIN HYDROCHLORIDE 1000 MG/1
1000 TABLET ORAL 2 TIMES DAILY
Qty: 60 TABLET | Refills: 3 | Status: SHIPPED | OUTPATIENT
Start: 2022-04-25 | End: 2022-09-12

## 2022-05-03 ENCOUNTER — TELEPHONE (OUTPATIENT)
Dept: BARIATRICS | Facility: CLINIC | Age: 72
End: 2022-05-03
Payer: MEDICARE

## 2022-05-03 NOTE — TELEPHONE ENCOUNTER
returned call to pt's sister, scheduled appt to discuss sx. confirmed appt details. pt acknowledged.

## 2022-05-03 NOTE — TELEPHONE ENCOUNTER
----- Message from Jono Tabor sent at 5/3/2022  1:03 PM CDT -----  Regarding: procedure  Contact: sister,donna Robles want to speak with a nurse regarding scheduling procedure, please call back at [phone] (home) [phone] (work)     Case number [id number]

## 2022-05-09 ENCOUNTER — OFFICE VISIT (OUTPATIENT)
Dept: INFECTIOUS DISEASES | Facility: CLINIC | Age: 72
End: 2022-05-09
Payer: MEDICARE

## 2022-05-09 VITALS
WEIGHT: 174 LBS | SYSTOLIC BLOOD PRESSURE: 144 MMHG | TEMPERATURE: 98 F | RESPIRATION RATE: 18 BRPM | DIASTOLIC BLOOD PRESSURE: 66 MMHG | BODY MASS INDEX: 23.57 KG/M2 | OXYGEN SATURATION: 100 % | HEART RATE: 69 BPM | HEIGHT: 72 IN

## 2022-05-09 DIAGNOSIS — B20 HUMAN IMMUNODEFICIENCY VIRUS I INFECTION: Primary | ICD-10-CM

## 2022-05-09 DIAGNOSIS — E78.2 MIXED HYPERLIPIDEMIA: ICD-10-CM

## 2022-05-09 DIAGNOSIS — Z79.899 ENCOUNTER FOR LONG-TERM (CURRENT) USE OF MEDICATIONS: ICD-10-CM

## 2022-05-09 DIAGNOSIS — E55.9 VITAMIN D DEFICIENCY: ICD-10-CM

## 2022-05-09 DIAGNOSIS — N18.30 STAGE 3 CHRONIC KIDNEY DISEASE, UNSPECIFIED WHETHER STAGE 3A OR 3B CKD: ICD-10-CM

## 2022-05-09 PROCEDURE — 1160F PR REVIEW ALL MEDS BY PRESCRIBER/CLIN PHARMACIST DOCUMENTED: ICD-10-PCS | Mod: CPTII,S$GLB,, | Performed by: INTERNAL MEDICINE

## 2022-05-09 PROCEDURE — 3061F NEG MICROALBUMINURIA REV: CPT | Mod: CPTII,S$GLB,, | Performed by: INTERNAL MEDICINE

## 2022-05-09 PROCEDURE — 99214 OFFICE O/P EST MOD 30 MIN: CPT | Mod: S$GLB,,, | Performed by: INTERNAL MEDICINE

## 2022-05-09 PROCEDURE — 3061F PR NEG MICROALBUMINURIA RESULT DOCUMENTED/REVIEW: ICD-10-PCS | Mod: CPTII,S$GLB,, | Performed by: INTERNAL MEDICINE

## 2022-05-09 PROCEDURE — 4010F PR ACE/ARB THEARPY RXD/TAKEN: ICD-10-PCS | Mod: CPTII,S$GLB,, | Performed by: INTERNAL MEDICINE

## 2022-05-09 PROCEDURE — 1160F RVW MEDS BY RX/DR IN RCRD: CPT | Mod: CPTII,S$GLB,, | Performed by: INTERNAL MEDICINE

## 2022-05-09 PROCEDURE — 3066F PR DOCUMENTATION OF TREATMENT FOR NEPHROPATHY: ICD-10-PCS | Mod: CPTII,S$GLB,, | Performed by: INTERNAL MEDICINE

## 2022-05-09 PROCEDURE — 4010F ACE/ARB THERAPY RXD/TAKEN: CPT | Mod: CPTII,S$GLB,, | Performed by: INTERNAL MEDICINE

## 2022-05-09 PROCEDURE — 1159F MED LIST DOCD IN RCRD: CPT | Mod: CPTII,S$GLB,, | Performed by: INTERNAL MEDICINE

## 2022-05-09 PROCEDURE — 3044F HG A1C LEVEL LT 7.0%: CPT | Mod: CPTII,S$GLB,, | Performed by: INTERNAL MEDICINE

## 2022-05-09 PROCEDURE — 3066F NEPHROPATHY DOC TX: CPT | Mod: CPTII,S$GLB,, | Performed by: INTERNAL MEDICINE

## 2022-05-09 PROCEDURE — 3044F PR MOST RECENT HEMOGLOBIN A1C LEVEL <7.0%: ICD-10-PCS | Mod: CPTII,S$GLB,, | Performed by: INTERNAL MEDICINE

## 2022-05-09 PROCEDURE — 1159F PR MEDICATION LIST DOCUMENTED IN MEDICAL RECORD: ICD-10-PCS | Mod: CPTII,S$GLB,, | Performed by: INTERNAL MEDICINE

## 2022-05-09 PROCEDURE — 99214 PR OFFICE/OUTPT VISIT, EST, LEVL IV, 30-39 MIN: ICD-10-PCS | Mod: S$GLB,,, | Performed by: INTERNAL MEDICINE

## 2022-05-09 RX ORDER — BENAZEPRIL/HYDROCHLOROTHIAZIDE 20 MG-25MG
1 TABLET ORAL DAILY
COMMUNITY
Start: 2022-03-15 | End: 2022-09-12

## 2022-05-09 RX ORDER — MULTIVITAMIN WITH FOLIC ACID 400 MCG
1 TABLET ORAL DAILY
COMMUNITY
Start: 2022-03-15

## 2022-05-09 RX ORDER — PEN NEEDLE, DIABETIC 31 GX5/16"
NEEDLE, DISPOSABLE MISCELLANEOUS
COMMUNITY
Start: 2022-03-15 | End: 2022-07-12 | Stop reason: SDUPTHER

## 2022-05-09 RX ORDER — FENOFIBRATE 134 MG/1
134 CAPSULE ORAL DAILY
COMMUNITY
Start: 2022-03-15 | End: 2022-07-12 | Stop reason: SDUPTHER

## 2022-05-09 NOTE — PROGRESS NOTES
"Subjective:       Patient ID: Brandon Parson is a 71 y.o. male.    Chief Complaint:: HIV Positive/AIDS    HPI Transferring care to [facility]. Followed by Dr. Dawn Helm/[place] for 20 years.   [place] he was HIV positive over 20 yrs ago. He was admitted to [facility] for pneumonia (pneumocystis?). Does not recall his first T cell count. Last CD4 1215, 2/22/22,  But last viral load was 30 on 9/17/21.     He has been under Dr. Dawn Helm's care at [facility], then [facility], then [facility]). Reviewed available records in care everywhere.   He has been on Descovy/Sustiva for many years, cannot recall regimens prior to this  No episodes of shingles, received 2 shingles vaccines 2020  No history of hepatitis   Treated for latent TB 2006-07  No other STDs  Diabetes, carotid  Vascular disease. Recently admitted for syncope. Stopped smoking 2 months ago  DANK on TID clonazepam an dhs ambien, for years. (sister concerned about this). He lives with sister near [place], spends time with another sister in [place] and a friend on the [facility]. Does not drive. Has limited his life greatly due to COVID.   Preparing for an inguinal hernia repair    5/9/22: since last visit had a right CEA. Still off cigarettes since January. Awaiting inguinal hernia repair. Taking  mg daily  He is more active and getting out of the house more than at last visit. Still not driving.   Taking the following: Vitamin D 5000 IU per day, inc 30 mg elemental or 220 mg of sulfate, Vitamin C 500-1000 mg per day. Could not find quercetin  Reviewed labs . TB Gold still positive .     Current Outpatient Medications:     atorvastatin calcium (ATORVASTATIN ORAL), Take 40 mg by mouth once daily. , Disp: , Rfl:     BD ULTRA-FINE MINI PEN NEEDLE 31 gauge x 3/16" Ndle, USE 2 TIMES A DAY, Disp: , Rfl:     blood sugar diagnostic Strp, TEST 2 TIMES DAILY, Disp: , Rfl:     blood-glucose meter Misc, USE TO TEST BLOOD SUGAR 2 TO 3 TIMES DAILY, " Disp: , Rfl:     clonazePAM (KLONOPIN) 0.25 MG TbDL, 0.25 mg twice during the day, 0.5mg QHS, Disp: 120 tablet, Rfl: 0    efavirenz (SUSTIVA) 600 mg Tab, Take 1 tablet (600 mg total) by mouth every evening., Disp: 30 tablet, Rfl: 5    emtricitabine-tenofovir alafen (DESCOVY) 200-25 mg Tab, Take 1 tablet by mouth every evening., Disp: 30 tablet, Rfl: 5    fenofibrate (TRICOR) 145 MG tablet, Take 145 mg by mouth once daily., Disp: , Rfl:     fish oil-omega-3 fatty acids 300-1,000 mg capsule, Take 2 capsules by mouth once daily., Disp: , Rfl:     HIGH POTENCY MULTIVITAMIN 400 mcg Tab, Take 1 tablet by mouth once daily., Disp: , Rfl:     insulin aspart protamine-insulin aspart (NOVOLOG 70/30) 100 unit/mL (70-30) InPn pen, Inject 16 Units into the skin once daily., Disp: , Rfl:     insulin aspart protamine-insulin aspart (NOVOLOG 70/30) 100 unit/mL (70-30) InPn pen, Inject 18 Units into the skin nightly., Disp: , Rfl:     metFORMIN (GLUCOPHAGE) 1000 MG tablet, Take 1 tablet (1,000 mg total) by mouth 2 (two) times daily., Disp: 60 tablet, Rfl: 3    multivitamin (THERAGRAN) tablet, Take 1 tablet by mouth once daily., Disp: , Rfl:     sertraline (ZOLOFT) 100 MG tablet, Take 100 mg by mouth once daily. , Disp: , Rfl:     TRUEPLUS LANCETS 30 gauge Misc, USE TWICE DAILY TO TEST BLOOD SUGAR, Disp: , Rfl:     zolpidem (AMBIEN) 5 MG Tab, Take 1 tablet (5 mg total) by mouth nightly as needed (insomnia)., Disp: 30 tablet, Rfl: 2    aspirin 325 MG tablet, Take 1 tablet (325 mg total) by mouth once daily. (Patient not taking: Reported on 5/9/2022), Disp: 30 tablet, Rfl: 0    benazepril-hydrochlorthiazide (LOTENSIN HCT) 20-25 mg Tab, Take 1 tablet by mouth once daily., Disp: , Rfl:     fenofibrate micronized (LOFIBRA) 134 MG Cap, Take 134 mg by mouth once daily., Disp: , Rfl:   Review of patient's allergies indicates:   Allergen Reactions    Chantix [varenicline]      Past Medical History:   Diagnosis Date    Anxiety      Carotid artery disease     Depression     Diabetes mellitus     GERD (gastroesophageal reflux disease)     HIV (human immunodeficiency virus infection)     Osteomyelitis of great toe of left foot 2019    TB lung, latent 2006    treated with INH   pneumonia , probably pneumocystis     Diabetic foot ulcer    Past Surgical History:   Procedure Laterality Date    CAROTID ENDARTERECTOMY Right 2022    UMBILICAL HERNIA REPAIR       Social History     Socioeconomic History    Marital status: Single   Tobacco Use    Smoking status: Former Smoker     Years: 49.00     Types: Cigarettes     Start date:      Quit date: 2022     Years since quittin.2    Smokeless tobacco: Never Used    Tobacco comment: Handout provided for Ambulatory Smoking Cessation program   Substance and Sexual Activity    Alcohol use: No    Drug use: No    Sexual activity: Not Currently     No family history on file.    Travel History: [place], [place]    Vaccine History: see immunization tab. Pneumovax , . HAV and HBV vax -  Advanced Directive:   Safer Sex: abstinent since diagnosis  Bone Density:   Colonoscopy: , and 2 tubulovillous adenomas 2016,  postponed due to COVID   at public [employer], retired  at the time of illness    Review of Systems    Constitutional: No fever, chills, sweats, fatigue, weakness,  weight loss    Eyes: No change in vision, loss of vision, diplopia, photophobia. Awaiting UTD eye exam    ENT: No sinus drainage, sore throat, mouth pain, or lesions. Awaiting UTD dental exam    Cardiovascular: No chest pain, TRIANA, palpitations or pedal edema. had nuclear stress which was normal    Respiratory: No shortness of breath, TRIANA, cough, wheeze, sputum, pleurisy, or hemoptysis    Gastrointestinal: No abdominal pain, nausea, vomiting, diarrhea, constipation, blood in stool   Has an inguinal hernia that needs repair. Reviewed CT abd    Genitourinary: No dysuria,  hematuria, incontinence,     Had a hernandez for CEA    Musculoskeletal: No new pain, joint swelling, or injuries    Integumentary: No new rashes, lesions, or wounds    Neurological: No dizziness, vertigo, unusual headaches, neuropathy, or falls.      Psychiatric: No anxiety, depression, memory loss, sleep disturbance or substance abuse. Weaning the clonazepam on his own,  bid now    Endocrine: Blood sugars controlled, . Thyroid normal    Lymphatic: No lymphadenopathy, blood loss, anemia, or malignancy    Objective:      Blood pressure (!) 144/66, pulse 69, temperature 97.8 °F (36.6 °C), temperature source Oral, resp. rate 18, height 6' (1.829 m), weight 78.9 kg (174 lb), SpO2 100 %. Body mass index is 23.6 kg/m².  Physical Exam      General: Alert and attentive, cooperative and in no distress. Looks much happier and more vigorous     Eyes: Pupils equal, round, reactive to light, anicteric, EOMI    Neck: Supple, non-tender, no thyromegaly or masses    ENT: EAC patent, TM normal, nares patent, no oral lesions,  , no thrush    Cardiovascular: Regular rate and rhythm, no murmurs, rubs, or gallop    Respiratory: Lungs clear without wheezes, no rales, rub or rhonci    Gastrointestinal: Active bowel sounds, soft, no mass or organomegaly, no tenderness or distention    Genitourinary: No  flank tenderness. Large right inguinal hernia    Vascular: No peripheral edema, phlebitis, pulses normal. Warm and well perfused    Musculoskeletal: Ambulates without difficulty, no acute arthritis, synovitis or myositis. Improved muscle bulk and strength    Integumentary: Skin without rashes, lesions, or wounds    AnusRectum: DAVID 2/22/22 ED    Neurological: Normal LOC, cranial nerves, speech, reflexes, normal gait, some hemiparesis    Psychiatric: Normal(improved) mood, speech, demeanor    Lymphatic: No cervical, supraclavicular, axillary, or inguinal lymphadenopathy      Wound:     HIV Table:   DATE  result  Toxoplasma  IgG 3/7/22  Positive  HLA     Negative (hop)  RPR   2018  Non reactive  Hep A total  3/16/09 positive  Hep B sAg  Hep B sAb  4/2007  Pos 37.4  Hep B cAb total 3/7/22  negative  Hep B DNA  Hep C Ab  3/7/22  negative  Hep C RNA  Chlamy/GC  2018  Negative  TB gold     Ppd pos 7/31/06 treated with 9 mo INH     3/7/22  Positive, CXR 2/22 normal  Vitamin D  3/7/22  30  CD4   2/22/22 1215  PSA   9/2021  0.5  Hem A1c  2/2022  6.6%  UA prot  9/2021  neg      Recent Diagnostics:   2/22 echocardiogram  · There is no evidence of intracardiac shunting.  · The left ventricle is normal in size with concentric remodeling and normal systolic function.  · The estimated ejection fraction is 58%.  · Normal left ventricular diastolic function.  · Normal right ventricular size with normal right ventricular systolic function.  · Mild left atrial enlargement.  · Mild mitral regurgitation.  · Normal central venous pressure (3 mmHg).  · The estimated PA systolic pressure is 28 mmHg.        Assessment and Plan:           Human immunodeficiency virus I infection  -     CBC Auto Differential; Future; Expected date: 09/01/2022  -     Comprehensive Metabolic Panel; Future; Expected date: 09/01/2022  -     Lipid Panel; Future; Expected date: 09/01/2022  -     HIV RNA, Quantitative, PCR; Future; Expected date: 09/01/2022  -     Hemoglobin A1C; Future; Expected date: 09/01/2022  -     Vitamin D; Future; Expected date: 09/01/2022  -     RPR; Future; Expected date: 09/01/2022    Vitamin D deficiency  -     Vitamin D; Future; Expected date: 09/01/2022    Hyperlipidemia   -     Lipid Panel; Future; Expected date: 09/01/2022    Stage 3 chronic kidney disease, unspecified whether stage 3a or 3b CKD     Encounter for long-term (current) use of medications          Same medications    Lab in 4 months with visit after    Don't forget about getting a colonoscopy this year    This note was created using Dragon voice recognition software that occasionally  misinterpreted phrases or words.

## 2022-05-09 NOTE — PATIENT INSTRUCTIONS
Same medications    Lab in 4 months with visit after    Don't forget about getting a colonoscopy this year

## 2022-05-11 RX ORDER — ATORVASTATIN CALCIUM 40 MG/1
40 TABLET, FILM COATED ORAL DAILY
Qty: 90 TABLET | Refills: 3 | Status: SHIPPED | OUTPATIENT
Start: 2022-05-11 | End: 2023-04-10

## 2022-05-11 NOTE — TELEPHONE ENCOUNTER
No new care gaps identified.  [facility] Embedded Care Gaps. Reference number: [id number]. 5/11/2022   8:30:48 AM CDT

## 2022-05-18 ENCOUNTER — OFFICE VISIT (OUTPATIENT)
Dept: SURGERY | Facility: CLINIC | Age: 72
End: 2022-05-18
Payer: MEDICARE

## 2022-05-18 VITALS
DIASTOLIC BLOOD PRESSURE: 78 MMHG | BODY MASS INDEX: 22.64 KG/M2 | RESPIRATION RATE: 16 BRPM | HEIGHT: 72 IN | WEIGHT: 167.13 LBS | SYSTOLIC BLOOD PRESSURE: 167 MMHG | TEMPERATURE: 98 F | HEART RATE: 62 BPM

## 2022-05-18 DIAGNOSIS — K40.91 RECURRENT RIGHT INGUINAL HERNIA: Primary | ICD-10-CM

## 2022-05-18 PROCEDURE — 3288F FALL RISK ASSESSMENT DOCD: CPT | Mod: CPTII,S$GLB,, | Performed by: SURGERY

## 2022-05-18 PROCEDURE — 3061F NEG MICROALBUMINURIA REV: CPT | Mod: CPTII,S$GLB,, | Performed by: SURGERY

## 2022-05-18 PROCEDURE — 3078F PR MOST RECENT DIASTOLIC BLOOD PRESSURE < 80 MM HG: ICD-10-PCS | Mod: CPTII,S$GLB,, | Performed by: SURGERY

## 2022-05-18 PROCEDURE — 3044F PR MOST RECENT HEMOGLOBIN A1C LEVEL <7.0%: ICD-10-PCS | Mod: CPTII,S$GLB,, | Performed by: SURGERY

## 2022-05-18 PROCEDURE — 3288F PR FALLS RISK ASSESSMENT DOCUMENTED: ICD-10-PCS | Mod: CPTII,S$GLB,, | Performed by: SURGERY

## 2022-05-18 PROCEDURE — 1125F AMNT PAIN NOTED PAIN PRSNT: CPT | Mod: CPTII,S$GLB,, | Performed by: SURGERY

## 2022-05-18 PROCEDURE — 4010F ACE/ARB THERAPY RXD/TAKEN: CPT | Mod: CPTII,S$GLB,, | Performed by: SURGERY

## 2022-05-18 PROCEDURE — 1101F PR PT FALLS ASSESS DOC 0-1 FALLS W/OUT INJ PAST YR: ICD-10-PCS | Mod: CPTII,S$GLB,, | Performed by: SURGERY

## 2022-05-18 PROCEDURE — 1101F PT FALLS ASSESS-DOCD LE1/YR: CPT | Mod: CPTII,S$GLB,, | Performed by: SURGERY

## 2022-05-18 PROCEDURE — 3066F PR DOCUMENTATION OF TREATMENT FOR NEPHROPATHY: ICD-10-PCS | Mod: CPTII,S$GLB,, | Performed by: SURGERY

## 2022-05-18 PROCEDURE — 3008F PR BODY MASS INDEX (BMI) DOCUMENTED: ICD-10-PCS | Mod: CPTII,S$GLB,, | Performed by: SURGERY

## 2022-05-18 PROCEDURE — 3077F SYST BP >= 140 MM HG: CPT | Mod: CPTII,S$GLB,, | Performed by: SURGERY

## 2022-05-18 PROCEDURE — 3078F DIAST BP <80 MM HG: CPT | Mod: CPTII,S$GLB,, | Performed by: SURGERY

## 2022-05-18 PROCEDURE — 4010F PR ACE/ARB THEARPY RXD/TAKEN: ICD-10-PCS | Mod: CPTII,S$GLB,, | Performed by: SURGERY

## 2022-05-18 PROCEDURE — 3061F PR NEG MICROALBUMINURIA RESULT DOCUMENTED/REVIEW: ICD-10-PCS | Mod: CPTII,S$GLB,, | Performed by: SURGERY

## 2022-05-18 PROCEDURE — 99213 OFFICE O/P EST LOW 20 MIN: CPT | Mod: S$GLB,,, | Performed by: SURGERY

## 2022-05-18 PROCEDURE — 3077F PR MOST RECENT SYSTOLIC BLOOD PRESSURE >= 140 MM HG: ICD-10-PCS | Mod: CPTII,S$GLB,, | Performed by: SURGERY

## 2022-05-18 PROCEDURE — 1125F PR PAIN SEVERITY QUANTIFIED, PAIN PRESENT: ICD-10-PCS | Mod: CPTII,S$GLB,, | Performed by: SURGERY

## 2022-05-18 PROCEDURE — 3066F NEPHROPATHY DOC TX: CPT | Mod: CPTII,S$GLB,, | Performed by: SURGERY

## 2022-05-18 PROCEDURE — 99213 PR OFFICE/OUTPT VISIT, EST, LEVL III, 20-29 MIN: ICD-10-PCS | Mod: S$GLB,,, | Performed by: SURGERY

## 2022-05-18 PROCEDURE — 3044F HG A1C LEVEL LT 7.0%: CPT | Mod: CPTII,S$GLB,, | Performed by: SURGERY

## 2022-05-18 PROCEDURE — 1159F PR MEDICATION LIST DOCUMENTED IN MEDICAL RECORD: ICD-10-PCS | Mod: CPTII,S$GLB,, | Performed by: SURGERY

## 2022-05-18 PROCEDURE — 1159F MED LIST DOCD IN RCRD: CPT | Mod: CPTII,S$GLB,, | Performed by: SURGERY

## 2022-05-18 PROCEDURE — 99999 PR PBB SHADOW E&M-EST. PATIENT-LVL IV: CPT | Mod: PBBFAC,,, | Performed by: SURGERY

## 2022-05-18 PROCEDURE — 3008F BODY MASS INDEX DOCD: CPT | Mod: CPTII,S$GLB,, | Performed by: SURGERY

## 2022-05-18 PROCEDURE — 99999 PR PBB SHADOW E&M-EST. PATIENT-LVL IV: ICD-10-PCS | Mod: PBBFAC,,, | Performed by: SURGERY

## 2022-05-18 NOTE — PROGRESS NOTES
Doing well  Symptoms about the same  Has right inguinal hernia difficult to reduce    A/P  Recurrent right inguinal hernia  Reviewed CT- bowel present in hernia- discussed repair oprions  Diarrhea- improved/resolved- saw ID  Stopped smoking  Saw cards  Had cea  Plan for repair about 3 months after cea- will notify Derick

## 2022-06-03 RX ORDER — ZOLPIDEM TARTRATE 5 MG/1
TABLET ORAL
Qty: 30 TABLET | Refills: 2 | Status: SHIPPED | OUTPATIENT
Start: 2022-06-03 | End: 2022-08-08

## 2022-06-03 NOTE — TELEPHONE ENCOUNTER
No new care gaps identified.  [facility] Embedded Care Gaps. Reference number: [id number]. 6/03/2022   11:19:37 AM LEONARDT

## 2022-06-24 ENCOUNTER — TELEPHONE (OUTPATIENT)
Dept: SURGERY | Facility: CLINIC | Age: 72
End: 2022-06-24
Payer: MEDICARE

## 2022-06-24 NOTE — TELEPHONE ENCOUNTER
Called pt, informed him that he has appt scheduled with Dr. Ramirez on 7/6 and all will be taken care of at that time. Complete understanding and appreciation verbalized.   ----- Message from Jose Antonio Sanchez sent at 6/24/2022 10:06 AM CDT -----  Contact: carla/ sister  Type: Needs Medical Advice  Who Called: carla (patient sister)  Best Call Back Number: [phone] (home)   Additional Info: Patient sister called today states that Dr. Sepulveda sent ref. 3 weeks ago to Dr. Ramirez to get the ok to do the surgery for Mr. Parson. Patient sister is stating that he has pain and needs the surgery soon. Patient would a call back today to confirm this info.

## 2022-07-06 ENCOUNTER — OFFICE VISIT (OUTPATIENT)
Dept: SURGERY | Facility: CLINIC | Age: 72
End: 2022-07-06
Payer: MEDICARE

## 2022-07-06 VITALS
RESPIRATION RATE: 16 BRPM | HEART RATE: 71 BPM | WEIGHT: 175.38 LBS | SYSTOLIC BLOOD PRESSURE: 159 MMHG | BODY MASS INDEX: 23.75 KG/M2 | DIASTOLIC BLOOD PRESSURE: 83 MMHG | TEMPERATURE: 98 F | HEIGHT: 72 IN

## 2022-07-06 DIAGNOSIS — K40.91 RECURRENT RIGHT INGUINAL HERNIA: Primary | ICD-10-CM

## 2022-07-06 PROCEDURE — 3008F BODY MASS INDEX DOCD: CPT | Mod: CPTII,S$GLB,, | Performed by: SURGERY

## 2022-07-06 PROCEDURE — 3077F SYST BP >= 140 MM HG: CPT | Mod: CPTII,S$GLB,, | Performed by: SURGERY

## 2022-07-06 PROCEDURE — 3008F PR BODY MASS INDEX (BMI) DOCUMENTED: ICD-10-PCS | Mod: CPTII,S$GLB,, | Performed by: SURGERY

## 2022-07-06 PROCEDURE — 3066F PR DOCUMENTATION OF TREATMENT FOR NEPHROPATHY: ICD-10-PCS | Mod: CPTII,S$GLB,, | Performed by: SURGERY

## 2022-07-06 PROCEDURE — 99214 OFFICE O/P EST MOD 30 MIN: CPT | Mod: S$GLB,,, | Performed by: SURGERY

## 2022-07-06 PROCEDURE — 3079F DIAST BP 80-89 MM HG: CPT | Mod: CPTII,S$GLB,, | Performed by: SURGERY

## 2022-07-06 PROCEDURE — 99999 PR PBB SHADOW E&M-EST. PATIENT-LVL V: ICD-10-PCS | Mod: PBBFAC,,, | Performed by: SURGERY

## 2022-07-06 PROCEDURE — 1125F AMNT PAIN NOTED PAIN PRSNT: CPT | Mod: CPTII,S$GLB,, | Performed by: SURGERY

## 2022-07-06 PROCEDURE — 1125F PR PAIN SEVERITY QUANTIFIED, PAIN PRESENT: ICD-10-PCS | Mod: CPTII,S$GLB,, | Performed by: SURGERY

## 2022-07-06 PROCEDURE — 1159F PR MEDICATION LIST DOCUMENTED IN MEDICAL RECORD: ICD-10-PCS | Mod: CPTII,S$GLB,, | Performed by: SURGERY

## 2022-07-06 PROCEDURE — 3061F PR NEG MICROALBUMINURIA RESULT DOCUMENTED/REVIEW: ICD-10-PCS | Mod: CPTII,S$GLB,, | Performed by: SURGERY

## 2022-07-06 PROCEDURE — 3044F HG A1C LEVEL LT 7.0%: CPT | Mod: CPTII,S$GLB,, | Performed by: SURGERY

## 2022-07-06 PROCEDURE — 1101F PR PT FALLS ASSESS DOC 0-1 FALLS W/OUT INJ PAST YR: ICD-10-PCS | Mod: CPTII,S$GLB,, | Performed by: SURGERY

## 2022-07-06 PROCEDURE — 3288F PR FALLS RISK ASSESSMENT DOCUMENTED: ICD-10-PCS | Mod: CPTII,S$GLB,, | Performed by: SURGERY

## 2022-07-06 PROCEDURE — 1101F PT FALLS ASSESS-DOCD LE1/YR: CPT | Mod: CPTII,S$GLB,, | Performed by: SURGERY

## 2022-07-06 PROCEDURE — 3288F FALL RISK ASSESSMENT DOCD: CPT | Mod: CPTII,S$GLB,, | Performed by: SURGERY

## 2022-07-06 PROCEDURE — 3077F PR MOST RECENT SYSTOLIC BLOOD PRESSURE >= 140 MM HG: ICD-10-PCS | Mod: CPTII,S$GLB,, | Performed by: SURGERY

## 2022-07-06 PROCEDURE — 1159F MED LIST DOCD IN RCRD: CPT | Mod: CPTII,S$GLB,, | Performed by: SURGERY

## 2022-07-06 PROCEDURE — 99214 PR OFFICE/OUTPT VISIT, EST, LEVL IV, 30-39 MIN: ICD-10-PCS | Mod: S$GLB,,, | Performed by: SURGERY

## 2022-07-06 PROCEDURE — 3066F NEPHROPATHY DOC TX: CPT | Mod: CPTII,S$GLB,, | Performed by: SURGERY

## 2022-07-06 PROCEDURE — 3044F PR MOST RECENT HEMOGLOBIN A1C LEVEL <7.0%: ICD-10-PCS | Mod: CPTII,S$GLB,, | Performed by: SURGERY

## 2022-07-06 PROCEDURE — 3079F PR MOST RECENT DIASTOLIC BLOOD PRESSURE 80-89 MM HG: ICD-10-PCS | Mod: CPTII,S$GLB,, | Performed by: SURGERY

## 2022-07-06 PROCEDURE — 4010F PR ACE/ARB THEARPY RXD/TAKEN: ICD-10-PCS | Mod: CPTII,S$GLB,, | Performed by: SURGERY

## 2022-07-06 PROCEDURE — 3061F NEG MICROALBUMINURIA REV: CPT | Mod: CPTII,S$GLB,, | Performed by: SURGERY

## 2022-07-06 PROCEDURE — 99999 PR PBB SHADOW E&M-EST. PATIENT-LVL V: CPT | Mod: PBBFAC,,, | Performed by: SURGERY

## 2022-07-06 PROCEDURE — 4010F ACE/ARB THERAPY RXD/TAKEN: CPT | Mod: CPTII,S$GLB,, | Performed by: SURGERY

## 2022-07-06 NOTE — PROGRESS NOTES
"  Initial Consult    Chief Complaint   Patient presents with    Inguinal Hernia       History of Present Illness:  Patient is a 71 y.o. male who is referred for right inguina hernia.  Has had a repair in the past laparoscopically but it has returned.  It is large and painful at times.  He had a CEA a few months ago and currently on asa.  Stopped smoking months ago.    Review of patient's allergies indicates:   Allergen Reactions    Chantix [varenicline]        Current Outpatient Medications   Medication Sig Dispense Refill    aspirin 325 MG tablet Take 1 tablet (325 mg total) by mouth once daily. 30 tablet 0    atorvastatin (LIPITOR) 40 MG tablet Take 1 tablet (40 mg total) by mouth once daily. 90 tablet 3    BD ULTRA-FINE MINI PEN NEEDLE 31 gauge x 3/16" Ndle USE 2 TIMES A DAY      benazepril-hydrochlorthiazide (LOTENSIN HCT) 20-25 mg Tab Take 1 tablet by mouth once daily.      blood sugar diagnostic Strp TEST 2 TIMES DAILY      blood-glucose meter Misc USE TO TEST BLOOD SUGAR 2 TO 3 TIMES DAILY      clonazePAM (KLONOPIN) 0.25 MG TbDL 0.25 mg twice during the day, 0.5mg  tablet 0    efavirenz (SUSTIVA) 600 mg Tab Take 1 tablet (600 mg total) by mouth every evening. 30 tablet 5    emtricitabine-tenofovir alafen (DESCOVY) 200-25 mg Tab Take 1 tablet by mouth every evening. 30 tablet 5    fenofibrate (TRICOR) 145 MG tablet Take 145 mg by mouth once daily.      fenofibrate micronized (LOFIBRA) 134 MG Cap Take 134 mg by mouth once daily.      fish oil-omega-3 fatty acids 300-1,000 mg capsule Take 2 capsules by mouth once daily.      HIGH POTENCY MULTIVITAMIN 400 mcg Tab Take 1 tablet by mouth once daily.      insulin aspart protamine-insulin aspart (NOVOLOG 70/30) 100 unit/mL (70-30) InPn pen Inject 16 Units into the skin once daily.      insulin aspart protamine-insulin aspart (NOVOLOG 70/30) 100 unit/mL (70-30) InPn pen Inject 18 Units into the skin nightly.      metFORMIN (GLUCOPHAGE) 1000 MG " tablet Take 1 tablet (1,000 mg total) by mouth 2 (two) times daily. 60 tablet 3    multivitamin (THERAGRAN) tablet Take 1 tablet by mouth once daily.      sertraline (ZOLOFT) 100 MG tablet Take 100 mg by mouth once daily.       TRUEPLUS LANCETS 30 gauge Misc USE TWICE DAILY TO TEST BLOOD SUGAR      zolpidem (AMBIEN) 5 MG Tab TAKE ONE TABLET BY MOUTH EVERY NIGHT AS NEEDED FOR INSOMNIA 30 tablet 2     No current facility-administered medications for this visit.       Past Medical History:   Diagnosis Date    Anxiety     Carotid artery disease     Depression     Diabetes mellitus     GERD (gastroesophageal reflux disease)     HIV (human immunodeficiency virus infection)     Osteomyelitis of great toe of left foot 2019    TB lung, latent 2006    treated with INH     Past Surgical History:   Procedure Laterality Date    CAROTID ENDARTERECTOMY Right 2022    UMBILICAL HERNIA REPAIR       No family history on file.  Social History     Tobacco Use    Smoking status: Former Smoker     Years: 49.00     Types: Cigarettes     Start date:      Quit date: 2022     Years since quittin.4    Smokeless tobacco: Never Used    Tobacco comment: Handout provided for Ambulatory Smoking Cessation program   Substance Use Topics    Alcohol use: No    Drug use: No            Review of Systems:  Review of Systems   All other systems reviewed and are negative.      Physical:     Vital Signs (Most Recent)  Temp: 97.6 °F (36.4 °C) (22)  Pulse: 71 (22)  Resp: 16 (22)  BP: (!) 159/83 (22)  6' (1.829 m)  79.5 kg (175 lb 6 oz)     Physical Exam:  Physical Exam  Vitals reviewed.   Constitutional:       General: He is not in acute distress.     Appearance: He is not ill-appearing.   HENT:      Head: Atraumatic.      Nose: No congestion.      Mouth/Throat:      Mouth: Mucous membranes are moist.   Eyes:      Extraocular Movements: Extraocular movements intact.    Cardiovascular:      Rate and Rhythm: Normal rate and regular rhythm.      Pulses: Normal pulses.      Heart sounds: No murmur heard.    No gallop.   Pulmonary:      Effort: Pulmonary effort is normal. No respiratory distress.      Breath sounds: Normal breath sounds.   Abdominal:      General: Abdomen is flat. There is no distension.      Palpations: Abdomen is soft.      Tenderness: There is no abdominal tenderness.   Genitourinary:     Comments: Obvious inguinal hernia on right, difficult to reduce, mild tenderness to deep palpation  Musculoskeletal:         General: No swelling or tenderness. Normal range of motion.      Cervical back: Normal range of motion. No rigidity.   Skin:     General: Skin is warm and dry.      Capillary Refill: Capillary refill takes less than 2 seconds.   Neurological:      General: No focal deficit present.      Mental Status: He is alert and oriented to person, place, and time.   Psychiatric:         Mood and Affect: Mood normal.         Behavior: Behavior normal.         ASSESSMENT/PLAN:        1. Recurrent right inguinal hernia  Case Request Operating Room: REPAIR, HERNIA, INGUINAL, INCARCERATED, RECURRENT   2. Recurrent inguinal hernia       Open repair on right, had previous laparoscopic repair.    Plan to use mesh  We discussed risks and benefits  Should continue asa.

## 2022-07-11 DIAGNOSIS — Z79.899 CHRONIC PRESCRIPTION BENZODIAZEPINE USE: ICD-10-CM

## 2022-07-11 RX ORDER — CLONAZEPAM 0.25 MG/1
0.25 TABLET, ORALLY DISINTEGRATING ORAL DAILY
Qty: 30 TABLET | Refills: 0 | Status: SHIPPED | OUTPATIENT
Start: 2022-07-11 | End: 2022-08-08

## 2022-07-11 NOTE — TELEPHONE ENCOUNTER
No new care gaps identified.  [facility] Embedded Care Gaps. Reference number: [id number]. 7/11/2022   9:51:38 AM LEONARDT

## 2022-07-12 ENCOUNTER — TELEPHONE (OUTPATIENT)
Dept: FAMILY MEDICINE | Facility: CLINIC | Age: 72
End: 2022-07-12
Payer: MEDICARE

## 2022-07-12 DIAGNOSIS — E11.628 TYPE 2 DIABETES MELLITUS WITH OTHER SKIN COMPLICATION, WITHOUT LONG-TERM CURRENT USE OF INSULIN: Primary | ICD-10-CM

## 2022-07-12 DIAGNOSIS — F33.9 RECURRENT MAJOR DEPRESSIVE DISORDER, REMISSION STATUS UNSPECIFIED: ICD-10-CM

## 2022-07-12 DIAGNOSIS — E78.5 HYPERLIPIDEMIA, UNSPECIFIED HYPERLIPIDEMIA TYPE: ICD-10-CM

## 2022-07-12 NOTE — TELEPHONE ENCOUNTER
----- Message from Krys Duque sent at 7/12/2022 10:57 AM CDT -----  Contact: Pebbles from [facility] at [phone]  Type:  Pharmacy Calling to Clarify an RX    Name of Caller:  Pebbles  Pharmacy Name:    [facility] [place], [place] - [address] [address]  [address] [address]  Phone: [phone] Fax: [phone]  Prescription Name:  clonazePAM (KLONOPIN) 0.25 MG TbDL  What do they need to clarify?:  the instructions are different form the pt's last Rx  Best Call Back Number:  [phone]  Additional Information:  Please verify if the new instructions are correct. Please call back to advise.

## 2022-07-12 NOTE — TELEPHONE ENCOUNTER
No new care gaps identified.  [facility] Embedded Care Gaps. Reference number: [id number]. 7/12/2022   3:29:12 PM CDT

## 2022-07-12 NOTE — TELEPHONE ENCOUNTER
Patient returned call to office. Writer advised patient of medication dosage change for Clonazepam. Patient verbalized understanding.

## 2022-07-12 NOTE — TELEPHONE ENCOUNTER
Confirmed. I am reducing his daily dose.     Message text      Call placed to [facility]. Spoke to Any for notification that daily dose of Clonazepam has been reduced. Call placed to patient on contact number [phone] and [phone] to discuss dosage change for Clonazepam. No answer received. Left message requesting return call to office.

## 2022-07-12 NOTE — TELEPHONE ENCOUNTER
Patient requesting refill of attached medications. Please advise. Thank you.  LOV--3-11-22  FOV--9-12-22

## 2022-07-12 NOTE — TELEPHONE ENCOUNTER
Please see attached message.   Pharmacy requesting to confirm directions for Clonazepam prescription e-scribed on 7-11-22 as directions are different from previous prescription. Please confirm new directions for Clonazepam are 0.25 mg take one tablet (0.25 mg total) by mouth once daily. Thank you.

## 2022-07-14 RX ORDER — INSULIN ASPART 100 [IU]/ML
18 INJECTION, SUSPENSION SUBCUTANEOUS NIGHTLY
Qty: 16.2 ML | Refills: 3 | Status: SHIPPED | OUTPATIENT
Start: 2022-07-14 | End: 2022-10-25

## 2022-07-14 RX ORDER — FENOFIBRATE 134 MG/1
134 CAPSULE ORAL DAILY
Qty: 90 CAPSULE | Refills: 3 | Status: SHIPPED | OUTPATIENT
Start: 2022-07-14 | End: 2023-06-01

## 2022-07-14 RX ORDER — SERTRALINE HYDROCHLORIDE 100 MG/1
100 TABLET, FILM COATED ORAL DAILY
Qty: 90 TABLET | Refills: 3 | Status: SHIPPED | OUTPATIENT
Start: 2022-07-14 | End: 2023-06-01

## 2022-07-14 RX ORDER — INSULIN ASPART 100 [IU]/ML
16 INJECTION, SUSPENSION SUBCUTANEOUS DAILY
Qty: 14.4 ML | Refills: 2 | Status: SHIPPED | OUTPATIENT
Start: 2022-07-14 | End: 2022-10-12

## 2022-07-14 RX ORDER — PEN NEEDLE, DIABETIC 31 GX5/16"
NEEDLE, DISPOSABLE MISCELLANEOUS
Qty: 200 EACH | Refills: 3 | Status: SHIPPED | OUTPATIENT
Start: 2022-07-14 | End: 2023-06-01

## 2022-07-29 ENCOUNTER — HOSPITAL ENCOUNTER (OUTPATIENT)
Dept: PREADMISSION TESTING | Facility: HOSPITAL | Age: 72
Discharge: HOME | End: 2022-07-29
Attending: SURGERY
Payer: MEDICARE

## 2022-07-29 LAB
ANION GAP SERPL CALC-SCNC: 10 MMOL/L (ref 8–16)
BASOPHILS # BLD AUTO: 0.02 K/UL (ref 0–0.2)
BASOPHILS NFR BLD: 0.3 % (ref 0–1.9)
BUN SERPL-MCNC: 14 MG/DL (ref 8–23)
CALCIUM SERPL-MCNC: 9.4 MG/DL (ref 8.7–10.5)
CHLORIDE SERPL-SCNC: 109 MMOL/L (ref 95–110)
CO2 SERPL-SCNC: 24 MMOL/L (ref 23–29)
CREAT SERPL-MCNC: 1.4 MG/DL (ref 0.5–1.4)
DIFFERENTIAL METHOD: ABNORMAL
EOSINOPHIL # BLD AUTO: 0.3 K/UL (ref 0–0.5)
EOSINOPHIL NFR BLD: 3.4 % (ref 0–8)
ERYTHROCYTE [DISTWIDTH] IN BLOOD BY AUTOMATED COUNT: 14.8 % (ref 11.5–14.5)
EST. GFR  (AFRICAN AMERICAN): 58 ML/MIN/1.73 M^2
EST. GFR  (NON AFRICAN AMERICAN): 50 ML/MIN/1.73 M^2
GLUCOSE SERPL-MCNC: 198 MG/DL (ref 70–110)
HCT VFR BLD AUTO: 35.6 % (ref 40–54)
HGB BLD-MCNC: 12.2 G/DL (ref 14–18)
IMM GRANULOCYTES # BLD AUTO: 0.05 K/UL (ref 0–0.04)
IMM GRANULOCYTES NFR BLD AUTO: 0.7 % (ref 0–0.5)
LYMPHOCYTES # BLD AUTO: 2.2 K/UL (ref 1–4.8)
LYMPHOCYTES NFR BLD: 29.2 % (ref 18–48)
MCH RBC QN AUTO: 29.9 PG (ref 27–31)
MCHC RBC AUTO-ENTMCNC: 34.3 G/DL (ref 32–36)
MCV RBC AUTO: 87 FL (ref 82–98)
MONOCYTES # BLD AUTO: 0.7 K/UL (ref 0.3–1)
MONOCYTES NFR BLD: 10 % (ref 4–15)
NEUTROPHILS # BLD AUTO: 4.2 K/UL (ref 1.8–7.7)
NEUTROPHILS NFR BLD: 56.4 % (ref 38–73)
NRBC BLD-RTO: 0 /100 WBC
PLATELET # BLD AUTO: 199 K/UL (ref 150–450)
PMV BLD AUTO: 9.4 FL (ref 9.2–12.9)
POTASSIUM SERPL-SCNC: 3.3 MMOL/L (ref 3.5–5.1)
RBC # BLD AUTO: 4.08 M/UL (ref 4.6–6.2)
SODIUM SERPL-SCNC: 143 MMOL/L (ref 136–145)
WBC # BLD AUTO: 7.42 K/UL (ref 3.9–12.7)

## 2022-07-29 PROCEDURE — 99900104 DSU ONLY-NO CHARGE-EA ADD'L HR (STAT)

## 2022-07-29 PROCEDURE — 85025 COMPLETE CBC W/AUTO DIFF WBC: CPT | Performed by: ANESTHESIOLOGY

## 2022-07-29 PROCEDURE — 80048 BASIC METABOLIC PNL TOTAL CA: CPT | Performed by: ANESTHESIOLOGY

## 2022-07-29 PROCEDURE — 99900103 DSU ONLY-NO CHARGE-INITIAL HR (STAT)

## 2022-07-29 PROCEDURE — 36415 COLL VENOUS BLD VENIPUNCTURE: CPT | Performed by: ANESTHESIOLOGY

## 2022-07-29 RX ORDER — VITAMIN B COMPLEX
1 CAPSULE ORAL DAILY
COMMUNITY

## 2022-07-29 NOTE — DISCHARGE INSTRUCTIONS
To confirm, Your doctor has instructed you that surgery is scheduled for:     Please report to [facility], Registration the morning of surgery. You must check-in and receive a wristband before going to your procedure.    Pre-Op will call the afternoon prior to surgery between 1:00 and 6:00 PM with the final arrival time.  Phone number: [phone]    PLEASE NOTE:  The surgery schedule has many variables which may affect the time of your surgery case.  Family members should be available if your surgery time changes.  Plan to be here the day of your procedure between 4-6 hours.    MEDICATIONS:  TAKE ONLY THESE MEDICATIONS WITH A SMALL SIP OF WATER THE MORNING OF YOUR PROCEDURE:  ZOLOFT, ATORVASTATIN, KLONOPIN IF NEEDED, FENOFIBRATE    1/2 DOSE OF INSULIN NIGHT BEFORE SURGERY AND NO INSULIN MORNING OF SURGERY.    NO METFORMIN NIGHT BEFORE OR MORNING OF SURGERY.      DO NOT TAKE THESE MEDICATIONS 5-7 DAYS PRIOR to your procedure or per your surgeon's request:    ALEVE, ADVIL, IBUPROFEN, FISH OIL VITAMIN E, HERBALS  (May take Tylenol)    PER DR. CARRILLO OFFICE YOU DO NOT HAVE TO STOP ASPIRIN    ONLY if you are prescribed any types of blood thinners such as:  Aspirin, Coumadin, Plavix, Pradaxa, Xarelto, Aggrenox, Effient, Eliquis, Savasya, Brilinta, or any other, ask your surgeon whether you should stop taking them and how long before surgery you should stop.  You may also need to verify with the prescribing physician if it is ok to stop your medication.      INSTRUCTIONS IMPORTANT!!  Do not eat or drink anything between midnight and the time of your procedure- this includes gum, mints, and candy.  Do not smoke or drink alcoholic beverages 24 hours prior to your procedure.  Shower the night before AND the morning of your procedure with a Chlorhexidine wash such as Hibiclens or Dial antibacterial soap from the neck down.  Do not get it on your face or in your eyes.  You may use your own shampoo and  face wash. This helps your skin to be as bacteria free as possible.    If you wear contact lenses, dentures, hearing aids or glasses, bring a container to put them in during surgery and give to a family member for safe keeping.  Please leave all jewelry, piercing's and valuables at home.   DO NOT remove hair from the surgery site.  Do not shave the incision site unless you are given specific instructions to do so.    ONLY if you have been diagnosed with sleep apnea please bring your C-PAP machine.  ONLY if you wear home oxygen please bring your portable oxygen tank the day of your procedure.  ONLY if you have a history of OPEN HEART SURGERY you will need a clearance from your Cardiologist per Anesthesia.      ONLY for patients requiring bowel prep, written instructions will be given by your doctor's office.  ONLY if you have a neuro stimulator, please bring the controller with you the morning of surgery  ONLY if a type and screen test is needed before surgery, please return:  If your doctor has scheduled you for an overnight stay, bring a small overnight bag with any personal items you need.  Make arrangements in advance for transportation home by a responsible adult.  It is not safe to drive a vehicle during the 24 hours after anesthesia.       [facility] Visitor Policy  Effective July 25, 2022    [facility] will make masks available at entrances and will enforce mask wearing in all common and patient  care areas for employees, patients, and visitors. Masks must be worn properly, ensuring that the nose and mouth  are covered. Children under 2 years of age are excluded from this requirement.    [facility] will continue routine visitation for COVID-19 negative patients, including inpatients, outpatients, and  procedural areas. Visitors will be asked to leave if they exhibit symptoms of respiratory infection, have tested  positive for COVID -19 in the last ten days, have a pending COVID-19 test for symptoms, are  unable or refuse  to wear a mask OR do not comply with current policy    All [facility] facilities and properties are tobacco free.  Smoking is NOT allowed.   If you have any questions about these instructions, call Pre-Op Admit  Nursing at [phone] or the Pre-Op Day Surgery Unit at [phone].

## 2022-07-31 ENCOUNTER — ANESTHESIA EVENT (OUTPATIENT)
Dept: SURGERY | Facility: HOSPITAL | Age: 72
End: 2022-07-31
Payer: MEDICARE

## 2022-08-01 ENCOUNTER — HOSPITAL ENCOUNTER (OUTPATIENT)
Facility: HOSPITAL | Age: 72
Discharge: HOME | End: 2022-08-03
Attending: SURGERY | Admitting: SURGERY
Payer: MEDICARE

## 2022-08-01 ENCOUNTER — ANESTHESIA (OUTPATIENT)
Dept: SURGERY | Facility: HOSPITAL | Age: 72
End: 2022-08-01
Payer: MEDICARE

## 2022-08-01 DIAGNOSIS — K40.91 RECURRENT INGUINAL HERNIA: ICD-10-CM

## 2022-08-01 DIAGNOSIS — K40.91 UNILATERAL RECURRENT INGUINAL HERNIA WITHOUT OBSTRUCTION OR GANGRENE: Primary | ICD-10-CM

## 2022-08-01 LAB
POCT GLUCOSE: 175 MG/DL (ref 70–110)
POCT GLUCOSE: 187 MG/DL (ref 70–110)

## 2022-08-01 PROCEDURE — D9220A PRA ANESTHESIA: Mod: CRNA,,, | Performed by: NURSE ANESTHETIST, CERTIFIED REGISTERED

## 2022-08-01 PROCEDURE — D9220A PRA ANESTHESIA: ICD-10-PCS | Mod: ANES,,, | Performed by: ANESTHESIOLOGY

## 2022-08-01 PROCEDURE — 99900035 HC TECH TIME PER 15 MIN (STAT)

## 2022-08-01 PROCEDURE — 63600175 PHARM REV CODE 636 W HCPCS: Performed by: NURSE ANESTHETIST, CERTIFIED REGISTERED

## 2022-08-01 PROCEDURE — 63600175 PHARM REV CODE 636 W HCPCS: Performed by: SURGERY

## 2022-08-01 PROCEDURE — 88302 PR  SURG PATH,LEVEL II: ICD-10-PCS | Mod: 26,,, | Performed by: PATHOLOGY

## 2022-08-01 PROCEDURE — D9220A PRA ANESTHESIA: ICD-10-PCS | Mod: CRNA,,, | Performed by: NURSE ANESTHETIST, CERTIFIED REGISTERED

## 2022-08-01 PROCEDURE — 36000706: Performed by: SURGERY

## 2022-08-01 PROCEDURE — 25000003 PHARM REV CODE 250: Performed by: NURSE ANESTHETIST, CERTIFIED REGISTERED

## 2022-08-01 PROCEDURE — D9220A PRA ANESTHESIA: Mod: ANES,,, | Performed by: ANESTHESIOLOGY

## 2022-08-01 PROCEDURE — 71000039 HC RECOVERY, EACH ADD'L HOUR: Performed by: SURGERY

## 2022-08-01 PROCEDURE — 37000008 HC ANESTHESIA 1ST 15 MINUTES: Performed by: SURGERY

## 2022-08-01 PROCEDURE — 63600175 PHARM REV CODE 636 W HCPCS: Mod: GZ | Performed by: SURGERY

## 2022-08-01 PROCEDURE — 25000003 PHARM REV CODE 250: Performed by: ANESTHESIOLOGY

## 2022-08-01 PROCEDURE — 94761 N-INVAS EAR/PLS OXIMETRY MLT: CPT

## 2022-08-01 PROCEDURE — 36415 COLL VENOUS BLD VENIPUNCTURE: CPT | Performed by: SURGERY

## 2022-08-01 PROCEDURE — 36000707: Performed by: SURGERY

## 2022-08-01 PROCEDURE — 49651 PR LAP,INGUINAL HERNIA REPR,RECUR: ICD-10-PCS | Mod: RT,,, | Performed by: SURGERY

## 2022-08-01 PROCEDURE — 83036 HEMOGLOBIN GLYCOSYLATED A1C: CPT | Performed by: SURGERY

## 2022-08-01 PROCEDURE — 88302 TISSUE EXAM BY PATHOLOGIST: CPT | Mod: 26,,, | Performed by: PATHOLOGY

## 2022-08-01 PROCEDURE — 25000003 PHARM REV CODE 250: Performed by: SURGERY

## 2022-08-01 PROCEDURE — C1781 MESH (IMPLANTABLE): HCPCS | Performed by: SURGERY

## 2022-08-01 PROCEDURE — 88302 TISSUE EXAM BY PATHOLOGIST: CPT | Performed by: PATHOLOGY

## 2022-08-01 PROCEDURE — 49651 LAP ING HERNIA REPAIR RECUR: CPT | Mod: RT,,, | Performed by: SURGERY

## 2022-08-01 PROCEDURE — 71000033 HC RECOVERY, INTIAL HOUR: Performed by: SURGERY

## 2022-08-01 PROCEDURE — 37000009 HC ANESTHESIA EA ADD 15 MINS: Performed by: SURGERY

## 2022-08-01 PROCEDURE — 94799 UNLISTED PULMONARY SVC/PX: CPT

## 2022-08-01 PROCEDURE — 99900104 DSU ONLY-NO CHARGE-EA ADD'L HR (STAT): Performed by: SURGERY

## 2022-08-01 PROCEDURE — 99900103 DSU ONLY-NO CHARGE-INITIAL HR (STAT): Performed by: SURGERY

## 2022-08-01 DEVICE — BARD MESH
Type: IMPLANTABLE DEVICE | Site: ABDOMEN | Status: FUNCTIONAL
Brand: BARD MESH

## 2022-08-01 RX ORDER — SODIUM CHLORIDE, SODIUM LACTATE, POTASSIUM CHLORIDE, CALCIUM CHLORIDE 600; 310; 30; 20 MG/100ML; MG/100ML; MG/100ML; MG/100ML
INJECTION, SOLUTION INTRAVENOUS CONTINUOUS
Status: DISCONTINUED | OUTPATIENT
Start: 2022-08-01 | End: 2022-08-02

## 2022-08-01 RX ORDER — ONDANSETRON 2 MG/ML
INJECTION INTRAMUSCULAR; INTRAVENOUS
Status: DISCONTINUED | OUTPATIENT
Start: 2022-08-01 | End: 2022-08-01

## 2022-08-01 RX ORDER — DEXAMETHASONE SODIUM PHOSPHATE 4 MG/ML
INJECTION, SOLUTION INTRA-ARTICULAR; INTRALESIONAL; INTRAMUSCULAR; INTRAVENOUS; SOFT TISSUE
Status: DISCONTINUED | OUTPATIENT
Start: 2022-08-01 | End: 2022-08-01

## 2022-08-01 RX ORDER — PANTOPRAZOLE SODIUM 40 MG/1
40 TABLET, DELAYED RELEASE ORAL DAILY
Status: DISCONTINUED | OUTPATIENT
Start: 2022-08-01 | End: 2022-08-03 | Stop reason: HOSPADM

## 2022-08-01 RX ORDER — CEFAZOLIN SODIUM 2 G/50ML
2 SOLUTION INTRAVENOUS
Status: COMPLETED | OUTPATIENT
Start: 2022-08-01 | End: 2022-08-01

## 2022-08-01 RX ORDER — CEFAZOLIN SODIUM 2 G/50ML
2 SOLUTION INTRAVENOUS
Status: COMPLETED | OUTPATIENT
Start: 2022-08-01 | End: 2022-08-02

## 2022-08-01 RX ORDER — LIDOCAINE HYDROCHLORIDE 10 MG/ML
1 INJECTION, SOLUTION EPIDURAL; INFILTRATION; INTRACAUDAL; PERINEURAL ONCE
Status: COMPLETED | OUTPATIENT
Start: 2022-08-01 | End: 2022-08-01

## 2022-08-01 RX ORDER — PROPOFOL 10 MG/ML
VIAL (ML) INTRAVENOUS
Status: DISCONTINUED | OUTPATIENT
Start: 2022-08-01 | End: 2022-08-01

## 2022-08-01 RX ORDER — BENAZEPRIL/HYDROCHLOROTHIAZIDE 20 MG-25MG
1 TABLET ORAL DAILY
Status: DISCONTINUED | OUTPATIENT
Start: 2022-08-01 | End: 2022-08-01

## 2022-08-01 RX ORDER — ENOXAPARIN SODIUM 100 MG/ML
40 INJECTION SUBCUTANEOUS EVERY 24 HOURS
Status: DISCONTINUED | OUTPATIENT
Start: 2022-08-02 | End: 2022-08-03 | Stop reason: HOSPADM

## 2022-08-01 RX ORDER — HYDROMORPHONE HYDROCHLORIDE 1 MG/ML
1 INJECTION, SOLUTION INTRAMUSCULAR; INTRAVENOUS; SUBCUTANEOUS EVERY 6 HOURS PRN
Status: DISCONTINUED | OUTPATIENT
Start: 2022-08-01 | End: 2022-08-03 | Stop reason: HOSPADM

## 2022-08-01 RX ORDER — GLUCAGON 1 MG
1 KIT INJECTION
Status: DISCONTINUED | OUTPATIENT
Start: 2022-08-01 | End: 2022-08-03 | Stop reason: HOSPADM

## 2022-08-01 RX ORDER — LISINOPRIL 10 MG/1
20 TABLET ORAL DAILY
Status: DISCONTINUED | OUTPATIENT
Start: 2022-08-01 | End: 2022-08-03 | Stop reason: HOSPADM

## 2022-08-01 RX ORDER — EFAVIRENZ 600 MG/1
600 TABLET, FILM COATED ORAL NIGHTLY
Status: DISCONTINUED | OUTPATIENT
Start: 2022-08-01 | End: 2022-08-03 | Stop reason: HOSPADM

## 2022-08-01 RX ORDER — LIDOCAINE HYDROCHLORIDE 20 MG/ML
INJECTION INTRAVENOUS
Status: DISCONTINUED | OUTPATIENT
Start: 2022-08-01 | End: 2022-08-01

## 2022-08-01 RX ORDER — HYDROCHLOROTHIAZIDE 12.5 MG/1
25 TABLET ORAL DAILY
Status: DISCONTINUED | OUTPATIENT
Start: 2022-08-01 | End: 2022-08-03 | Stop reason: HOSPADM

## 2022-08-01 RX ORDER — ACETAMINOPHEN 10 MG/ML
INJECTION, SOLUTION INTRAVENOUS
Status: DISCONTINUED | OUTPATIENT
Start: 2022-08-01 | End: 2022-08-01

## 2022-08-01 RX ORDER — HYDROMORPHONE HYDROCHLORIDE 2 MG/ML
0.2 INJECTION, SOLUTION INTRAMUSCULAR; INTRAVENOUS; SUBCUTANEOUS EVERY 5 MIN PRN
Status: DISCONTINUED | OUTPATIENT
Start: 2022-08-01 | End: 2022-08-01 | Stop reason: HOSPADM

## 2022-08-01 RX ORDER — ONDANSETRON 2 MG/ML
8 INJECTION INTRAMUSCULAR; INTRAVENOUS EVERY 6 HOURS PRN
Status: DISCONTINUED | OUTPATIENT
Start: 2022-08-01 | End: 2022-08-03 | Stop reason: HOSPADM

## 2022-08-01 RX ORDER — BUPIVACAINE HYDROCHLORIDE AND EPINEPHRINE 5; 5 MG/ML; UG/ML
INJECTION, SOLUTION EPIDURAL; INTRACAUDAL; PERINEURAL
Status: DISCONTINUED | OUTPATIENT
Start: 2022-08-01 | End: 2022-08-01 | Stop reason: HOSPADM

## 2022-08-01 RX ORDER — SERTRALINE HYDROCHLORIDE 50 MG/1
100 TABLET, FILM COATED ORAL DAILY
Status: DISCONTINUED | OUTPATIENT
Start: 2022-08-01 | End: 2022-08-03 | Stop reason: HOSPADM

## 2022-08-01 RX ORDER — FENTANYL CITRATE 50 UG/ML
INJECTION, SOLUTION INTRAMUSCULAR; INTRAVENOUS
Status: DISCONTINUED | OUTPATIENT
Start: 2022-08-01 | End: 2022-08-01

## 2022-08-01 RX ORDER — ROCURONIUM BROMIDE 10 MG/ML
INJECTION, SOLUTION INTRAVENOUS
Status: DISCONTINUED | OUTPATIENT
Start: 2022-08-01 | End: 2022-08-01

## 2022-08-01 RX ORDER — ZOLPIDEM TARTRATE 5 MG/1
5 TABLET ORAL NIGHTLY
Status: DISCONTINUED | OUTPATIENT
Start: 2022-08-01 | End: 2022-08-03 | Stop reason: HOSPADM

## 2022-08-01 RX ORDER — DIPHENHYDRAMINE HYDROCHLORIDE 50 MG/ML
12.5 INJECTION INTRAMUSCULAR; INTRAVENOUS EVERY 4 HOURS PRN
Status: DISCONTINUED | OUTPATIENT
Start: 2022-08-01 | End: 2022-08-03 | Stop reason: HOSPADM

## 2022-08-01 RX ORDER — FENTANYL CITRATE 50 UG/ML
25 INJECTION, SOLUTION INTRAMUSCULAR; INTRAVENOUS EVERY 5 MIN PRN
Status: DISCONTINUED | OUTPATIENT
Start: 2022-08-01 | End: 2022-08-01 | Stop reason: HOSPADM

## 2022-08-01 RX ORDER — PHENYLEPHRINE HYDROCHLORIDE 10 MG/ML
INJECTION INTRAVENOUS
Status: DISCONTINUED | OUTPATIENT
Start: 2022-08-01 | End: 2022-08-01

## 2022-08-01 RX ORDER — OXYCODONE HYDROCHLORIDE 5 MG/1
5 TABLET ORAL
Status: DISCONTINUED | OUTPATIENT
Start: 2022-08-01 | End: 2022-08-01 | Stop reason: HOSPADM

## 2022-08-01 RX ORDER — EPHEDRINE SULFATE 50 MG/ML
INJECTION, SOLUTION INTRAVENOUS
Status: DISCONTINUED | OUTPATIENT
Start: 2022-08-01 | End: 2022-08-01

## 2022-08-01 RX ORDER — OXYCODONE HYDROCHLORIDE 10 MG/1
10 TABLET ORAL EVERY 4 HOURS PRN
Status: DISCONTINUED | OUTPATIENT
Start: 2022-08-01 | End: 2022-08-03 | Stop reason: HOSPADM

## 2022-08-01 RX ORDER — INSULIN ASPART 100 [IU]/ML
1-10 INJECTION, SOLUTION INTRAVENOUS; SUBCUTANEOUS EVERY 6 HOURS PRN
Status: DISCONTINUED | OUTPATIENT
Start: 2022-08-01 | End: 2022-08-03 | Stop reason: HOSPADM

## 2022-08-01 RX ADMIN — SUGAMMADEX 200 MG: 100 INJECTION, SOLUTION INTRAVENOUS at 11:08

## 2022-08-01 RX ADMIN — OXYCODONE HYDROCHLORIDE 10 MG: 10 TABLET ORAL at 09:08

## 2022-08-01 RX ADMIN — LIDOCAINE HYDROCHLORIDE 100 MG: 20 INJECTION, SOLUTION INTRAVENOUS at 09:08

## 2022-08-01 RX ADMIN — PHENYLEPHRINE HYDROCHLORIDE 200 MCG: 10 INJECTION INTRAVENOUS at 09:08

## 2022-08-01 RX ADMIN — SODIUM CHLORIDE, SODIUM GLUCONATE, SODIUM ACETATE, POTASSIUM CHLORIDE, MAGNESIUM CHLORIDE, SODIUM PHOSPHATE, DIBASIC, AND POTASSIUM PHOSPHATE: .53; .5; .37; .037; .03; .012; .00082 INJECTION, SOLUTION INTRAVENOUS at 08:08

## 2022-08-01 RX ADMIN — OXYCODONE 5 MG: 5 TABLET ORAL at 12:08

## 2022-08-01 RX ADMIN — ROCURONIUM BROMIDE 20 MG: 10 INJECTION, SOLUTION INTRAVENOUS at 10:08

## 2022-08-01 RX ADMIN — PHENYLEPHRINE HYDROCHLORIDE 200 MCG: 10 INJECTION INTRAVENOUS at 10:08

## 2022-08-01 RX ADMIN — CEFAZOLIN SODIUM 2 G: 2 SOLUTION INTRAVENOUS at 09:08

## 2022-08-01 RX ADMIN — EFAVIRENZ 600 MG: 600 TABLET, FILM COATED ORAL at 09:08

## 2022-08-01 RX ADMIN — PHENYLEPHRINE HYDROCHLORIDE 100 MCG: 10 INJECTION INTRAVENOUS at 10:08

## 2022-08-01 RX ADMIN — EPHEDRINE SULFATE 5 MG: 50 INJECTION, SOLUTION INTRAMUSCULAR; INTRAVENOUS; SUBCUTANEOUS at 09:08

## 2022-08-01 RX ADMIN — LIDOCAINE HYDROCHLORIDE 0.1 MG: 10 INJECTION, SOLUTION EPIDURAL; INFILTRATION; INTRACAUDAL; PERINEURAL at 08:08

## 2022-08-01 RX ADMIN — ROCURONIUM BROMIDE 50 MG: 10 INJECTION, SOLUTION INTRAVENOUS at 09:08

## 2022-08-01 RX ADMIN — ONDANSETRON 4 MG: 2 INJECTION INTRAMUSCULAR; INTRAVENOUS at 09:08

## 2022-08-01 RX ADMIN — ZOLPIDEM TARTRATE 5 MG: 5 TABLET ORAL at 09:08

## 2022-08-01 RX ADMIN — FENTANYL CITRATE 50 MCG: 50 INJECTION, SOLUTION INTRAMUSCULAR; INTRAVENOUS at 09:08

## 2022-08-01 RX ADMIN — HYDROCHLOROTHIAZIDE 25 MG: 12.5 TABLET ORAL at 02:08

## 2022-08-01 RX ADMIN — EMTRICITABINE AND TENOFOVIR ALAFENAMIDE 1 TABLET: 200; 25 TABLET ORAL at 09:08

## 2022-08-01 RX ADMIN — GLYCOPYRROLATE 0.2 MG: 0.2 INJECTION, SOLUTION INTRAMUSCULAR; INTRAVITREAL at 08:08

## 2022-08-01 RX ADMIN — CEFAZOLIN SODIUM 2 G: 2 SOLUTION INTRAVENOUS at 05:08

## 2022-08-01 RX ADMIN — EPHEDRINE SULFATE 10 MG: 50 INJECTION, SOLUTION INTRAMUSCULAR; INTRAVENOUS; SUBCUTANEOUS at 09:08

## 2022-08-01 RX ADMIN — DEXAMETHASONE SODIUM PHOSPHATE 4 MG: 4 INJECTION, SOLUTION INTRA-ARTICULAR; INTRALESIONAL; INTRAMUSCULAR; INTRAVENOUS; SOFT TISSUE at 09:08

## 2022-08-01 RX ADMIN — SODIUM CHLORIDE, SODIUM LACTATE, POTASSIUM CHLORIDE, AND CALCIUM CHLORIDE: .6; .31; .03; .02 INJECTION, SOLUTION INTRAVENOUS at 10:08

## 2022-08-01 RX ADMIN — PANTOPRAZOLE SODIUM 40 MG: 40 TABLET, DELAYED RELEASE ORAL at 02:08

## 2022-08-01 RX ADMIN — SODIUM CHLORIDE, SODIUM LACTATE, POTASSIUM CHLORIDE, AND CALCIUM CHLORIDE: .6; .31; .03; .02 INJECTION, SOLUTION INTRAVENOUS at 02:08

## 2022-08-01 RX ADMIN — PROPOFOL 180 MG: 10 INJECTION, EMULSION INTRAVENOUS at 09:08

## 2022-08-01 RX ADMIN — LISINOPRIL 20 MG: 10 TABLET ORAL at 02:08

## 2022-08-01 RX ADMIN — ACETAMINOPHEN 1000 MG: 10 INJECTION, SOLUTION INTRAVENOUS at 09:08

## 2022-08-01 RX ADMIN — INSULIN ASPART 1 UNITS: 100 INJECTION, SOLUTION INTRAVENOUS; SUBCUTANEOUS at 09:08

## 2022-08-01 RX ADMIN — EPHEDRINE SULFATE 10 MG: 50 INJECTION, SOLUTION INTRAMUSCULAR; INTRAVENOUS; SUBCUTANEOUS at 10:08

## 2022-08-01 RX ADMIN — SERTRALINE HYDROCHLORIDE 100 MG: 50 TABLET ORAL at 02:08

## 2022-08-01 RX ADMIN — OXYCODONE HYDROCHLORIDE 10 MG: 10 TABLET ORAL at 02:08

## 2022-08-01 RX ADMIN — HYDROMORPHONE HYDROCHLORIDE 1 MG: 1 INJECTION, SOLUTION INTRAMUSCULAR; INTRAVENOUS; SUBCUTANEOUS at 05:08

## 2022-08-01 RX ADMIN — ROCURONIUM BROMIDE 30 MG: 10 INJECTION, SOLUTION INTRAVENOUS at 09:08

## 2022-08-01 NOTE — ANESTHESIA POSTPROCEDURE EVALUATION
Anesthesia Post Evaluation    Patient: Brandon Parson    Procedure(s) Performed: Procedure(s) (LRB):  REPAIR, HERNIA, INGUINAL, INCARCERATED, RECURRENT (Right)    Final Anesthesia Type: general      Patient location during evaluation: PACU  Patient participation: Yes- Able to Participate  Level of consciousness: awake and alert and oriented  Post-procedure vital signs: reviewed and stable  Pain management: adequate  Airway patency: patent    PONV status at discharge: No PONV  Anesthetic complications: no      Cardiovascular status: blood pressure returned to baseline  Respiratory status: unassisted, spontaneous ventilation and room air  Hydration status: euvolemic  Follow-up not needed.          Vitals Value Taken Time   /59 08/01/22 1134   Temp  08/01/22 1137   Pulse 70 08/01/22 1136   Resp 14 08/01/22 1136   SpO2 100 % 08/01/22 1136   Vitals shown include unvalidated device data.      No case tracking events are documented in the log.      Pain/David Score: No data recorded

## 2022-08-01 NOTE — OP NOTE
[facility]  Hernia, Open  Procedure Note    SUMMARY     Date of Procedure: 8/1/2022    Procedure: Procedure(s) (LRB):  REPAIR, HERNIA, INGUINAL, INCARCERATED, RECURRENT (Right)    Surgeon(s) and Role:     * Mack Ramirez MD - Primary    Assisting Surgeon: None    Indications: The patient presented with a history of a right, not reducible recurrent, inguinal hernia.      Pre-Operative Diagnosis: right not reducible; recurrent    Post-Operative Diagnosis: right not reducible     Anesthesia: General    Technical Procedures Used: open    Description of the Findings of the Procedure:    The patient was seen again in the Holding Room. The risks, benefits, complications, treatment options, and expected outcomes were discussed with the patient. The possibilities of reaction to medication, pulmonary aspiration, perforation of viscus, bleeding, recurrent infection, the need for additional procedures, and development of a complication requiring transfusion or further operation were discussed with the patient and/or family. There was concurrence with the proposed plan, and informed consent was obtained. The site of surgery was properly Marked. The patient was taken to the Operating Room 6, identified as Brandon Parson, and the procedure verified as right inguinal hernia repair. A Time Out was held and the above information confirmed.    The patient was placed in the supine position and underwent induction of anesthesia, the lower abdomen and groin was prepped and draped in the standard fashion, and 0.5% Marcaine with epinephrine was used to anesthetize the skin over the mid-portion of the inguinal canal. A transverse incision was made. Dissection was carried through the soft tissue to expose the inguinal canal and inguinal ligament along its lower edge. The external oblique fascia was split along the course of its fibers, exposing the inguinal canal.   The hernia was easily seen and was incarcerated  into the scrotum.  With blunt dissection and tension the large hernia found along the spermatic cord reduced.  The cord and nerve were looped using a Penrose drain and reflected out of the field.  The hernia sack was visualized and opened. Adhesions to the omentum were taken down.  The colon reduced on its own.  The hernia sack was closed with a vicryl suture then divided. The defect was exposed and a piece of mesh was trimmed to size and placed over the defect.   Running 3-0 PDS suture was then used in a continuous fashion to repair the defect, with the suture being sewn from the pubic tubercle inferiorly and superiorly along the canal to a level just beyond the internal ring. The mesh was split to allow passage of the cord and nerve into the canal without entrapment.   The contents were then returned to canal and the external oblique fashion was then closed in a continuous fashion using 3-0 Vicryl suture taking care not to cause entrapment.    Instrument, sponge, and needle counts were correct prior to closure and at the conclusion of the case.    Significant Surgical Tasks Conducted by the Assistant(s), if Applicable: retraction, cutting, tying     Complications: None; patient tolerated the procedure well.    Total IV Fluids: see anesthesia    Estimated Blood Loss (EBL): 50.0 cc           Drains: none    Implants: bard mesh cut to position    Specimens: hernia sack           Condition: stable    Disposition: PACU - hemodynamically stable.    Attestation: I was present and scrubbed for the entire procedure.

## 2022-08-01 NOTE — PLAN OF CARE
Criteria met for transfer per anesthesia  Awake alert and oriented  No acute resp distress  Vs wnl and stable  Pain controlled  IV patent  Bandage to right groin c/d/I  Tolerating po with no PONV  Bedside report given to MACKENZIE Sewell

## 2022-08-01 NOTE — ANESTHESIA PREPROCEDURE EVALUATION
2022  Brandon Parson is a 71 y.o., male.      Pre-op Assessment    I have reviewed the Patient Summary Reports.     I have reviewed the Nursing Notes. I have reviewed the NPO Status.   I have reviewed the Medications.     Review of Systems  Anesthesia Hx:  No problems with previous Anesthesia Denies Hx of Anesthetic complications CAROTID ENDARTERECTOMY     HIV (human immunodeficiency virus infection)   Social:  Former Smoker Smoking Status: Former Smoker  Quit Smokin22  Smokeless Tobacco Status: Never Used  Alcohol use: No  Drug use: No       Cardiovascular:   Denies Hypertension.  Denies MI.  Denies CAD.    Denies CABG/stent.   Denies Angina.    Pulmonary:   Denies COPD.  Denies Asthma.  Denies Recent URI.    Renal/:   Denies Chronic Renal Disease.  Smoking Status: Former Smoker  Quit Smokin22  Smokeless Tobacco Status: Never Used  Alcohol use: No  Drug use: No     Hepatic/GI:   Denies GERD. Denies Liver Disease.    Neurological:   Denies TIA. Denies CVA. Denies Seizures.    Endocrine:   Diabetes, type 2 Denies Hypothyroidism.    Psych:   Denies Psychiatric History.          Physical Exam  General: Well nourished, Cooperative, Alert and Oriented    Airway:  Mallampati: II / II  Mouth Opening: Normal  TM Distance: 4 - 6 cm  Tongue: Normal    Dental:  Intact    Chest/Lungs:  Clear to auscultation, Normal Respiratory Rate    Heart:  Rate: Normal  Rhythm: Regular Rhythm  Sounds: Normal        Anesthesia Plan  Type of Anesthesia, risks & benefits discussed:    Anesthesia Type: Gen ETT  Intra-op Monitoring Plan: Standard ASA Monitors  Induction:  IV  Informed Consent: Informed consent signed with the Patient and all parties understand the risks and agree with anesthesia plan.  All questions answered.   ASA Score: 3    Ready For Surgery From Anesthesia Perspective.     .

## 2022-08-01 NOTE — PLAN OF CARE
08/01/22 1356   Patient Assessment/Suction   Level of Consciousness (AVPU) alert   Respiratory Effort Normal;Unlabored   Expansion/Accessory Muscles/Retractions no retractions;no use of accessory muscles   Rhythm/Pattern, Respiratory depth regular;unlabored;pattern regular   Cough Frequency no cough   PRE-TX-O2   O2 Device (Oxygen Therapy) room air   SpO2 100 %   Pulse Oximetry Type Intermittent   $ Pulse Oximetry - Multiple Charge Pulse Oximetry - Multiple   Pulse 78   Resp 16   Incentive Spirometer   $ Incentive Spirometer Charges postop instruction   Incentive Spirometer Predicted Level (mL) 10   Administration (IS) instruction provided, follow-up   Number of Repetitions (IS) 10   Level Incentive Spirometer (mL) 4000   Patient Tolerance (IS) good

## 2022-08-01 NOTE — PROGRESS NOTES
Benazepril therapy for Brandon Parson [id number] has been evaluated according to the pharmacy practice protocol.      Per the pharmacy therapeutic interchange policy, benazepril 20 mg has been changed to lisinopril 20 mg.     Thank you,  Lopez Patrick

## 2022-08-01 NOTE — TRANSFER OF CARE
Anesthesia Transfer of Care Note    Patient: Brandon Parson    Procedure(s) Performed: Procedure(s) (LRB):  REPAIR, HERNIA, INGUINAL, INCARCERATED, RECURRENT (Right)    Patient location: PACU    Anesthesia Type: general    Transport from OR: Transported from OR on 2-3 L/min O2 by NC with adequate spontaneous ventilation    Post pain: adequate analgesia    Post assessment: no apparent anesthetic complications and tolerated procedure well    Post vital signs: stable    Level of consciousness: awake    Nausea/Vomiting: no nausea/vomiting    Complications: none    Transfer of care protocol was followed      Last vitals:   Visit Vitals  BP (!) 150/56 (BP Location: Left arm, Patient Position: Sitting)   Pulse 64   Temp 36.7 °C (98.1 °F) (Skin)   Resp 18   Ht 6' (1.829 m)   Wt 80 kg (176 lb 5.9 oz)   SpO2 98%   BMI 23.92 kg/m²

## 2022-08-01 NOTE — ANESTHESIA PROCEDURE NOTES
Intubation    Date/Time: 8/1/2022 9:07 AM  Performed by: Kyree Coelho CRNA  Authorized by: Jacobo Daniels MD     Intubation:     Induction:  Intravenous    Intubated:  Postinduction    Mask Ventilation:  Easy mask    Attempts:  1    Attempted By:  Student    Method of Intubation:  Direct    Blade:  Naya 4    Laryngeal View Grade: Grade I - full view of cords      Difficult Airway Encountered?: No      Complications:  None    Airway Device:  Oral endotracheal tube    Airway Device Size:  7.5    Style/Cuff Inflation:  Cuffed    Inflation Amount (mL):  7    Tube secured:  23    Secured at:  The lips    Placement Verified By:  Capnometry    Complicating Factors:  None    Findings Post-Intubation:  BS equal bilateral

## 2022-08-02 PROBLEM — E87.6 HYPOKALEMIA: Status: ACTIVE | Noted: 2022-08-02

## 2022-08-02 PROBLEM — K40.90 INGUINAL HERNIA: Status: ACTIVE | Noted: 2022-08-02

## 2022-08-02 LAB
ANION GAP SERPL CALC-SCNC: 11 MMOL/L (ref 8–16)
BASOPHILS # BLD AUTO: 0.02 K/UL (ref 0–0.2)
BASOPHILS NFR BLD: 0.3 % (ref 0–1.9)
BUN SERPL-MCNC: 13 MG/DL (ref 8–23)
CALCIUM SERPL-MCNC: 8.8 MG/DL (ref 8.7–10.5)
CHLORIDE SERPL-SCNC: 103 MMOL/L (ref 95–110)
CO2 SERPL-SCNC: 23 MMOL/L (ref 23–29)
CREAT SERPL-MCNC: 1.3 MG/DL (ref 0.5–1.4)
DIFFERENTIAL METHOD: ABNORMAL
EOSINOPHIL # BLD AUTO: 0 K/UL (ref 0–0.5)
EOSINOPHIL NFR BLD: 0.5 % (ref 0–8)
ERYTHROCYTE [DISTWIDTH] IN BLOOD BY AUTOMATED COUNT: 14.8 % (ref 11.5–14.5)
EST. GFR  (NO RACE VARIABLE): 58 ML/MIN/1.73 M^2
ESTIMATED AVG GLUCOSE: 223 MG/DL (ref 68–131)
GLUCOSE SERPL-MCNC: 198 MG/DL (ref 70–110)
HBA1C MFR BLD: 9.4 % (ref 4–5.6)
HCT VFR BLD AUTO: 28.5 % (ref 40–54)
HGB BLD-MCNC: 9.7 G/DL (ref 14–18)
IMM GRANULOCYTES # BLD AUTO: 0.03 K/UL (ref 0–0.04)
IMM GRANULOCYTES NFR BLD AUTO: 0.4 % (ref 0–0.5)
LYMPHOCYTES # BLD AUTO: 1.5 K/UL (ref 1–4.8)
LYMPHOCYTES NFR BLD: 19 % (ref 18–48)
MAGNESIUM SERPL-MCNC: 1.5 MG/DL (ref 1.6–2.6)
MAGNESIUM SERPL-MCNC: 1.6 MG/DL (ref 1.6–2.6)
MCH RBC QN AUTO: 30.1 PG (ref 27–31)
MCHC RBC AUTO-ENTMCNC: 34 G/DL (ref 32–36)
MCV RBC AUTO: 89 FL (ref 82–98)
MONOCYTES # BLD AUTO: 1 K/UL (ref 0.3–1)
MONOCYTES NFR BLD: 12.7 % (ref 4–15)
NEUTROPHILS # BLD AUTO: 5.1 K/UL (ref 1.8–7.7)
NEUTROPHILS NFR BLD: 67.1 % (ref 38–73)
NRBC BLD-RTO: 0 /100 WBC
PHOSPHATE SERPL-MCNC: 3.8 MG/DL (ref 2.7–4.5)
PLATELET # BLD AUTO: 165 K/UL (ref 150–450)
PMV BLD AUTO: 9.1 FL (ref 9.2–12.9)
POCT GLUCOSE: 218 MG/DL (ref 70–110)
POCT GLUCOSE: 266 MG/DL (ref 70–110)
POCT GLUCOSE: 304 MG/DL (ref 70–110)
POTASSIUM SERPL-SCNC: 2.6 MMOL/L (ref 3.5–5.1)
POTASSIUM SERPL-SCNC: 2.8 MMOL/L (ref 3.5–5.1)
RBC # BLD AUTO: 3.22 M/UL (ref 4.6–6.2)
SODIUM SERPL-SCNC: 137 MMOL/L (ref 136–145)
WBC # BLD AUTO: 7.63 K/UL (ref 3.9–12.7)

## 2022-08-02 PROCEDURE — 84132 ASSAY OF SERUM POTASSIUM: CPT | Mod: 91 | Performed by: INTERNAL MEDICINE

## 2022-08-02 PROCEDURE — 84100 ASSAY OF PHOSPHORUS: CPT | Performed by: SURGERY

## 2022-08-02 PROCEDURE — 80048 BASIC METABOLIC PNL TOTAL CA: CPT | Performed by: SURGERY

## 2022-08-02 PROCEDURE — 94799 UNLISTED PULMONARY SVC/PX: CPT

## 2022-08-02 PROCEDURE — 36415 COLL VENOUS BLD VENIPUNCTURE: CPT | Performed by: INTERNAL MEDICINE

## 2022-08-02 PROCEDURE — 63600175 PHARM REV CODE 636 W HCPCS: Performed by: INTERNAL MEDICINE

## 2022-08-02 PROCEDURE — 25000003 PHARM REV CODE 250: Performed by: INTERNAL MEDICINE

## 2022-08-02 PROCEDURE — 25000003 PHARM REV CODE 250: Performed by: SURGERY

## 2022-08-02 PROCEDURE — 25000003 PHARM REV CODE 250: Performed by: NURSE PRACTITIONER

## 2022-08-02 PROCEDURE — 63600175 PHARM REV CODE 636 W HCPCS: Performed by: SURGERY

## 2022-08-02 PROCEDURE — 83735 ASSAY OF MAGNESIUM: CPT | Performed by: SURGERY

## 2022-08-02 PROCEDURE — 36415 COLL VENOUS BLD VENIPUNCTURE: CPT | Performed by: SURGERY

## 2022-08-02 PROCEDURE — 94761 N-INVAS EAR/PLS OXIMETRY MLT: CPT

## 2022-08-02 PROCEDURE — 85025 COMPLETE CBC W/AUTO DIFF WBC: CPT | Performed by: SURGERY

## 2022-08-02 RX ORDER — ACETAMINOPHEN 325 MG/1
650 TABLET ORAL EVERY 6 HOURS PRN
Status: DISCONTINUED | OUTPATIENT
Start: 2022-08-02 | End: 2022-08-03 | Stop reason: HOSPADM

## 2022-08-02 RX ORDER — POTASSIUM CHLORIDE 20 MEQ/1
40 TABLET, EXTENDED RELEASE ORAL 2 TIMES DAILY
Status: COMPLETED | OUTPATIENT
Start: 2022-08-02 | End: 2022-08-02

## 2022-08-02 RX ORDER — POTASSIUM CHLORIDE 20 MEQ/1
40 TABLET, EXTENDED RELEASE ORAL 2 TIMES DAILY
Status: DISCONTINUED | OUTPATIENT
Start: 2022-08-02 | End: 2022-08-02

## 2022-08-02 RX ORDER — LANOLIN ALCOHOL/MO/W.PET/CERES
800 CREAM (GRAM) TOPICAL
Status: DISCONTINUED | OUTPATIENT
Start: 2022-08-02 | End: 2022-08-03 | Stop reason: HOSPADM

## 2022-08-02 RX ORDER — MAGNESIUM SULFATE HEPTAHYDRATE 40 MG/ML
2 INJECTION, SOLUTION INTRAVENOUS ONCE
Status: COMPLETED | OUTPATIENT
Start: 2022-08-02 | End: 2022-08-02

## 2022-08-02 RX ADMIN — INSULIN ASPART 6 UNITS: 100 INJECTION, SOLUTION INTRAVENOUS; SUBCUTANEOUS at 12:08

## 2022-08-02 RX ADMIN — EFAVIRENZ 600 MG: 600 TABLET, FILM COATED ORAL at 08:08

## 2022-08-02 RX ADMIN — HYDROMORPHONE HYDROCHLORIDE 1 MG: 1 INJECTION, SOLUTION INTRAMUSCULAR; INTRAVENOUS; SUBCUTANEOUS at 06:08

## 2022-08-02 RX ADMIN — SERTRALINE HYDROCHLORIDE 100 MG: 50 TABLET ORAL at 09:08

## 2022-08-02 RX ADMIN — EMTRICITABINE AND TENOFOVIR ALAFENAMIDE 1 TABLET: 200; 25 TABLET ORAL at 08:08

## 2022-08-02 RX ADMIN — CEFAZOLIN SODIUM 2 G: 2 SOLUTION INTRAVENOUS at 12:08

## 2022-08-02 RX ADMIN — OXYCODONE HYDROCHLORIDE 10 MG: 10 TABLET ORAL at 02:08

## 2022-08-02 RX ADMIN — OXYCODONE HYDROCHLORIDE 10 MG: 10 TABLET ORAL at 08:08

## 2022-08-02 RX ADMIN — OXYCODONE HYDROCHLORIDE 10 MG: 10 TABLET ORAL at 07:08

## 2022-08-02 RX ADMIN — HYDROMORPHONE HYDROCHLORIDE 1 MG: 1 INJECTION, SOLUTION INTRAMUSCULAR; INTRAVENOUS; SUBCUTANEOUS at 12:08

## 2022-08-02 RX ADMIN — HYDROCHLOROTHIAZIDE 25 MG: 12.5 TABLET ORAL at 09:08

## 2022-08-02 RX ADMIN — POTASSIUM BICARBONATE 60 MEQ: 391 TABLET, EFFERVESCENT ORAL at 04:08

## 2022-08-02 RX ADMIN — POTASSIUM CHLORIDE 40 MEQ: 1500 TABLET, EXTENDED RELEASE ORAL at 08:08

## 2022-08-02 RX ADMIN — MAGNESIUM SULFATE 2 G: 2 INJECTION INTRAVENOUS at 04:08

## 2022-08-02 RX ADMIN — ENOXAPARIN SODIUM 40 MG: 100 INJECTION SUBCUTANEOUS at 04:08

## 2022-08-02 RX ADMIN — PANTOPRAZOLE SODIUM 40 MG: 40 TABLET, DELAYED RELEASE ORAL at 09:08

## 2022-08-02 RX ADMIN — Medication 800 MG: at 09:08

## 2022-08-02 RX ADMIN — ZOLPIDEM TARTRATE 5 MG: 5 TABLET ORAL at 08:08

## 2022-08-02 RX ADMIN — INSULIN ASPART 4 UNITS: 100 INJECTION, SOLUTION INTRAVENOUS; SUBCUTANEOUS at 04:08

## 2022-08-02 RX ADMIN — SODIUM CHLORIDE, SODIUM LACTATE, POTASSIUM CHLORIDE, AND CALCIUM CHLORIDE: .6; .31; .03; .02 INJECTION, SOLUTION INTRAVENOUS at 06:08

## 2022-08-02 RX ADMIN — OXYCODONE HYDROCHLORIDE 10 MG: 10 TABLET ORAL at 01:08

## 2022-08-02 RX ADMIN — Medication 800 MG: at 06:08

## 2022-08-02 RX ADMIN — POTASSIUM BICARBONATE 60 MEQ: 391 TABLET, EFFERVESCENT ORAL at 07:08

## 2022-08-02 RX ADMIN — INSULIN ASPART 4 UNITS: 100 INJECTION, SOLUTION INTRAVENOUS; SUBCUTANEOUS at 08:08

## 2022-08-02 RX ADMIN — OXYCODONE HYDROCHLORIDE 10 MG: 10 TABLET ORAL at 04:08

## 2022-08-02 RX ADMIN — POTASSIUM CHLORIDE 40 MEQ: 1500 TABLET, EXTENDED RELEASE ORAL at 04:08

## 2022-08-02 RX ADMIN — ACETAMINOPHEN 650 MG: 325 TABLET ORAL at 08:08

## 2022-08-02 NOTE — ASSESSMENT & PLAN NOTE
Tele-monitoring.  Continue to follow Dr. Ramirez's recommendations.  Needs aggressive incentive spirometry.  Follow hemoglobin and hematocrit closely.  Pain control with IV narcotics and antiemetics as needed.  Observe fall precautions.  Check blood glucose level q AC/HS.  Use Novolog Insulin Sliding Scale as needed.   Continue routine medications as before.   Gastric ulcer prophylaxis with gastric acid suppressant.  Deep venous thrombosis prophylaxis with Lovenox 40 mg SQ daily.  See Orders.

## 2022-08-02 NOTE — SUBJECTIVE & OBJECTIVE
Interval History: having pain, some control    Medications:  Continuous Infusions:   lactated ringers Stopped (08/02/22 1228)     Scheduled Meds:   efavirenz  600 mg Oral QHS    emtricitabine-tenofovir alafen  1 tablet Oral QHS    enoxaparin  40 mg Subcutaneous Daily    hydroCHLOROthiazide  25 mg Oral Daily    lisinopriL  20 mg Oral Daily    pantoprazole  40 mg Oral Daily    sertraline  100 mg Oral Daily    zolpidem  5 mg Oral Nightly     PRN Meds:diphenhydrAMINE, glucagon (human recombinant), HYDROmorphone, insulin aspart U-100, magnesium oxide, magnesium oxide, ondansetron, oxyCODONE, potassium bicarbonate, potassium bicarbonate, potassium bicarbonate     Review of patient's allergies indicates:   Allergen Reactions    Chantix [varenicline]      Objective:     Vital Signs (Most Recent):  Temp: 99.7 °F (37.6 °C) (08/02/22 1124)  Pulse: 96 (08/02/22 1124)  Resp: 16 (08/02/22 1308)  BP: 139/62 (08/02/22 1124)  SpO2: (!) 93 % (08/02/22 1124)   Vital Signs (24h Range):  Temp:  [97.6 °F (36.4 °C)-99.7 °F (37.6 °C)] 99.7 °F (37.6 °C)  Pulse:  [75-96] 96  Resp:  [15-20] 16  SpO2:  [93 %-100 %] 93 %  BP: (109-147)/(60-68) 139/62     Weight: 80 kg (176 lb 5.9 oz)  Body mass index is 23.92 kg/m².    Intake/Output - Last 3 Shifts         07/31 0700  08/01 0659 08/01 0700 08/02 0659 08/02 0700  08/03 0659    P.O.  460 840    I.V. (mL/kg)  2106.4 (26.3) 2097.7 (26.2)    IV Piggyback  850 41.6    Total Intake(mL/kg)  3416.4 (42.7) 2979.3 (37.2)    Urine (mL/kg/hr)  1550 (0.8) 650 (1.2)    Total Output  1550 650    Net  +1866.4 +2329.3                   Physical Exam  Abdominal:      General: There is no distension.      Palpations: Abdomen is soft. There is no mass.      Tenderness: There is no abdominal tenderness.      Hernia: No hernia is present.      Comments: Some bruising around inguinal area       Significant Labs:  I have reviewed all pertinent lab results within the past 24 hours.  CBC:   Recent Labs   Lab  08/02/22  0336   WBC 7.63   RBC 3.22*   HGB 9.7*   HCT 28.5*      MCV 89   MCH 30.1   MCHC 34.0     BMP:   Recent Labs   Lab 08/02/22  0335   *      K 2.6*      CO2 23   BUN 13   CREATININE 1.3   CALCIUM 8.8   MG 1.5*       Significant Diagnostics:  I have reviewed all pertinent imaging results/findings within the past 24 hours.

## 2022-08-02 NOTE — CARE UPDATE
08/02/22 0820   Patient Assessment/Suction   Level of Consciousness (AVPU) alert   PRE-TX-O2   O2 Device (Oxygen Therapy) room air   SpO2 95 %   Pulse Oximetry Type Intermittent   $ Pulse Oximetry - Multiple Charge Pulse Oximetry - Multiple   Incentive Spirometer   $ Incentive Spirometer Charges done with encouragement;postop instruction   Administration (IS) instruction provided, follow-up;mouthpiece utilized   Number of Repetitions (IS) 10   Level Incentive Spirometer (mL) 3000   Patient Tolerance (IS) good

## 2022-08-02 NOTE — PLAN OF CARE
Problem: Adult Inpatient Plan of Care  Goal: Plan of Care Review  Outcome: Ongoing, Progressing  Goal: Patient-Specific Goal (Individualized)  Outcome: Ongoing, Progressing  Goal: Absence of Hospital-Acquired Illness or Injury  Outcome: Ongoing, Progressing  Goal: Optimal Comfort and Wellbeing  Outcome: Ongoing, Progressing  Goal: Readiness for Transition of Care  Outcome: Ongoing, Progressing     Problem: Diabetes Comorbidity  Goal: Blood Glucose Level Within Targeted Range  Outcome: Ongoing, Progressing     Problem: Impaired Wound Healing  Goal: Optimal Wound Healing  Outcome: Ongoing, Progressing     Problem: Fall Injury Risk  Goal: Absence of Fall and Fall-Related Injury  Outcome: Ongoing, Progressing

## 2022-08-02 NOTE — ASSESSMENT & PLAN NOTE
Patient is on HAART. Will continue HAART. Prophylaxis was not indicated at this time.     Continue to monitor routine labs. Last CD4 count was   Lab Results   Component Value Date    ABSOLUTECD4 1216 02/23/2022       We will not consult Infectious disease at this time.   Evaluate and treat HIV-associated diseases as indicated by specific problems listed below.

## 2022-08-02 NOTE — HPI
Patient is a 71 y.o. male with a past medical history of HIV, Diabetes mellitus, Depression, GERD, and Anxiety admitted under hospitalist service S/p REPAIR, HERNIA, INGUINAL, INCARCERATED, RECURRENT   Pt was diagnosed with right inguina hernia.  Has had a repair in the past laparoscopically but it has returned.  It has been getting large and painful at times.  He had a CEA a few months ago and currently on asa.       Complications: None; patient tolerated the procedure well.     Total IV Fluids: see anesthesia     Estimated Blood Loss (EBL): 50.0 cc           Drains: none     Implants: bard mesh cut to position     Specimens: hernia sack           Condition: stable     Disposition: PACU - hemodynamically stable.

## 2022-08-02 NOTE — ASSESSMENT & PLAN NOTE
Patient's FSGs are controlled on current medication regimen.  Last A1c reviewed-   Lab Results   Component Value Date    HGBA1C 9.4 (H) 08/01/2022     Most recent fingerstick glucose reviewed-   Recent Labs   Lab 08/01/22 2123   POCTGLUCOSE 187*     Current correctional scale  Low  Maintain anti-hyperglycemic dose as follows-   Antihyperglycemics (From admission, onward)            Start     Stop Route Frequency Ordered    08/01/22 1312  insulin aspart U-100 pen 1-10 Units         -- SubQ Every 6 hours PRN 08/01/22 1313        Hold Oral hypoglycemics while patient is in the hospital.

## 2022-08-02 NOTE — H&P
[facility] Medicine  History & Physical    Patient Name: Brandon Parson  MRN: [id number]  Patient Class: OP- Outpatient Recovery  Admission Date: 8/1/2022  Attending Physician: Mack Ramirez MD   Primary Care Provider: Francois Eugene MD         Patient information was obtained from patient and past medical records.     Subjective:     Principal Problem:Inguinal hernia    Chief Complaint: No chief complaint on file.       HPI: Patient is a 71 y.o. male with a past medical history of HIV, Diabetes mellitus, Depression, GERD, and Anxiety admitted under hospitalist service S/p REPAIR, HERNIA, INGUINAL, INCARCERATED, RECURRENT   Pt was diagnosed with right inguina hernia.  Has had a repair in the past laparoscopically but it has returned.  It has been getting large and painful at times.  He had a CEA a few months ago and currently on asa.       Complications: None; patient tolerated the procedure well.     Total IV Fluids: see anesthesia     Estimated Blood Loss (EBL): 50.0 cc           Drains: none     Implants: bard mesh cut to position     Specimens: hernia sack           Condition: stable     Disposition: PACU - hemodynamically stable.        Past Medical History:   Diagnosis Date    Anxiety     Carotid artery disease     Depression     Diabetes mellitus     GERD (gastroesophageal reflux disease)     HIV (human immunodeficiency virus infection)     Osteomyelitis of great toe of left foot 08/2019    TB lung, latent 2006    treated with INH       Past Surgical History:   Procedure Laterality Date    CAROTID ENDARTERECTOMY Right 04/2022    HERNIORRHAPHY OF RECURRENT INCARCERATED INGUINAL HERNIA Right 8/1/2022    Procedure: REPAIR, HERNIA, INGUINAL, INCARCERATED, RECURRENT;  Surgeon: Mack Ramirez MD;  Location: [facility];  Service: General;  Laterality: Right;    UMBILICAL HERNIA REPAIR         Review of patient's allergies indicates:   Allergen Reactions    Chantix  [varenicline]        No current facility-administered medications on file prior to encounter.     Current Outpatient Medications on File Prior to Encounter   Medication Sig    aspirin 325 MG tablet Take 1 tablet (325 mg total) by mouth once daily.    atorvastatin (LIPITOR) 40 MG tablet Take 1 tablet (40 mg total) by mouth once daily.    benazepril-hydrochlorthiazide (LOTENSIN HCT) 20-25 mg Tab Take 1 tablet by mouth once daily.    efavirenz (SUSTIVA) 600 mg Tab Take 1 tablet (600 mg total) by mouth every evening.    emtricitabine-tenofovir alafen (DESCOVY) 200-25 mg Tab Take 1 tablet by mouth every evening.    fish oil-omega-3 fatty acids 300-1,000 mg capsule Take 2 capsules by mouth once daily.    HIGH POTENCY MULTIVITAMIN 400 mcg Tab Take 1 tablet by mouth once daily.    metFORMIN (GLUCOPHAGE) 1000 MG tablet Take 1 tablet (1,000 mg total) by mouth 2 (two) times daily.    multivitamin (THERAGRAN) tablet Take 1 tablet by mouth once daily.    zolpidem (AMBIEN) 5 MG Tab TAKE ONE TABLET BY MOUTH EVERY NIGHT AS NEEDED FOR INSOMNIA    blood sugar diagnostic Strp TEST 2 TIMES DAILY    blood-glucose meter Misc USE TO TEST BLOOD SUGAR 2 TO 3 TIMES DAILY    fenofibrate (TRICOR) 145 MG tablet Take 145 mg by mouth once daily.    TRUEPLUS LANCETS 30 gauge Misc USE TWICE DAILY TO TEST BLOOD SUGAR     Family History    None       Tobacco Use    Smoking status: Former Smoker     Years: 49.00     Types: Cigarettes     Start date:      Quit date: 2022     Years since quittin.5    Smokeless tobacco: Never Used    Tobacco comment: Handout provided for Ambulatory Smoking Cessation program   Substance and Sexual Activity    Alcohol use: No    Drug use: No    Sexual activity: Not Currently     Review of Systems   Constitutional:  Negative for chills and fever.   HENT:  Negative for congestion, postnasal drip and rhinorrhea.    Respiratory:  Negative for cough and shortness of breath.    Cardiovascular:   Negative for chest pain.   Gastrointestinal:  Positive for abdominal pain (expected Post op pain right LQ). Negative for abdominal distention.   Genitourinary:  Negative for difficulty urinating.   Musculoskeletal:  Negative for arthralgias and myalgias.   Skin:  Negative for color change.   Neurological:  Negative for weakness, light-headedness and headaches.   Hematological:  Does not bruise/bleed easily.   Psychiatric/Behavioral:  Negative for agitation.    Objective:     Vital Signs (Most Recent):  Temp: 98.2 °F (36.8 °C) (08/02/22 0756)  Pulse: 87 (08/02/22 0756)  Resp: 15 (08/02/22 0756)  BP: 123/68 (08/02/22 0756)  SpO2: 95 % (08/02/22 0820) Vital Signs (24h Range):  Temp:  [97.6 °F (36.4 °C)-99 °F (37.2 °C)] 98.2 °F (36.8 °C)  Pulse:  [66-87] 87  Resp:  [6-21] 15  SpO2:  [94 %-100 %] 95 %  BP: ()/(50-68) 123/68     Weight: 80 kg (176 lb 5.9 oz)  Body mass index is 23.92 kg/m².    Physical Exam  Vitals and nursing note reviewed.   Constitutional:       General: He is not in acute distress.     Appearance: He is well-developed. He is not diaphoretic.   HENT:      Head: Normocephalic and atraumatic.      Right Ear: External ear normal.      Left Ear: External ear normal.      Nose: Nose normal.   Eyes:      General: No scleral icterus.        Right eye: No discharge.         Left eye: No discharge.      Conjunctiva/sclera: Conjunctivae normal.      Pupils: Pupils are equal, round, and reactive to light.   Neck:      Thyroid: No thyromegaly.   Cardiovascular:      Rate and Rhythm: Normal rate and regular rhythm.      Heart sounds: Normal heart sounds. No murmur heard.  Pulmonary:      Effort: Pulmonary effort is normal. No respiratory distress.      Breath sounds: Normal breath sounds. No stridor. No wheezing or rales.   Abdominal:      General: Bowel sounds are normal. There is no distension.      Palpations: Abdomen is soft.      Tenderness: There is abdominal tenderness (Right LQ expected post op).    Musculoskeletal:         General: No tenderness.      Cervical back: Normal range of motion and neck supple.   Lymphadenopathy:      Cervical: No cervical adenopathy.   Skin:     General: Skin is warm and dry.      Capillary Refill: Capillary refill takes less than 2 seconds.      Findings: No erythema or rash.   Neurological:      Mental Status: He is alert and oriented to person, place, and time.      Cranial Nerves: No cranial nerve deficit.      Sensory: No sensory deficit.      Motor: No abnormal muscle tone.      Coordination: Coordination normal.   Psychiatric:         Behavior: Behavior normal.         Thought Content: Thought content normal.         Judgment: Judgment normal.         CRANIAL NERVES     CN III, IV, VI   Pupils are equal, round, and reactive to light.     Significant Labs: All pertinent labs within the past 24 hours have been reviewed.  CBC:   Recent Labs   Lab 08/02/22  0336   WBC 7.63   HGB 9.7*   HCT 28.5*        CMP:   Recent Labs   Lab 08/02/22  0335      K 2.6*      CO2 23   *   BUN 13   CREATININE 1.3   CALCIUM 8.8   ANIONGAP 11       Significant Imaging: I have reviewed all pertinent imaging results/findings within the past 24 hours.    Assessment/Plan:     * Inguinal hernia  Tele-monitoring.  Continue to follow Dr. Ramirez's recommendations.  Needs aggressive incentive spirometry.  Follow hemoglobin and hematocrit closely.  Pain control with IV narcotics and antiemetics as needed.  Observe fall precautions.  Check blood glucose level q AC/HS.  Use Novolog Insulin Sliding Scale as needed.   Continue routine medications as before.   Gastric ulcer prophylaxis with gastric acid suppressant.  Deep venous thrombosis prophylaxis with Lovenox 40 mg SQ daily.  See Orders.      Mixed hyperlipidemia  Hx noted        Type 2 diabetes mellitus with skin complication, without long-term current use of insulin  Patient's FSGs are controlled on current medication  regimen.  Last A1c reviewed-   Lab Results   Component Value Date    HGBA1C 9.4 (H) 08/01/2022     Most recent fingerstick glucose reviewed-   Recent Labs   Lab 08/01/22 2123   POCTGLUCOSE 187*     Current correctional scale  Low  Maintain anti-hyperglycemic dose as follows-   Antihyperglycemics (From admission, onward)            Start     Stop Route Frequency Ordered    08/01/22 1312  insulin aspart U-100 pen 1-10 Units         -- SubQ Every 6 hours PRN 08/01/22 1313        Hold Oral hypoglycemics while patient is in the hospital.    Depression  Patient has persistent depression which is mild and is currently controlled. Will Continue anti-depressant medications. We will not consult psychiatry at this time. Patient does not display psychosis at this time. Continue to monitor closely and adjust plan of care as needed.        HIV disease   Patient is on HAART. Will continue HAART. Prophylaxis was not indicated at this time.     Continue to monitor routine labs. Last CD4 count was   Lab Results   Component Value Date    ABSOLUTECD4 1216 02/23/2022       We will not consult Infectious disease at this time.   Evaluate and treat HIV-associated diseases as indicated by specific problems listed below.     Diabetic ulcer of toe of left foot associated with type 2 diabetes mellitus, with bone involvement without evidence of necrosis  Patient's FSGs are controlled on current medication regimen.  Last A1c reviewed-   Lab Results   Component Value Date    HGBA1C 9.4 (H) 08/01/2022     Most recent fingerstick glucose reviewed-   Recent Labs   Lab 08/01/22 2123   POCTGLUCOSE 187*     Current correctional scale  Medium  Maintain anti-hyperglycemic dose as follows-   Antihyperglycemics (From admission, onward)            Start     Stop Route Frequency Ordered    08/01/22 1312  insulin aspart U-100 pen 1-10 Units         -- SubQ Every 6 hours PRN 08/01/22 1313        Hold Oral hypoglycemics while patient is in the  [facility].      VTE Risk Mitigation (From admission, onward)         Ordered     enoxaparin injection 40 mg  Daily         08/01/22 1313     Place sequential compression device  Until discontinued         08/01/22 1313     IP VTE HIGH RISK PATIENT  Once         08/01/22 1313     Place LUCIANA hose  Until discontinued         08/01/22 1313                   Ebony Alvarado MD  Department of Hospital Medicine   [facility]

## 2022-08-02 NOTE — PLAN OF CARE
Per Dr. Alvarado discharge orders cancelled due to drop in pts H& H and hypokalemia. CM following.    08/02/22 1343   Discharge Assessment   Assessment Type Discharge Planning Reassessment

## 2022-08-02 NOTE — CARE UPDATE
08/01/22 2024   Patient Assessment/Suction   Level of Consciousness (AVPU) alert   Respiratory Effort Unlabored   PRE-TX-O2   O2 Device (Oxygen Therapy) room air   SpO2 95 %   Pulse Oximetry Type Intermittent   $ Pulse Oximetry - Multiple Charge Pulse Oximetry - Multiple   Pulse 75   Resp 18   Incentive Spirometer   $ Incentive Spirometer Charges done with encouragement;proper technique demonstrated   Administration (IS) proper technique demonstrated   Number of Repetitions (IS) 8   Level Incentive Spirometer (mL) 3000   Patient Tolerance (IS) good

## 2022-08-02 NOTE — SUBJECTIVE & OBJECTIVE
Interval History: pt continues to complain of pain at the surgical site no nausea no vomiting H&H trending down hypokalemic    Review of Systems   Constitutional:  Negative for chills and fever.   HENT:  Negative for congestion, postnasal drip and rhinorrhea.    Respiratory:  Negative for cough and shortness of breath.    Cardiovascular:  Negative for chest pain.   Gastrointestinal:  Positive for abdominal pain (expected Post op pain right LQ). Negative for abdominal distention.   Genitourinary:  Negative for difficulty urinating.   Musculoskeletal:  Negative for arthralgias and myalgias.   Skin:  Negative for color change.   Neurological:  Negative for weakness, light-headedness and headaches.   Hematological:  Does not bruise/bleed easily.   Psychiatric/Behavioral:  Negative for agitation.    Objective:     Vital Signs (Most Recent):  Temp: 99.7 °F (37.6 °C) (08/02/22 1124)  Pulse: 96 (08/02/22 1124)  Resp: 16 (08/02/22 1308)  BP: 139/62 (08/02/22 1124)  SpO2: (!) 93 % (08/02/22 1124)   Vital Signs (24h Range):  Temp:  [97.6 °F (36.4 °C)-99.7 °F (37.6 °C)] 99.7 °F (37.6 °C)  Pulse:  [75-96] 96  Resp:  [15-20] 16  SpO2:  [93 %-100 %] 93 %  BP: (109-147)/(60-68) 139/62     Weight: 80 kg (176 lb 5.9 oz)  Body mass index is 23.92 kg/m².    Intake/Output Summary (Last 24 hours) at 8/2/2022 1337  Last data filed at 8/2/2022 1308  Gross per 24 hour   Intake 5275.62 ml   Output 2200 ml   Net 3075.62 ml      Physical Exam  Vitals and nursing note reviewed.   Constitutional:       General: He is not in acute distress.     Appearance: He is well-developed. He is not diaphoretic.   HENT:      Head: Normocephalic and atraumatic.      Right Ear: External ear normal.      Left Ear: External ear normal.      Nose: Nose normal.   Eyes:      General: No scleral icterus.        Right eye: No discharge.         Left eye: No discharge.      Conjunctiva/sclera: Conjunctivae normal.      Pupils: Pupils are equal, round, and reactive to  light.   Neck:      Thyroid: No thyromegaly.   Cardiovascular:      Rate and Rhythm: Normal rate and regular rhythm.      Heart sounds: Normal heart sounds. No murmur heard.  Pulmonary:      Effort: Pulmonary effort is normal. No respiratory distress.      Breath sounds: Normal breath sounds. No stridor. No wheezing or rales.   Abdominal:      General: Abdomen is flat. Bowel sounds are normal. There is no distension.      Palpations: There is no mass.      Tenderness: There is abdominal tenderness.      Hernia: No hernia is present.      Comments: Some bruising around inguinal area   Musculoskeletal:         General: No tenderness.      Cervical back: Normal range of motion and neck supple.   Lymphadenopathy:      Cervical: No cervical adenopathy.   Skin:     General: Skin is warm and dry.      Capillary Refill: Capillary refill takes less than 2 seconds.      Findings: No erythema or rash.   Neurological:      Mental Status: He is alert and oriented to person, place, and time.      Cranial Nerves: No cranial nerve deficit.      Sensory: No sensory deficit.      Motor: No abnormal muscle tone.      Coordination: Coordination normal.   Psychiatric:         Behavior: Behavior normal.         Thought Content: Thought content normal.         Judgment: Judgment normal.       Significant Labs: All pertinent labs within the past 24 hours have been reviewed.  CBC:   Recent Labs   Lab 08/02/22  0336   WBC 7.63   HGB 9.7*   HCT 28.5*        CMP:   Recent Labs   Lab 08/02/22  0335      K 2.6*      CO2 23   *   BUN 13   CREATININE 1.3   CALCIUM 8.8   ANIONGAP 11       Significant Imaging: I have reviewed all pertinent imaging results/findings within the past 24 hours.

## 2022-08-02 NOTE — ASSESSMENT & PLAN NOTE
Patient's FSGs are controlled on current medication regimen.  Last A1c reviewed-   Lab Results   Component Value Date    HGBA1C 9.4 (H) 08/01/2022     Most recent fingerstick glucose reviewed-   Recent Labs   Lab 08/01/22 2123   POCTGLUCOSE 187*     Current correctional scale  Medium  Maintain anti-hyperglycemic dose as follows-   Antihyperglycemics (From admission, onward)            Start     Stop Route Frequency Ordered    08/01/22 1312  insulin aspart U-100 pen 1-10 Units         -- SubQ Every 6 hours PRN 08/01/22 1313        Hold Oral hypoglycemics while patient is in the hospital.

## 2022-08-02 NOTE — NURSING
POC reviewed with pt. Verbalized understanding. PIV 20 g L AC w/LR at 125 infusing. PRN pain meds administered. Dsg in place to R inguinal (groin) area. C/D/I and no visible drainage at this time. Tele box 6160, SR. Received critical value K+ (2.6) from chem, notified hospitalist and received orders for PRN potassium. Replaced PO 60mEq. Tolerated well. Bed low, wheels locked, call light within reach. Will continue to monitor.

## 2022-08-02 NOTE — ASSESSMENT & PLAN NOTE
Patient has persistent depression which is mild and is currently controlled. Will Continue anti-depressant medications. We will not consult psychiatry at this time. Patient does not display psychosis at this time. Continue to monitor closely and adjust plan of care as needed.

## 2022-08-02 NOTE — PROGRESS NOTES
[facility] Surgery  Progress Note    Subjective:     History of Present Illness:  No notes on file    Post-Op Info:  Procedure(s) (LRB):  REPAIR, HERNIA, INGUINAL, INCARCERATED, RECURRENT (Right)   1 Day Post-Op     Interval History: having pain, some control    Medications:  Continuous Infusions:   lactated ringers Stopped (08/02/22 1228)     Scheduled Meds:   efavirenz  600 mg Oral QHS    emtricitabine-tenofovir alafen  1 tablet Oral QHS    enoxaparin  40 mg Subcutaneous Daily    hydroCHLOROthiazide  25 mg Oral Daily    lisinopriL  20 mg Oral Daily    pantoprazole  40 mg Oral Daily    sertraline  100 mg Oral Daily    zolpidem  5 mg Oral Nightly     PRN Meds:diphenhydrAMINE, glucagon (human recombinant), HYDROmorphone, insulin aspart U-100, magnesium oxide, magnesium oxide, ondansetron, oxyCODONE, potassium bicarbonate, potassium bicarbonate, potassium bicarbonate     Review of patient's allergies indicates:   Allergen Reactions    Chantix [varenicline]      Objective:     Vital Signs (Most Recent):  Temp: 99.7 °F (37.6 °C) (08/02/22 1124)  Pulse: 96 (08/02/22 1124)  Resp: 16 (08/02/22 1308)  BP: 139/62 (08/02/22 1124)  SpO2: (!) 93 % (08/02/22 1124)   Vital Signs (24h Range):  Temp:  [97.6 °F (36.4 °C)-99.7 °F (37.6 °C)] 99.7 °F (37.6 °C)  Pulse:  [75-96] 96  Resp:  [15-20] 16  SpO2:  [93 %-100 %] 93 %  BP: (109-147)/(60-68) 139/62     Weight: 80 kg (176 lb 5.9 oz)  Body mass index is 23.92 kg/m².    Intake/Output - Last 3 Shifts         07/31 0700 08/01 0659 08/01 0700 08/02 0659 08/02 0700 08/03 0659    P.O.  460 840    I.V. (mL/kg)  2106.4 (26.3) 2097.7 (26.2)    IV Piggyback  850 41.6    Total Intake(mL/kg)  3416.4 (42.7) 2979.3 (37.2)    Urine (mL/kg/hr)  1550 (0.8) 650 (1.2)    Total Output  1550 650    Net  +1866.4 +2329.3                   Physical Exam  Abdominal:      General: There is no distension.      Palpations: Abdomen is soft. There is no mass.       Tenderness: There is no abdominal tenderness.      Hernia: No hernia is present.      Comments: Some bruising around inguinal area       Significant Labs:  I have reviewed all pertinent lab results within the past 24 hours.  CBC:   Recent Labs   Lab 08/02/22  0336   WBC 7.63   RBC 3.22*   HGB 9.7*   HCT 28.5*      MCV 89   MCH 30.1   MCHC 34.0     BMP:   Recent Labs   Lab 08/02/22  0335   *      K 2.6*      CO2 23   BUN 13   CREATININE 1.3   CALCIUM 8.8   MG 1.5*       Significant Diagnostics:  I have reviewed all pertinent imaging results/findings within the past 24 hours.    Assessment/Plan:     * Inguinal hernia  1 Day Post-Op    Open inguinal hernia repair  Continue observation for pain control and lab monitoring        Mack Ramirez MD  General Surgery  [facility]

## 2022-08-02 NOTE — SUBJECTIVE & OBJECTIVE
Past Medical History:   Diagnosis Date    Anxiety     Carotid artery disease     Depression     Diabetes mellitus     GERD (gastroesophageal reflux disease)     HIV (human immunodeficiency virus infection)     Osteomyelitis of great toe of left foot 08/2019    TB lung, latent 2006    treated with INH       Past Surgical History:   Procedure Laterality Date    CAROTID ENDARTERECTOMY Right 04/2022    HERNIORRHAPHY OF RECURRENT INCARCERATED INGUINAL HERNIA Right 8/1/2022    Procedure: REPAIR, HERNIA, INGUINAL, INCARCERATED, RECURRENT;  Surgeon: Mack Ramirez MD;  Location: [facility];  Service: General;  Laterality: Right;    UMBILICAL HERNIA REPAIR         Review of patient's allergies indicates:   Allergen Reactions    Chantix [varenicline]        No current facility-administered medications on file prior to encounter.     Current Outpatient Medications on File Prior to Encounter   Medication Sig    aspirin 325 MG tablet Take 1 tablet (325 mg total) by mouth once daily.    atorvastatin (LIPITOR) 40 MG tablet Take 1 tablet (40 mg total) by mouth once daily.    benazepril-hydrochlorthiazide (LOTENSIN HCT) 20-25 mg Tab Take 1 tablet by mouth once daily.    efavirenz (SUSTIVA) 600 mg Tab Take 1 tablet (600 mg total) by mouth every evening.    emtricitabine-tenofovir alafen (DESCOVY) 200-25 mg Tab Take 1 tablet by mouth every evening.    fish oil-omega-3 fatty acids 300-1,000 mg capsule Take 2 capsules by mouth once daily.    HIGH POTENCY MULTIVITAMIN 400 mcg Tab Take 1 tablet by mouth once daily.    metFORMIN (GLUCOPHAGE) 1000 MG tablet Take 1 tablet (1,000 mg total) by mouth 2 (two) times daily.    multivitamin (THERAGRAN) tablet Take 1 tablet by mouth once daily.    zolpidem (AMBIEN) 5 MG Tab TAKE ONE TABLET BY MOUTH EVERY NIGHT AS NEEDED FOR INSOMNIA    blood sugar diagnostic Strp TEST 2 TIMES DAILY    blood-glucose meter Misc USE TO TEST BLOOD SUGAR 2 TO 3 TIMES DAILY    fenofibrate (TRICOR) 145 MG tablet Take 145  mg by mouth once daily.    TRUEPLUS LANCETS 30 gauge Misc USE TWICE DAILY TO TEST BLOOD SUGAR     Family History    None       Tobacco Use    Smoking status: Former Smoker     Years: 49.00     Types: Cigarettes     Start date:      Quit date: 2022     Years since quittin.5    Smokeless tobacco: Never Used    Tobacco comment: Handout provided for Ambulatory Smoking Cessation program   Substance and Sexual Activity    Alcohol use: No    Drug use: No    Sexual activity: Not Currently     Review of Systems   Constitutional:  Negative for chills and fever.   HENT:  Negative for congestion, postnasal drip and rhinorrhea.    Respiratory:  Negative for cough and shortness of breath.    Cardiovascular:  Negative for chest pain.   Gastrointestinal:  Positive for abdominal pain (expected Post op pain right LQ). Negative for abdominal distention.   Genitourinary:  Negative for difficulty urinating.   Musculoskeletal:  Negative for arthralgias and myalgias.   Skin:  Negative for color change.   Neurological:  Negative for weakness, light-headedness and headaches.   Hematological:  Does not bruise/bleed easily.   Psychiatric/Behavioral:  Negative for agitation.    Objective:     Vital Signs (Most Recent):  Temp: 98.2 °F (36.8 °C) (22 0756)  Pulse: 87 (22 0756)  Resp: 15 (22 0756)  BP: 123/68 (22 0756)  SpO2: 95 % (22 0820) Vital Signs (24h Range):  Temp:  [97.6 °F (36.4 °C)-99 °F (37.2 °C)] 98.2 °F (36.8 °C)  Pulse:  [66-87] 87  Resp:  [6-21] 15  SpO2:  [94 %-100 %] 95 %  BP: ()/(50-68) 123/68     Weight: 80 kg (176 lb 5.9 oz)  Body mass index is 23.92 kg/m².    Physical Exam  Vitals and nursing note reviewed.   Constitutional:       General: He is not in acute distress.     Appearance: He is well-developed. He is not diaphoretic.   HENT:      Head: Normocephalic and atraumatic.      Right Ear: External ear normal.      Left Ear: External ear normal.      Nose: Nose normal.   Eyes:       General: No scleral icterus.        Right eye: No discharge.         Left eye: No discharge.      Conjunctiva/sclera: Conjunctivae normal.      Pupils: Pupils are equal, round, and reactive to light.   Neck:      Thyroid: No thyromegaly.   Cardiovascular:      Rate and Rhythm: Normal rate and regular rhythm.      Heart sounds: Normal heart sounds. No murmur heard.  Pulmonary:      Effort: Pulmonary effort is normal. No respiratory distress.      Breath sounds: Normal breath sounds. No stridor. No wheezing or rales.   Abdominal:      General: Bowel sounds are normal. There is no distension.      Palpations: Abdomen is soft.      Tenderness: There is abdominal tenderness (Right LQ expected post op).   Musculoskeletal:         General: No tenderness.      Cervical back: Normal range of motion and neck supple.   Lymphadenopathy:      Cervical: No cervical adenopathy.   Skin:     General: Skin is warm and dry.      Capillary Refill: Capillary refill takes less than 2 seconds.      Findings: No erythema or rash.   Neurological:      Mental Status: He is alert and oriented to person, place, and time.      Cranial Nerves: No cranial nerve deficit.      Sensory: No sensory deficit.      Motor: No abnormal muscle tone.      Coordination: Coordination normal.   Psychiatric:         Behavior: Behavior normal.         Thought Content: Thought content normal.         Judgment: Judgment normal.         CRANIAL NERVES     CN III, IV, VI   Pupils are equal, round, and reactive to light.     Significant Labs: All pertinent labs within the past 24 hours have been reviewed.  CBC:   Recent Labs   Lab 08/02/22  0336   WBC 7.63   HGB 9.7*   HCT 28.5*        CMP:   Recent Labs   Lab 08/02/22  0335      K 2.6*      CO2 23   *   BUN 13   CREATININE 1.3   CALCIUM 8.8   ANIONGAP 11       Significant Imaging: I have reviewed all pertinent imaging results/findings within the past 24 hours.

## 2022-08-02 NOTE — ASSESSMENT & PLAN NOTE
1 Day Post-Op    Open inguinal hernia repair  Continue observation for pain control and lab monitoring

## 2022-08-02 NOTE — PLAN OF CARE
[facility]  Initial Discharge Assessment       Primary Care Provider: Francois Eugene MD    Admission Diagnosis: Recurrent right inguinal hernia [K40.91]  Recurrent inguinal hernia [K40.91]    Admission Date: 8/1/2022  Expected Discharge Date:  Pt confirmed home address and lives with Sister Patricia [phone]. Pt denied HH and has a home rollator. Pt confirmed Fr. Eugene as PCP and insurance as [employer] medicare and Medicaid. Pts Pharmacy is [facility] and he stated that his sister will provide transport home. CM following.     Discharge Barriers Identified: None    Payor: [employer] MANAGED MEDICARE / Plan: [employer] SNP (SPECIAL NEEDS PLAN) / Product Type: Medicare Advantage /     Extended Emergency Contact Information  Primary Emergency Contact: Zackery Tomlinson   [facility]  Home Phone: [phone]  Work Phone: [phone]  Mobile Phone: [phone]  Relation: Friend  Secondary Emergency Contact: Lombardo,Marie  Mobile Phone: [phone]  Relation: Sister       Discharge Plan B: Home      [facility] [address]  [address]  [place] [address]  Phone: [phone] Fax: [phone]      Initial Assessment (most recent)     Adult Discharge Assessment - 08/02/22 0922        Discharge Assessment    Assessment Type Discharge Planning Assessment     Confirmed/corrected address, phone number and insurance Yes     Confirmed Demographics Correct on Facesheet     Source of Information patient     Communicated KALE with patient/caregiver Yes     Lives With spouse     Facility Arrived From: home     Do you expect to return to your current living situation? Yes     Do you have help at home or someone to help you manage your care at home? Yes     Who are your caregiver(s) and their phone number(s)? Sister Patricia [phone]     Prior to hospitilization cognitive status: Alert/Oriented     Current cognitive status: Alert/Oriented     Walking  or Climbing Stairs Difficulty none     Dressing/Bathing Difficulty none     Home Layout Able to live on 1st floor     Equipment Currently Used at Home rollator     Readmission within 30 days? No     Patient currently being followed by outpatient case management? No     Do you currently have service(s) that help you manage your care at home? No     Do you take prescription medications? Yes     Do you have prescription coverage? Yes     Do you have any problems affording any of your prescribed medications? No     Is the patient taking medications as prescribed? yes     Who is going to help you get home at discharge? Sister Patricia [phone]     How do you get to doctors appointments? family or friend will provide     Are you on dialysis? No     Do you take coumadin? No     Discharge Plan B Home     DME Needed Upon Discharge  none     Discharge Plan discussed with: Patient     Discharge Barriers Identified None        Relationship/Environment    Name(s) of Who Lives With Patient Sister Patricia [phone]

## 2022-08-02 NOTE — PROGRESS NOTES
[facility] Hospital Medicine  Progress Note    Patient Name: Brandon Parson  MRN: [id number]  Patient Class: OP- Outpatient Recovery   Admission Date: 8/1/2022  Length of Stay: 0 days  Attending Physician: Mack Ramirez MD  Primary Care Provider: Francois Eugene MD        Subjective:     Principal Problem:Inguinal hernia        HPI:  Patient is a 71 y.o. male with a past medical history of HIV, Diabetes mellitus, Depression, GERD, and Anxiety admitted under hospitalist service S/p REPAIR, HERNIA, INGUINAL, INCARCERATED, RECURRENT   Pt was diagnosed with right inguina hernia.  Has had a repair in the past laparoscopically but it has returned.  It has been getting large and painful at times.  He had a CEA a few months ago and currently on asa.       Complications: None; patient tolerated the procedure well.     Total IV Fluids: see anesthesia     Estimated Blood Loss (EBL): 50.0 cc           Drains: none     Implants: bard mesh cut to position     Specimens: hernia sack           Condition: stable     Disposition: PACU - hemodynamically stable.        Overview/Hospital Course:  No notes on file    Interval History: pt continues to complain of pain at the surgical site no nausea no vomiting H&H trending down hypokalemic    Review of Systems   Constitutional:  Negative for chills and fever.   HENT:  Negative for congestion, postnasal drip and rhinorrhea.    Respiratory:  Negative for cough and shortness of breath.    Cardiovascular:  Negative for chest pain.   Gastrointestinal:  Positive for abdominal pain (expected Post op pain right LQ). Negative for abdominal distention.   Genitourinary:  Negative for difficulty urinating.   Musculoskeletal:  Negative for arthralgias and myalgias.   Skin:  Negative for color change.   Neurological:  Negative for weakness, light-headedness and headaches.   Hematological:  Does not bruise/bleed easily.   Psychiatric/Behavioral:  Negative for agitation.     Objective:     Vital Signs (Most Recent):  Temp: 99.7 °F (37.6 °C) (08/02/22 1124)  Pulse: 96 (08/02/22 1124)  Resp: 16 (08/02/22 1308)  BP: 139/62 (08/02/22 1124)  SpO2: (!) 93 % (08/02/22 1124)   Vital Signs (24h Range):  Temp:  [97.6 °F (36.4 °C)-99.7 °F (37.6 °C)] 99.7 °F (37.6 °C)  Pulse:  [75-96] 96  Resp:  [15-20] 16  SpO2:  [93 %-100 %] 93 %  BP: (109-147)/(60-68) 139/62     Weight: 80 kg (176 lb 5.9 oz)  Body mass index is 23.92 kg/m².    Intake/Output Summary (Last 24 hours) at 8/2/2022 1337  Last data filed at 8/2/2022 1308  Gross per 24 hour   Intake 5275.62 ml   Output 2200 ml   Net 3075.62 ml      Physical Exam  Vitals and nursing note reviewed.   Constitutional:       General: He is not in acute distress.     Appearance: He is well-developed. He is not diaphoretic.   HENT:      Head: Normocephalic and atraumatic.      Right Ear: External ear normal.      Left Ear: External ear normal.      Nose: Nose normal.   Eyes:      General: No scleral icterus.        Right eye: No discharge.         Left eye: No discharge.      Conjunctiva/sclera: Conjunctivae normal.      Pupils: Pupils are equal, round, and reactive to light.   Neck:      Thyroid: No thyromegaly.   Cardiovascular:      Rate and Rhythm: Normal rate and regular rhythm.      Heart sounds: Normal heart sounds. No murmur heard.  Pulmonary:      Effort: Pulmonary effort is normal. No respiratory distress.      Breath sounds: Normal breath sounds. No stridor. No wheezing or rales.   Abdominal:      General: Abdomen is flat. Bowel sounds are normal. There is no distension.      Palpations: There is no mass.      Tenderness: There is abdominal tenderness.      Hernia: No hernia is present.      Comments: Some bruising around inguinal area   Musculoskeletal:         General: No tenderness.      Cervical back: Normal range of motion and neck supple.   Lymphadenopathy:      Cervical: No cervical adenopathy.   Skin:     General: Skin is warm and dry.       Capillary Refill: Capillary refill takes less than 2 seconds.      Findings: No erythema or rash.   Neurological:      Mental Status: He is alert and oriented to person, place, and time.      Cranial Nerves: No cranial nerve deficit.      Sensory: No sensory deficit.      Motor: No abnormal muscle tone.      Coordination: Coordination normal.   Psychiatric:         Behavior: Behavior normal.         Thought Content: Thought content normal.         Judgment: Judgment normal.       Significant Labs: All pertinent labs within the past 24 hours have been reviewed.  CBC:   Recent Labs   Lab 08/02/22  0336   WBC 7.63   HGB 9.7*   HCT 28.5*        CMP:   Recent Labs   Lab 08/02/22  0335      K 2.6*      CO2 23   *   BUN 13   CREATININE 1.3   CALCIUM 8.8   ANIONGAP 11       Significant Imaging: I have reviewed all pertinent imaging results/findings within the past 24 hours.      Assessment/Plan:      * Inguinal hernia    Tele-monitoring.  Continue to follow Dr. Ramirez's recommendations.  Needs aggressive incentive spirometry.  Follow hemoglobin and hematocrit closely.  Pain control with IV narcotics and antiemetics as needed.  Observe fall precautions.  Check blood glucose level q AC/HS.  Use Novolog Insulin Sliding Scale as needed.   Continue routine medications as before.   Gastric ulcer prophylaxis with gastric acid suppressant.  Deep venous thrombosis prophylaxis with Lovenox 40 mg SQ daily.  See Orders.      Hypokalemia  Will  continue to monitor will replete aggressively      Mixed hyperlipidemia  Hx noted        Type 2 diabetes mellitus with skin complication, without long-term current use of insulin  Patient's FSGs are controlled on current medication regimen.  Last A1c reviewed-   Lab Results   Component Value Date    HGBA1C 9.4 (H) 08/01/2022     Most recent fingerstick glucose reviewed-   Recent Labs   Lab 08/01/22 2123   POCTGLUCOSE 187*     Current correctional scale  Low  Maintain  anti-hyperglycemic dose as follows-   Antihyperglycemics (From admission, onward)            Start     Stop Route Frequency Ordered    08/01/22 1312  insulin aspart U-100 pen 1-10 Units         -- SubQ Every 6 hours PRN 08/01/22 1313        Hold Oral hypoglycemics while patient is in the hospital.    Depression  Patient has persistent depression which is mild and is currently controlled. Will Continue anti-depressant medications. We will not consult psychiatry at this time. Patient does not display psychosis at this time. Continue to monitor closely and adjust plan of care as needed.        HIV disease   Patient is on HAART. Will continue HAART. Prophylaxis was not indicated at this time.     Continue to monitor routine labs. Last CD4 count was   Lab Results   Component Value Date    ABSOLUTECD4 1216 02/23/2022       We will not consult Infectious disease at this time.   Evaluate and treat HIV-associated diseases as indicated by specific problems listed below.     Diabetic ulcer of toe of left foot associated with type 2 diabetes mellitus, with bone involvement without evidence of necrosis  Patient's FSGs are controlled on current medication regimen.  Last A1c reviewed-   Lab Results   Component Value Date    HGBA1C 9.4 (H) 08/01/2022     Most recent fingerstick glucose reviewed-   Recent Labs   Lab 08/01/22  2123   POCTGLUCOSE 187*     Current correctional scale  Medium  Maintain anti-hyperglycemic dose as follows-   Antihyperglycemics (From admission, onward)            Start     Stop Route Frequency Ordered    08/01/22 1312  insulin aspart U-100 pen 1-10 Units         -- SubQ Every 6 hours PRN 08/01/22 1313        Hold Oral hypoglycemics while patient is in the hospital.      VTE Risk Mitigation (From admission, onward)         Ordered     enoxaparin injection 40 mg  Daily         08/01/22 1313     Place sequential compression device  Until discontinued         08/01/22 1313     IP VTE HIGH RISK PATIENT  Once          08/01/22 1313     Place LUCIANA hose  Until discontinued         08/01/22 1313                Discharge Planning   KALE: 8/3/2022     Code Status: Prior   Is the patient medically ready for discharge?:     Reason for patient still in hospital (select all that apply): Patient trending condition and Laboratory test  Discharge Plan A: Home with family                  Ebony Alvarado MD  Department of Hospital Medicine   [facility]

## 2022-08-02 NOTE — PLAN OF CARE
The pt is cleared for discharge home from case management.    08/02/22 1057   Final Note   Assessment Type Final Discharge Note   Anticipated Discharge Disposition Home   Hospital Resources/Appts/Education Provided Appointments scheduled and added to AVS

## 2022-08-03 LAB
ANION GAP SERPL CALC-SCNC: 10 MMOL/L (ref 8–16)
BASOPHILS # BLD AUTO: 0.02 K/UL (ref 0–0.2)
BASOPHILS NFR BLD: 0.2 % (ref 0–1.9)
BUN SERPL-MCNC: 13 MG/DL (ref 8–23)
CALCIUM SERPL-MCNC: 9.1 MG/DL (ref 8.7–10.5)
CHLORIDE SERPL-SCNC: 102 MMOL/L (ref 95–110)
CO2 SERPL-SCNC: 26 MMOL/L (ref 23–29)
CREAT SERPL-MCNC: 1.3 MG/DL (ref 0.5–1.4)
DIFFERENTIAL METHOD: ABNORMAL
EOSINOPHIL # BLD AUTO: 0.1 K/UL (ref 0–0.5)
EOSINOPHIL NFR BLD: 1.2 % (ref 0–8)
ERYTHROCYTE [DISTWIDTH] IN BLOOD BY AUTOMATED COUNT: 14.8 % (ref 11.5–14.5)
EST. GFR  (NO RACE VARIABLE): 58 ML/MIN/1.73 M^2
GLUCOSE SERPL-MCNC: 200 MG/DL (ref 70–110)
HCT VFR BLD AUTO: 30.7 % (ref 40–54)
HGB BLD-MCNC: 10.2 G/DL (ref 14–18)
IMM GRANULOCYTES # BLD AUTO: 0.04 K/UL (ref 0–0.04)
IMM GRANULOCYTES NFR BLD AUTO: 0.5 % (ref 0–0.5)
LYMPHOCYTES # BLD AUTO: 1.2 K/UL (ref 1–4.8)
LYMPHOCYTES NFR BLD: 14.4 % (ref 18–48)
MCH RBC QN AUTO: 29.8 PG (ref 27–31)
MCHC RBC AUTO-ENTMCNC: 33.2 G/DL (ref 32–36)
MCV RBC AUTO: 90 FL (ref 82–98)
MONOCYTES # BLD AUTO: 0.8 K/UL (ref 0.3–1)
MONOCYTES NFR BLD: 9.5 % (ref 4–15)
NEUTROPHILS # BLD AUTO: 6 K/UL (ref 1.8–7.7)
NEUTROPHILS NFR BLD: 74.2 % (ref 38–73)
NRBC BLD-RTO: 0 /100 WBC
PLATELET # BLD AUTO: 176 K/UL (ref 150–450)
PMV BLD AUTO: 9.5 FL (ref 9.2–12.9)
POCT GLUCOSE: 200 MG/DL (ref 70–110)
POCT GLUCOSE: 213 MG/DL (ref 70–110)
POCT GLUCOSE: 221 MG/DL (ref 70–110)
POTASSIUM SERPL-SCNC: 3.1 MMOL/L (ref 3.5–5.1)
RBC # BLD AUTO: 3.42 M/UL (ref 4.6–6.2)
SODIUM SERPL-SCNC: 138 MMOL/L (ref 136–145)
WBC # BLD AUTO: 8.1 K/UL (ref 3.9–12.7)

## 2022-08-03 PROCEDURE — 85025 COMPLETE CBC W/AUTO DIFF WBC: CPT | Performed by: SURGERY

## 2022-08-03 PROCEDURE — 25000003 PHARM REV CODE 250: Performed by: NURSE PRACTITIONER

## 2022-08-03 PROCEDURE — 36415 COLL VENOUS BLD VENIPUNCTURE: CPT | Performed by: SURGERY

## 2022-08-03 PROCEDURE — 94799 UNLISTED PULMONARY SVC/PX: CPT

## 2022-08-03 PROCEDURE — 80048 BASIC METABOLIC PNL TOTAL CA: CPT | Performed by: SURGERY

## 2022-08-03 PROCEDURE — 63600175 PHARM REV CODE 636 W HCPCS: Performed by: SURGERY

## 2022-08-03 PROCEDURE — 94761 N-INVAS EAR/PLS OXIMETRY MLT: CPT

## 2022-08-03 PROCEDURE — 25000003 PHARM REV CODE 250: Performed by: SURGERY

## 2022-08-03 RX ORDER — HYDROCODONE BITARTRATE AND ACETAMINOPHEN 7.5; 325 MG/1; MG/1
1 TABLET ORAL EVERY 4 HOURS PRN
Qty: 20 TABLET | Refills: 0 | Status: SHIPPED | OUTPATIENT
Start: 2022-08-03 | End: 2022-08-04

## 2022-08-03 RX ADMIN — PANTOPRAZOLE SODIUM 40 MG: 40 TABLET, DELAYED RELEASE ORAL at 08:08

## 2022-08-03 RX ADMIN — HYDROMORPHONE HYDROCHLORIDE 1 MG: 1 INJECTION, SOLUTION INTRAMUSCULAR; INTRAVENOUS; SUBCUTANEOUS at 08:08

## 2022-08-03 RX ADMIN — OXYCODONE HYDROCHLORIDE 10 MG: 10 TABLET ORAL at 03:08

## 2022-08-03 RX ADMIN — INSULIN ASPART 4 UNITS: 100 INJECTION, SOLUTION INTRAVENOUS; SUBCUTANEOUS at 04:08

## 2022-08-03 RX ADMIN — ENOXAPARIN SODIUM 40 MG: 100 INJECTION SUBCUTANEOUS at 04:08

## 2022-08-03 RX ADMIN — POTASSIUM BICARBONATE 35 MEQ: 391 TABLET, EFFERVESCENT ORAL at 03:08

## 2022-08-03 RX ADMIN — LISINOPRIL 20 MG: 10 TABLET ORAL at 08:08

## 2022-08-03 RX ADMIN — SERTRALINE HYDROCHLORIDE 100 MG: 50 TABLET ORAL at 08:08

## 2022-08-03 RX ADMIN — INSULIN ASPART 2 UNITS: 100 INJECTION, SOLUTION INTRAVENOUS; SUBCUTANEOUS at 12:08

## 2022-08-03 RX ADMIN — DIPHENHYDRAMINE HYDROCHLORIDE 12.5 MG: 50 INJECTION, SOLUTION INTRAMUSCULAR; INTRAVENOUS at 08:08

## 2022-08-03 RX ADMIN — POTASSIUM BICARBONATE 35 MEQ: 391 TABLET, EFFERVESCENT ORAL at 12:08

## 2022-08-03 RX ADMIN — HYDROMORPHONE HYDROCHLORIDE 1 MG: 1 INJECTION, SOLUTION INTRAMUSCULAR; INTRAVENOUS; SUBCUTANEOUS at 12:08

## 2022-08-03 RX ADMIN — HYDROCHLOROTHIAZIDE 25 MG: 12.5 TABLET ORAL at 08:08

## 2022-08-03 NOTE — NURSING
Spoke with Dr. Alvarado on phone and he said that he would put the order in for pt to be discharged but couldn't put the order in for pt's pain meds. He said that Dr. Ramirez would have to order his pain meds.   Spoke with Dr. Ramirez and he said that he would call in his pain pills for pt.

## 2022-08-03 NOTE — CARE UPDATE
08/02/22 2034   Patient Assessment/Suction   Level of Consciousness (AVPU) alert   Respiratory Effort Normal;Unlabored   Expansion/Accessory Muscles/Retractions no use of accessory muscles;no retractions;expansion symmetric   Rhythm/Pattern, Respiratory unlabored;pattern regular;depth regular   PRE-TX-O2   O2 Device (Oxygen Therapy) room air   SpO2 95 %   Pulse Oximetry Type Intermittent   $ Pulse Oximetry - Multiple Charge Pulse Oximetry - Multiple   Pulse 90   Resp 20   Incentive Spirometer   $ Incentive Spirometer Charges done with encouragement   Incentive Spirometer Predicted Level (mL) 2550   Administration (IS) instruction provided, follow-up   Number of Repetitions (IS) 10   Level Incentive Spirometer (mL) 2500   Patient Tolerance (IS) good

## 2022-08-03 NOTE — PLAN OF CARE
Problem: Adult Inpatient Plan of Care  Goal: Plan of Care Review  8/3/2022 1748 by Karla Cody RN  Outcome: Met  8/3/2022 1537 by Karla Cody RN  Outcome: Ongoing, Progressing  Goal: Patient-Specific Goal (Individualized)  8/3/2022 1748 by Karla Cody RN  Outcome: Met  8/3/2022 1537 by Karla Cody RN  Outcome: Ongoing, Progressing  Goal: Absence of Hospital-Acquired Illness or Injury  8/3/2022 1748 by Karla Cody RN  Outcome: Met  8/3/2022 1537 by Karla Cody RN  Outcome: Ongoing, Progressing  Goal: Optimal Comfort and Wellbeing  8/3/2022 1748 by Karla Cody RN  Outcome: Met  8/3/2022 1537 by Karla Cody RN  Outcome: Ongoing, Progressing  Goal: Readiness for Transition of Care  Outcome: Met     Problem: Diabetes Comorbidity  Goal: Blood Glucose Level Within Targeted Range  8/3/2022 1748 by Karla Cody RN  Outcome: Met  8/3/2022 1537 by Karla Cody RN  Outcome: Ongoing, Progressing     Problem: Impaired Wound Healing  Goal: Optimal Wound Healing  8/3/2022 1748 by Karla Cody RN  Outcome: Met  8/3/2022 1537 by Karla Cody RN  Outcome: Ongoing, Progressing     Problem: Fall Injury Risk  Goal: Absence of Fall and Fall-Related Injury  Outcome: Met

## 2022-08-03 NOTE — NURSING
POC reviewed with pt. Verbalized understanding. PIV 20 g L AC intact and patent. PRN pain meds administered. Dsg in place to R inguinal (groin) area. C/D/I and no visible drainage at this time. Bruising noted around site. Tele box 8660, SR. Bed low, wheels locked, call light within reach. Will continue to monitor.

## 2022-08-03 NOTE — NURSING
Pt AAOx4 lying in bed.  No c/o pain voiced.  Accu ck this am 221. Insulin required.  No other needs at this time.

## 2022-08-03 NOTE — CARE UPDATE
08/03/22 0738   Patient Assessment/Suction   Level of Consciousness (AVPU) alert   Respiratory Effort Normal;Unlabored   Expansion/Accessory Muscles/Retractions no retractions;no use of accessory muscles;expansion symmetric   All Lung Fields Breath Sounds clear;equal bilaterally   Rhythm/Pattern, Respiratory unlabored;pattern regular;depth regular   Cough Frequency no cough   PRE-TX-O2   O2 Device (Oxygen Therapy) room air   SpO2 97 %   Pulse Oximetry Type Intermittent   $ Pulse Oximetry - Multiple Charge Pulse Oximetry - Multiple   Pulse 86   Resp 16   Incentive Spirometer   $ Incentive Spirometer Charges done with encouragement   Incentive Spirometer Predicted Level (mL) 2550   Administration (IS) instruction provided, follow-up   Number of Repetitions (IS) 10   Level Incentive Spirometer (mL) 3000   Patient Tolerance (IS) good       Patient encouraged to do deep breathing exercises independently.

## 2022-08-03 NOTE — PROGRESS NOTES
Doing well  Pain controlled    Tolerating diet  Labs vitals reviewed  Abd benign    Ap  Sp Inguinal hernia repair  Ok to do with pain meds   Fu 2 weeks

## 2022-08-03 NOTE — PLAN OF CARE
Problem: Adult Inpatient Plan of Care  Goal: Plan of Care Review  Outcome: Ongoing, Progressing  Goal: Patient-Specific Goal (Individualized)  Outcome: Ongoing, Progressing  Goal: Absence of Hospital-Acquired Illness or Injury  Outcome: Ongoing, Progressing  Goal: Optimal Comfort and Wellbeing  Outcome: Ongoing, Progressing     Problem: Diabetes Comorbidity  Goal: Blood Glucose Level Within Targeted Range  Outcome: Ongoing, Progressing     Problem: Impaired Wound Healing  Goal: Optimal Wound Healing  Outcome: Ongoing, Progressing       PT still remains with bed alarm on and instructed to call for assistance since pt has unsteady gait. Pt has had a BM today on 8/3/2022. Pt is also urinating clear yellow urine.

## 2022-08-04 ENCOUNTER — TELEPHONE (OUTPATIENT)
Dept: SURGERY | Facility: CLINIC | Age: 72
End: 2022-08-04
Payer: MEDICARE

## 2022-08-04 RX ORDER — HYDROCODONE BITARTRATE AND ACETAMINOPHEN 7.5; 325 MG/1; MG/1
1 TABLET ORAL EVERY 4 HOURS PRN
Qty: 20 TABLET | Refills: 0 | Status: SHIPPED | OUTPATIENT
Start: 2022-08-04 | End: 2022-12-15

## 2022-08-04 RX ORDER — ONDANSETRON 4 MG/1
4 TABLET, ORALLY DISINTEGRATING ORAL EVERY 6 HOURS PRN
Qty: 30 TABLET | Refills: 0 | Status: SHIPPED | OUTPATIENT
Start: 2022-08-04

## 2022-08-04 NOTE — HOSPITAL COURSE
Patient was admitted to hospital medicine. Patient was closely monitored post-operatively. Blood sugar and blood pressure closely followed. Patient was given supportive care. Patient worked with Physical Therapist. Nausea and pain controlled. Patient tolerated liquid diet. Symptoms resolved.  Patient's potassium was running low was repleted aggressively patient was medically stable for discharge

## 2022-08-04 NOTE — TELEPHONE ENCOUNTER
Returned call to patient's sister, Swapna. Informed her that Dr. Ramirez [facility] Complete understanding and appreciation verbalized.     ----- Message from Nadeen Torrez Patient Care Assistant sent at 8/4/2022  9:36 AM CDT -----  Contact: Pt Sister Swapna  Type: Needs Medical Advice    Who Called: Pt Sister Swapna  Best Call Back Number: [phone]  Inquiry/Question: Swapna is calling to see if the patient can get pain medication sent to   [facility]  [address] [place] [id number]  Phone: [phone]  Please call back and advise.   Thank you~

## 2022-08-04 NOTE — DISCHARGE SUMMARY
[facility] Medicine  Discharge Summary      Patient Name: Brandon Parson  MRN: [id number]  Patient Class: OP- Outpatient Recovery  Admission Date: 8/1/2022  Hospital Length of Stay: 0 days  Discharge Date and Time: 8/3/2022  7:00 PM  Attending Physician: No att. providers found   Discharging Provider: Ebony Alvarado MD  Primary Care Provider: Francois Eugene MD      HPI:   Patient is a 71 y.o. male with a past medical history of HIV, Diabetes mellitus, Depression, GERD, and Anxiety admitted under hospitalist service S/p REPAIR, HERNIA, INGUINAL, INCARCERATED, RECURRENT   Pt was diagnosed with right inguina hernia.  Has had a repair in the past laparoscopically but it has returned.  It has been getting large and painful at times.  He had a CEA a few months ago and currently on asa.       Complications: None; patient tolerated the procedure well.     Total IV Fluids: see anesthesia     Estimated Blood Loss (EBL): 50.0 cc           Drains: none     Implants: bard mesh cut to position     Specimens: hernia sack           Condition: stable     Disposition: PACU - hemodynamically stable.        Procedure(s) (LRB):  REPAIR, HERNIA, INGUINAL, INCARCERATED, RECURRENT (Right)      Hospital Course:   Patient was admitted to hospital medicine. Patient was closely monitored post-operatively. Blood sugar and blood pressure closely followed. Patient was given supportive care. Patient worked with Physical Therapist. Nausea and pain controlled. Patient tolerated liquid diet. Symptoms resolved.  Patient's potassium was running low was repleted aggressively patient was medically stable for discharge       Goals of Care Treatment Preferences:  Code Status: DNR      Consults:     No new Assessment & Plan notes have been filed under this hospital service since the last note was generated.  Service: Hospital Medicine    Final Active Diagnoses:    Diagnosis Date Noted POA    PRINCIPAL PROBLEM:  Inguinal  hernia [K40.90] 08/02/2022 Yes    Hypokalemia [E87.6] 08/02/2022 Yes    Mixed hyperlipidemia [E78.2] 03/03/2022 Yes    Diabetic ulcer of toe of left foot associated with type 2 diabetes mellitus, with bone involvement without evidence of necrosis [E11.621, L97.526] 08/16/2019 Yes    HIV disease [B20] 08/16/2019 Yes    Depression [F32.A] 08/16/2019 Yes    Type 2 diabetes mellitus with skin complication, without long-term current use of insulin [E11.628] 08/16/2019 Yes      Problems Resolved During this Admission:       Discharged Condition: stable    Disposition: Home or Self Care    Follow Up:   Follow-up Information     Francois Eugene MD Follow up.    Specialty: Family Medicine  Why: Message sent to the office to call patient back with an appointment date and time  Contact information:  [address]  [place] [address]  [phone]             Mack Ramirez MD Follow up on 8/16/2022.    Specialties: General Surgery, Bariatrics, Surgery  Why: 9:30 am  Contact information:  [address]  [address]  [place] [address]  [phone]                       Patient Instructions:      Activity as tolerated       Significant Diagnostic Studies: Labs:   CMP   Recent Labs   Lab 08/03/22  1003      K 3.1*      CO2 26   *   BUN 13   CREATININE 1.3   CALCIUM 9.1   ANIONGAP 10   , CBC   Recent Labs   Lab 08/03/22  1003   WBC 8.10   HGB 10.2*   HCT 30.7*      , Troponin No results for input(s): TROPONINI in the last 168 hours. and A1C:   Recent Labs   Lab 02/23/22  0614 08/01/22  1351   HGBA1C 6.6* 9.4*     Microbiology:   Blood Culture   Lab Results   Component Value Date    LABBLOO No growth after 5 days. 02/22/2022    and Urine Culture  No results found for: LABURIN    Pending Diagnostic Studies:     Procedure Component Value Units Date/Time    Specimen to Pathology, Surgery General Surgery [[id number]] Collected: 08/01/22 1105    Order Status: Sent Lab Status: In process Updated: 08/01/22  "1319    Specimen: Tissue          Medications:  Reconciled Home Medications:      Medication List      CONTINUE taking these medications    ascorbic acid (vitamin C) 100 MG tablet  Commonly known as: VITAMIN C  Take 100 mg by mouth once daily.     aspirin 325 MG tablet  Take 1 tablet (325 mg total) by mouth once daily.     atorvastatin 40 MG tablet  Commonly known as: LIPITOR  Take 1 tablet (40 mg total) by mouth once daily.     b complex vitamins capsule  Take 1 capsule by mouth once daily.     BD ULTRA-FINE MINI PEN NEEDLE 31 gauge x 3/16" Ndle  Generic drug: pen needle, diabetic  USE 2 TIMES A DAY     benazepril-hydrochlorthiazide 20-25 mg Tab  Commonly known as: LOTENSIN HCT  Take 1 tablet by mouth once daily.     blood sugar diagnostic Strp  TEST 2 TIMES DAILY     blood-glucose meter Misc  USE TO TEST BLOOD SUGAR 2 TO 3 TIMES DAILY     clonazePAM 0.25 MG Tbdl  Commonly known as: KlonoPIN  Take 1 tablet (0.25 mg total) by mouth once daily.     efavirenz 600 mg Tab  Commonly known as: SUSTIVA  Take 1 tablet (600 mg total) by mouth every evening.     emtricitabine-tenofovir alafen 200-25 mg Tab  Commonly known as: DESCOVY  Take 1 tablet by mouth every evening.     fenofibrate 145 MG tablet  Commonly known as: TRICOR  Take 145 mg by mouth once daily.     fenofibrate micronized 134 MG Cap  Commonly known as: LOFIBRA  Take 1 capsule (134 mg total) by mouth once daily.     fish oil-omega-3 fatty acids 300-1,000 mg capsule  Take 2 capsules by mouth once daily.     HIGH POTENCY MULTIVITAMIN 400 mcg Tab  Generic drug: multivitamin with folic acid  Take 1 tablet by mouth once daily.     * insulin aspart protamine-insulin aspart 100 unit/mL (70-30) Inpn pen  Commonly known as: NovoLOG 70/30  Inject 16 Units into the skin once daily.     * insulin aspart protamine-insulin aspart 100 unit/mL (70-30) Inpn pen  Commonly known as: NovoLOG 70/30  Inject 18 Units into the skin nightly.     metFORMIN 1000 MG tablet  Commonly known " as: GLUCOPHAGE  Take 1 tablet (1,000 mg total) by mouth 2 (two) times daily.     multivitamin tablet  Commonly known as: THERAGRAN  Take 1 tablet by mouth once daily.     sertraline 100 MG tablet  Commonly known as: ZOLOFT  Take 1 tablet (100 mg total) by mouth once daily.     TRUEPLUS LANCETS 30 gauge Misc  Generic drug: lancets  USE TWICE DAILY TO TEST BLOOD SUGAR     zolpidem 5 MG Tab  Commonly known as: AMBIEN  TAKE ONE TABLET BY MOUTH EVERY NIGHT AS NEEDED FOR INSOMNIA         * This list has 2 medication(s) that are the same as other medications prescribed for you. Read the directions carefully, and ask your doctor or other care provider to review them with you.                Indwelling Lines/Drains at time of discharge:   Lines/Drains/Airways     None                 Time spent on the discharge of patient: 60 minutes         Ebony Alvarado MD  Department of Hospital Medicine  [facility]

## 2022-08-04 NOTE — PLAN OF CARE
08/04/22 0726   Final Note   Assessment Type Final Discharge Note   Anticipated Discharge Disposition Home

## 2022-08-05 VITALS
HEIGHT: 72 IN | SYSTOLIC BLOOD PRESSURE: 134 MMHG | WEIGHT: 176.38 LBS | DIASTOLIC BLOOD PRESSURE: 65 MMHG | RESPIRATION RATE: 17 BRPM | BODY MASS INDEX: 23.89 KG/M2 | TEMPERATURE: 100 F | HEART RATE: 93 BPM | OXYGEN SATURATION: 94 %

## 2022-08-05 LAB
FINAL PATHOLOGIC DIAGNOSIS: NORMAL
GROSS: NORMAL
Lab: NORMAL

## 2022-08-05 NOTE — PROGRESS NOTES
Pt.  States very pleased with care in surgery area and on floor. Soreness with getting up and down, pain controlled with current meds.  Sister helping with care and shower. Understands discharge instructions,  and leave steri strips intact. Had BM today and on stool softener.

## 2022-08-08 DIAGNOSIS — B20 HIV DISEASE: Primary | ICD-10-CM

## 2022-08-08 DIAGNOSIS — Z79.899 CHRONIC PRESCRIPTION BENZODIAZEPINE USE: ICD-10-CM

## 2022-08-08 RX ORDER — ZOLPIDEM TARTRATE 5 MG/1
TABLET ORAL
Qty: 30 TABLET | Refills: 2 | Status: SHIPPED | OUTPATIENT
Start: 2022-08-08 | End: 2022-11-21

## 2022-08-08 RX ORDER — CLONAZEPAM 0.12 MG/1
0.12 TABLET, ORALLY DISINTEGRATING ORAL DAILY
Qty: 30 TABLET | Refills: 0 | Status: SHIPPED | OUTPATIENT
Start: 2022-08-08 | End: 2022-09-12 | Stop reason: DRUGHIGH

## 2022-08-08 RX ORDER — EFAVIRENZ 600 MG/1
600 TABLET, FILM COATED ORAL NIGHTLY
Qty: 30 TABLET | Refills: 5 | Status: SHIPPED | OUTPATIENT
Start: 2022-08-08 | End: 2023-02-13 | Stop reason: SDUPTHER

## 2022-08-08 NOTE — TELEPHONE ENCOUNTER
No new care gaps identified.  [facility] Embedded Care Gaps. Reference number: [id number]. 8/08/2022   10:25:39 AM LEONARDT

## 2022-08-16 ENCOUNTER — OFFICE VISIT (OUTPATIENT)
Dept: SURGERY | Facility: CLINIC | Age: 72
End: 2022-08-16
Payer: MEDICARE

## 2022-08-16 VITALS
HEART RATE: 53 BPM | SYSTOLIC BLOOD PRESSURE: 163 MMHG | DIASTOLIC BLOOD PRESSURE: 77 MMHG | TEMPERATURE: 98 F | WEIGHT: 173.31 LBS | BODY MASS INDEX: 23.47 KG/M2 | RESPIRATION RATE: 16 BRPM | HEIGHT: 72 IN

## 2022-08-16 DIAGNOSIS — K40.91 RECURRENT RIGHT INGUINAL HERNIA: Primary | ICD-10-CM

## 2022-08-16 PROCEDURE — 3078F PR MOST RECENT DIASTOLIC BLOOD PRESSURE < 80 MM HG: ICD-10-PCS | Mod: CPTII,S$GLB,, | Performed by: SURGERY

## 2022-08-16 PROCEDURE — 99999 PR PBB SHADOW E&M-EST. PATIENT-LVL IV: CPT | Mod: PBBFAC,,, | Performed by: SURGERY

## 2022-08-16 PROCEDURE — 3046F HEMOGLOBIN A1C LEVEL >9.0%: CPT | Mod: CPTII,S$GLB,, | Performed by: SURGERY

## 2022-08-16 PROCEDURE — 99024 POSTOP FOLLOW-UP VISIT: CPT | Mod: S$GLB,,, | Performed by: SURGERY

## 2022-08-16 PROCEDURE — 1125F PR PAIN SEVERITY QUANTIFIED, PAIN PRESENT: ICD-10-PCS | Mod: CPTII,S$GLB,, | Performed by: SURGERY

## 2022-08-16 PROCEDURE — 3061F PR NEG MICROALBUMINURIA RESULT DOCUMENTED/REVIEW: ICD-10-PCS | Mod: CPTII,S$GLB,, | Performed by: SURGERY

## 2022-08-16 PROCEDURE — 3066F NEPHROPATHY DOC TX: CPT | Mod: CPTII,S$GLB,, | Performed by: SURGERY

## 2022-08-16 PROCEDURE — 1101F PR PT FALLS ASSESS DOC 0-1 FALLS W/OUT INJ PAST YR: ICD-10-PCS | Mod: CPTII,S$GLB,, | Performed by: SURGERY

## 2022-08-16 PROCEDURE — 3288F FALL RISK ASSESSMENT DOCD: CPT | Mod: CPTII,S$GLB,, | Performed by: SURGERY

## 2022-08-16 PROCEDURE — 1101F PT FALLS ASSESS-DOCD LE1/YR: CPT | Mod: CPTII,S$GLB,, | Performed by: SURGERY

## 2022-08-16 PROCEDURE — 3077F SYST BP >= 140 MM HG: CPT | Mod: CPTII,S$GLB,, | Performed by: SURGERY

## 2022-08-16 PROCEDURE — 4010F ACE/ARB THERAPY RXD/TAKEN: CPT | Mod: CPTII,S$GLB,, | Performed by: SURGERY

## 2022-08-16 PROCEDURE — 3066F PR DOCUMENTATION OF TREATMENT FOR NEPHROPATHY: ICD-10-PCS | Mod: CPTII,S$GLB,, | Performed by: SURGERY

## 2022-08-16 PROCEDURE — 1125F AMNT PAIN NOTED PAIN PRSNT: CPT | Mod: CPTII,S$GLB,, | Performed by: SURGERY

## 2022-08-16 PROCEDURE — 3008F PR BODY MASS INDEX (BMI) DOCUMENTED: ICD-10-PCS | Mod: CPTII,S$GLB,, | Performed by: SURGERY

## 2022-08-16 PROCEDURE — 99999 PR PBB SHADOW E&M-EST. PATIENT-LVL IV: ICD-10-PCS | Mod: PBBFAC,,, | Performed by: SURGERY

## 2022-08-16 PROCEDURE — 1159F MED LIST DOCD IN RCRD: CPT | Mod: CPTII,S$GLB,, | Performed by: SURGERY

## 2022-08-16 PROCEDURE — 99024 PR POST-OP FOLLOW-UP VISIT: ICD-10-PCS | Mod: S$GLB,,, | Performed by: SURGERY

## 2022-08-16 PROCEDURE — 1159F PR MEDICATION LIST DOCUMENTED IN MEDICAL RECORD: ICD-10-PCS | Mod: CPTII,S$GLB,, | Performed by: SURGERY

## 2022-08-16 PROCEDURE — 3061F NEG MICROALBUMINURIA REV: CPT | Mod: CPTII,S$GLB,, | Performed by: SURGERY

## 2022-08-16 PROCEDURE — 3008F BODY MASS INDEX DOCD: CPT | Mod: CPTII,S$GLB,, | Performed by: SURGERY

## 2022-08-16 PROCEDURE — 4010F PR ACE/ARB THEARPY RXD/TAKEN: ICD-10-PCS | Mod: CPTII,S$GLB,, | Performed by: SURGERY

## 2022-08-16 PROCEDURE — 3046F PR MOST RECENT HEMOGLOBIN A1C LEVEL > 9.0%: ICD-10-PCS | Mod: CPTII,S$GLB,, | Performed by: SURGERY

## 2022-08-16 PROCEDURE — 3078F DIAST BP <80 MM HG: CPT | Mod: CPTII,S$GLB,, | Performed by: SURGERY

## 2022-08-16 PROCEDURE — 3077F PR MOST RECENT SYSTOLIC BLOOD PRESSURE >= 140 MM HG: ICD-10-PCS | Mod: CPTII,S$GLB,, | Performed by: SURGERY

## 2022-08-16 PROCEDURE — 3288F PR FALLS RISK ASSESSMENT DOCUMENTED: ICD-10-PCS | Mod: CPTII,S$GLB,, | Performed by: SURGERY

## 2022-08-16 NOTE — PROGRESS NOTES
Continues to do well  Incision healing well  Mild blistering on lateral wound is some thickening to spermatic cord no obvious hernia recurrence    Assessment plan  Doing well will have him return in a month just to make sure the swelling is improved.

## 2022-08-17 DIAGNOSIS — E11.9 TYPE 2 DIABETES MELLITUS WITHOUT COMPLICATION, UNSPECIFIED WHETHER LONG TERM INSULIN USE: ICD-10-CM

## 2022-08-29 DIAGNOSIS — Z79.899 CHRONIC PRESCRIPTION BENZODIAZEPINE USE: ICD-10-CM

## 2022-08-29 NOTE — TELEPHONE ENCOUNTER
No new care gaps identified.  [facility] Embedded Care Gaps. Reference number: [id number]. 8/29/2022   4:36:49 PM CDT

## 2022-08-30 RX ORDER — CLONAZEPAM 0.12 MG/1
TABLET, ORALLY DISINTEGRATING ORAL
Qty: 30 TABLET | Refills: 0 | OUTPATIENT
Start: 2022-08-30

## 2022-09-08 ENCOUNTER — OFFICE VISIT (OUTPATIENT)
Dept: SURGERY | Facility: CLINIC | Age: 72
End: 2022-09-08
Payer: MEDICARE

## 2022-09-08 ENCOUNTER — DOCUMENTATION ONLY (OUTPATIENT)
Dept: PULMONOLOGY | Facility: CLINIC | Age: 72
End: 2022-09-08

## 2022-09-08 ENCOUNTER — LAB VISIT (OUTPATIENT)
Dept: LAB | Facility: HOSPITAL | Age: 72
End: 2022-09-08
Attending: INTERNAL MEDICINE
Payer: MEDICARE

## 2022-09-08 ENCOUNTER — OFFICE VISIT (OUTPATIENT)
Dept: INFECTIOUS DISEASES | Facility: CLINIC | Age: 72
End: 2022-09-08
Payer: MEDICARE

## 2022-09-08 VITALS
BODY MASS INDEX: 22.24 KG/M2 | HEIGHT: 72 IN | HEART RATE: 89 BPM | DIASTOLIC BLOOD PRESSURE: 58 MMHG | WEIGHT: 164.19 LBS | TEMPERATURE: 98 F | OXYGEN SATURATION: 97 % | SYSTOLIC BLOOD PRESSURE: 118 MMHG

## 2022-09-08 VITALS
TEMPERATURE: 98 F | SYSTOLIC BLOOD PRESSURE: 108 MMHG | HEIGHT: 72 IN | DIASTOLIC BLOOD PRESSURE: 66 MMHG | BODY MASS INDEX: 22.17 KG/M2 | WEIGHT: 163.69 LBS | RESPIRATION RATE: 16 BRPM | HEART RATE: 91 BPM

## 2022-09-08 DIAGNOSIS — E87.6 HYPOKALEMIA: ICD-10-CM

## 2022-09-08 DIAGNOSIS — B20 HUMAN IMMUNODEFICIENCY VIRUS I INFECTION: ICD-10-CM

## 2022-09-08 DIAGNOSIS — R10.31 GROIN PAIN, CHRONIC, RIGHT: ICD-10-CM

## 2022-09-08 DIAGNOSIS — E78.2 MIXED HYPERLIPIDEMIA: ICD-10-CM

## 2022-09-08 DIAGNOSIS — R19.09 GROIN SWELLING: ICD-10-CM

## 2022-09-08 DIAGNOSIS — E55.9 VITAMIN D DEFICIENCY: Primary | ICD-10-CM

## 2022-09-08 DIAGNOSIS — M96.811: Primary | ICD-10-CM

## 2022-09-08 DIAGNOSIS — E08.65 DIABETES MELLITUS DUE TO UNDERLYING CONDITION WITH HYPERGLYCEMIA, WITH LONG-TERM CURRENT USE OF INSULIN: ICD-10-CM

## 2022-09-08 DIAGNOSIS — G89.29 GROIN PAIN, CHRONIC, RIGHT: ICD-10-CM

## 2022-09-08 DIAGNOSIS — Z79.4 DIABETES MELLITUS DUE TO UNDERLYING CONDITION WITH HYPERGLYCEMIA, WITH LONG-TERM CURRENT USE OF INSULIN: ICD-10-CM

## 2022-09-08 DIAGNOSIS — E55.9 VITAMIN D DEFICIENCY: ICD-10-CM

## 2022-09-08 LAB
25(OH)D3+25(OH)D2 SERPL-MCNC: 98 NG/ML (ref 30–96)
ALBUMIN SERPL BCP-MCNC: 4.4 G/DL (ref 3.5–5.2)
ALP SERPL-CCNC: 71 U/L (ref 55–135)
ALT SERPL W/O P-5'-P-CCNC: 20 U/L (ref 10–44)
ANION GAP SERPL CALC-SCNC: 15 MMOL/L (ref 8–16)
AST SERPL-CCNC: 18 U/L (ref 10–40)
BASOPHILS # BLD AUTO: 0.03 K/UL (ref 0–0.2)
BASOPHILS NFR BLD: 0.3 % (ref 0–1.9)
BILIRUB SERPL-MCNC: 0.8 MG/DL (ref 0.1–1)
BUN SERPL-MCNC: 24 MG/DL (ref 8–23)
CALCIUM SERPL-MCNC: 10.5 MG/DL (ref 8.7–10.5)
CHLORIDE SERPL-SCNC: 98 MMOL/L (ref 95–110)
CHOLEST SERPL-MCNC: 154 MG/DL (ref 120–199)
CHOLEST/HDLC SERPL: 4.4 {RATIO} (ref 2–5)
CO2 SERPL-SCNC: 24 MMOL/L (ref 23–29)
CREAT SERPL-MCNC: 1.9 MG/DL (ref 0.5–1.4)
DIFFERENTIAL METHOD: ABNORMAL
EOSINOPHIL # BLD AUTO: 0.1 K/UL (ref 0–0.5)
EOSINOPHIL NFR BLD: 1.4 % (ref 0–8)
ERYTHROCYTE [DISTWIDTH] IN BLOOD BY AUTOMATED COUNT: 14 % (ref 11.5–14.5)
EST. GFR  (NO RACE VARIABLE): 37 ML/MIN/1.73 M^2
GLUCOSE SERPL-MCNC: 341 MG/DL (ref 70–110)
HCT VFR BLD AUTO: 38.8 % (ref 40–54)
HDLC SERPL-MCNC: 35 MG/DL (ref 40–75)
HDLC SERPL: 22.7 % (ref 20–50)
HGB BLD-MCNC: 13.2 G/DL (ref 14–18)
IMM GRANULOCYTES # BLD AUTO: 0.03 K/UL (ref 0–0.04)
IMM GRANULOCYTES NFR BLD AUTO: 0.3 % (ref 0–0.5)
LDLC SERPL CALC-MCNC: 70.2 MG/DL (ref 63–159)
LYMPHOCYTES # BLD AUTO: 3.1 K/UL (ref 1–4.8)
LYMPHOCYTES NFR BLD: 30.3 % (ref 18–48)
MCH RBC QN AUTO: 29.2 PG (ref 27–31)
MCHC RBC AUTO-ENTMCNC: 34 G/DL (ref 32–36)
MCV RBC AUTO: 86 FL (ref 82–98)
MONOCYTES # BLD AUTO: 0.8 K/UL (ref 0.3–1)
MONOCYTES NFR BLD: 7.6 % (ref 4–15)
NEUTROPHILS # BLD AUTO: 6.1 K/UL (ref 1.8–7.7)
NEUTROPHILS NFR BLD: 60.1 % (ref 38–73)
NONHDLC SERPL-MCNC: 119 MG/DL
NRBC BLD-RTO: 0 /100 WBC
PLATELET # BLD AUTO: 238 K/UL (ref 150–450)
PMV BLD AUTO: 10.3 FL (ref 9.2–12.9)
POTASSIUM SERPL-SCNC: 2.7 MMOL/L (ref 3.5–5.1)
PROT SERPL-MCNC: 8 G/DL (ref 6–8.4)
RBC # BLD AUTO: 4.52 M/UL (ref 4.6–6.2)
SODIUM SERPL-SCNC: 137 MMOL/L (ref 136–145)
TRIGL SERPL-MCNC: 244 MG/DL (ref 30–150)
WBC # BLD AUTO: 10.15 K/UL (ref 3.9–12.7)

## 2022-09-08 PROCEDURE — 82306 VITAMIN D 25 HYDROXY: CPT | Performed by: INTERNAL MEDICINE

## 2022-09-08 PROCEDURE — 1159F PR MEDICATION LIST DOCUMENTED IN MEDICAL RECORD: ICD-10-PCS | Mod: CPTII,S$GLB,, | Performed by: INTERNAL MEDICINE

## 2022-09-08 PROCEDURE — 3008F BODY MASS INDEX DOCD: CPT | Mod: CPTII,S$GLB,, | Performed by: SURGERY

## 2022-09-08 PROCEDURE — 3066F PR DOCUMENTATION OF TREATMENT FOR NEPHROPATHY: ICD-10-PCS | Mod: CPTII,S$GLB,, | Performed by: INTERNAL MEDICINE

## 2022-09-08 PROCEDURE — 99999 PR PBB SHADOW E&M-EST. PATIENT-LVL IV: CPT | Mod: PBBFAC,,, | Performed by: SURGERY

## 2022-09-08 PROCEDURE — 1159F MED LIST DOCD IN RCRD: CPT | Mod: CPTII,S$GLB,, | Performed by: SURGERY

## 2022-09-08 PROCEDURE — 3046F HEMOGLOBIN A1C LEVEL >9.0%: CPT | Mod: CPTII,S$GLB,, | Performed by: SURGERY

## 2022-09-08 PROCEDURE — 4010F PR ACE/ARB THEARPY RXD/TAKEN: ICD-10-PCS | Mod: CPTII,S$GLB,, | Performed by: INTERNAL MEDICINE

## 2022-09-08 PROCEDURE — 1125F PR PAIN SEVERITY QUANTIFIED, PAIN PRESENT: ICD-10-PCS | Mod: CPTII,S$GLB,, | Performed by: SURGERY

## 2022-09-08 PROCEDURE — 80053 COMPREHEN METABOLIC PANEL: CPT | Performed by: INTERNAL MEDICINE

## 2022-09-08 PROCEDURE — 99214 PR OFFICE/OUTPT VISIT, EST, LEVL IV, 30-39 MIN: ICD-10-PCS | Mod: S$GLB,,, | Performed by: INTERNAL MEDICINE

## 2022-09-08 PROCEDURE — 1125F AMNT PAIN NOTED PAIN PRSNT: CPT | Mod: CPTII,S$GLB,, | Performed by: SURGERY

## 2022-09-08 PROCEDURE — 3078F PR MOST RECENT DIASTOLIC BLOOD PRESSURE < 80 MM HG: ICD-10-PCS | Mod: CPTII,S$GLB,, | Performed by: INTERNAL MEDICINE

## 2022-09-08 PROCEDURE — 3061F NEG MICROALBUMINURIA REV: CPT | Mod: CPTII,S$GLB,, | Performed by: INTERNAL MEDICINE

## 2022-09-08 PROCEDURE — 1159F PR MEDICATION LIST DOCUMENTED IN MEDICAL RECORD: ICD-10-PCS | Mod: CPTII,S$GLB,, | Performed by: SURGERY

## 2022-09-08 PROCEDURE — 3074F SYST BP LT 130 MM HG: CPT | Mod: CPTII,S$GLB,, | Performed by: INTERNAL MEDICINE

## 2022-09-08 PROCEDURE — 1160F RVW MEDS BY RX/DR IN RCRD: CPT | Mod: CPTII,S$GLB,, | Performed by: INTERNAL MEDICINE

## 2022-09-08 PROCEDURE — 4010F ACE/ARB THERAPY RXD/TAKEN: CPT | Mod: CPTII,S$GLB,, | Performed by: SURGERY

## 2022-09-08 PROCEDURE — 3046F HEMOGLOBIN A1C LEVEL >9.0%: CPT | Mod: CPTII,S$GLB,, | Performed by: INTERNAL MEDICINE

## 2022-09-08 PROCEDURE — 82010 KETONE BODYS QUAN: CPT | Performed by: INTERNAL MEDICINE

## 2022-09-08 PROCEDURE — 87536 HIV-1 QUANT&REVRSE TRNSCRPJ: CPT | Performed by: INTERNAL MEDICINE

## 2022-09-08 PROCEDURE — 1160F PR REVIEW ALL MEDS BY PRESCRIBER/CLIN PHARMACIST DOCUMENTED: ICD-10-PCS | Mod: CPTII,S$GLB,, | Performed by: INTERNAL MEDICINE

## 2022-09-08 PROCEDURE — 85025 COMPLETE CBC W/AUTO DIFF WBC: CPT | Performed by: INTERNAL MEDICINE

## 2022-09-08 PROCEDURE — 3288F FALL RISK ASSESSMENT DOCD: CPT | Mod: CPTII,S$GLB,, | Performed by: INTERNAL MEDICINE

## 2022-09-08 PROCEDURE — 4010F ACE/ARB THERAPY RXD/TAKEN: CPT | Mod: CPTII,S$GLB,, | Performed by: INTERNAL MEDICINE

## 2022-09-08 PROCEDURE — 3066F PR DOCUMENTATION OF TREATMENT FOR NEPHROPATHY: ICD-10-PCS | Mod: CPTII,S$GLB,, | Performed by: SURGERY

## 2022-09-08 PROCEDURE — 3061F PR NEG MICROALBUMINURIA RESULT DOCUMENTED/REVIEW: ICD-10-PCS | Mod: CPTII,S$GLB,, | Performed by: INTERNAL MEDICINE

## 2022-09-08 PROCEDURE — 36415 COLL VENOUS BLD VENIPUNCTURE: CPT | Performed by: INTERNAL MEDICINE

## 2022-09-08 PROCEDURE — 1125F PR PAIN SEVERITY QUANTIFIED, PAIN PRESENT: ICD-10-PCS | Mod: CPTII,S$GLB,, | Performed by: INTERNAL MEDICINE

## 2022-09-08 PROCEDURE — 1100F PTFALLS ASSESS-DOCD GE2>/YR: CPT | Mod: CPTII,S$GLB,, | Performed by: SURGERY

## 2022-09-08 PROCEDURE — 1100F PR PT FALLS ASSESS DOC 2+ FALLS/FALL W/INJURY/YR: ICD-10-PCS | Mod: CPTII,S$GLB,, | Performed by: INTERNAL MEDICINE

## 2022-09-08 PROCEDURE — 3061F PR NEG MICROALBUMINURIA RESULT DOCUMENTED/REVIEW: ICD-10-PCS | Mod: CPTII,S$GLB,, | Performed by: SURGERY

## 2022-09-08 PROCEDURE — 3046F PR MOST RECENT HEMOGLOBIN A1C LEVEL > 9.0%: ICD-10-PCS | Mod: CPTII,S$GLB,, | Performed by: SURGERY

## 2022-09-08 PROCEDURE — 80061 LIPID PANEL: CPT | Performed by: INTERNAL MEDICINE

## 2022-09-08 PROCEDURE — 3008F PR BODY MASS INDEX (BMI) DOCUMENTED: ICD-10-PCS | Mod: CPTII,S$GLB,, | Performed by: INTERNAL MEDICINE

## 2022-09-08 PROCEDURE — 3078F DIAST BP <80 MM HG: CPT | Mod: CPTII,S$GLB,, | Performed by: INTERNAL MEDICINE

## 2022-09-08 PROCEDURE — 99024 POSTOP FOLLOW-UP VISIT: CPT | Mod: S$GLB,,, | Performed by: SURGERY

## 2022-09-08 PROCEDURE — 3074F PR MOST RECENT SYSTOLIC BLOOD PRESSURE < 130 MM HG: ICD-10-PCS | Mod: CPTII,S$GLB,, | Performed by: INTERNAL MEDICINE

## 2022-09-08 PROCEDURE — 1100F PTFALLS ASSESS-DOCD GE2>/YR: CPT | Mod: CPTII,S$GLB,, | Performed by: INTERNAL MEDICINE

## 2022-09-08 PROCEDURE — 3008F PR BODY MASS INDEX (BMI) DOCUMENTED: ICD-10-PCS | Mod: CPTII,S$GLB,, | Performed by: SURGERY

## 2022-09-08 PROCEDURE — 3046F PR MOST RECENT HEMOGLOBIN A1C LEVEL > 9.0%: ICD-10-PCS | Mod: CPTII,S$GLB,, | Performed by: INTERNAL MEDICINE

## 2022-09-08 PROCEDURE — 3074F SYST BP LT 130 MM HG: CPT | Mod: CPTII,S$GLB,, | Performed by: SURGERY

## 2022-09-08 PROCEDURE — 1100F PR PT FALLS ASSESS DOC 2+ FALLS/FALL W/INJURY/YR: ICD-10-PCS | Mod: CPTII,S$GLB,, | Performed by: SURGERY

## 2022-09-08 PROCEDURE — 4010F PR ACE/ARB THEARPY RXD/TAKEN: ICD-10-PCS | Mod: CPTII,S$GLB,, | Performed by: SURGERY

## 2022-09-08 PROCEDURE — 83036 HEMOGLOBIN GLYCOSYLATED A1C: CPT | Performed by: INTERNAL MEDICINE

## 2022-09-08 PROCEDURE — 3074F PR MOST RECENT SYSTOLIC BLOOD PRESSURE < 130 MM HG: ICD-10-PCS | Mod: CPTII,S$GLB,, | Performed by: SURGERY

## 2022-09-08 PROCEDURE — 99999 PR PBB SHADOW E&M-EST. PATIENT-LVL IV: ICD-10-PCS | Mod: PBBFAC,,, | Performed by: SURGERY

## 2022-09-08 PROCEDURE — 3066F NEPHROPATHY DOC TX: CPT | Mod: CPTII,S$GLB,, | Performed by: INTERNAL MEDICINE

## 2022-09-08 PROCEDURE — 99024 PR POST-OP FOLLOW-UP VISIT: ICD-10-PCS | Mod: S$GLB,,, | Performed by: SURGERY

## 2022-09-08 PROCEDURE — 3066F NEPHROPATHY DOC TX: CPT | Mod: CPTII,S$GLB,, | Performed by: SURGERY

## 2022-09-08 PROCEDURE — 1159F MED LIST DOCD IN RCRD: CPT | Mod: CPTII,S$GLB,, | Performed by: INTERNAL MEDICINE

## 2022-09-08 PROCEDURE — 3078F PR MOST RECENT DIASTOLIC BLOOD PRESSURE < 80 MM HG: ICD-10-PCS | Mod: CPTII,S$GLB,, | Performed by: SURGERY

## 2022-09-08 PROCEDURE — 3078F DIAST BP <80 MM HG: CPT | Mod: CPTII,S$GLB,, | Performed by: SURGERY

## 2022-09-08 PROCEDURE — 3288F PR FALLS RISK ASSESSMENT DOCUMENTED: ICD-10-PCS | Mod: CPTII,S$GLB,, | Performed by: INTERNAL MEDICINE

## 2022-09-08 PROCEDURE — 1125F AMNT PAIN NOTED PAIN PRSNT: CPT | Mod: CPTII,S$GLB,, | Performed by: INTERNAL MEDICINE

## 2022-09-08 PROCEDURE — 3288F PR FALLS RISK ASSESSMENT DOCUMENTED: ICD-10-PCS | Mod: CPTII,S$GLB,, | Performed by: SURGERY

## 2022-09-08 PROCEDURE — 99214 OFFICE O/P EST MOD 30 MIN: CPT | Mod: S$GLB,,, | Performed by: INTERNAL MEDICINE

## 2022-09-08 PROCEDURE — 3061F NEG MICROALBUMINURIA REV: CPT | Mod: CPTII,S$GLB,, | Performed by: SURGERY

## 2022-09-08 PROCEDURE — 3288F FALL RISK ASSESSMENT DOCD: CPT | Mod: CPTII,S$GLB,, | Performed by: SURGERY

## 2022-09-08 PROCEDURE — 3008F BODY MASS INDEX DOCD: CPT | Mod: CPTII,S$GLB,, | Performed by: INTERNAL MEDICINE

## 2022-09-08 RX ORDER — POTASSIUM CHLORIDE 20 MEQ/1
20 TABLET, EXTENDED RELEASE ORAL 2 TIMES DAILY
Qty: 30 TABLET | Refills: 1 | Status: SHIPPED | OUTPATIENT
Start: 2022-09-08 | End: 2022-10-03 | Stop reason: SDUPTHER

## 2022-09-08 RX ORDER — POTASSIUM CHLORIDE 20 MEQ/1
20 TABLET, EXTENDED RELEASE ORAL 2 TIMES DAILY
Qty: 30 TABLET | Refills: 1 | Status: SHIPPED | OUTPATIENT
Start: 2022-09-08 | End: 2022-09-08

## 2022-09-08 NOTE — PATIENT INSTRUCTIONS
Please resume the insulin that you have and measure your sugar 3 times a day and write them down and bring them to Dr. Eugene    Lab today    If you want a flu vaccine, I would get it in November.     Return in 6 months

## 2022-09-08 NOTE — PROGRESS NOTES
Subjective:       Patient ID: Brandon Parson is a 71 y.o. male.    Chief Complaint:: Follow-up and HIV Positive/AIDS    HIV Positive/AIDS  Follow-up   Transferring care to [facility]. Followed by Dr. Dawn Helm/[place] for 20 years.   [place] he was HIV positive over 20 yrs ago. He was admitted to [facility] for pneumonia (pneumocystis?). Does not recall his first T cell count. Last CD4 1215, 2/22/22,  But last viral load was 30 on 9/17/21.     He has been under Dr. Dawn Helm's care at [facility], then [facility], then [facility]). Reviewed available records in care everywhere.   He has been on Descovy/Sustiva for many years, cannot recall regimens prior to this  No episodes of shingles, received 2 shingles vaccines 2020  No history of hepatitis   Treated for latent TB 2006-07  No other STDs  Diabetes, carotid  Vascular disease. Recently admitted for syncope. Stopped smoking 2 months ago  DANK on TID clonazepam an dhs ambien, for years. (sister concerned about this). He lives with sister near [place], spends time with another sister in [place] and a friend on the [facility]. Does not drive. Has limited his life greatly due to COVID.   Preparing for an inguinal hernia repair    5/9/22: since last visit had a right CEA. Still off cigarettes since January. Awaiting inguinal hernia repair. Taking  mg daily  He is more active and getting out of the house more than at last visit. Still not driving.   Taking the following: Vitamin D 5000 IU per day, inc 30 mg elemental or 220 mg of sulfate, Vitamin C 500-1000 mg per day. Could not find quercetin  Reviewed labs . TB Gold still positive .     9/8/22: since last visit, he has had inguinal hernia repair.  His blood sugar was over 400 this morning. He was without insulin for 3-4 days several weeks ago. The supply came to him warm and he did not take it for the last month.  He had been instructed by previous physician to never take insulin that was not cold.  He  "was told by the pharmacist that it should be okay but he did not re-refrigerate it and has not been taking it.  Discussed with Hospital pharmacist and His novolog is good for 14d outside of the refrigerator. He has polydipsia and polyuria now.  He is taking a gal of water to the bedside at night.  He has lost 10 lb.  He did not call this issue to the attention of his primary or to me.      Current Outpatient Medications:     ascorbic acid, vitamin C, (VITAMIN C) 100 MG tablet, Take 100 mg by mouth once daily., Disp: , Rfl:     atorvastatin (LIPITOR) 40 MG tablet, Take 1 tablet (40 mg total) by mouth once daily., Disp: 90 tablet, Rfl: 3    b complex vitamins capsule, Take 1 capsule by mouth once daily., Disp: , Rfl:     BD ULTRA-FINE MINI PEN NEEDLE 31 gauge x 3/16" Ndle, USE 2 TIMES A DAY, Disp: 200 each, Rfl: 3    blood sugar diagnostic Strp, TEST 2 TIMES DAILY, Disp: , Rfl:     blood-glucose meter Misc, USE TO TEST BLOOD SUGAR 2 TO 3 TIMES DAILY, Disp: , Rfl:     clonazePAM (KLONOPIN) 0.125 MG disintegrating tablet, Take 1 tablet (0.125 mg total) by mouth once daily., Disp: 30 tablet, Rfl: 0    efavirenz (SUSTIVA) 600 mg Tab, Take 1 tablet (600 mg total) by mouth every evening., Disp: 30 tablet, Rfl: 5    emtricitabine-tenofovir alafen (DESCOVY) 200-25 mg Tab, Take 1 tablet by mouth every evening., Disp: 30 tablet, Rfl: 5    fenofibrate (TRICOR) 145 MG tablet, Take 145 mg by mouth once daily., Disp: , Rfl:     fenofibrate micronized (LOFIBRA) 134 MG Cap, Take 1 capsule (134 mg total) by mouth once daily., Disp: 90 capsule, Rfl: 3    fish oil-omega-3 fatty acids 300-1,000 mg capsule, Take 2 capsules by mouth once daily., Disp: , Rfl:     HIGH POTENCY MULTIVITAMIN 400 mcg Tab, Take 1 tablet by mouth once daily., Disp: , Rfl:     insulin aspart protamine-insulin aspart (NOVOLOG 70/30) 100 unit/mL (70-30) InPn pen, Inject 16 Units into the skin once daily., Disp: 14.4 mL, Rfl: 2    insulin aspart protamine-insulin " aspart (NOVOLOG 70/30) 100 unit/mL (70-30) InPn pen, Inject 18 Units into the skin nightly., Disp: 16.2 mL, Rfl: 3    metFORMIN (GLUCOPHAGE) 1000 MG tablet, Take 1 tablet (1,000 mg total) by mouth 2 (two) times daily., Disp: 60 tablet, Rfl: 3    multivitamin (THERAGRAN) tablet, Take 1 tablet by mouth once daily., Disp: , Rfl:     ondansetron (ZOFRAN-ODT) 4 MG TbDL, Take 1 tablet (4 mg total) by mouth every 6 (six) hours as needed (nv)., Disp: 30 tablet, Rfl: 0    sertraline (ZOLOFT) 100 MG tablet, Take 1 tablet (100 mg total) by mouth once daily., Disp: 90 tablet, Rfl: 3    TRUEPLUS LANCETS 30 gauge Misc, USE TWICE DAILY TO TEST BLOOD SUGAR, Disp: , Rfl:     aspirin 325 MG tablet, Take 1 tablet (325 mg total) by mouth once daily., Disp: 30 tablet, Rfl: 0    benazepril-hydrochlorthiazide (LOTENSIN HCT) 20-25 mg Tab, Take 1 tablet by mouth once daily., Disp: , Rfl:     HYDROcodone-acetaminophen (NORCO) 7.5-325 mg per tablet, Take 1 tablet by mouth every 4 (four) hours as needed for Pain. (Patient not taking: Reported on 9/8/2022), Disp: 20 tablet, Rfl: 0    potassium chloride SA (K-DUR,KLOR-CON) 20 MEQ tablet, Take 1 tablet (20 mEq total) by mouth 2 (two) times daily., Disp: 30 tablet, Rfl: 1    zolpidem (AMBIEN) 5 MG Tab, TAKE ONE TABLET BY MOUTH EVERY NIGHT AS NEEDED FOR INSOMNIA (Patient not taking: No sig reported), Disp: 30 tablet, Rfl: 2  Review of patient's allergies indicates:   Allergen Reactions    Chantix [varenicline]      Past Medical History:   Diagnosis Date    Anxiety     Carotid artery disease     Depression     Diabetes mellitus     GERD (gastroesophageal reflux disease)     HIV (human immunodeficiency virus infection)     Osteomyelitis of great toe of left foot 08/2019    TB lung, latent 2006    treated with INH   pneumonia 2000, probably pneumocystis     Diabetic foot ulcer    Past Surgical History:   Procedure Laterality Date    CAROTID ENDARTERECTOMY Right 04/2022    HERNIORRHAPHY OF RECURRENT  INCARCERATED INGUINAL HERNIA Right 2022    Procedure: REPAIR, HERNIA, INGUINAL, INCARCERATED, RECURRENT;  Surgeon: Mack Ramirez MD;  [facility];  Service: General;  Laterality: Right;    UMBILICAL HERNIA REPAIR       Social History     Socioeconomic History    Marital status: Single   Tobacco Use    Smoking status: Former     Years: 49.00     Types: Cigarettes     Start date:      Quit date: 2022     Years since quittin.6    Smokeless tobacco: Never    Tobacco comments:     Handout provided for Ambulatory Smoking Cessation program   Substance and Sexual Activity    Alcohol use: No    Drug use: No    Sexual activity: Not Currently     History reviewed. No pertinent family history.    Travel History: [place], [place]    Vaccine History: see immunization tab. Pneumovax , . HAV and HBV vax -  Advanced Directive:   Safer Sex: abstinent since diagnosis  Bone Density:   Colonoscopy: , and 2 tubulovillous adenomas 2016,  postponed due to COVID   at public [employer], retired  at the time of illness    Review of Systems    Constitutional: No fever, chills, sweats, fatigue, weakness,  weight loss    Eyes: No change in vision, loss of vision, diplopia, photophobia. Awaiting UTD eye exam    ENT: No sinus drainage, sore throat, mouth pain, or lesions. Awaiting UTD dental exam    Cardiovascular: No chest pain, TRIANA, palpitations or pedal edema.     Respiratory: No shortness of breath, TRIANA, cough, wheeze, sputum, pleurisy, or hemoptysis    Gastrointestinal: No abdominal pain, nausea, vomiting, diarrhea, constipation, blood in stool       Genitourinary: No dysuria, hematuria, incontinence,         Musculoskeletal: No new pain, joint swelling, or injuries    Integumentary: No new rashes, lesions.  He has a bulge in the left side of the mons pubis since his hernia surgery.  He stated that the surgeon will be obtaining an ultrasound in 6 months but believes it is either a  seroma or hematoma    Neurological: No dizziness, vertigo, unusual headaches, neuropathy, or falls.      Psychiatric: No anxiety, depression, memory loss,  or substance abuse. Weaning the clonazepam with the assistance of his primary, and reports that his Ambien was canceled.  His sleep is very poor with only 0.125 mg of Klonopin at bedtime    Endocrine: Blood sugars not controlled, see HPI. Thyroid normal    Lymphatic: No lymphadenopathy, blood loss, anemia, or malignancy    Objective:      Blood pressure (!) 118/58, pulse 89, temperature 97.8 °F (36.6 °C), height 6' (1.829 m), weight 74.5 kg (164 lb 3.2 oz), SpO2 97 %. Body mass index is 22.27 kg/m².  Physical Exam      General: Alert and attentive, cooperative and in no distress.  Looks a little thinner.  Eyes: Pupils equal, round, reactive to light, anicteric, EOMI    Neck: Supple, non-tender, no thyromegaly or masses    ENT: EAC patent, TM normal, nares patent, no oral lesions,  , no thrush    Cardiovascular: Regular rate and rhythm, no murmurs, rubs, or gallop    Respiratory: Lungs clear without wheezes, no rales, rub or rhonci    Gastrointestinal: Active bowel sounds, soft, no mass or organomegaly, no tenderness or distention    Genitourinary: No  flank tenderness.  There is fullness and firmness, rubbery mass in the left mons pubis.  There is no bruising or redness.  It is not tender.  Vascular: No peripheral edema, phlebitis, pulses normal. Warm and well perfused    Musculoskeletal: Ambulates without difficulty, no acute arthritis, synovitis or myositis.      Integumentary: Skin without rashes, lesions     AnusRectum: DAVID 2/22/22 ED    Neurological: Normal LOC, cranial nerves, speech, reflexes, normal gait, some hemiparesis.  His decision-making may be a little impaired.    Psychiatric: Normal(improved) mood, speech, demeanor    Lymphatic: No cervical, supraclavicular, axillary, or inguinal lymphadenopathy      Wound:     HIV Table:    DATE  result  Toxoplasma IgG 3/7/22  Positive  HLA     Negative (hop)  RPR   2018  Non reactive  Hep A total  3/16/09 positive  Hep B sAg  Hep B sAb  4/2007  Pos 37.4  Hep B cAb total 3/7/22  negative  Hep B DNA  Hep C Ab  3/7/22  negative  Hep C RNA  Chlamy/GC  2018  Negative  TB gold     Ppd pos 7/31/06 treated with 9 mo INH     3/7/22  Positive, CXR 2/22 normal    Vitamin D  3/7/22  30  CD4   2/22/22 1215  PSA   9/2021  0.5  Hem A1c  5/2022  9.4%  UA prot  9/2021  neg      Recent Diagnostics:   2/22 echocardiogram  There is no evidence of intracardiac shunting.  The left ventricle is normal in size with concentric remodeling and normal systolic function.  The estimated ejection fraction is 58%.  Normal left ventricular diastolic function.  Normal right ventricular size with normal right ventricular systolic function.  Mild left atrial enlargement.  Mild mitral regurgitation.  Normal central venous pressure (3 mmHg).  The estimated PA systolic pressure is 28 mmHg.        Assessment and Plan:           Vitamin D deficiency  -     Vitamin D; Future; Expected date: 09/08/2022    Human immunodeficiency virus I infection  -     CBC Auto Differential; Future; Expected date: 09/08/2022  -     Comprehensive Metabolic Panel; Future; Expected date: 09/08/2022  -     HIV RNA, Quantitative, PCR; Future; Expected date: 09/08/2022    Mixed hyperlipidemia  -     Lipid Panel; Future; Expected date: 09/08/2022    Diabetes mellitus due to underlying condition with hyperglycemia, with long-term current use of insulin  -     Hemoglobin A1C; Future; Expected date: 09/08/2022  -     Beta - Hydroxybutyrate, Serum; Future; Expected date: 09/08/2022    Hypokalemia  -     Basic Metabolic Panel; Future; Expected date: 09/12/2022    Other orders  -     Discontinue: potassium chloride SA (K-DUR,KLOR-CON) 20 MEQ tablet; Take 1 tablet (20 mEq total) by mouth 2 (two) times daily.  Dispense: 30 tablet; Refill: 1  -     potassium chloride   (K-DUR,KLOR-CON) 20 MEQ tablet; Take 1 tablet (20 mEq total) by mouth 2 (two) times daily.  Dispense: 30 tablet; Refill: 1    Please resume the insulin that you have and measure your sugar 3 times a day and write them down and bring them to Dr. Eugene  Discussed with him that at worst the insulin may not be very effective but it should not harm him.  Lab today  Received a critical lab result for potassium 2.7 and called in some oral potassium for him to take and a repeat BMP on Monday  If you want a flu vaccine, I would get it in November.     Return in 6 months          Don't forget about getting a colonoscopy this year but he wants to put it off until next year    This note was created using Dragon voice recognition software that occasionally misinterpreted phrases or words.

## 2022-09-08 NOTE — PROGRESS NOTES
Dr Hoover informed of critical Potassium level of 2.7  Called in by Ayah ( [facility] lab ext # [phone])    I spoke with patient to advise :    Dr Hoover wants him to start Potassium today   He requests [facility] on [address] in [place]    He is to stop benazepril/hydrochlorothiazide   Until he sees Dr Eugene     Repeat Potassium level at [facility] lab on Monday 9/12/22    Dr Hoover also left him a message on his phone prior to his call back to office.    J s [facility]MA  9/08/22

## 2022-09-08 NOTE — PROGRESS NOTES
Doing well, Except with some very mild pain over the right groin.  He reports this is associated with the swelling we saw at the last visit.  He does report it is getting better.      Vitals reviewed     On exam the thickened spermatic cord is much smaller.  I suspect there may be some swelling from the surgery still present.  There is a small area that may represent a seroma.      Assessment plan status post right inguinal hernia repair     Ultrasound groin to eval for hematoma/seroma  As it is getting better I would not plan an obvious intervention for this but we will monitor and follow up in about 6 weeks.  He is to continue using warm packs and NSAIDs as needed.

## 2022-09-09 LAB
ESTIMATED AVG GLUCOSE: 272 MG/DL (ref 68–131)
HBA1C MFR BLD: 11.1 % (ref 4.5–6.2)

## 2022-09-10 LAB
HIV1 RNA # SERPL NAA+PROBE: 300 COPIES/ML
HIV1 RNA SERPL NAA+PROBE-LOG#: 2.48 LOG10COPY/ML

## 2022-09-12 ENCOUNTER — OFFICE VISIT (OUTPATIENT)
Dept: FAMILY MEDICINE | Facility: CLINIC | Age: 72
End: 2022-09-12
Payer: MEDICARE

## 2022-09-12 ENCOUNTER — HOSPITAL ENCOUNTER (OUTPATIENT)
Dept: RADIOLOGY | Facility: HOSPITAL | Age: 72
Discharge: HOME | End: 2022-09-12
Attending: SURGERY
Payer: MEDICARE

## 2022-09-12 ENCOUNTER — LAB VISIT (OUTPATIENT)
Dept: LAB | Facility: HOSPITAL | Age: 72
End: 2022-09-12
Attending: INTERNAL MEDICINE
Payer: MEDICARE

## 2022-09-12 VITALS
DIASTOLIC BLOOD PRESSURE: 62 MMHG | OXYGEN SATURATION: 97 % | RESPIRATION RATE: 18 BRPM | HEART RATE: 99 BPM | SYSTOLIC BLOOD PRESSURE: 100 MMHG | BODY MASS INDEX: 22.37 KG/M2 | WEIGHT: 165.13 LBS | HEIGHT: 72 IN

## 2022-09-12 DIAGNOSIS — M96.811: ICD-10-CM

## 2022-09-12 DIAGNOSIS — E11.628 TYPE 2 DIABETES MELLITUS WITH OTHER SKIN COMPLICATION, WITHOUT LONG-TERM CURRENT USE OF INSULIN: Primary | ICD-10-CM

## 2022-09-12 DIAGNOSIS — G47.00 INSOMNIA, UNSPECIFIED TYPE: ICD-10-CM

## 2022-09-12 DIAGNOSIS — N18.31 STAGE 3A CHRONIC KIDNEY DISEASE: ICD-10-CM

## 2022-09-12 DIAGNOSIS — E87.6 HYPOKALEMIA: ICD-10-CM

## 2022-09-12 LAB
ANION GAP SERPL CALC-SCNC: 9 MMOL/L (ref 8–16)
BUN SERPL-MCNC: 20 MG/DL (ref 8–23)
CALCIUM SERPL-MCNC: 9.3 MG/DL (ref 8.7–10.5)
CHLORIDE SERPL-SCNC: 107 MMOL/L (ref 95–110)
CO2 SERPL-SCNC: 22 MMOL/L (ref 23–29)
CREAT SERPL-MCNC: 1.1 MG/DL (ref 0.5–1.4)
EST. GFR  (NO RACE VARIABLE): >60 ML/MIN/1.73 M^2
GLUCOSE SERPL-MCNC: 92 MG/DL (ref 70–110)
POTASSIUM SERPL-SCNC: 3.5 MMOL/L (ref 3.5–5.1)
SODIUM SERPL-SCNC: 138 MMOL/L (ref 136–145)

## 2022-09-12 PROCEDURE — 3288F FALL RISK ASSESSMENT DOCD: CPT | Mod: CPTII,S$GLB,, | Performed by: STUDENT IN AN ORGANIZED HEALTH CARE EDUCATION/TRAINING PROGRAM

## 2022-09-12 PROCEDURE — 3061F NEG MICROALBUMINURIA REV: CPT | Mod: CPTII,S$GLB,, | Performed by: STUDENT IN AN ORGANIZED HEALTH CARE EDUCATION/TRAINING PROGRAM

## 2022-09-12 PROCEDURE — 80048 BASIC METABOLIC PNL TOTAL CA: CPT | Performed by: INTERNAL MEDICINE

## 2022-09-12 PROCEDURE — 76882 US SOFT TISSUE, GROIN RIGHT: ICD-10-PCS | Mod: 26,RT,, | Performed by: RADIOLOGY

## 2022-09-12 PROCEDURE — 36415 COLL VENOUS BLD VENIPUNCTURE: CPT | Performed by: INTERNAL MEDICINE

## 2022-09-12 PROCEDURE — 3074F PR MOST RECENT SYSTOLIC BLOOD PRESSURE < 130 MM HG: ICD-10-PCS | Mod: CPTII,S$GLB,, | Performed by: STUDENT IN AN ORGANIZED HEALTH CARE EDUCATION/TRAINING PROGRAM

## 2022-09-12 PROCEDURE — 3046F HEMOGLOBIN A1C LEVEL >9.0%: CPT | Mod: CPTII,S$GLB,, | Performed by: STUDENT IN AN ORGANIZED HEALTH CARE EDUCATION/TRAINING PROGRAM

## 2022-09-12 PROCEDURE — 3008F BODY MASS INDEX DOCD: CPT | Mod: CPTII,S$GLB,, | Performed by: STUDENT IN AN ORGANIZED HEALTH CARE EDUCATION/TRAINING PROGRAM

## 2022-09-12 PROCEDURE — 1160F RVW MEDS BY RX/DR IN RCRD: CPT | Mod: CPTII,S$GLB,, | Performed by: STUDENT IN AN ORGANIZED HEALTH CARE EDUCATION/TRAINING PROGRAM

## 2022-09-12 PROCEDURE — 3074F SYST BP LT 130 MM HG: CPT | Mod: CPTII,S$GLB,, | Performed by: STUDENT IN AN ORGANIZED HEALTH CARE EDUCATION/TRAINING PROGRAM

## 2022-09-12 PROCEDURE — 3046F PR MOST RECENT HEMOGLOBIN A1C LEVEL > 9.0%: ICD-10-PCS | Mod: CPTII,S$GLB,, | Performed by: STUDENT IN AN ORGANIZED HEALTH CARE EDUCATION/TRAINING PROGRAM

## 2022-09-12 PROCEDURE — 3066F PR DOCUMENTATION OF TREATMENT FOR NEPHROPATHY: ICD-10-PCS | Mod: CPTII,S$GLB,, | Performed by: STUDENT IN AN ORGANIZED HEALTH CARE EDUCATION/TRAINING PROGRAM

## 2022-09-12 PROCEDURE — 1159F MED LIST DOCD IN RCRD: CPT | Mod: CPTII,S$GLB,, | Performed by: STUDENT IN AN ORGANIZED HEALTH CARE EDUCATION/TRAINING PROGRAM

## 2022-09-12 PROCEDURE — 3078F DIAST BP <80 MM HG: CPT | Mod: CPTII,S$GLB,, | Performed by: STUDENT IN AN ORGANIZED HEALTH CARE EDUCATION/TRAINING PROGRAM

## 2022-09-12 PROCEDURE — 3061F PR NEG MICROALBUMINURIA RESULT DOCUMENTED/REVIEW: ICD-10-PCS | Mod: CPTII,S$GLB,, | Performed by: STUDENT IN AN ORGANIZED HEALTH CARE EDUCATION/TRAINING PROGRAM

## 2022-09-12 PROCEDURE — 1159F PR MEDICATION LIST DOCUMENTED IN MEDICAL RECORD: ICD-10-PCS | Mod: CPTII,S$GLB,, | Performed by: STUDENT IN AN ORGANIZED HEALTH CARE EDUCATION/TRAINING PROGRAM

## 2022-09-12 PROCEDURE — 99999 PR PBB SHADOW E&M-EST. PATIENT-LVL V: ICD-10-PCS | Mod: PBBFAC,,, | Performed by: STUDENT IN AN ORGANIZED HEALTH CARE EDUCATION/TRAINING PROGRAM

## 2022-09-12 PROCEDURE — 4010F PR ACE/ARB THEARPY RXD/TAKEN: ICD-10-PCS | Mod: CPTII,S$GLB,, | Performed by: STUDENT IN AN ORGANIZED HEALTH CARE EDUCATION/TRAINING PROGRAM

## 2022-09-12 PROCEDURE — 76882 US LMTD JT/FCL EVL NVASC XTR: CPT | Mod: TC,RT

## 2022-09-12 PROCEDURE — 1126F PR PAIN SEVERITY QUANTIFIED, NO PAIN PRESENT: ICD-10-PCS | Mod: CPTII,S$GLB,, | Performed by: STUDENT IN AN ORGANIZED HEALTH CARE EDUCATION/TRAINING PROGRAM

## 2022-09-12 PROCEDURE — 3008F PR BODY MASS INDEX (BMI) DOCUMENTED: ICD-10-PCS | Mod: CPTII,S$GLB,, | Performed by: STUDENT IN AN ORGANIZED HEALTH CARE EDUCATION/TRAINING PROGRAM

## 2022-09-12 PROCEDURE — 3288F PR FALLS RISK ASSESSMENT DOCUMENTED: ICD-10-PCS | Mod: CPTII,S$GLB,, | Performed by: STUDENT IN AN ORGANIZED HEALTH CARE EDUCATION/TRAINING PROGRAM

## 2022-09-12 PROCEDURE — 4010F ACE/ARB THERAPY RXD/TAKEN: CPT | Mod: CPTII,S$GLB,, | Performed by: STUDENT IN AN ORGANIZED HEALTH CARE EDUCATION/TRAINING PROGRAM

## 2022-09-12 PROCEDURE — 1101F PT FALLS ASSESS-DOCD LE1/YR: CPT | Mod: CPTII,S$GLB,, | Performed by: STUDENT IN AN ORGANIZED HEALTH CARE EDUCATION/TRAINING PROGRAM

## 2022-09-12 PROCEDURE — 99214 OFFICE O/P EST MOD 30 MIN: CPT | Mod: S$GLB,,, | Performed by: STUDENT IN AN ORGANIZED HEALTH CARE EDUCATION/TRAINING PROGRAM

## 2022-09-12 PROCEDURE — 1126F AMNT PAIN NOTED NONE PRSNT: CPT | Mod: CPTII,S$GLB,, | Performed by: STUDENT IN AN ORGANIZED HEALTH CARE EDUCATION/TRAINING PROGRAM

## 2022-09-12 PROCEDURE — 1101F PR PT FALLS ASSESS DOC 0-1 FALLS W/OUT INJ PAST YR: ICD-10-PCS | Mod: CPTII,S$GLB,, | Performed by: STUDENT IN AN ORGANIZED HEALTH CARE EDUCATION/TRAINING PROGRAM

## 2022-09-12 PROCEDURE — 3078F PR MOST RECENT DIASTOLIC BLOOD PRESSURE < 80 MM HG: ICD-10-PCS | Mod: CPTII,S$GLB,, | Performed by: STUDENT IN AN ORGANIZED HEALTH CARE EDUCATION/TRAINING PROGRAM

## 2022-09-12 PROCEDURE — 3066F NEPHROPATHY DOC TX: CPT | Mod: CPTII,S$GLB,, | Performed by: STUDENT IN AN ORGANIZED HEALTH CARE EDUCATION/TRAINING PROGRAM

## 2022-09-12 PROCEDURE — 76882 US LMTD JT/FCL EVL NVASC XTR: CPT | Mod: 26,RT,, | Performed by: RADIOLOGY

## 2022-09-12 PROCEDURE — 1160F PR REVIEW ALL MEDS BY PRESCRIBER/CLIN PHARMACIST DOCUMENTED: ICD-10-PCS | Mod: CPTII,S$GLB,, | Performed by: STUDENT IN AN ORGANIZED HEALTH CARE EDUCATION/TRAINING PROGRAM

## 2022-09-12 PROCEDURE — 99999 PR PBB SHADOW E&M-EST. PATIENT-LVL V: CPT | Mod: PBBFAC,,, | Performed by: STUDENT IN AN ORGANIZED HEALTH CARE EDUCATION/TRAINING PROGRAM

## 2022-09-12 PROCEDURE — 99214 PR OFFICE/OUTPT VISIT, EST, LEVL IV, 30-39 MIN: ICD-10-PCS | Mod: S$GLB,,, | Performed by: STUDENT IN AN ORGANIZED HEALTH CARE EDUCATION/TRAINING PROGRAM

## 2022-09-12 RX ORDER — METFORMIN HYDROCHLORIDE 500 MG/1
500 TABLET ORAL 2 TIMES DAILY WITH MEALS
Qty: 60 TABLET | Refills: 2 | Status: SHIPPED | OUTPATIENT
Start: 2022-09-12 | End: 2022-10-25

## 2022-09-12 RX ORDER — DULAGLUTIDE 0.75 MG/.5ML
0.75 INJECTION, SOLUTION SUBCUTANEOUS
Qty: 4 PEN | Refills: 0 | Status: SHIPPED | OUTPATIENT
Start: 2022-09-12 | End: 2022-10-03 | Stop reason: DRUGHIGH

## 2022-09-12 NOTE — PATIENT INSTRUCTIONS
Reduce the metformin to 500mg once daily.     Start Trulicity once a week.   Keep monitoring blood sugar.    If your blood sugar is under 140 for 3 consecutive mornings, reduce both insulin doses, by 3 units.           Chiki Taylor,     If you are due for any health screening(s) below please notify me so we can arrange them to be ordered and scheduled to maintain your health. Most healthy patients complete it. Don't lose out on improving your health.     Tests to Keep You Healthy    Eye Exam: ORDERED BUT NOT SCHEDULED  Colon Cancer Screening: DUE  Last HbA1c < 8 (09/08/2022): NO  Tobacco Cessation: Yes         Colon Cancer Screening    Of cancers affecting both men and women, colorectal cancer is the third leading cancer killer in the [place]. But it doesnt have to be. Screening can prevent colorectal cancer or find it at an early stage when treatment often leads to a cure.    A colonoscopy is the preferred test for detecting colon cancer. It is needed only once every 10 years if results are negative. While sedated, a flexible, lighted tube with a tiny camera is inserted into the rectum and advanced through the colon to look for cancers. An alternative screening test that is used at home and returned to the lab may also be used. It detects hidden blood in bowel movements which could indicate cancer in the colon. If results are positive, you will need a colonoscopy to determine if the blood is a sign of cancer. This type of follow up (diagnostic) colonoscopy usually requires additional copays as required by your insurance provider. Please contact your PCP if you have any questions.    Although you are still overdue for this important screening, due to the COVID-19 pandemic, we recommend scheduling it in the near future.       Diabetic Retinal Eye Exam    Diabetes is the #1 cause of blindness in the [place] - early detection before signs or symptoms develop can prevent debilitating blindness.    Once-a-year screening  is recommended for all diabetic patients. This exam can prevent and treat diabetes complications in the eye before developing symptoms. This can be done with a special camera is used to take photographs of the back of your eye without having to dilate them, or you can see an eye doctor for a full dilated exam.    Although you are still overdue for this important screening, due to the COVID-19 pandemic, we recommend scheduling it in the near future.    A1c every 3-6 months, lipid panel every year, microalbumin (urine test) once per year, comprehensive foot exam once per year, dilated eye exam at least once per year, dental exam every 6 months, flu vaccination every year, and pneumonia vaccination(s) as recommended

## 2022-09-12 NOTE — PROGRESS NOTES
[facility] CLINIC NOTE    Patient Name: Brandon Parson  YOB: 1950    PRESENTING HISTORY     History of Present Illness:  Mr. Brandon Parson is a 71 y.o. male     Here for f/u.     DM2-  Recently uncontrolled when he stopped taking his insulin and didn't tell me.   Since resuming, blood glucose reading below  124, 280   228, 290   208, 185   189  16, 18 units insulin.   No more nocturia.   Metformin 1000mg BID.     Recent reduction in renal function, severe hypokalemia. K resulted during today's office visit, now 3.5.             Ambien 5mg is effective and he cannot sleep at all without it. Will leave at low dose.   Off BZD.         ROS      OBJECTIVE:   Vital Signs:  Vitals:    09/12/22 1350 09/12/22 1400   BP: 110/84 100/62   Pulse: 99    Resp: 18    SpO2: 97%    Weight: 74.9 kg (165 lb 2 oz)    Height: 6' (1.829 m)             Physical Exam  Constitutional:       Comments: No walking aids in clinic   Neurological:      Comments: Ataxic gait       ASSESSMENT & PLAN:     Type 2 diabetes mellitus with other skin complication, without long-term current use of insulin  -     metFORMIN (GLUCOPHAGE) 500 MG tablet; Take 1 tablet (500 mg total) by mouth 2 (two) times daily with meals.  Dispense: 60 tablet; Refill: 2  -     dulaglutide (TRULICITY) 0.75 mg/0.5 mL pen injector; Inject 0.75 mg into the skin every 7 days.  Dispense: 4 pen; Refill: 0  Dose reduce metformin for renal function.   Add Trulicity.   Given insulin titration instructions for AM BG < 140.   Reassess soon.     Hypokalemia  Improved. Unclear etiology, no N/V/D.     Stage 3a chronic kidney disease  Improved.     Insomnia, unspecified type   Will continue low dose ambien.         Francois Eugene MD   Internal Medicine

## 2022-09-15 ENCOUNTER — TELEPHONE (OUTPATIENT)
Dept: SURGERY | Facility: HOSPITAL | Age: 72
End: 2022-09-15
Payer: MEDICARE

## 2022-09-15 ENCOUNTER — TELEPHONE (OUTPATIENT)
Dept: FAMILY MEDICINE | Facility: CLINIC | Age: 72
End: 2022-09-15
Payer: MEDICARE

## 2022-09-15 NOTE — TELEPHONE ENCOUNTER
I spoke with pts sister via phone, she states that she is unable to bring him in that day. I advised her that we do not have anything sooner. Will add appt to the waiting list for sooner time.       ----- Message from Nicole Garcia sent at 9/15/2022 10:39 AM CDT -----  Contact: Patricia Dubon  Type: Patient Call Back         Who called: Patricia Dubon         What is the request in detail: calling to resched 11/8 appt time to sometime before lunch; states that same day is fine but just needs an earlier time in day; please advise          Best call back number: [phone] - today         Additional Information:            Thank You

## 2022-09-15 NOTE — PROGRESS NOTES
I spoke with patient to advise of potassium level and to go back is usual dose ; per   Dr Hoover's orders   [facility]  9/15/22

## 2022-09-19 ENCOUNTER — OFFICE VISIT (OUTPATIENT)
Dept: CARDIOLOGY | Facility: CLINIC | Age: 72
End: 2022-09-19
Payer: MEDICARE

## 2022-09-19 VITALS
HEIGHT: 72 IN | BODY MASS INDEX: 22.46 KG/M2 | SYSTOLIC BLOOD PRESSURE: 138 MMHG | DIASTOLIC BLOOD PRESSURE: 78 MMHG | WEIGHT: 165.81 LBS | HEART RATE: 78 BPM

## 2022-09-19 DIAGNOSIS — E78.2 MIXED HYPERLIPIDEMIA: Primary | ICD-10-CM

## 2022-09-19 PROCEDURE — 1160F RVW MEDS BY RX/DR IN RCRD: CPT | Mod: CPTII,S$GLB,, | Performed by: INTERNAL MEDICINE

## 2022-09-19 PROCEDURE — 3078F DIAST BP <80 MM HG: CPT | Mod: CPTII,S$GLB,, | Performed by: INTERNAL MEDICINE

## 2022-09-19 PROCEDURE — 3008F BODY MASS INDEX DOCD: CPT | Mod: CPTII,S$GLB,, | Performed by: INTERNAL MEDICINE

## 2022-09-19 PROCEDURE — 1126F AMNT PAIN NOTED NONE PRSNT: CPT | Mod: CPTII,S$GLB,, | Performed by: INTERNAL MEDICINE

## 2022-09-19 PROCEDURE — 3046F PR MOST RECENT HEMOGLOBIN A1C LEVEL > 9.0%: ICD-10-PCS | Mod: CPTII,S$GLB,, | Performed by: INTERNAL MEDICINE

## 2022-09-19 PROCEDURE — 4010F PR ACE/ARB THEARPY RXD/TAKEN: ICD-10-PCS | Mod: CPTII,S$GLB,, | Performed by: INTERNAL MEDICINE

## 2022-09-19 PROCEDURE — 99999 PR PBB SHADOW E&M-EST. PATIENT-LVL III: CPT | Mod: PBBFAC,,, | Performed by: INTERNAL MEDICINE

## 2022-09-19 PROCEDURE — 3066F NEPHROPATHY DOC TX: CPT | Mod: CPTII,S$GLB,, | Performed by: INTERNAL MEDICINE

## 2022-09-19 PROCEDURE — 3288F FALL RISK ASSESSMENT DOCD: CPT | Mod: CPTII,S$GLB,, | Performed by: INTERNAL MEDICINE

## 2022-09-19 PROCEDURE — 3046F HEMOGLOBIN A1C LEVEL >9.0%: CPT | Mod: CPTII,S$GLB,, | Performed by: INTERNAL MEDICINE

## 2022-09-19 PROCEDURE — 1160F PR REVIEW ALL MEDS BY PRESCRIBER/CLIN PHARMACIST DOCUMENTED: ICD-10-PCS | Mod: CPTII,S$GLB,, | Performed by: INTERNAL MEDICINE

## 2022-09-19 PROCEDURE — 3288F PR FALLS RISK ASSESSMENT DOCUMENTED: ICD-10-PCS | Mod: CPTII,S$GLB,, | Performed by: INTERNAL MEDICINE

## 2022-09-19 PROCEDURE — 1159F MED LIST DOCD IN RCRD: CPT | Mod: CPTII,S$GLB,, | Performed by: INTERNAL MEDICINE

## 2022-09-19 PROCEDURE — 3078F PR MOST RECENT DIASTOLIC BLOOD PRESSURE < 80 MM HG: ICD-10-PCS | Mod: CPTII,S$GLB,, | Performed by: INTERNAL MEDICINE

## 2022-09-19 PROCEDURE — 1101F PR PT FALLS ASSESS DOC 0-1 FALLS W/OUT INJ PAST YR: ICD-10-PCS | Mod: CPTII,S$GLB,, | Performed by: INTERNAL MEDICINE

## 2022-09-19 PROCEDURE — 99214 OFFICE O/P EST MOD 30 MIN: CPT | Mod: S$GLB,,, | Performed by: INTERNAL MEDICINE

## 2022-09-19 PROCEDURE — 3075F SYST BP GE 130 - 139MM HG: CPT | Mod: CPTII,S$GLB,, | Performed by: INTERNAL MEDICINE

## 2022-09-19 PROCEDURE — 3008F PR BODY MASS INDEX (BMI) DOCUMENTED: ICD-10-PCS | Mod: CPTII,S$GLB,, | Performed by: INTERNAL MEDICINE

## 2022-09-19 PROCEDURE — 1101F PT FALLS ASSESS-DOCD LE1/YR: CPT | Mod: CPTII,S$GLB,, | Performed by: INTERNAL MEDICINE

## 2022-09-19 PROCEDURE — 3061F PR NEG MICROALBUMINURIA RESULT DOCUMENTED/REVIEW: ICD-10-PCS | Mod: CPTII,S$GLB,, | Performed by: INTERNAL MEDICINE

## 2022-09-19 PROCEDURE — 99214 PR OFFICE/OUTPT VISIT, EST, LEVL IV, 30-39 MIN: ICD-10-PCS | Mod: S$GLB,,, | Performed by: INTERNAL MEDICINE

## 2022-09-19 PROCEDURE — 1159F PR MEDICATION LIST DOCUMENTED IN MEDICAL RECORD: ICD-10-PCS | Mod: CPTII,S$GLB,, | Performed by: INTERNAL MEDICINE

## 2022-09-19 PROCEDURE — 99999 PR PBB SHADOW E&M-EST. PATIENT-LVL III: ICD-10-PCS | Mod: PBBFAC,,, | Performed by: INTERNAL MEDICINE

## 2022-09-19 PROCEDURE — 1126F PR PAIN SEVERITY QUANTIFIED, NO PAIN PRESENT: ICD-10-PCS | Mod: CPTII,S$GLB,, | Performed by: INTERNAL MEDICINE

## 2022-09-19 PROCEDURE — 4010F ACE/ARB THERAPY RXD/TAKEN: CPT | Mod: CPTII,S$GLB,, | Performed by: INTERNAL MEDICINE

## 2022-09-19 PROCEDURE — 3075F PR MOST RECENT SYSTOLIC BLOOD PRESS GE 130-139MM HG: ICD-10-PCS | Mod: CPTII,S$GLB,, | Performed by: INTERNAL MEDICINE

## 2022-09-19 PROCEDURE — 3066F PR DOCUMENTATION OF TREATMENT FOR NEPHROPATHY: ICD-10-PCS | Mod: CPTII,S$GLB,, | Performed by: INTERNAL MEDICINE

## 2022-09-19 PROCEDURE — 3061F NEG MICROALBUMINURIA REV: CPT | Mod: CPTII,S$GLB,, | Performed by: INTERNAL MEDICINE

## 2022-09-19 NOTE — PROGRESS NOTES
Subjective:    Patient ID:  Brandon Parson is a 71 y.o. male who presents for follow-up of Hyperlipidemia      Problem List Items Addressed This Visit          Cardiac/Vascular    Mixed hyperlipidemia - Primary       HPI    Patient was last seen on 03/03/2022 at which time he was evaluated prior to inguinal hernia surgery.  Nuclear stress test was ordered for evaluation which showed no evidence of ischemia.  There was some issue with carotid stenosis which prompted a vascular surgery evaluation.    On assessment today, the patient states that he feels well.    No chest pain.  No shortness of breath.  Doing everything he wants to do    How did surgery go - Went well       Objective:     Vitals:    09/19/22 1426   BP: (!) 159/86   BP Location: Left arm   Patient Position: Sitting   BP Method: Medium (Automatic)   Pulse: 78   Weight: 75.2 kg (165 lb 12.6 oz)   Height: 6' (1.829 m)        Physical Exam  Constitutional:       Appearance: He is well-developed.   HENT:      Head: Normocephalic and atraumatic.   Neck:      Vascular: No JVD.   Cardiovascular:      Rate and Rhythm: Normal rate and regular rhythm.      Heart sounds: Normal heart sounds. No murmur heard.    No friction rub. No gallop.   Pulmonary:      Effort: Pulmonary effort is normal. No respiratory distress.      Breath sounds: Normal breath sounds. No wheezing or rales.   Abdominal:      General: Bowel sounds are normal.      Palpations: Abdomen is soft.      Tenderness: There is no abdominal tenderness. There is no guarding or rebound.   Musculoskeletal:      Cervical back: Normal range of motion and neck supple.   Skin:     General: Skin is warm and dry.   Neurological:      Mental Status: He is alert and oriented to person, place, and time.   Psychiatric:         Behavior: Behavior normal.         Current Outpatient Medications on File Prior to Visit   Medication Sig    ascorbic acid, vitamin C, (VITAMIN C) 100 MG tablet Take 100 mg by mouth once daily.  "   atorvastatin (LIPITOR) 40 MG tablet Take 1 tablet (40 mg total) by mouth once daily.    b complex vitamins capsule Take 1 capsule by mouth once daily.    BD ULTRA-FINE MINI PEN NEEDLE 31 gauge x 3/16" Ndle USE 2 TIMES A DAY    blood sugar diagnostic Strp TEST 2 TIMES DAILY    blood-glucose meter Misc USE TO TEST BLOOD SUGAR 2 TO 3 TIMES DAILY    dulaglutide (TRULICITY) 0.75 mg/0.5 mL pen injector Inject 0.75 mg into the skin every 7 days.    efavirenz (SUSTIVA) 600 mg Tab Take 1 tablet (600 mg total) by mouth every evening.    emtricitabine-tenofovir alafen (DESCOVY) 200-25 mg Tab Take 1 tablet by mouth every evening.    fenofibrate (TRICOR) 145 MG tablet Take 145 mg by mouth once daily.    fenofibrate micronized (LOFIBRA) 134 MG Cap Take 1 capsule (134 mg total) by mouth once daily.    fish oil-omega-3 fatty acids 300-1,000 mg capsule Take 2 capsules by mouth once daily.    HIGH POTENCY MULTIVITAMIN 400 mcg Tab Take 1 tablet by mouth once daily.    HYDROcodone-acetaminophen (NORCO) 7.5-325 mg per tablet Take 1 tablet by mouth every 4 (four) hours as needed for Pain.    insulin aspart protamine-insulin aspart (NOVOLOG 70/30) 100 unit/mL (70-30) InPn pen Inject 16 Units into the skin once daily.    insulin aspart protamine-insulin aspart (NOVOLOG 70/30) 100 unit/mL (70-30) InPn pen Inject 18 Units into the skin nightly.    metFORMIN (GLUCOPHAGE) 500 MG tablet Take 1 tablet (500 mg total) by mouth 2 (two) times daily with meals.    multivitamin (THERAGRAN) tablet Take 1 tablet by mouth once daily.    ondansetron (ZOFRAN-ODT) 4 MG TbDL Take 1 tablet (4 mg total) by mouth every 6 (six) hours as needed (nv).    potassium chloride SA (K-DUR,KLOR-CON) 20 MEQ tablet Take 1 tablet (20 mEq total) by mouth 2 (two) times daily.    sertraline (ZOLOFT) 100 MG tablet Take 1 tablet (100 mg total) by mouth once daily.    TRUEPLUS LANCETS 30 gauge Misc USE TWICE DAILY TO TEST BLOOD SUGAR    zolpidem (AMBIEN) 5 MG Tab TAKE ONE " TABLET BY MOUTH EVERY NIGHT AS NEEDED FOR INSOMNIA    aspirin 325 MG tablet Take 1 tablet (325 mg total) by mouth once daily.     No current facility-administered medications on file prior to visit.       Lipid Panel:   Lab Results   Component Value Date    CHOL 154 09/08/2022    HDL 35 (L) 09/08/2022    LDLCALC 70.2 09/08/2022    TRIG 244 (H) 09/08/2022    CHOLHDL 22.7 09/08/2022       The 10-year ASCVD risk score (Matteo MESA, et al., 2019) is: 49%    Values used to calculate the score:      Age: 71 years      Sex: Male      Is Non- : No      Diabetic: Yes      Tobacco smoker: No      Systolic Blood Pressure: 159 mmHg      Is BP treated: No      HDL Cholesterol: 35 mg/dL      Total Cholesterol: 154 mg/dL    All pertinent labs, imaging, and EKGs reviewed.  Patient's most recent EKG tracing was personally interpreted by this provider.    Most Recent Echocardiogram Results  Results for orders placed during the hospital encounter of 02/22/22    Echo Saline Bubble? Yes    Interpretation Summary  · There is no evidence of intracardiac shunting.  · The left ventricle is normal in size with concentric remodeling and normal systolic function.  · The estimated ejection fraction is 58%.  · Normal left ventricular diastolic function.  · Normal right ventricular size with normal right ventricular systolic function.  · Mild left atrial enlargement.  · Mild mitral regurgitation.  · Normal central venous pressure (3 mmHg).  · The estimated PA systolic pressure is 28 mmHg.        Most Recent EKG  Results for orders placed or performed during the hospital encounter of 02/22/22   EKG and show to ED MD    Collection Time: 02/22/22  4:21 PM    Narrative    Test Reason : R55,    Vent. Rate : 062 BPM     Atrial Rate : 062 BPM     P-R Int : 218 ms          QRS Dur : 110 ms      QT Int : 430 ms       P-R-T Axes : 061 -53 073 degrees     QTc Int : 436 ms    Sinus rhythm with 1st degree A-V block  Low voltage  QRS  Incomplete right bundle branch block  Left anterior fascicular block  Abnormal ECG  No previous ECGs available  Confirmed by James Thomas MD ([id number]) on 2/26/2022 6:34:19 PM    Referred By: BELLA   SELF           Confirmed By:James Thomas MD       No valid procedures specified.   Results for orders placed during the hospital encounter of 03/24/22    Nuclear Stress - Cardiology Interpreted    Interpretation Summary    Normal myocardial perfusion scan. There is no evidence of myocardial ischemia or infarction.    There is a  mild intensity fixed perfusion abnormality in the  wall of the left ventricle secondary to diaphragm attenuation.    The gated perfusion images showed an ejection fraction of 66% at rest.    There is normal wall motion at rest.    The EKG portion of this study is negative for ischemia.    There are no prior studies for comparison.    No valid procedures specified.      Assessment:       1. Mixed hyperlipidemia         Plan:     Symptoms OK today  BP/Pulse OK today  Most recent echocardiogram reviewed personally     Continue atorvastatin 40mg PO Daily    Continue other cardiac medications  Mediterranean Diet/Cardiovascular Exercise Program    Patient queried and all questions were answered.    F/u in 1 year tor reassess      Signed:    Paolo Webb MD  9/19/2022 8:39 AM

## 2022-10-03 ENCOUNTER — TELEPHONE (OUTPATIENT)
Dept: FAMILY MEDICINE | Facility: CLINIC | Age: 72
End: 2022-10-03
Payer: MEDICARE

## 2022-10-03 DIAGNOSIS — B20 HUMAN IMMUNODEFICIENCY VIRUS I INFECTION: ICD-10-CM

## 2022-10-03 DIAGNOSIS — E87.6 HYPOKALEMIA: Primary | ICD-10-CM

## 2022-10-03 DIAGNOSIS — E11.628 TYPE 2 DIABETES MELLITUS WITH OTHER SKIN COMPLICATION, WITHOUT LONG-TERM CURRENT USE OF INSULIN: ICD-10-CM

## 2022-10-03 RX ORDER — DULAGLUTIDE 1.5 MG/.5ML
1.5 INJECTION, SOLUTION SUBCUTANEOUS
Qty: 4 PEN | Refills: 11 | Status: SHIPPED | OUTPATIENT
Start: 2022-10-03 | End: 2022-10-25

## 2022-10-03 RX ORDER — DULAGLUTIDE 0.75 MG/.5ML
0.75 INJECTION, SOLUTION SUBCUTANEOUS
Qty: 4 PEN | Refills: 0 | Status: CANCELLED | OUTPATIENT
Start: 2022-10-03 | End: 2023-10-03

## 2022-10-03 RX ORDER — POTASSIUM CHLORIDE 20 MEQ/1
20 TABLET, EXTENDED RELEASE ORAL 2 TIMES DAILY
Qty: 30 TABLET | Refills: 1 | Status: SHIPPED | OUTPATIENT
Start: 2022-10-03

## 2022-10-03 NOTE — TELEPHONE ENCOUNTER
No new care gaps identified.  [facility] Embedded Care Gaps. Reference number: [id number]. 10/03/2022   1:15:21 PM CDT

## 2022-10-03 NOTE — TELEPHONE ENCOUNTER
I spoke with Mr. Marie with [facility] pharmacy, he states that he does not see anything documented as far as  insurance. He states that it just needs to be refilled. Will send refill request in another encounter.     ----- Message from Sean Ramirez sent at 10/3/2022 12:11 PM CDT -----  Contact: Self  Type: Needs Medical Advice  Who Called:  Patient  Pharmacy name and phone #:    \[facility]\"" Pharmacy [facility], [place] - [address]  [address]  [place] [address]  Phone: [phone] Fax: [phone]      Best Call Back Number: [phone]  Additional Information:  States he rec'd letter from [facility] that dulaglutide (TRULICITY) 0.75 mg/0.5 mL pen injector prescription was not covered by ins. Would like to discuss with office. Please call.

## 2022-10-03 NOTE — TELEPHONE ENCOUNTER
Rx refill was sent by Dr Eugene and a PA requests was auto generated in Epic.   This was done last month via [employer] and PA was denied.   Spoke to [employer] who indicates he did get his script last month but was confused by denial letter from insurance.   Phoned [facility] and spoke to pharmacist, Nette. She ran rx for Trulicity and the claim went through for 0 balance. No PA is needed.

## 2022-10-05 NOTE — TELEPHONE ENCOUNTER
I spoke with patient to advise of lab work to  Be done .  Patient stated he will do this on   10/19/22 as he has an appointment for other labs to be done and has a hard time with transportation .  LEX MOSES  10/05/22

## 2022-10-14 ENCOUNTER — LAB VISIT (OUTPATIENT)
Dept: LAB | Facility: HOSPITAL | Age: 72
End: 2022-10-14
Attending: INTERNAL MEDICINE
Payer: MEDICARE

## 2022-10-14 DIAGNOSIS — E87.6 HYPOKALEMIA: ICD-10-CM

## 2022-10-14 DIAGNOSIS — E78.2 MIXED HYPERLIPIDEMIA: ICD-10-CM

## 2022-10-14 DIAGNOSIS — B20 HUMAN IMMUNODEFICIENCY VIRUS I INFECTION: ICD-10-CM

## 2022-10-14 LAB
ALBUMIN SERPL BCP-MCNC: 4.5 G/DL (ref 3.5–5.2)
ALP SERPL-CCNC: 66 U/L (ref 55–135)
ALT SERPL W/O P-5'-P-CCNC: 22 U/L (ref 10–44)
ANION GAP SERPL CALC-SCNC: 10 MMOL/L (ref 8–16)
ANION GAP SERPL CALC-SCNC: 10 MMOL/L (ref 8–16)
AST SERPL-CCNC: 16 U/L (ref 10–40)
BASOPHILS # BLD AUTO: 0.03 K/UL (ref 0–0.2)
BASOPHILS NFR BLD: 0.4 % (ref 0–1.9)
BILIRUB SERPL-MCNC: 0.8 MG/DL (ref 0.1–1)
BUN SERPL-MCNC: 26 MG/DL (ref 8–23)
BUN SERPL-MCNC: 26 MG/DL (ref 8–23)
CALCIUM SERPL-MCNC: 10.3 MG/DL (ref 8.7–10.5)
CALCIUM SERPL-MCNC: 10.3 MG/DL (ref 8.7–10.5)
CHLORIDE SERPL-SCNC: 102 MMOL/L (ref 95–110)
CHLORIDE SERPL-SCNC: 102 MMOL/L (ref 95–110)
CHOLEST SERPL-MCNC: 179 MG/DL (ref 120–199)
CHOLEST/HDLC SERPL: 4.8 {RATIO} (ref 2–5)
CO2 SERPL-SCNC: 23 MMOL/L (ref 23–29)
CO2 SERPL-SCNC: 23 MMOL/L (ref 23–29)
CREAT SERPL-MCNC: 1.2 MG/DL (ref 0.5–1.4)
CREAT SERPL-MCNC: 1.2 MG/DL (ref 0.5–1.4)
DIFFERENTIAL METHOD: NORMAL
EOSINOPHIL # BLD AUTO: 0.1 K/UL (ref 0–0.5)
EOSINOPHIL NFR BLD: 1.5 % (ref 0–8)
ERYTHROCYTE [DISTWIDTH] IN BLOOD BY AUTOMATED COUNT: 13.2 % (ref 11.5–14.5)
EST. GFR  (NO RACE VARIABLE): >60 ML/MIN/1.73 M^2
EST. GFR  (NO RACE VARIABLE): >60 ML/MIN/1.73 M^2
ESTIMATED AVG GLUCOSE: 295 MG/DL (ref 68–131)
GLUCOSE SERPL-MCNC: 426 MG/DL (ref 70–110)
GLUCOSE SERPL-MCNC: 426 MG/DL (ref 70–110)
HBA1C MFR BLD: 11.9 % (ref 4.5–6.2)
HCT VFR BLD AUTO: 41.6 % (ref 40–54)
HDLC SERPL-MCNC: 37 MG/DL (ref 40–75)
HDLC SERPL: 20.7 % (ref 20–50)
HGB BLD-MCNC: 14.2 G/DL (ref 14–18)
IMM GRANULOCYTES # BLD AUTO: 0.04 K/UL (ref 0–0.04)
IMM GRANULOCYTES NFR BLD AUTO: 0.5 % (ref 0–0.5)
LDLC SERPL CALC-MCNC: 77.8 MG/DL (ref 63–159)
LYMPHOCYTES # BLD AUTO: 2.2 K/UL (ref 1–4.8)
LYMPHOCYTES NFR BLD: 28.3 % (ref 18–48)
MCH RBC QN AUTO: 29.8 PG (ref 27–31)
MCHC RBC AUTO-ENTMCNC: 34.1 G/DL (ref 32–36)
MCV RBC AUTO: 87 FL (ref 82–98)
MONOCYTES # BLD AUTO: 0.5 K/UL (ref 0.3–1)
MONOCYTES NFR BLD: 6.5 % (ref 4–15)
NEUTROPHILS # BLD AUTO: 4.9 K/UL (ref 1.8–7.7)
NEUTROPHILS NFR BLD: 62.8 % (ref 38–73)
NONHDLC SERPL-MCNC: 142 MG/DL
NRBC BLD-RTO: 0 /100 WBC
PLATELET # BLD AUTO: 230 K/UL (ref 150–450)
PMV BLD AUTO: 9.5 FL (ref 9.2–12.9)
POTASSIUM SERPL-SCNC: 4.3 MMOL/L (ref 3.5–5.1)
POTASSIUM SERPL-SCNC: 4.3 MMOL/L (ref 3.5–5.1)
PROT SERPL-MCNC: 8.2 G/DL (ref 6–8.4)
RBC # BLD AUTO: 4.76 M/UL (ref 4.6–6.2)
RPR SER QL: NORMAL
SODIUM SERPL-SCNC: 135 MMOL/L (ref 136–145)
SODIUM SERPL-SCNC: 135 MMOL/L (ref 136–145)
TRIGL SERPL-MCNC: 321 MG/DL (ref 30–150)
WBC # BLD AUTO: 7.8 K/UL (ref 3.9–12.7)

## 2022-10-14 PROCEDURE — 83036 HEMOGLOBIN GLYCOSYLATED A1C: CPT | Performed by: INTERNAL MEDICINE

## 2022-10-14 PROCEDURE — 80061 LIPID PANEL: CPT | Performed by: INTERNAL MEDICINE

## 2022-10-14 PROCEDURE — 87536 HIV-1 QUANT&REVRSE TRNSCRPJ: CPT | Performed by: INTERNAL MEDICINE

## 2022-10-14 PROCEDURE — 80053 COMPREHEN METABOLIC PANEL: CPT | Performed by: INTERNAL MEDICINE

## 2022-10-14 PROCEDURE — 86592 SYPHILIS TEST NON-TREP QUAL: CPT | Performed by: INTERNAL MEDICINE

## 2022-10-14 PROCEDURE — 85025 COMPLETE CBC W/AUTO DIFF WBC: CPT | Performed by: INTERNAL MEDICINE

## 2022-10-16 LAB
HIV1 RNA # SERPL NAA+PROBE: 40 COPIES/ML
HIV1 RNA SERPL NAA+PROBE-LOG#: 1.6 LOG10COPY/ML

## 2022-10-17 ENCOUNTER — TELEPHONE (OUTPATIENT)
Dept: FAMILY MEDICINE | Facility: CLINIC | Age: 72
End: 2022-10-17
Payer: MEDICARE

## 2022-10-17 DIAGNOSIS — E11.628 TYPE 2 DIABETES MELLITUS WITH OTHER SKIN COMPLICATION, WITHOUT LONG-TERM CURRENT USE OF INSULIN: Primary | ICD-10-CM

## 2022-10-17 NOTE — TELEPHONE ENCOUNTER
Handled in a different encounter.     ----- Message from Charli Germain sent at 10/17/2022  1:37 PM CDT -----  Type:  Patient Returning Call    Who Called: Pt     Who Left Message for Patient:Cassandra Green LPN    Does the patient know what this is regarding?:yes     Would the patient rather a call back or a response via [employer]?  Call back     Best Call Back Number:[phone]    Additional Information:

## 2022-10-17 NOTE — TELEPHONE ENCOUNTER
----- Message from Cas Dumont sent at 10/17/2022  1:07 PM CDT -----  Type: Needs Medical Advice  Who Called:  pt  Symptoms (please be specific):  pt said he need to speak to the dr--said his blood sugar is very high and the just new meds and his ID dr want him to speak to the dr to see what changes he need to make--please call and advise--thank you  Best Call Back Number: [phone] (home) [phone] (work)    Additional Information: thank you

## 2022-10-17 NOTE — TELEPHONE ENCOUNTER
I spoke with pts sister, will see Dr. Eugene as scheduled and ENDO first available.   No further questions.

## 2022-10-17 NOTE — TELEPHONE ENCOUNTER
Call placed to patient at contact number [phone]; no answer/left message. Call placed to patient at contact number [phone]; call answered by patient's wife who request for writer to call patient again at [phone]. Call placed as requested with no answer for 2nd time. Left message requesting return call to office.

## 2022-10-19 ENCOUNTER — OFFICE VISIT (OUTPATIENT)
Dept: SURGERY | Facility: CLINIC | Age: 72
End: 2022-10-19
Payer: MEDICARE

## 2022-10-19 VITALS
SYSTOLIC BLOOD PRESSURE: 141 MMHG | BODY MASS INDEX: 21.23 KG/M2 | DIASTOLIC BLOOD PRESSURE: 88 MMHG | HEIGHT: 72 IN | WEIGHT: 156.75 LBS | RESPIRATION RATE: 16 BRPM | HEART RATE: 93 BPM | TEMPERATURE: 98 F

## 2022-10-19 DIAGNOSIS — Z79.4 TYPE 2 DIABETES MELLITUS WITHOUT COMPLICATION, WITH LONG-TERM CURRENT USE OF INSULIN: Primary | ICD-10-CM

## 2022-10-19 DIAGNOSIS — E11.9 TYPE 2 DIABETES MELLITUS WITHOUT COMPLICATION, WITH LONG-TERM CURRENT USE OF INSULIN: Primary | ICD-10-CM

## 2022-10-19 PROCEDURE — 4010F PR ACE/ARB THEARPY RXD/TAKEN: ICD-10-PCS | Mod: CPTII,S$GLB,, | Performed by: SURGERY

## 2022-10-19 PROCEDURE — 1126F PR PAIN SEVERITY QUANTIFIED, NO PAIN PRESENT: ICD-10-PCS | Mod: CPTII,S$GLB,, | Performed by: SURGERY

## 2022-10-19 PROCEDURE — 3288F FALL RISK ASSESSMENT DOCD: CPT | Mod: CPTII,S$GLB,, | Performed by: SURGERY

## 2022-10-19 PROCEDURE — 3079F PR MOST RECENT DIASTOLIC BLOOD PRESSURE 80-89 MM HG: ICD-10-PCS | Mod: CPTII,S$GLB,, | Performed by: SURGERY

## 2022-10-19 PROCEDURE — 1159F MED LIST DOCD IN RCRD: CPT | Mod: CPTII,S$GLB,, | Performed by: SURGERY

## 2022-10-19 PROCEDURE — 99999 PR PBB SHADOW E&M-EST. PATIENT-LVL IV: ICD-10-PCS | Mod: PBBFAC,,, | Performed by: SURGERY

## 2022-10-19 PROCEDURE — 3066F PR DOCUMENTATION OF TREATMENT FOR NEPHROPATHY: ICD-10-PCS | Mod: CPTII,S$GLB,, | Performed by: SURGERY

## 2022-10-19 PROCEDURE — 99999 PR PBB SHADOW E&M-EST. PATIENT-LVL IV: CPT | Mod: PBBFAC,,, | Performed by: SURGERY

## 2022-10-19 PROCEDURE — 3066F NEPHROPATHY DOC TX: CPT | Mod: CPTII,S$GLB,, | Performed by: SURGERY

## 2022-10-19 PROCEDURE — 3061F NEG MICROALBUMINURIA REV: CPT | Mod: CPTII,S$GLB,, | Performed by: SURGERY

## 2022-10-19 PROCEDURE — 1101F PT FALLS ASSESS-DOCD LE1/YR: CPT | Mod: CPTII,S$GLB,, | Performed by: SURGERY

## 2022-10-19 PROCEDURE — 3288F PR FALLS RISK ASSESSMENT DOCUMENTED: ICD-10-PCS | Mod: CPTII,S$GLB,, | Performed by: SURGERY

## 2022-10-19 PROCEDURE — 3077F PR MOST RECENT SYSTOLIC BLOOD PRESSURE >= 140 MM HG: ICD-10-PCS | Mod: CPTII,S$GLB,, | Performed by: SURGERY

## 2022-10-19 PROCEDURE — 3061F PR NEG MICROALBUMINURIA RESULT DOCUMENTED/REVIEW: ICD-10-PCS | Mod: CPTII,S$GLB,, | Performed by: SURGERY

## 2022-10-19 PROCEDURE — 99213 OFFICE O/P EST LOW 20 MIN: CPT | Mod: 24,S$GLB,, | Performed by: SURGERY

## 2022-10-19 PROCEDURE — 1126F AMNT PAIN NOTED NONE PRSNT: CPT | Mod: CPTII,S$GLB,, | Performed by: SURGERY

## 2022-10-19 PROCEDURE — 1159F PR MEDICATION LIST DOCUMENTED IN MEDICAL RECORD: ICD-10-PCS | Mod: CPTII,S$GLB,, | Performed by: SURGERY

## 2022-10-19 PROCEDURE — 99213 PR OFFICE/OUTPT VISIT, EST, LEVL III, 20-29 MIN: ICD-10-PCS | Mod: 24,S$GLB,, | Performed by: SURGERY

## 2022-10-19 PROCEDURE — 3079F DIAST BP 80-89 MM HG: CPT | Mod: CPTII,S$GLB,, | Performed by: SURGERY

## 2022-10-19 PROCEDURE — 1101F PR PT FALLS ASSESS DOC 0-1 FALLS W/OUT INJ PAST YR: ICD-10-PCS | Mod: CPTII,S$GLB,, | Performed by: SURGERY

## 2022-10-19 PROCEDURE — 3046F HEMOGLOBIN A1C LEVEL >9.0%: CPT | Mod: CPTII,S$GLB,, | Performed by: SURGERY

## 2022-10-19 PROCEDURE — 3077F SYST BP >= 140 MM HG: CPT | Mod: CPTII,S$GLB,, | Performed by: SURGERY

## 2022-10-19 PROCEDURE — 3046F PR MOST RECENT HEMOGLOBIN A1C LEVEL > 9.0%: ICD-10-PCS | Mod: CPTII,S$GLB,, | Performed by: SURGERY

## 2022-10-19 PROCEDURE — 4010F ACE/ARB THERAPY RXD/TAKEN: CPT | Mod: CPTII,S$GLB,, | Performed by: SURGERY

## 2022-10-19 NOTE — PROGRESS NOTES
Doing well from his surgery.  The hematoma has basically resolved.  I do feel a suspected lymph node in that area but no obvious hernia recurrence and resolution of the hematoma completely.  He does not have any symptoms  Of note his blood sugars have been basically out of control recently.  Last fasting blood sugar was in the 400s.  He does not have any symptoms from this.  He does note he urinates a lot.    Vitals:    10/19/22 0925   BP: (!) 141/88   Pulse: 93   Resp: 16   Temp: 97.6 °F (36.4 °C)     Abdomen benign  Small lymph node in inguinal region, no hematoma, well healed incision (LN less than 1 cm soft)    A/P  Hematoma has resolved.  I do not think he needs any further care of his inguinal region.  I think if the hernia comes back or he has any problems he come back to see me.      I am more concerned about his blood sugars today.  I see that he has an appointment with Endocrine next year and I think he will need something sooner.  I have contacted the endocrine department and have asked them to see him sooner.  They will schedule an appointment to see nurse practitioner Yesenia next week.  I have warned Mr. Silva of possible side effects from hyperglycemia i and he will report to the emergency department if he feels abnormal.

## 2022-10-25 ENCOUNTER — OFFICE VISIT (OUTPATIENT)
Dept: ENDOCRINOLOGY | Facility: CLINIC | Age: 72
End: 2022-10-25
Payer: MEDICARE

## 2022-10-25 VITALS
HEIGHT: 72 IN | OXYGEN SATURATION: 99 % | SYSTOLIC BLOOD PRESSURE: 140 MMHG | DIASTOLIC BLOOD PRESSURE: 80 MMHG | WEIGHT: 158.06 LBS | BODY MASS INDEX: 21.41 KG/M2 | HEART RATE: 73 BPM

## 2022-10-25 DIAGNOSIS — F32.A DEPRESSION, UNSPECIFIED DEPRESSION TYPE: ICD-10-CM

## 2022-10-25 DIAGNOSIS — E11.628 TYPE 2 DIABETES MELLITUS WITH OTHER SKIN COMPLICATION, WITHOUT LONG-TERM CURRENT USE OF INSULIN: ICD-10-CM

## 2022-10-25 DIAGNOSIS — E11.42 TYPE 2 DIABETES MELLITUS WITH DIABETIC POLYNEUROPATHY, WITH LONG-TERM CURRENT USE OF INSULIN: Primary | ICD-10-CM

## 2022-10-25 DIAGNOSIS — B20 HIV DISEASE: ICD-10-CM

## 2022-10-25 DIAGNOSIS — Z79.4 TYPE 2 DIABETES MELLITUS WITHOUT COMPLICATION, WITH LONG-TERM CURRENT USE OF INSULIN: ICD-10-CM

## 2022-10-25 DIAGNOSIS — E78.2 MIXED HYPERLIPIDEMIA: ICD-10-CM

## 2022-10-25 DIAGNOSIS — E11.9 TYPE 2 DIABETES MELLITUS WITHOUT COMPLICATION, WITH LONG-TERM CURRENT USE OF INSULIN: ICD-10-CM

## 2022-10-25 DIAGNOSIS — Z79.4 TYPE 2 DIABETES MELLITUS WITH DIABETIC POLYNEUROPATHY, WITH LONG-TERM CURRENT USE OF INSULIN: Primary | ICD-10-CM

## 2022-10-25 PROCEDURE — 4010F PR ACE/ARB THEARPY RXD/TAKEN: ICD-10-PCS | Mod: CPTII,S$GLB,, | Performed by: NURSE PRACTITIONER

## 2022-10-25 PROCEDURE — 99999 PR PBB SHADOW E&M-EST. PATIENT-LVL IV: CPT | Mod: PBBFAC,,, | Performed by: NURSE PRACTITIONER

## 2022-10-25 PROCEDURE — 4010F ACE/ARB THERAPY RXD/TAKEN: CPT | Mod: CPTII,S$GLB,, | Performed by: NURSE PRACTITIONER

## 2022-10-25 PROCEDURE — 3066F NEPHROPATHY DOC TX: CPT | Mod: CPTII,S$GLB,, | Performed by: NURSE PRACTITIONER

## 2022-10-25 PROCEDURE — 3079F PR MOST RECENT DIASTOLIC BLOOD PRESSURE 80-89 MM HG: ICD-10-PCS | Mod: CPTII,S$GLB,, | Performed by: NURSE PRACTITIONER

## 2022-10-25 PROCEDURE — 99204 PR OFFICE/OUTPT VISIT, NEW, LEVL IV, 45-59 MIN: ICD-10-PCS | Mod: S$GLB,,, | Performed by: NURSE PRACTITIONER

## 2022-10-25 PROCEDURE — 1126F PR PAIN SEVERITY QUANTIFIED, NO PAIN PRESENT: ICD-10-PCS | Mod: CPTII,S$GLB,, | Performed by: NURSE PRACTITIONER

## 2022-10-25 PROCEDURE — 1100F PR PT FALLS ASSESS DOC 2+ FALLS/FALL W/INJURY/YR: ICD-10-PCS | Mod: CPTII,S$GLB,, | Performed by: NURSE PRACTITIONER

## 2022-10-25 PROCEDURE — 3077F SYST BP >= 140 MM HG: CPT | Mod: CPTII,S$GLB,, | Performed by: NURSE PRACTITIONER

## 2022-10-25 PROCEDURE — 1159F MED LIST DOCD IN RCRD: CPT | Mod: CPTII,S$GLB,, | Performed by: NURSE PRACTITIONER

## 2022-10-25 PROCEDURE — 1100F PTFALLS ASSESS-DOCD GE2>/YR: CPT | Mod: CPTII,S$GLB,, | Performed by: NURSE PRACTITIONER

## 2022-10-25 PROCEDURE — 3046F PR MOST RECENT HEMOGLOBIN A1C LEVEL > 9.0%: ICD-10-PCS | Mod: CPTII,S$GLB,, | Performed by: NURSE PRACTITIONER

## 2022-10-25 PROCEDURE — 3077F PR MOST RECENT SYSTOLIC BLOOD PRESSURE >= 140 MM HG: ICD-10-PCS | Mod: CPTII,S$GLB,, | Performed by: NURSE PRACTITIONER

## 2022-10-25 PROCEDURE — 99999 PR PBB SHADOW E&M-EST. PATIENT-LVL IV: ICD-10-PCS | Mod: PBBFAC,,, | Performed by: NURSE PRACTITIONER

## 2022-10-25 PROCEDURE — 3079F DIAST BP 80-89 MM HG: CPT | Mod: CPTII,S$GLB,, | Performed by: NURSE PRACTITIONER

## 2022-10-25 PROCEDURE — 3061F PR NEG MICROALBUMINURIA RESULT DOCUMENTED/REVIEW: ICD-10-PCS | Mod: CPTII,S$GLB,, | Performed by: NURSE PRACTITIONER

## 2022-10-25 PROCEDURE — 3288F PR FALLS RISK ASSESSMENT DOCUMENTED: ICD-10-PCS | Mod: CPTII,S$GLB,, | Performed by: NURSE PRACTITIONER

## 2022-10-25 PROCEDURE — 3288F FALL RISK ASSESSMENT DOCD: CPT | Mod: CPTII,S$GLB,, | Performed by: NURSE PRACTITIONER

## 2022-10-25 PROCEDURE — 3061F NEG MICROALBUMINURIA REV: CPT | Mod: CPTII,S$GLB,, | Performed by: NURSE PRACTITIONER

## 2022-10-25 PROCEDURE — 3066F PR DOCUMENTATION OF TREATMENT FOR NEPHROPATHY: ICD-10-PCS | Mod: CPTII,S$GLB,, | Performed by: NURSE PRACTITIONER

## 2022-10-25 PROCEDURE — 1159F PR MEDICATION LIST DOCUMENTED IN MEDICAL RECORD: ICD-10-PCS | Mod: CPTII,S$GLB,, | Performed by: NURSE PRACTITIONER

## 2022-10-25 PROCEDURE — 3046F HEMOGLOBIN A1C LEVEL >9.0%: CPT | Mod: CPTII,S$GLB,, | Performed by: NURSE PRACTITIONER

## 2022-10-25 PROCEDURE — 99204 OFFICE O/P NEW MOD 45 MIN: CPT | Mod: S$GLB,,, | Performed by: NURSE PRACTITIONER

## 2022-10-25 PROCEDURE — 1126F AMNT PAIN NOTED NONE PRSNT: CPT | Mod: CPTII,S$GLB,, | Performed by: NURSE PRACTITIONER

## 2022-10-25 RX ORDER — METFORMIN HYDROCHLORIDE 500 MG/1
500 TABLET ORAL 2 TIMES DAILY WITH MEALS
Qty: 60 TABLET | Refills: 2
Start: 2022-10-25 | End: 2022-11-28

## 2022-10-25 RX ORDER — INSULIN ASPART 100 [IU]/ML
INJECTION, SUSPENSION SUBCUTANEOUS
Qty: 15 ML | Refills: 12 | Status: SHIPPED | OUTPATIENT
Start: 2022-10-25 | End: 2023-02-27

## 2022-10-25 NOTE — PROGRESS NOTES
CC: This 72 y.o. male presents for management of diabetes  and chronic conditions pending review including  HLP, HIV, DM PN    HPI: He was diagnosed with T2DM in ~ 2010. Has never been hospitalized r/t DM.  Family hx of DM: mother     Arrives new to endocrine today. Referred by Dr Ramirez. Pt s/p inguinal hernia surgery and bg have been in the 400s  Patient with polyuria, polydipsia   Has previously seen Teresita Velazco at [facility]- last visit ~ April  Reports that several months ago, metformin was decreased by 1/2 and Novolog 70/30 was stopped- he's not sure why?   he denies hypoglycemia reports bg 100-150s when on insulin  Currently reports bg  hypoglycemia at home - none  monitoring BG at home:  Bg have been 350-500s    Diet: Eats 3  Meals a day, snacks- rarely   B- biscuit and coffee  L- 1/2 sandwich  D- protein and veg, salad   Exercise: walks his dog  CURRENT DM MEDS: Trulicity 1.5 mg weekly (Tuesday); metformin 250 mg bid  Glucometer type:  new?    Standards of Care:  Eye exam: pre-covid, Dr Parker- needs to schedule     ROS:   Gen: Appetite good,    Eyes: Denies visual disturbances  Resp: no SOB or TRIANA, no cough  Cardiac: No palpitations, chest pain, no edema   GI: No nausea or vomiting, diarrhea, constipation, or abdominal pain.  /GYN: 5-6+ nocturia, no burning or pain.   MS/Neuro: Denies numbness/ tingling in BLE; Gait steady, speech clear  Psych: Denies drug/ETOH abuse, + h/o depression.  Other systems: negative.    PE:  GENERAL: Well developed, well nourished.  PSYCH: AAOx3, appropriate mood and affect, pleasant expression, conversant, appears relaxed, well groomed.   EYES: Conjunctiva, corneas clear  NECK: Supple, trachea midline   ABDOMEN: Soft, non-tender, non-distended   NEURO: Gait steady  FOOT EXAMINATION: 2/2022       Personally reviewed Past Medical, Surgical, Social History.    BP (!) 140/80 (BP Method: Large (Manual))   Pulse 73   Ht 6' (1.829 m)   Wt 71.7 kg (158 lb 1.1 oz)    SpO2 99%   BMI 21.44 kg/m²      Personally reviewed the below labs:      Chemistry        Component Value Date/Time     (L) 10/14/2022 0940     (L) 10/14/2022 0940    K 4.3 10/14/2022 0940    K 4.3 10/14/2022 0940     10/14/2022 0940     10/14/2022 0940    CO2 23 10/14/2022 0940    CO2 23 10/14/2022 0940    BUN 26 (H) 10/14/2022 0940    BUN 26 (H) 10/14/2022 0940    CREATININE 1.2 10/14/2022 0940    CREATININE 1.2 10/14/2022 0940     (H) 10/14/2022 0940     (H) 10/14/2022 0940        Component Value Date/Time    CALCIUM 10.3 10/14/2022 0940    CALCIUM 10.3 10/14/2022 0940    ALKPHOS 66 10/14/2022 0940    AST 16 10/14/2022 0940    ALT 22 10/14/2022 0940    BILITOT 0.8 10/14/2022 0940    ESTGFRAFRICA 58 (A) 07/29/2022 0934    EGFRNONAA 50 (A) 07/29/2022 0934            Lab Results   Component Value Date    TSH 3.410 02/22/2022       Recent Labs   Lab 10/14/22  0940   LDL Cholesterol 77.8   HDL 37 L   Cholesterol 179        Results for orders placed or performed in visit on 09/08/22   Vitamin D   Result Value Ref Range    Vit D, 25-Hydroxy 98 (H) 30 - 96 ng/mL     No results found for this or any previous visit.    Lab Results   Component Value Date    MICALBCREAT 12.0 03/11/2022       Hemoglobin A1C   Date Value Ref Range Status   10/14/2022 11.9 (H) 4.5 - 6.2 % Final     Comment:     According to ADA guidelines, hemoglobin A1C <7.0% represents  optimal control in non-pregnant diabetic patients.  Different  metrics may apply to specific populations.   Standards of Medical Care in Diabetes - 2016.    For the purpose of screening for the presence of diabetes:  <5.7%     Consistent with the absence of diabetes  5.7-6.4%  Consistent with increasing risk for diabetes   (prediabetes)  >or=6.5%  Consistent with diabetes    Currently no consensus exists for use of hemoglobin A1C  for diagnosis of diabetes for children.     09/08/2022 11.1 (H) 4.5 - 6.2 % Final     Comment:     According  to ADA guidelines, hemoglobin A1C <7.0% represents  optimal control in non-pregnant diabetic patients.  Different  metrics may apply to specific populations.   Standards of Medical Care in Diabetes - 2016.    For the purpose of screening for the presence of diabetes:  <5.7%     Consistent with the absence of diabetes  5.7-6.4%  Consistent with increasing risk for diabetes   (prediabetes)  >or=6.5%  Consistent with diabetes    Currently no consensus exists for use of hemoglobin A1C  for diagnosis of diabetes for children.     08/01/2022 9.4 (H) 4.0 - 5.6 % Final     Comment:     ADA Screening Guidelines:  5.7-6.4%  Consistent with prediabetes  >or=6.5%  Consistent with diabetes    High levels of fetal hemoglobin interfere with the HbA1C  assay. Heterozygous hemoglobin variants (HbS, HgC, etc)do  not significantly interfere with this assay.   However, presence of multiple variants may affect accuracy.          ASSESSMENT and PLAN:      1. T2DM with hyperglycemia, DM PN-  Stop Trulicity once out, does not appear to effective  Resume Novolog 70/30 15 u at breakfast and 15 u supper  Check bg bid- log in 2 weeks or sooner for hypoglycemia (has appt w Dr Eugene on 11/8, appears that he had also resumed his insulin at his last visit as well)  Resume metformin at 500 mg bid      Check c peptide w RTC    2. HLP -  on statin therapy, LFTs WNL    3. HIV- follows w Dr Carney     4. Depression stable, chronic     Follow-up: in 3 months with lab prior

## 2022-11-04 ENCOUNTER — TELEPHONE (OUTPATIENT)
Dept: FAMILY MEDICINE | Facility: CLINIC | Age: 72
End: 2022-11-04
Payer: MEDICARE

## 2022-11-04 NOTE — TELEPHONE ENCOUNTER
Patient originally scheduled for follow up with Dr. Eugene on 11-8-22. Provider booked out. Appointment scheduled for the date of 12-15-22. Patient agreed to appointment date, time, and location.

## 2022-11-04 NOTE — TELEPHONE ENCOUNTER
----- Message from Charli Germain sent at 11/4/2022 10:45 AM CDT -----  Type:  Patient Returning Call    Who Called:Pt     Who Left Message for Patient:  Nurse   Does the patient know what this is regarding?:yes     Would the patient rather a call back or a response via [employer] [facility]? Call back     Best Call Back Number:[phone] or [phone] (mobile)     Additional Information:

## 2022-11-09 ENCOUNTER — PATIENT OUTREACH (OUTPATIENT)
Dept: ADMINISTRATIVE | Facility: HOSPITAL | Age: 72
End: 2022-11-09
Payer: MEDICARE

## 2022-11-09 NOTE — PROGRESS NOTES
Diabetic eye exam GAP report-I spoke to pt regarding his overdue Diabetic eye exam and he stated he will call and schedule and let us know.

## 2022-12-15 ENCOUNTER — OFFICE VISIT (OUTPATIENT)
Dept: FAMILY MEDICINE | Facility: CLINIC | Age: 72
End: 2022-12-15
Payer: MEDICARE

## 2022-12-15 ENCOUNTER — LAB VISIT (OUTPATIENT)
Dept: LAB | Facility: HOSPITAL | Age: 72
End: 2022-12-15
Attending: STUDENT IN AN ORGANIZED HEALTH CARE EDUCATION/TRAINING PROGRAM
Payer: MEDICARE

## 2022-12-15 VITALS
SYSTOLIC BLOOD PRESSURE: 110 MMHG | DIASTOLIC BLOOD PRESSURE: 76 MMHG | HEART RATE: 87 BPM | WEIGHT: 161.63 LBS | RESPIRATION RATE: 18 BRPM | BODY MASS INDEX: 21.89 KG/M2 | HEIGHT: 72 IN

## 2022-12-15 DIAGNOSIS — R41.3 OTHER AMNESIA: ICD-10-CM

## 2022-12-15 DIAGNOSIS — E11.628 TYPE 2 DIABETES MELLITUS WITH OTHER SKIN COMPLICATION, WITHOUT LONG-TERM CURRENT USE OF INSULIN: Primary | ICD-10-CM

## 2022-12-15 DIAGNOSIS — G47.00 INSOMNIA, UNSPECIFIED TYPE: ICD-10-CM

## 2022-12-15 DIAGNOSIS — E11.628 TYPE 2 DIABETES MELLITUS WITH OTHER SKIN COMPLICATION, WITHOUT LONG-TERM CURRENT USE OF INSULIN: ICD-10-CM

## 2022-12-15 PROCEDURE — G0008 ADMIN INFLUENZA VIRUS VAC: HCPCS | Mod: S$GLB,,, | Performed by: STUDENT IN AN ORGANIZED HEALTH CARE EDUCATION/TRAINING PROGRAM

## 2022-12-15 PROCEDURE — 3074F SYST BP LT 130 MM HG: CPT | Mod: CPTII,S$GLB,, | Performed by: STUDENT IN AN ORGANIZED HEALTH CARE EDUCATION/TRAINING PROGRAM

## 2022-12-15 PROCEDURE — 84443 ASSAY THYROID STIM HORMONE: CPT | Performed by: STUDENT IN AN ORGANIZED HEALTH CARE EDUCATION/TRAINING PROGRAM

## 2022-12-15 PROCEDURE — 3288F PR FALLS RISK ASSESSMENT DOCUMENTED: ICD-10-PCS | Mod: CPTII,S$GLB,, | Performed by: STUDENT IN AN ORGANIZED HEALTH CARE EDUCATION/TRAINING PROGRAM

## 2022-12-15 PROCEDURE — 3078F PR MOST RECENT DIASTOLIC BLOOD PRESSURE < 80 MM HG: ICD-10-PCS | Mod: CPTII,S$GLB,, | Performed by: STUDENT IN AN ORGANIZED HEALTH CARE EDUCATION/TRAINING PROGRAM

## 2022-12-15 PROCEDURE — 3008F PR BODY MASS INDEX (BMI) DOCUMENTED: ICD-10-PCS | Mod: CPTII,S$GLB,, | Performed by: STUDENT IN AN ORGANIZED HEALTH CARE EDUCATION/TRAINING PROGRAM

## 2022-12-15 PROCEDURE — 4010F ACE/ARB THERAPY RXD/TAKEN: CPT | Mod: CPTII,S$GLB,, | Performed by: STUDENT IN AN ORGANIZED HEALTH CARE EDUCATION/TRAINING PROGRAM

## 2022-12-15 PROCEDURE — 82607 VITAMIN B-12: CPT | Performed by: STUDENT IN AN ORGANIZED HEALTH CARE EDUCATION/TRAINING PROGRAM

## 2022-12-15 PROCEDURE — 90694 FLU VACCINE - QUADRIVALENT - ADJUVANTED: ICD-10-PCS | Mod: S$GLB,,, | Performed by: STUDENT IN AN ORGANIZED HEALTH CARE EDUCATION/TRAINING PROGRAM

## 2022-12-15 PROCEDURE — 36415 COLL VENOUS BLD VENIPUNCTURE: CPT | Mod: PO | Performed by: STUDENT IN AN ORGANIZED HEALTH CARE EDUCATION/TRAINING PROGRAM

## 2022-12-15 PROCEDURE — 3008F BODY MASS INDEX DOCD: CPT | Mod: CPTII,S$GLB,, | Performed by: STUDENT IN AN ORGANIZED HEALTH CARE EDUCATION/TRAINING PROGRAM

## 2022-12-15 PROCEDURE — 99999 PR PBB SHADOW E&M-EST. PATIENT-LVL V: CPT | Mod: PBBFAC,,, | Performed by: STUDENT IN AN ORGANIZED HEALTH CARE EDUCATION/TRAINING PROGRAM

## 2022-12-15 PROCEDURE — 3061F PR NEG MICROALBUMINURIA RESULT DOCUMENTED/REVIEW: ICD-10-PCS | Mod: CPTII,S$GLB,, | Performed by: STUDENT IN AN ORGANIZED HEALTH CARE EDUCATION/TRAINING PROGRAM

## 2022-12-15 PROCEDURE — 3061F NEG MICROALBUMINURIA REV: CPT | Mod: CPTII,S$GLB,, | Performed by: STUDENT IN AN ORGANIZED HEALTH CARE EDUCATION/TRAINING PROGRAM

## 2022-12-15 PROCEDURE — 3066F NEPHROPATHY DOC TX: CPT | Mod: CPTII,S$GLB,, | Performed by: STUDENT IN AN ORGANIZED HEALTH CARE EDUCATION/TRAINING PROGRAM

## 2022-12-15 PROCEDURE — 3066F PR DOCUMENTATION OF TREATMENT FOR NEPHROPATHY: ICD-10-PCS | Mod: CPTII,S$GLB,, | Performed by: STUDENT IN AN ORGANIZED HEALTH CARE EDUCATION/TRAINING PROGRAM

## 2022-12-15 PROCEDURE — 1159F PR MEDICATION LIST DOCUMENTED IN MEDICAL RECORD: ICD-10-PCS | Mod: CPTII,S$GLB,, | Performed by: STUDENT IN AN ORGANIZED HEALTH CARE EDUCATION/TRAINING PROGRAM

## 2022-12-15 PROCEDURE — 90694 VACC AIIV4 NO PRSRV 0.5ML IM: CPT | Mod: S$GLB,,, | Performed by: STUDENT IN AN ORGANIZED HEALTH CARE EDUCATION/TRAINING PROGRAM

## 2022-12-15 PROCEDURE — 3046F PR MOST RECENT HEMOGLOBIN A1C LEVEL > 9.0%: ICD-10-PCS | Mod: CPTII,S$GLB,, | Performed by: STUDENT IN AN ORGANIZED HEALTH CARE EDUCATION/TRAINING PROGRAM

## 2022-12-15 PROCEDURE — 99214 PR OFFICE/OUTPT VISIT, EST, LEVL IV, 30-39 MIN: ICD-10-PCS | Mod: S$GLB,,, | Performed by: STUDENT IN AN ORGANIZED HEALTH CARE EDUCATION/TRAINING PROGRAM

## 2022-12-15 PROCEDURE — 3074F PR MOST RECENT SYSTOLIC BLOOD PRESSURE < 130 MM HG: ICD-10-PCS | Mod: CPTII,S$GLB,, | Performed by: STUDENT IN AN ORGANIZED HEALTH CARE EDUCATION/TRAINING PROGRAM

## 2022-12-15 PROCEDURE — 1101F PT FALLS ASSESS-DOCD LE1/YR: CPT | Mod: CPTII,S$GLB,, | Performed by: STUDENT IN AN ORGANIZED HEALTH CARE EDUCATION/TRAINING PROGRAM

## 2022-12-15 PROCEDURE — 99214 OFFICE O/P EST MOD 30 MIN: CPT | Mod: S$GLB,,, | Performed by: STUDENT IN AN ORGANIZED HEALTH CARE EDUCATION/TRAINING PROGRAM

## 2022-12-15 PROCEDURE — 1160F PR REVIEW ALL MEDS BY PRESCRIBER/CLIN PHARMACIST DOCUMENTED: ICD-10-PCS | Mod: CPTII,S$GLB,, | Performed by: STUDENT IN AN ORGANIZED HEALTH CARE EDUCATION/TRAINING PROGRAM

## 2022-12-15 PROCEDURE — 4010F PR ACE/ARB THEARPY RXD/TAKEN: ICD-10-PCS | Mod: CPTII,S$GLB,, | Performed by: STUDENT IN AN ORGANIZED HEALTH CARE EDUCATION/TRAINING PROGRAM

## 2022-12-15 PROCEDURE — 1126F PR PAIN SEVERITY QUANTIFIED, NO PAIN PRESENT: ICD-10-PCS | Mod: CPTII,S$GLB,, | Performed by: STUDENT IN AN ORGANIZED HEALTH CARE EDUCATION/TRAINING PROGRAM

## 2022-12-15 PROCEDURE — 1126F AMNT PAIN NOTED NONE PRSNT: CPT | Mod: CPTII,S$GLB,, | Performed by: STUDENT IN AN ORGANIZED HEALTH CARE EDUCATION/TRAINING PROGRAM

## 2022-12-15 PROCEDURE — 99999 PR PBB SHADOW E&M-EST. PATIENT-LVL V: ICD-10-PCS | Mod: PBBFAC,,, | Performed by: STUDENT IN AN ORGANIZED HEALTH CARE EDUCATION/TRAINING PROGRAM

## 2022-12-15 PROCEDURE — 80053 COMPREHEN METABOLIC PANEL: CPT | Performed by: STUDENT IN AN ORGANIZED HEALTH CARE EDUCATION/TRAINING PROGRAM

## 2022-12-15 PROCEDURE — G0008 FLU VACCINE - QUADRIVALENT - ADJUVANTED: ICD-10-PCS | Mod: S$GLB,,, | Performed by: STUDENT IN AN ORGANIZED HEALTH CARE EDUCATION/TRAINING PROGRAM

## 2022-12-15 PROCEDURE — 3046F HEMOGLOBIN A1C LEVEL >9.0%: CPT | Mod: CPTII,S$GLB,, | Performed by: STUDENT IN AN ORGANIZED HEALTH CARE EDUCATION/TRAINING PROGRAM

## 2022-12-15 PROCEDURE — 1159F MED LIST DOCD IN RCRD: CPT | Mod: CPTII,S$GLB,, | Performed by: STUDENT IN AN ORGANIZED HEALTH CARE EDUCATION/TRAINING PROGRAM

## 2022-12-15 PROCEDURE — 3288F FALL RISK ASSESSMENT DOCD: CPT | Mod: CPTII,S$GLB,, | Performed by: STUDENT IN AN ORGANIZED HEALTH CARE EDUCATION/TRAINING PROGRAM

## 2022-12-15 PROCEDURE — 1101F PR PT FALLS ASSESS DOC 0-1 FALLS W/OUT INJ PAST YR: ICD-10-PCS | Mod: CPTII,S$GLB,, | Performed by: STUDENT IN AN ORGANIZED HEALTH CARE EDUCATION/TRAINING PROGRAM

## 2022-12-15 PROCEDURE — 3078F DIAST BP <80 MM HG: CPT | Mod: CPTII,S$GLB,, | Performed by: STUDENT IN AN ORGANIZED HEALTH CARE EDUCATION/TRAINING PROGRAM

## 2022-12-15 PROCEDURE — 1160F RVW MEDS BY RX/DR IN RCRD: CPT | Mod: CPTII,S$GLB,, | Performed by: STUDENT IN AN ORGANIZED HEALTH CARE EDUCATION/TRAINING PROGRAM

## 2022-12-15 PROCEDURE — 84425 ASSAY OF VITAMIN B-1: CPT | Performed by: STUDENT IN AN ORGANIZED HEALTH CARE EDUCATION/TRAINING PROGRAM

## 2022-12-15 RX ORDER — DULAGLUTIDE 1.5 MG/.5ML
INJECTION, SOLUTION SUBCUTANEOUS
COMMUNITY
Start: 2022-10-28 | End: 2023-02-27

## 2022-12-15 RX ORDER — ZOLPIDEM TARTRATE 5 MG/1
5 TABLET ORAL NIGHTLY PRN
Qty: 30 TABLET | Refills: 5 | Status: SHIPPED | OUTPATIENT
Start: 2022-12-15 | End: 2023-05-29

## 2022-12-15 NOTE — PATIENT INSTRUCTIONS
Chiki Taylor,     If you are due for any health screening(s) below please notify me so we can arrange them to be ordered and scheduled to maintain your health. Most healthy patients complete it. Don't lose out on improving your health.     Tests to Keep You Healthy    Eye Exam: ORDERED BUT NOT SCHEDULED  Colon Cancer Screening: DUE  Last HbA1c < 8 (10/14/2022): NO  Tobacco Cessation: Yes         Colon Cancer Screening    Of cancers affecting both men and women, colorectal cancer is the third leading cancer killer in the [place]. But it doesnt have to be. Screening can prevent colorectal cancer or find it at an early stage when treatment often leads to a cure.    A colonoscopy is the preferred test for detecting colon cancer. It is needed only once every 10 years if results are negative. While sedated, a flexible, lighted tube with a tiny camera is inserted into the rectum and advanced through the colon to look for cancers. An alternative screening test that is used at home and returned to the lab may also be used. It detects hidden blood in bowel movements which could indicate cancer in the colon. If results are positive, you will need a colonoscopy to determine if the blood is a sign of cancer. This type of follow up (diagnostic) colonoscopy usually requires additional copays as required by your insurance provider. Please contact your PCP if you have any questions.    Although you are still overdue for this important screening, due to the COVID-19 pandemic, we recommend scheduling it in the near future.       Diabetic Retinal Eye Exam    Diabetes is the #1 cause of blindness in the [place] - early detection before signs or symptoms develop can prevent debilitating blindness.    Once-a-year screening is recommended for all diabetic patients. This exam can prevent and treat diabetes complications in the eye before developing symptoms. This can be done with a special camera is used to take photographs of the back of  your eye without having to dilate them, or you can see an eye doctor for a full dilated exam.    Although you are still overdue for this important screening, due to the COVID-19 pandemic, we recommend scheduling it in the near future.    A1c every 3-6 months, lipid panel every year, microalbumin (urine test) once per year, comprehensive foot exam once per year, dilated eye exam at least once per year, dental exam every 6 months, flu vaccination every year, and pneumonia vaccination(s) as recommended

## 2022-12-15 NOTE — PROGRESS NOTES
[facility] CLINIC NOTE    Patient Name: Brandon Parson  YOB: 1950    PRESENTING HISTORY     History of Present Illness:  Mr. Brandon Parson is a 72 y.o. male here for scheduled f/u.     There has been a recurrent issue where he stops taking his insulin. This time he thought I told him to. I reviewed my previous notes and he had stopped his insulin prior to those visits also.   He splits his time between two relatives houses, making  services difficult to achieve. He also declines this when I bring it up.   I'm not sure how to correct this problem. I will recheck some vitamin studies to ensure I have not overlooked an easily reversible cause of memory loss.     BG good today 113 this morning.   Ate chicken salad sandwich, 1/2 banana.   Discussed some dietary modifications he can make to lower carbohydrates. Offered DM education but he has to figure out when he can come to that.           ROS      OBJECTIVE:   Vital Signs:  Vitals:    12/15/22 1509   BP: 110/76   Pulse: 87   Resp: 18   Weight: 73.3 kg (161 lb 9.6 oz)   Height: 6' (1.829 m)          Physical Exam: Normal, no change.     Physical Exam    ASSESSMENT & PLAN:     Type 2 diabetes mellitus with other skin complication, without long-term current use of insulin  -     Comprehensive Metabolic Panel; Future; Expected date: 12/15/2022  -     VITAMIN B12; Future; Expected date: 12/15/2022  -     VITAMIN B1; Future; Expected date: 12/15/2022  -     TSH; Future; Expected date: 12/15/2022  Adherence and diet are both issues.   No medication changes for now.     Other amnesia  -     VITAMIN B12; Future; Expected date: 12/15/2022    Insomnia, unspecified type  -     zolpidem (AMBIEN) 5 MG Tab; Take 1 tablet (5 mg total) by mouth nightly as needed.  Dispense: 30 tablet; Refill: 5  Discussed at length that this is probably not good for him. That said, he is now off the bzds for anxiety so we have made some improvement. I will leave in place  for now.     Other orders  -     Influenza - Quadrivalent (Adjuvanted)             Francois Eugene MD   Internal Medicine

## 2022-12-16 LAB
ALBUMIN SERPL BCP-MCNC: 4.2 G/DL (ref 3.5–5.2)
ALP SERPL-CCNC: 61 U/L (ref 55–135)
ALT SERPL W/O P-5'-P-CCNC: 15 U/L (ref 10–44)
ANION GAP SERPL CALC-SCNC: 14 MMOL/L (ref 8–16)
AST SERPL-CCNC: 15 U/L (ref 10–40)
BILIRUB SERPL-MCNC: 0.2 MG/DL (ref 0.1–1)
BUN SERPL-MCNC: 23 MG/DL (ref 8–23)
CALCIUM SERPL-MCNC: 10.2 MG/DL (ref 8.7–10.5)
CHLORIDE SERPL-SCNC: 102 MMOL/L (ref 95–110)
CO2 SERPL-SCNC: 21 MMOL/L (ref 23–29)
CREAT SERPL-MCNC: 1.6 MG/DL (ref 0.5–1.4)
EST. GFR  (NO RACE VARIABLE): 45.2 ML/MIN/1.73 M^2
GLUCOSE SERPL-MCNC: 180 MG/DL (ref 70–110)
POTASSIUM SERPL-SCNC: 4.1 MMOL/L (ref 3.5–5.1)
PROT SERPL-MCNC: 7.8 G/DL (ref 6–8.4)
SODIUM SERPL-SCNC: 137 MMOL/L (ref 136–145)
TSH SERPL DL<=0.005 MIU/L-ACNC: 2.62 UIU/ML (ref 0.4–4)
VIT B12 SERPL-MCNC: 379 PG/ML (ref 210–950)

## 2022-12-19 ENCOUNTER — TELEPHONE (OUTPATIENT)
Dept: ENDOCRINOLOGY | Facility: CLINIC | Age: 72
End: 2022-12-19
Payer: MEDICARE

## 2022-12-19 NOTE — TELEPHONE ENCOUNTER
Spoke with pt, and after noting that appts would be rescheduled to next soonest available (March) he opted to keep appts for lab 1/30 and ARIELLA Saenz 2/3

## 2022-12-19 NOTE — TELEPHONE ENCOUNTER
----- Message from [place]ida [place] sent at 12/19/2022  9:04 AM CST -----  Contact: pt  Type:  Sooner Appointment Request    Caller is requesting a sooner appointment.  Caller declined first available appointment listed below.  Caller will not accept being placed on the waitlist and is requesting a message be sent to doctor.    Name of Caller:  pt  When is the first available appointment?  02/03/2023    Best Call Back Number:  [phone]    Additional Information:  pt has an appt on 02/03/2023 but he would like to change it to a Monday. Fridays are not good for him. Please call and advise

## 2022-12-21 LAB — VIT B1 BLD-MCNC: 149 UG/L (ref 38–122)

## 2023-01-17 ENCOUNTER — PATIENT OUTREACH (OUTPATIENT)
Dept: ADMINISTRATIVE | Facility: HOSPITAL | Age: 73
End: 2023-01-17
Payer: MEDICARE

## 2023-01-17 NOTE — PROGRESS NOTES
UNCONTROLLED A1C REPORT.  PATIENT HAS AN UPCOMING LAB APPOINTMENT.  CHART REVIEWED;  LABS ORDERED AND LINKED WHAT IS NEEDED    Hemoglobin A1C   Date Value Ref Range Status   10/14/2022 11.9 (H) 4.5 - 6.2 % Final     Comment:     According to ADA guidelines, hemoglobin A1C <7.0% represents  optimal control in non-pregnant diabetic patients.  Different  metrics may apply to specific populations.   Standards of Medical Care in Diabetes - 2016.    For the purpose of screening for the presence of diabetes:  <5.7%     Consistent with the absence of diabetes  5.7-6.4%  Consistent with increasing risk for diabetes   (prediabetes)  >or=6.5%  Consistent with diabetes    Currently no consensus exists for use of hemoglobin A1C  for diagnosis of diabetes for children.     09/08/2022 11.1 (H) 4.5 - 6.2 % Final     Comment:     According to ADA guidelines, hemoglobin A1C <7.0% represents  optimal control in non-pregnant diabetic patients.  Different  metrics may apply to specific populations.   Standards of Medical Care in Diabetes - 2016.    For the purpose of screening for the presence of diabetes:  <5.7%     Consistent with the absence of diabetes  5.7-6.4%  Consistent with increasing risk for diabetes   (prediabetes)  >or=6.5%  Consistent with diabetes    Currently no consensus exists for use of hemoglobin A1C  for diagnosis of diabetes for children.     08/01/2022 9.4 (H) 4.0 - 5.6 % Final     Comment:     ADA Screening Guidelines:  5.7-6.4%  Consistent with prediabetes  >or=6.5%  Consistent with diabetes    High levels of fetal hemoglobin interfere with the HbA1C  assay. Heterozygous hemoglobin variants (HbS, HgC, etc)do  not significantly interfere with this assay.   However, presence of multiple variants may affect accuracy.

## 2023-01-23 ENCOUNTER — PATIENT OUTREACH (OUTPATIENT)
Dept: ADMINISTRATIVE | Facility: HOSPITAL | Age: 73
End: 2023-01-23
Payer: MEDICARE

## 2023-01-23 NOTE — PROGRESS NOTES
Diabetic eye exam GAP report-I spoke to pt regarding his overdue Diabetic eye exam and he stated he sees Dr Arellano next Monday.

## 2023-01-30 ENCOUNTER — LAB VISIT (OUTPATIENT)
Dept: LAB | Facility: HOSPITAL | Age: 73
End: 2023-01-30
Payer: MEDICARE

## 2023-01-30 DIAGNOSIS — Z79.4 TYPE 2 DIABETES MELLITUS WITH DIABETIC POLYNEUROPATHY, WITH LONG-TERM CURRENT USE OF INSULIN: ICD-10-CM

## 2023-01-30 DIAGNOSIS — E11.42 TYPE 2 DIABETES MELLITUS WITH DIABETIC POLYNEUROPATHY, WITH LONG-TERM CURRENT USE OF INSULIN: ICD-10-CM

## 2023-01-30 LAB
C PEPTIDE SERPL-MCNC: 1.85 NG/ML (ref 0.78–5.19)
ESTIMATED AVG GLUCOSE: 220 MG/DL (ref 68–131)
HBA1C MFR BLD: 9.3 % (ref 4–5.6)

## 2023-01-30 PROCEDURE — 83036 HEMOGLOBIN GLYCOSYLATED A1C: CPT | Performed by: NURSE PRACTITIONER

## 2023-01-30 PROCEDURE — 86341 ISLET CELL ANTIBODY: CPT | Performed by: NURSE PRACTITIONER

## 2023-01-30 PROCEDURE — 36415 COLL VENOUS BLD VENIPUNCTURE: CPT | Mod: PO | Performed by: NURSE PRACTITIONER

## 2023-01-30 PROCEDURE — 84681 ASSAY OF C-PEPTIDE: CPT | Performed by: NURSE PRACTITIONER

## 2023-02-02 ENCOUNTER — PATIENT OUTREACH (OUTPATIENT)
Dept: ADMINISTRATIVE | Facility: HOSPITAL | Age: 73
End: 2023-02-02
Payer: MEDICARE

## 2023-02-02 LAB
LEFT EYE DM RETINOPATHY: NEGATIVE
RIGHT EYE DM RETINOPATHY: NEGATIVE

## 2023-02-02 NOTE — LETTER
AUTHORIZATION FOR RELEASE OF   CONFIDENTIAL INFORMATION    Dear Dr Colbert,    We are seeing Brandon Parson, date of birth 1950, in the clinic at [facility]. Francois Eugene MD is the patient's PCP. Brandon Parson has an outstanding lab/procedure at the time we reviewed his chart. In order to help keep his health information updated, he has authorized us to request the following medical record(s):        (  )  MAMMOGRAM                                      (  )  COLONOSCOPY      (  )  PAP SMEAR                                          (  )  OUTSIDE LAB RESULTS     (  )  DEXA SCAN                                          ( X )  EYE EXAM            (  )  FOOT EXAM                                          (  )  ENTIRE RECORD     (  )  OUTSIDE IMMUNIZATIONS                 (  )  _______________         Please fax records to [facility], Craig P Naccari, MD at [phone]      Thanks so much and have a great day!    Beverly Pearson LPN [facility]  [address] [place] [address]  P- [phone]   [phone]          Patient Name: Brandon Parson  : 1950  Patient Phone #: [phone]

## 2023-02-03 LAB — GAD65 AB SER-SCNC: 0 NMOL/L

## 2023-02-13 DIAGNOSIS — B20 HIV DISEASE: Primary | ICD-10-CM

## 2023-02-13 RX ORDER — EFAVIRENZ 600 MG/1
600 TABLET, FILM COATED ORAL NIGHTLY
Qty: 30 TABLET | Refills: 5 | Status: SHIPPED | OUTPATIENT
Start: 2023-02-13 | End: 2023-07-25 | Stop reason: SDUPTHER

## 2023-02-24 NOTE — PROGRESS NOTES
CC: This 72 y.o. male presents for management of diabetes  and chronic conditions pending review including  HLP, HIV, DM PN    HPI: He was diagnosed with T2DM in ~ 2010. Has never been hospitalized r/t DM.  Family hx of DM: mother     No acute events since his last visit  - no meter, No log  Reports bg  hypoglycemia at home - none  monitoring BG at home:  Fasting- 149-300- mostly 200s recently  BT- 200-300s     Diet: Eats 2- 3  Meals a day, snacks- banana   Exercise: walks his dog  CURRENT DM MEDS:   metformin 500 mg bid, Novolog 70/30 15 u bid, Trulicity 1.5 mg weekly     Standards of Care:  Eye exam: 2/2023 Dr Parker     ROS:   Gen: Appetite good,  weight gain 20 lbs  Eyes: Denies visual disturbances  Resp: no SOB or TRIANA, no cough  Cardiac: No palpitations, chest pain, no edema   GI: No nausea or vomiting, diarrhea, constipation, or abdominal pain.  /GYN: 2+ nocturia, no burning or pain.   MS/Neuro: Denies numbness/ tingling in BLE; Gait steady, speech clear  Psych: Denies drug/ETOH abuse, + h/o depression.  Other systems: negative.    PE:  GENERAL: Well developed, well nourished.  PSYCH: AAOx3, appropriate mood and affect, pleasant expression, conversant, appears relaxed, well groomed.   EYES: Conjunctiva, corneas clear  NECK: Supple, trachea midline   ABDOMEN: Soft, non-tender, non-distended   NEURO: Gait steady  FOOT EXAMINATION: 2/27/2023  No foot deformity, corns or callus formation,  nails in good condiiton and well trimmed, no interspace maceration or ulceration noted.  Decreased hair growth present over toes/feet.  Protective sensation intact with 10 gram monofilament.  +2 dorsalis pedis and posterior pulses noted.     Personally reviewed Past Medical, Surgical, Social History.    BP (!) 144/80 (BP Location: Right arm, Patient Position: Sitting, BP Method: Medium (Manual))   Pulse 69   Ht 6' (1.829 m)   Wt 81.4 kg (179 lb 9 oz)   SpO2 99%   BMI 24.35 kg/m²      Personally reviewed the below  labs:      Chemistry        Component Value Date/Time     12/15/2022 1541    K 4.1 12/15/2022 1541     12/15/2022 1541    CO2 21 (L) 12/15/2022 1541    BUN 23 12/15/2022 1541    CREATININE 1.6 (H) 12/15/2022 1541     (H) 12/15/2022 1541        Component Value Date/Time    CALCIUM 10.2 12/15/2022 1541    ALKPHOS 61 12/15/2022 1541    AST 15 12/15/2022 1541    ALT 15 12/15/2022 1541    BILITOT 0.2 12/15/2022 1541    ESTGFRAFRICA 58 (A) 07/29/2022 0934    EGFRNONAA 50 (A) 07/29/2022 0934            Lab Results   Component Value Date    TSH 2.618 12/15/2022       Recent Labs   Lab 10/14/22  0940   LDL Cholesterol 77.8   HDL 37 L   Cholesterol 179        Results for orders placed or performed in visit on 09/08/22   Vitamin D   Result Value Ref Range    Vit D, 25-Hydroxy 98 (H) 30 - 96 ng/mL     No results found for this or any previous visit.    Lab Results   Component Value Date    MICALBCREAT 12.0 03/11/2022       Hemoglobin A1C   Date Value Ref Range Status   01/30/2023 9.3 (H) 4.0 - 5.6 % Final     Comment:     ADA Screening Guidelines:  5.7-6.4%  Consistent with prediabetes  >or=6.5%  Consistent with diabetes    High levels of fetal hemoglobin interfere with the HbA1C  assay. Heterozygous hemoglobin variants (HbS, HgC, etc)do  not significantly interfere with this assay.   However, presence of multiple variants may affect accuracy.     10/14/2022 11.9 (H) 4.5 - 6.2 % Final     Comment:     According to ADA guidelines, hemoglobin A1C <7.0% represents  optimal control in non-pregnant diabetic patients.  Different  metrics may apply to specific populations.   Standards of Medical Care in Diabetes - 2016.    For the purpose of screening for the presence of diabetes:  <5.7%     Consistent with the absence of diabetes  5.7-6.4%  Consistent with increasing risk for diabetes   (prediabetes)  >or=6.5%  Consistent with diabetes    Currently no consensus exists for use of hemoglobin A1C  for diagnosis of  diabetes for children.     09/08/2022 11.1 (H) 4.5 - 6.2 % Final     Comment:     According to ADA guidelines, hemoglobin A1C <7.0% represents  optimal control in non-pregnant diabetic patients.  Different  metrics may apply to specific populations.   Standards of Medical Care in Diabetes - 2016.    For the purpose of screening for the presence of diabetes:  <5.7%     Consistent with the absence of diabetes  5.7-6.4%  Consistent with increasing risk for diabetes   (prediabetes)  >or=6.5%  Consistent with diabetes    Currently no consensus exists for use of hemoglobin A1C  for diagnosis of diabetes for children.          ASSESSMENT and PLAN:      1. T2DM with hyperglycemia, DM PN, CKD 3a-   Increase Novolog 70/30 18u at breakfast and 18 u supper  Increase Trulicity to 3 mg weekly  Check bg bid- log in 2 weeks - notify me sooner for consistent hypoglycemia   Schedule CGMS pro   Continue metformin at 500 mg bid         2. HLP -  on statin therapy,    3. HIV- follows w Dr Carney - has appt 3/11/2023    4. Depression stable, chronic     Follow-up: in 3 months with lab prior

## 2023-02-27 ENCOUNTER — OFFICE VISIT (OUTPATIENT)
Dept: ENDOCRINOLOGY | Facility: CLINIC | Age: 73
End: 2023-02-27
Payer: MEDICARE

## 2023-02-27 VITALS
HEIGHT: 72 IN | SYSTOLIC BLOOD PRESSURE: 144 MMHG | BODY MASS INDEX: 24.32 KG/M2 | WEIGHT: 179.56 LBS | OXYGEN SATURATION: 99 % | HEART RATE: 69 BPM | DIASTOLIC BLOOD PRESSURE: 80 MMHG

## 2023-02-27 DIAGNOSIS — E11.42 TYPE 2 DIABETES MELLITUS WITH DIABETIC POLYNEUROPATHY, WITH LONG-TERM CURRENT USE OF INSULIN: Primary | ICD-10-CM

## 2023-02-27 DIAGNOSIS — N18.31 TYPE 2 DIABETES MELLITUS WITH STAGE 3A CHRONIC KIDNEY DISEASE, WITH LONG-TERM CURRENT USE OF INSULIN: ICD-10-CM

## 2023-02-27 DIAGNOSIS — B20 HIV DISEASE: ICD-10-CM

## 2023-02-27 DIAGNOSIS — F32.A DEPRESSION, UNSPECIFIED DEPRESSION TYPE: ICD-10-CM

## 2023-02-27 DIAGNOSIS — Z79.4 TYPE 2 DIABETES MELLITUS WITH STAGE 3A CHRONIC KIDNEY DISEASE, WITH LONG-TERM CURRENT USE OF INSULIN: ICD-10-CM

## 2023-02-27 DIAGNOSIS — E11.628 TYPE 2 DIABETES MELLITUS WITH OTHER SKIN COMPLICATION, WITHOUT LONG-TERM CURRENT USE OF INSULIN: ICD-10-CM

## 2023-02-27 DIAGNOSIS — E11.22 TYPE 2 DIABETES MELLITUS WITH STAGE 3A CHRONIC KIDNEY DISEASE, WITH LONG-TERM CURRENT USE OF INSULIN: ICD-10-CM

## 2023-02-27 DIAGNOSIS — E78.2 MIXED HYPERLIPIDEMIA: ICD-10-CM

## 2023-02-27 DIAGNOSIS — Z79.4 TYPE 2 DIABETES MELLITUS WITH DIABETIC POLYNEUROPATHY, WITH LONG-TERM CURRENT USE OF INSULIN: Primary | ICD-10-CM

## 2023-02-27 PROCEDURE — 3077F SYST BP >= 140 MM HG: CPT | Mod: CPTII,S$GLB,, | Performed by: NURSE PRACTITIONER

## 2023-02-27 PROCEDURE — 99999 PR PBB SHADOW E&M-EST. PATIENT-LVL IV: ICD-10-PCS | Mod: PBBFAC,,, | Performed by: NURSE PRACTITIONER

## 2023-02-27 PROCEDURE — 1101F PR PT FALLS ASSESS DOC 0-1 FALLS W/OUT INJ PAST YR: ICD-10-PCS | Mod: CPTII,S$GLB,, | Performed by: NURSE PRACTITIONER

## 2023-02-27 PROCEDURE — 3288F PR FALLS RISK ASSESSMENT DOCUMENTED: ICD-10-PCS | Mod: CPTII,S$GLB,, | Performed by: NURSE PRACTITIONER

## 2023-02-27 PROCEDURE — 3079F PR MOST RECENT DIASTOLIC BLOOD PRESSURE 80-89 MM HG: ICD-10-PCS | Mod: CPTII,S$GLB,, | Performed by: NURSE PRACTITIONER

## 2023-02-27 PROCEDURE — 3079F DIAST BP 80-89 MM HG: CPT | Mod: CPTII,S$GLB,, | Performed by: NURSE PRACTITIONER

## 2023-02-27 PROCEDURE — 99999 PR PBB SHADOW E&M-EST. PATIENT-LVL IV: CPT | Mod: PBBFAC,,, | Performed by: NURSE PRACTITIONER

## 2023-02-27 PROCEDURE — 3046F HEMOGLOBIN A1C LEVEL >9.0%: CPT | Mod: CPTII,S$GLB,, | Performed by: NURSE PRACTITIONER

## 2023-02-27 PROCEDURE — 3046F PR MOST RECENT HEMOGLOBIN A1C LEVEL > 9.0%: ICD-10-PCS | Mod: CPTII,S$GLB,, | Performed by: NURSE PRACTITIONER

## 2023-02-27 PROCEDURE — 1126F AMNT PAIN NOTED NONE PRSNT: CPT | Mod: CPTII,S$GLB,, | Performed by: NURSE PRACTITIONER

## 2023-02-27 PROCEDURE — 3288F FALL RISK ASSESSMENT DOCD: CPT | Mod: CPTII,S$GLB,, | Performed by: NURSE PRACTITIONER

## 2023-02-27 PROCEDURE — 1101F PT FALLS ASSESS-DOCD LE1/YR: CPT | Mod: CPTII,S$GLB,, | Performed by: NURSE PRACTITIONER

## 2023-02-27 PROCEDURE — 1159F MED LIST DOCD IN RCRD: CPT | Mod: CPTII,S$GLB,, | Performed by: NURSE PRACTITIONER

## 2023-02-27 PROCEDURE — 1159F PR MEDICATION LIST DOCUMENTED IN MEDICAL RECORD: ICD-10-PCS | Mod: CPTII,S$GLB,, | Performed by: NURSE PRACTITIONER

## 2023-02-27 PROCEDURE — 3008F BODY MASS INDEX DOCD: CPT | Mod: CPTII,S$GLB,, | Performed by: NURSE PRACTITIONER

## 2023-02-27 PROCEDURE — 3077F PR MOST RECENT SYSTOLIC BLOOD PRESSURE >= 140 MM HG: ICD-10-PCS | Mod: CPTII,S$GLB,, | Performed by: NURSE PRACTITIONER

## 2023-02-27 PROCEDURE — 1126F PR PAIN SEVERITY QUANTIFIED, NO PAIN PRESENT: ICD-10-PCS | Mod: CPTII,S$GLB,, | Performed by: NURSE PRACTITIONER

## 2023-02-27 PROCEDURE — 99214 OFFICE O/P EST MOD 30 MIN: CPT | Mod: S$GLB,,, | Performed by: NURSE PRACTITIONER

## 2023-02-27 PROCEDURE — 3008F PR BODY MASS INDEX (BMI) DOCUMENTED: ICD-10-PCS | Mod: CPTII,S$GLB,, | Performed by: NURSE PRACTITIONER

## 2023-02-27 PROCEDURE — 99214 PR OFFICE/OUTPT VISIT, EST, LEVL IV, 30-39 MIN: ICD-10-PCS | Mod: S$GLB,,, | Performed by: NURSE PRACTITIONER

## 2023-02-27 RX ORDER — INSULIN ASPART 100 [IU]/ML
INJECTION, SUSPENSION SUBCUTANEOUS
Qty: 15 ML | Refills: 12
Start: 2023-02-27 | End: 2023-04-13

## 2023-02-27 RX ORDER — DULAGLUTIDE 3 MG/.5ML
3 INJECTION, SOLUTION SUBCUTANEOUS
Qty: 4 PEN | Refills: 11 | Status: SHIPPED | OUTPATIENT
Start: 2023-02-27 | End: 2023-06-15

## 2023-03-06 ENCOUNTER — OFFICE VISIT (OUTPATIENT)
Dept: INFECTIOUS DISEASES | Facility: CLINIC | Age: 73
End: 2023-03-06
Payer: MEDICARE

## 2023-03-06 ENCOUNTER — LAB VISIT (OUTPATIENT)
Dept: LAB | Facility: HOSPITAL | Age: 73
End: 2023-03-06
Attending: INTERNAL MEDICINE
Payer: MEDICARE

## 2023-03-06 VITALS
WEIGHT: 182.38 LBS | SYSTOLIC BLOOD PRESSURE: 138 MMHG | HEART RATE: 71 BPM | OXYGEN SATURATION: 98 % | DIASTOLIC BLOOD PRESSURE: 76 MMHG | BODY MASS INDEX: 24.74 KG/M2 | TEMPERATURE: 98 F

## 2023-03-06 DIAGNOSIS — B20 HUMAN IMMUNODEFICIENCY VIRUS I INFECTION: ICD-10-CM

## 2023-03-06 DIAGNOSIS — E08.65 DIABETES MELLITUS DUE TO UNDERLYING CONDITION WITH HYPERGLYCEMIA, WITH LONG-TERM CURRENT USE OF INSULIN: ICD-10-CM

## 2023-03-06 DIAGNOSIS — Z12.5 SCREENING FOR PROSTATE CANCER: ICD-10-CM

## 2023-03-06 DIAGNOSIS — Z12.11 SCREENING FOR COLON CANCER: ICD-10-CM

## 2023-03-06 DIAGNOSIS — B20 HUMAN IMMUNODEFICIENCY VIRUS I INFECTION: Primary | ICD-10-CM

## 2023-03-06 DIAGNOSIS — E78.2 MIXED HYPERLIPIDEMIA: ICD-10-CM

## 2023-03-06 DIAGNOSIS — Z79.4 DIABETES MELLITUS DUE TO UNDERLYING CONDITION WITH HYPERGLYCEMIA, WITH LONG-TERM CURRENT USE OF INSULIN: ICD-10-CM

## 2023-03-06 DIAGNOSIS — Z79.899 ENCOUNTER FOR LONG-TERM (CURRENT) USE OF MEDICATIONS: ICD-10-CM

## 2023-03-06 LAB
ALBUMIN SERPL BCP-MCNC: 4.1 G/DL (ref 3.5–5.2)
ALP SERPL-CCNC: 54 U/L (ref 55–135)
ALT SERPL W/O P-5'-P-CCNC: 21 U/L (ref 10–44)
ANION GAP SERPL CALC-SCNC: 8 MMOL/L (ref 8–16)
AST SERPL-CCNC: 19 U/L (ref 10–40)
BASOPHILS # BLD AUTO: 0.04 K/UL (ref 0–0.2)
BASOPHILS NFR BLD: 0.6 % (ref 0–1.9)
BILIRUB SERPL-MCNC: 0.4 MG/DL (ref 0.1–1)
BUN SERPL-MCNC: 23 MG/DL (ref 8–23)
CALCIUM SERPL-MCNC: 9.6 MG/DL (ref 8.7–10.5)
CHLORIDE SERPL-SCNC: 107 MMOL/L (ref 95–110)
CO2 SERPL-SCNC: 23 MMOL/L (ref 23–29)
COMPLEXED PSA SERPL-MCNC: 0.42 NG/ML (ref 0–4)
CREAT SERPL-MCNC: 1.2 MG/DL (ref 0.5–1.4)
DIFFERENTIAL METHOD: ABNORMAL
EOSINOPHIL # BLD AUTO: 0.3 K/UL (ref 0–0.5)
EOSINOPHIL NFR BLD: 4.9 % (ref 0–8)
ERYTHROCYTE [DISTWIDTH] IN BLOOD BY AUTOMATED COUNT: 13.2 % (ref 11.5–14.5)
EST. GFR  (NO RACE VARIABLE): >60 ML/MIN/1.73 M^2
GLUCOSE SERPL-MCNC: 124 MG/DL (ref 70–110)
HCT VFR BLD AUTO: 40.8 % (ref 40–54)
HGB BLD-MCNC: 13.4 G/DL (ref 14–18)
IMM GRANULOCYTES # BLD AUTO: 0.06 K/UL (ref 0–0.04)
IMM GRANULOCYTES NFR BLD AUTO: 0.9 % (ref 0–0.5)
LYMPHOCYTES # BLD AUTO: 2.3 K/UL (ref 1–4.8)
LYMPHOCYTES NFR BLD: 33 % (ref 18–48)
MCH RBC QN AUTO: 31.1 PG (ref 27–31)
MCHC RBC AUTO-ENTMCNC: 32.8 G/DL (ref 32–36)
MCV RBC AUTO: 95 FL (ref 82–98)
MONOCYTES # BLD AUTO: 0.8 K/UL (ref 0.3–1)
MONOCYTES NFR BLD: 10.7 % (ref 4–15)
NEUTROPHILS # BLD AUTO: 3.5 K/UL (ref 1.8–7.7)
NEUTROPHILS NFR BLD: 49.9 % (ref 38–73)
NRBC BLD-RTO: 0 /100 WBC
PLATELET # BLD AUTO: 223 K/UL (ref 150–450)
PMV BLD AUTO: 9.5 FL (ref 9.2–12.9)
POTASSIUM SERPL-SCNC: 4.3 MMOL/L (ref 3.5–5.1)
PROT SERPL-MCNC: 7.9 G/DL (ref 6–8.4)
RBC # BLD AUTO: 4.31 M/UL (ref 4.6–6.2)
SODIUM SERPL-SCNC: 138 MMOL/L (ref 136–145)
WBC # BLD AUTO: 6.99 K/UL (ref 3.9–12.7)

## 2023-03-06 PROCEDURE — 99214 PR OFFICE/OUTPT VISIT, EST, LEVL IV, 30-39 MIN: ICD-10-PCS | Mod: S$GLB,,, | Performed by: INTERNAL MEDICINE

## 2023-03-06 PROCEDURE — 3008F PR BODY MASS INDEX (BMI) DOCUMENTED: ICD-10-PCS | Mod: CPTII,S$GLB,, | Performed by: INTERNAL MEDICINE

## 2023-03-06 PROCEDURE — 84153 ASSAY OF PSA TOTAL: CPT | Performed by: INTERNAL MEDICINE

## 2023-03-06 PROCEDURE — 3075F SYST BP GE 130 - 139MM HG: CPT | Mod: CPTII,S$GLB,, | Performed by: INTERNAL MEDICINE

## 2023-03-06 PROCEDURE — 3078F DIAST BP <80 MM HG: CPT | Mod: CPTII,S$GLB,, | Performed by: INTERNAL MEDICINE

## 2023-03-06 PROCEDURE — 3078F PR MOST RECENT DIASTOLIC BLOOD PRESSURE < 80 MM HG: ICD-10-PCS | Mod: CPTII,S$GLB,, | Performed by: INTERNAL MEDICINE

## 2023-03-06 PROCEDURE — 1159F PR MEDICATION LIST DOCUMENTED IN MEDICAL RECORD: ICD-10-PCS | Mod: CPTII,S$GLB,, | Performed by: INTERNAL MEDICINE

## 2023-03-06 PROCEDURE — 3046F HEMOGLOBIN A1C LEVEL >9.0%: CPT | Mod: CPTII,S$GLB,, | Performed by: INTERNAL MEDICINE

## 2023-03-06 PROCEDURE — 1126F AMNT PAIN NOTED NONE PRSNT: CPT | Mod: CPTII,S$GLB,, | Performed by: INTERNAL MEDICINE

## 2023-03-06 PROCEDURE — 3075F PR MOST RECENT SYSTOLIC BLOOD PRESS GE 130-139MM HG: ICD-10-PCS | Mod: CPTII,S$GLB,, | Performed by: INTERNAL MEDICINE

## 2023-03-06 PROCEDURE — 36415 COLL VENOUS BLD VENIPUNCTURE: CPT | Performed by: INTERNAL MEDICINE

## 2023-03-06 PROCEDURE — 1159F MED LIST DOCD IN RCRD: CPT | Mod: CPTII,S$GLB,, | Performed by: INTERNAL MEDICINE

## 2023-03-06 PROCEDURE — 99214 OFFICE O/P EST MOD 30 MIN: CPT | Mod: S$GLB,,, | Performed by: INTERNAL MEDICINE

## 2023-03-06 PROCEDURE — 1160F PR REVIEW ALL MEDS BY PRESCRIBER/CLIN PHARMACIST DOCUMENTED: ICD-10-PCS | Mod: CPTII,S$GLB,, | Performed by: INTERNAL MEDICINE

## 2023-03-06 PROCEDURE — 3008F BODY MASS INDEX DOCD: CPT | Mod: CPTII,S$GLB,, | Performed by: INTERNAL MEDICINE

## 2023-03-06 PROCEDURE — 1160F RVW MEDS BY RX/DR IN RCRD: CPT | Mod: CPTII,S$GLB,, | Performed by: INTERNAL MEDICINE

## 2023-03-06 PROCEDURE — 1126F PR PAIN SEVERITY QUANTIFIED, NO PAIN PRESENT: ICD-10-PCS | Mod: CPTII,S$GLB,, | Performed by: INTERNAL MEDICINE

## 2023-03-06 PROCEDURE — 87536 HIV-1 QUANT&REVRSE TRNSCRPJ: CPT | Performed by: INTERNAL MEDICINE

## 2023-03-06 PROCEDURE — 3046F PR MOST RECENT HEMOGLOBIN A1C LEVEL > 9.0%: ICD-10-PCS | Mod: CPTII,S$GLB,, | Performed by: INTERNAL MEDICINE

## 2023-03-06 PROCEDURE — 80053 COMPREHEN METABOLIC PANEL: CPT | Performed by: INTERNAL MEDICINE

## 2023-03-06 PROCEDURE — 85025 COMPLETE CBC W/AUTO DIFF WBC: CPT | Performed by: INTERNAL MEDICINE

## 2023-03-06 NOTE — PATIENT INSTRUCTIONS
5/15/2023 11:00 AM Erendira Souza RD, [facility]   5/22/2023 11:00 AM Erendira Souza RD, [facility]   5/22/2023 11:30 AM Alma Rosa Patterson DNP, NP [facility]   6/29/2023 1:40 PM Francois Eugene MD [facility]   9/19/2023 10:15 AM Paolo Webb MD [facility]     Continue Descovy and Sustiva    Lab today    Return in 4-6 months

## 2023-03-06 NOTE — PROGRESS NOTES
Subjective:       Patient ID: Brandon Parson is a 72 y.o. male.    Chief Complaint:: Follow-up (6 MONTH FOLLOW UP)      HIV Positive/AIDS  Follow-up   Transferring care to [facility]. Followed by Dr. Dawn Helm/[place] for 20 years.   [place] he was HIV positive over 20 yrs ago. He was admitted to [facility] for pneumonia (pneumocystis?). Does not recall his first T cell count. Last CD4 1215, 2/22/22,  But last viral load was 30 on 9/17/21.     He has been under Dr. Dawn Helm's care at [facility], then [facility], then [facility]). Reviewed available records in care everywhere.   He has been on Descovy/Sustiva for many years, cannot recall regimens prior to this  No episodes of shingles, received 2 shingles vaccines 2020  No history of hepatitis   Treated for latent TB 2006-07  No other STDs  Diabetes, carotid  Vascular disease. Recently admitted for syncope. Stopped smoking 2 months ago  DANK on TID clonazepam an dhs ambien, for years. (sister concerned about this). He lives with sister near [place], spends time with another sister in [place] and a friend on the [facility]. Does not drive. Has limited his life greatly due to COVID.   Preparing for an inguinal hernia repair    5/9/22: since last visit had a right CEA. Still off cigarettes since January. Awaiting inguinal hernia repair. Taking  mg daily  He is more active and getting out of the house more than at last visit. Still not driving.   Taking the following: Vitamin D 5000 IU per day, inc 30 mg elemental or 220 mg of sulfate, Vitamin C 500-1000 mg per day. Could not find quercetin  Reviewed labs . TB Gold still positive .     9/8/22: since last visit, he has had inguinal hernia repair.  His blood sugar was over 400 this morning. He was without insulin for 3-4 days several weeks ago. The supply came to him warm and he did not take it for the last month.  He had been instructed by previous physician to never take insulin that was not cold.  He  was told by the pharmacist that it should be okay but he did not re-refrigerate it and has not been taking it.  Discussed with Hospital pharmacist and His novolog is good for 14d outside of the refrigerator. He has polydipsia and polyuria now.  He is taking a gal of water to the bedside at night.  He has lost 10 lb.  He did not call this issue to the attention of his primary or to me.      3/6/23: gained 20-30#.  This is likely from some correction in his blood sugar unless polyuria and polydipsia.  He is in good spirits, working in his garden.  Compliant with his antivirals.  His Trulicity dose was increased because his A1c was 9.3%.  His diet sounds more appropriate.  He has seen optometry but has not yet arranged any colonoscopy.  A referral/case request was placed into the [facility] system.    Current Outpatient Medications:     ascorbic acid, vitamin C, (VITAMIN C) 100 MG tablet, Take 100 mg by mouth once daily., Disp: , Rfl:     b complex vitamins capsule, Take 1 capsule by mouth once daily., Disp: , Rfl:     dulaglutide (TRULICITY) 3 mg/0.5 mL pen injector, Inject 3 mg into the skin every 7 days., Disp: 4 pen, Rfl: 11    efavirenz (SUSTIVA) 600 mg Tab, Take 1 tablet (600 mg total) by mouth every evening., Disp: 30 tablet, Rfl: 5    emtricitabine-tenofovir alafen (DESCOVY) 200-25 mg Tab, Take 1 tablet by mouth every evening., Disp: 30 tablet, Rfl: 5    fenofibrate (TRICOR) 145 MG tablet, Take 145 mg by mouth once daily., Disp: , Rfl:     fenofibrate micronized (LOFIBRA) 134 MG Cap, Take 1 capsule (134 mg total) by mouth once daily., Disp: 90 capsule, Rfl: 3    fish oil-omega-3 fatty acids 300-1,000 mg capsule, Take 2 capsules by mouth once daily., Disp: , Rfl:     HIGH POTENCY MULTIVITAMIN 400 mcg Tab, Take 1 tablet by mouth once daily., Disp: , Rfl:     metFORMIN (GLUCOPHAGE) 500 MG tablet, TAKE 1 TABLET BY MOUTH TWICE DAILY WITH MEALS, Disp: 180 tablet, Rfl: 1    sertraline (ZOLOFT) 100 MG tablet, Take 1  "tablet (100 mg total) by mouth once daily., Disp: 90 tablet, Rfl: 3    zolpidem (AMBIEN) 5 MG Tab, Take 1 tablet (5 mg total) by mouth nightly as needed., Disp: 30 tablet, Rfl: 5    aspirin 325 MG tablet, Take 1 tablet (325 mg total) by mouth once daily., Disp: 30 tablet, Rfl: 0    atorvastatin (LIPITOR) 40 MG tablet, TAKE ONE TABLET BY MOUTH DAILY, Disp: 90 tablet, Rfl: 1    BD ULTRA-FINE MINI PEN NEEDLE 31 gauge x 3/16" Ndle, USE 2 TIMES A DAY, Disp: 200 each, Rfl: 3    blood sugar diagnostic Strp, TEST 2 TIMES DAILY, Disp: , Rfl:     blood-glucose meter Misc, USE TO TEST BLOOD SUGAR 2 TO 3 TIMES DAILY, Disp: , Rfl:     insulin aspart protamine-insulin aspart (NOVOLOG 70/30) 100 unit/mL (70-30) InPn pen, 24 u at breakfast and 20 u supper, Disp: 15 mL, Rfl: 12    ondansetron (ZOFRAN-ODT) 4 MG TbDL, Take 1 tablet (4 mg total) by mouth every 6 (six) hours as needed (nv). (Patient not taking: Reported on 2/27/2023), Disp: 30 tablet, Rfl: 0    potassium chloride SA (K-DUR,KLOR-CON) 20 MEQ tablet, Take 1 tablet (20 mEq total) by mouth 2 (two) times daily. (Patient not taking: Reported on 2/27/2023), Disp: 30 tablet, Rfl: 1    TRUEPLUS LANCETS 30 gauge Misc, USE TWICE DAILY TO TEST BLOOD SUGAR, Disp: , Rfl:   Review of patient's allergies indicates:   Allergen Reactions    Chantix [varenicline]      Past Medical History:   Diagnosis Date    Anxiety     Carotid artery disease     Depression     Diabetes mellitus     GERD (gastroesophageal reflux disease)     HIV (human immunodeficiency virus infection)     Osteomyelitis of great toe of left foot 08/2019    TB lung, latent 2006    treated with INH   pneumonia 2000, probably pneumocystis     Diabetic foot ulcer    Past Surgical History:   Procedure Laterality Date    CAROTID ENDARTERECTOMY Right 04/2022    HERNIORRHAPHY OF RECURRENT INCARCERATED INGUINAL HERNIA Right 8/1/2022    Procedure: REPAIR, HERNIA, INGUINAL, INCARCERATED, RECURRENT;  Surgeon: Mack Ramirez MD;  " Location: [facility];  Service: General;  Laterality: Right;    UMBILICAL HERNIA REPAIR       Social History     Socioeconomic History    Marital status: Single   Tobacco Use    Smoking status: Former     Years: 49.00     Types: Cigarettes     Start date:      Quit date: 2022     Years since quittin.3    Smokeless tobacco: Never    Tobacco comments:     Handout provided for Ambulatory Smoking Cessation program   Substance and Sexual Activity    Alcohol use: No    Drug use: No    Sexual activity: Not Currently     No family history on file.    Travel History: [place], [place]    Vaccine History: see immunization tab. Pneumovax , . HAV and HBV vax -  Advanced Directive:   Safer Sex: abstinent since diagnosis  Bone Density:   Colonoscopy: , and 2 tubulovillous adenomas 2016,  postponed due to COVID   at public [employer], retired  at the time of illness    Review of Systems    Constitutional: No fever, chills, sweats, fatigue, weakness,  weight loss. Diet seems improved. Works in his garden, walks his dog    Eyes: No change in vision, loss of vision, diplopia, photophobia.   UTD eye exam    ENT: No sinus drainage, sore throat, mouth pain, or lesions. Awaiting UTD dental exam    Cardiovascular: No chest pain, TRIANA, palpitations or pedal edema.     Respiratory: No shortness of breath, TRIANA, cough, wheeze, sputum, p     Gastrointestinal: No abdominal pain, nausea, vomiting, diarrhea,      Genitourinary: No dysuria, hematuria, incontinence,         Musculoskeletal: No new pain, joint swelling, or injuries    Integumentary: No new rashes, lesions.  He had a bulge in the left side of the mons pubis since his hernia surgery.  the surgeon found that the groin issue was a small hematoma, 2022    Neurological: No dizziness, vertigo, unusual headaches, neuropathy, or falls.      Psychiatric: No anxiety, depression, memory loss,  or substance abuse. Has been off clonazepam and  sleeping pill was reduced as well    Endocrine: Blood sugars still not controlled, see HPI. Thyroid normal. A1c 9.3 %, trulicity increased per endo NP    Lymphatic: No lymphadenopathy, blood loss, anemia, or malignancy    Objective:      Blood pressure 138/76, pulse 71, temperature 97.7 °F (36.5 °C), weight 82.7 kg (182 lb 6.4 oz), SpO2 98 %. Body mass index is 24.74 kg/m².  Physical Exam      General: Alert and attentive, cooperative and in no distress.     Eyes: Pupils equal, round, reactive to light, anicteric, EOMI    Neck: Supple, non-tender, no thyromegaly or masses    ENT: EAC patent, TM normal, nares patent, no oral lesions,  , no thrush    Cardiovascular: Regular rate and rhythm, no murmurs, rubs, or gallop    Respiratory: Lungs clear without wheezes, no rales, rub or rhonci    Gastrointestinal: Active bowel sounds, soft, no mass or organomegaly, no tenderness or distention    Genitourinary: No  flank tenderness.    Vascular: No peripheral edema, phlebitis, pulses normal. Warm and well perfused    Musculoskeletal: Ambulates without difficulty, no acute arthritis, synovitis or myositis.      Integumentary: Skin without rashes, lesions .  Benign moles on his back    AnusRectum: DAVID 2/22/22 ED    Neurological: Normal LOC, cranial nerves, speech, reflexes, normal gait, some hemiparesis.       Psychiatric: Normal mood, speech, demeanor    Lymphatic: No cervical, supraclavicular,  or inguinal lymphadenopathy .  There is a generous lymph node in the left axilla     Wound:     HIV Table:   DATE  result  Toxoplasma IgG 3/7/22  Positive  HLA     Negative ([facility] [place])  RPR   10/2022 Non reactive  Hep A total  3/16/09 positive  Hep B sAg  Hep B sAb  4/2007  Pos 37.4  Hep B cAb total 3/7/22  negative  Hep B DNA  Hep C Ab  3/7/22  negative  Hep C RNA  Chlamy/GC  2018  Negative  TB gold     Ppd pos 7/31/06 treated with 9 mo INH     3/7/22  Positive, CXR 2/22 normal    Vitamin D  9/8/22  98  CD4   2/22/22 1215 and has been  normal for a while  PSA   9/2021  0.5  Hem A1c  01/2023 9.3%  UA prot  9/2021  neg      Recent Diagnostics:   2/22 echocardiogram  There is no evidence of intracardiac shunting.  The left ventricle is normal in size with concentric remodeling and normal systolic function.  The estimated ejection fraction is 58%.  Normal left ventricular diastolic function.  Normal right ventricular size with normal right ventricular systolic function.  Mild left atrial enlargement.  Mild mitral regurgitation.  Normal central venous pressure (3 mmHg).  The estimated PA systolic pressure is 28 mmHg.        Assessment and Plan:           Human immunodeficiency virus I infection  -     CBC Auto Differential; Future; Expected date: 03/06/2023  -     Comprehensive Metabolic Panel; Future; Expected date: 03/06/2023  -     Cancel: HIV RNA, Quantitative, PCR; Future; Expected date: 03/06/2023  -     Urinalysis; Future; Expected date: 03/06/2023    Screening for prostate cancer  -     PSA, Screening; Future; Expected date: 03/06/2023    Screening for colon cancer  -     Case Request Endoscopy: COLONOSCOPY    Mixed hyperlipidemia    Encounter for long-term (current) use of medications    Diabetes mellitus due to underlying condition with hyperglycemia, with long-term current use of insulin      5/15/2023 11:00 AM Erendira Souza RD, CDE [place] [facility] [place]   5/22/2023 11:00 AM Erendira Souza RD, CDE [place] [facility] [place]   5/22/2023 11:30 AM Alma Rosa Patterson DNP, NP [place] [facility] [place]   6/29/2023 1:40 PM Francois Euegne MD [place] [facility] [place]   9/19/2023 10:15 AM Paolo Webb MD [place] [facility] [place]     Continue Descovy and Sustiva    Lab today:, CBC, C MP, viral load, urinalysis, PSA    Return in 4-6 months    Case request was placed for colonoscopy at [facility]    This note was created using Dragon voice recognition software that occasionally misinterpreted  phrases or words.

## 2023-03-08 ENCOUNTER — TELEPHONE (OUTPATIENT)
Dept: GASTROENTEROLOGY | Facility: CLINIC | Age: 73
End: 2023-03-08
Payer: MEDICARE

## 2023-03-08 NOTE — TELEPHONE ENCOUNTER
Left message for patient to call office to schedule colonoscopy. No active Portal no message sent.

## 2023-03-24 ENCOUNTER — PATIENT OUTREACH (OUTPATIENT)
Dept: ADMINISTRATIVE | Facility: HOSPITAL | Age: 73
End: 2023-03-24
Payer: MEDICARE

## 2023-04-13 ENCOUNTER — TELEPHONE (OUTPATIENT)
Dept: ENDOCRINOLOGY | Facility: CLINIC | Age: 73
End: 2023-04-13
Payer: MEDICARE

## 2023-04-13 DIAGNOSIS — E11.628 TYPE 2 DIABETES MELLITUS WITH OTHER SKIN COMPLICATION, WITHOUT LONG-TERM CURRENT USE OF INSULIN: ICD-10-CM

## 2023-04-13 RX ORDER — INSULIN ASPART 100 [IU]/ML
INJECTION, SUSPENSION SUBCUTANEOUS
Qty: 15 ML | Refills: 12
Start: 2023-04-13 | End: 2023-10-31

## 2023-04-13 NOTE — TELEPHONE ENCOUNTER
Bg log received  Readings are elevated but are improved since his last visit    Increase Novolog 70/30 to 24 units in breakfast and 20 units w supper

## 2023-04-27 NOTE — NURSING
Pt discharged to home with brother at side.  Pt rolled down with w/c to vehicle.  Pt verbalized understanding of discharge orders.   no c/o pain/nausea/vomitting.  Will CTM.   Pt screened per LOS policy.  No acute nutrition or weight issues identified.  Pt meal compliant.  Hx notable for obesity class III.  Supplements reduced as pt does not require the extra calories.  Ht: 5'2\"  Wt: 250 lb  BMI: 45.73 kg/(m^2) c/w obesity class III (morbid)  Est energy needs: 1815 kcal, 65 g protein, 2200 mL fluids  Pt will consume > 75% of meals at follow up 7-10 days  LOS

## 2023-05-01 ENCOUNTER — LAB VISIT (OUTPATIENT)
Dept: LAB | Facility: HOSPITAL | Age: 73
End: 2023-05-01
Attending: NURSE PRACTITIONER
Payer: MEDICARE

## 2023-05-01 DIAGNOSIS — E11.42 TYPE 2 DIABETES MELLITUS WITH DIABETIC POLYNEUROPATHY, WITH LONG-TERM CURRENT USE OF INSULIN: ICD-10-CM

## 2023-05-01 DIAGNOSIS — Z79.4 TYPE 2 DIABETES MELLITUS WITH DIABETIC POLYNEUROPATHY, WITH LONG-TERM CURRENT USE OF INSULIN: ICD-10-CM

## 2023-05-01 LAB
ESTIMATED AVG GLUCOSE: 258 MG/DL (ref 68–131)
HBA1C MFR BLD: 10.6 % (ref 4–5.6)

## 2023-05-01 PROCEDURE — 36415 COLL VENOUS BLD VENIPUNCTURE: CPT | Mod: PO | Performed by: NURSE PRACTITIONER

## 2023-05-01 PROCEDURE — 83036 HEMOGLOBIN GLYCOSYLATED A1C: CPT | Performed by: NURSE PRACTITIONER

## 2023-05-02 ENCOUNTER — LAB VISIT (OUTPATIENT)
Dept: LAB | Facility: HOSPITAL | Age: 73
End: 2023-05-02
Attending: NURSE PRACTITIONER
Payer: MEDICARE

## 2023-05-02 DIAGNOSIS — E11.42 TYPE 2 DIABETES MELLITUS WITH DIABETIC POLYNEUROPATHY, WITH LONG-TERM CURRENT USE OF INSULIN: ICD-10-CM

## 2023-05-02 DIAGNOSIS — Z79.4 TYPE 2 DIABETES MELLITUS WITH DIABETIC POLYNEUROPATHY, WITH LONG-TERM CURRENT USE OF INSULIN: ICD-10-CM

## 2023-05-02 LAB
ALBUMIN/CREAT UR: 125.7 UG/MG (ref 0–30)
CREAT UR-MCNC: 136 MG/DL (ref 23–375)
MICROALBUMIN UR DL<=1MG/L-MCNC: 171 UG/ML

## 2023-05-02 PROCEDURE — 82570 ASSAY OF URINE CREATININE: CPT | Performed by: NURSE PRACTITIONER

## 2023-05-15 ENCOUNTER — CLINICAL SUPPORT (OUTPATIENT)
Dept: DIABETES | Facility: CLINIC | Age: 73
End: 2023-05-15
Payer: MEDICARE

## 2023-05-15 NOTE — PROGRESS NOTES
"DIABETES EDUCATOR NOTE   PLACEMENT OF DEXCOM G6 PRO SENSOR  CONTINOUS GLUCOSE MONITORING SYSTEM (CGMS)    Patient is here in clinic today for placement of continuous glucose monitoring sensor.                 Patient verified that they were here for CGMS procedure ordered by their provider and that they have a working glucose meter and supplies at home.     Patient provided with a Dexcom G6 Pro Sensor and Transmitter and a copy of the Continuous Glucose Monitoring Patient Food Log to fill out during the study.  A detailed explanation of Continuous Glucose Monitoring was provided. Patient informed that this is a blind procedure and that they will not actually see the blood sugar tracing in real time.  Reviewed with patient the  patient education handout called "Dexcom Blinded CGM Patient Handout" to review self-care during the study to avoid sensor loosening or removal ie... bathing, swimming, dressing, and exercising.    Instructed patient to check blood sugar using home glucometer and to record the following on provided patient log sheets: Blood sugar taken at home, meals and snacks, activity, and diabetes medications taken and dosage.    Patient was brought to a private location.  Abdomen area for insertion was selected and prepared and allowed to dry. Single use Dexcom G6 sensor placed in patient's abdomen.  Dexcom Sensor Lot # [id number] with expiration of 9/6/23.  Dexcom G6 Transmitter Serial Number [id number] was inserted into sensor and paired with Dexcom G6 Pro [place].  Patient reminded of time/date of CGM off appointment.                  The following forms were given and reviewed in detail with patient and all questions answered:  Dexcom Blinded CGM Patient Handout    Dexcom Daily Log Sheet         Time: 15 minutes      "

## 2023-05-24 ENCOUNTER — CLINICAL SUPPORT (OUTPATIENT)
Dept: DIABETES | Facility: CLINIC | Age: 73
End: 2023-05-24
Payer: MEDICARE

## 2023-05-24 ENCOUNTER — TELEPHONE (OUTPATIENT)
Dept: DIABETES | Facility: CLINIC | Age: 73
End: 2023-05-24

## 2023-05-24 DIAGNOSIS — Z79.4 TYPE 2 DIABETES MELLITUS WITH DIABETIC POLYNEUROPATHY, WITH LONG-TERM CURRENT USE OF INSULIN: ICD-10-CM

## 2023-05-24 DIAGNOSIS — E11.42 TYPE 2 DIABETES MELLITUS WITH DIABETIC POLYNEUROPATHY, WITH LONG-TERM CURRENT USE OF INSULIN: ICD-10-CM

## 2023-05-24 PROCEDURE — 95250 PR GLUCOSE MONITORING,72 HRS,SUB-Q SENSOR: ICD-10-PCS | Mod: S$GLB,,, | Performed by: DIETITIAN, REGISTERED

## 2023-05-24 PROCEDURE — 95250 CONT GLUC MNTR PHYS/QHP EQP: CPT | Mod: S$GLB,,, | Performed by: DIETITIAN, REGISTERED

## 2023-05-24 NOTE — TELEPHONE ENCOUNTER
Patient completed professional cgm study.  He had to cancel f/u for review with Alma Rosa and does not have f/u until August.  Can you review reports and advise on any changes to his regimen?  Clarity reports are uploaded to media.  He kept a food log but did not bring it with him today.

## 2023-05-24 NOTE — PROGRESS NOTES
DIABETES EDUCATOR NOTE   Return of the Dexcom G6 Pro Sensor and Patient Daily Log.     Patient returned to clinic today to return Dexcom continuous glucose sensor/transmitter and remove device from abdomen.  10 days of data were recorded.       The CGMS Sensor will be scanned and downloaded. All reports will be imported into the patient's electronic medical record.     Endocrine provider will complete data interpretation and make recommendations; will forward recommendations to the ordering provider for follow up with patient.

## 2023-05-24 NOTE — TELEPHONE ENCOUNTER
Patient had professional cgm study completed this week.  He had to cancel his appointment on Monday and was rescheduled for 8/3 to review results with Alma Rosa.  Alma Rosa asked that I check with her staff to see if there is anywhere he could be fit in any earlier for f/u.  If possible can you contact patient to schedule?

## 2023-05-28 DIAGNOSIS — G47.00 INSOMNIA, UNSPECIFIED TYPE: ICD-10-CM

## 2023-05-29 RX ORDER — ZOLPIDEM TARTRATE 5 MG/1
TABLET ORAL
Qty: 30 TABLET | Refills: 5 | Status: SHIPPED | OUTPATIENT
Start: 2023-05-29 | End: 2023-11-17

## 2023-05-29 NOTE — TELEPHONE ENCOUNTER
Refill Routing Note   Medication(s) are not appropriate for processing by [facility] for the following reason(s):      Medication outside of protocol  Responsible provider unclear    [facility] action(s):  Route Care Due:  None identified          Appointments  past 12m or future 3m with PCP    Date Provider   Last Visit   12/15/2022 Francois Eugene MD   Next Visit   6/29/2023 Francois Eugene MD   ED visits in past 90 days: 0        Note composed:7:15 AM 05/29/2023

## 2023-06-01 DIAGNOSIS — E78.5 HYPERLIPIDEMIA, UNSPECIFIED HYPERLIPIDEMIA TYPE: ICD-10-CM

## 2023-06-01 DIAGNOSIS — E11.628 TYPE 2 DIABETES MELLITUS WITH OTHER SKIN COMPLICATION, WITHOUT LONG-TERM CURRENT USE OF INSULIN: ICD-10-CM

## 2023-06-01 DIAGNOSIS — F33.9 RECURRENT MAJOR DEPRESSIVE DISORDER, REMISSION STATUS UNSPECIFIED: ICD-10-CM

## 2023-06-01 RX ORDER — PEN NEEDLE, DIABETIC 31 GX5/16"
NEEDLE, DISPOSABLE MISCELLANEOUS
Qty: 200 EACH | Refills: 3 | Status: SHIPPED | OUTPATIENT
Start: 2023-06-01

## 2023-06-01 RX ORDER — SERTRALINE HYDROCHLORIDE 100 MG/1
TABLET, FILM COATED ORAL
Qty: 90 TABLET | Refills: 1 | Status: SHIPPED | OUTPATIENT
Start: 2023-06-01 | End: 2023-11-15

## 2023-06-01 RX ORDER — FENOFIBRATE 134 MG/1
CAPSULE ORAL
Qty: 90 CAPSULE | Refills: 1 | Status: SHIPPED | OUTPATIENT
Start: 2023-06-01 | End: 2023-11-17

## 2023-06-01 NOTE — TELEPHONE ENCOUNTER
Refill Decision Note   Brandon Gwendolyndania  is requesting a refill authorization.  Brief Assessment and Rationale for Refill:  Approve     Medication Therapy Plan:       Medication Reconciliation Completed: No   Comments:     No Care Gaps recommended.     Note composed:6:10 PM 06/01/2023

## 2023-06-01 NOTE — TELEPHONE ENCOUNTER
No care due was identified.  [facility] Embedded Care Due Messages. Reference number: [id number].   6/01/2023 4:56:50 PM CDT

## 2023-06-14 NOTE — PROGRESS NOTES
CC: This 72 y.o. male presents for management of diabetes  and chronic conditions pending review including  HLP, HIV, DM PN    HPI: He was diagnosed with T2DM in ~ 2010. Has never been hospitalized r/t DM.  Family hx of DM: mother     Arrives for CGMS review   Time in range: 54%  Hypoglycemia 2%   Pp excursions mostly noted post supper - reports was eating often at the hospital this week as two of his nieces were undergoing procedure for new cancer diagnosis  Denies missed doses of medication, but is taking insulin after meal  Has been trying to cut back on bread     Diet: Eats 2- 3  Meals a day, snacks- pears, apple, mini banana  Exercise: walks his dog - 3 times a day for ~ 20-30 mins,  works in his garden   CURRENT DM MEDS:   metformin 500 mg bid, Novolog 70/30 22 u bid, Trulicity 3 mg weekly  (Monday)  Taking his insulin just after the emal     Standards of Care:  Eye exam: 2/2023 Dr Parker     ROS:   Gen: Appetite good,  weight loss 3 lbs  Eyes: Denies visual disturbances  Resp: no SOB or TRIANA, no cough  Cardiac: No palpitations, chest pain, no edema   GI: No nausea or vomiting, diarrhea, constipation, or abdominal pain.  /GYN: 3+ nocturia, no burning or pain.   MS/Neuro: Denies numbness/ tingling in BLE; Gait steady, speech clear  Psych: Denies drug/ETOH abuse, + h/o depression.  Other systems: negative.    PE:  GENERAL: Well developed, well nourished.  PSYCH: AAOx3, appropriate mood and affect, pleasant expression, conversant, appears relaxed, well groomed.   EYES: Conjunctiva, corneas clear  NECK: Supple, trachea midline   ABDOMEN: Soft, non-tender, non-distended   NEURO: Gait steady  FOOT EXAMINATION: 2/27/2023  No foot deformity, corns or callus formation,  nails in good condiiton and well trimmed, no interspace maceration or ulceration noted.  Decreased hair growth present over toes/feet.  Protective sensation intact with 10 gram monofilament.  +2 dorsalis pedis and posterior pulses noted.     Personally  reviewed Past Medical, Surgical, Social History.    /84 (BP Location: Left arm, Patient Position: Sitting, BP Method: Medium (Manual))   Pulse 74   Ht 6' (1.829 m)   Wt 80 kg (176 lb 7.7 oz)   SpO2 99%   BMI 23.93 kg/m²      Personally reviewed the below labs:      Chemistry        Component Value Date/Time     03/06/2023 0948    K 4.3 03/06/2023 0948     03/06/2023 0948    CO2 23 03/06/2023 0948    BUN 23 03/06/2023 0948    CREATININE 1.2 03/06/2023 0948     (H) 03/06/2023 0948        Component Value Date/Time    CALCIUM 9.6 03/06/2023 0948    ALKPHOS 54 (L) 03/06/2023 0948    AST 19 03/06/2023 0948    ALT 21 03/06/2023 0948    BILITOT 0.4 03/06/2023 0948    ESTGFRAFRICA 58 (A) 07/29/2022 0934    EGFRNONAA 50 (A) 07/29/2022 0934            Lab Results   Component Value Date    TSH 2.618 12/15/2022       Recent Labs   Lab 10/14/22  0940   LDL Cholesterol 77.8   HDL 37 L   Cholesterol 179        Results for orders placed or performed in visit on 09/08/22   Vitamin D   Result Value Ref Range    Vit D, 25-Hydroxy 98 (H) 30 - 96 ng/mL     No results found for this or any previous visit.    Lab Results   Component Value Date    MICALBCREAT 125.7 (H) 05/02/2023       Hemoglobin A1C   Date Value Ref Range Status   05/01/2023 10.6 (H) 4.0 - 5.6 % Final     Comment:     ADA Screening Guidelines:  5.7-6.4%  Consistent with prediabetes  >or=6.5%  Consistent with diabetes    High levels of fetal hemoglobin interfere with the HbA1C  assay. Heterozygous hemoglobin variants (HbS, HgC, etc)do  not significantly interfere with this assay.   However, presence of multiple variants may affect accuracy.     01/30/2023 9.3 (H) 4.0 - 5.6 % Final     Comment:     ADA Screening Guidelines:  5.7-6.4%  Consistent with prediabetes  >or=6.5%  Consistent with diabetes    High levels of fetal hemoglobin interfere with the HbA1C  assay. Heterozygous hemoglobin variants (HbS, HgC, etc)do  not significantly interfere  with this assay.   However, presence of multiple variants may affect accuracy.     10/14/2022 11.9 (H) 4.5 - 6.2 % Final     Comment:     According to ADA guidelines, hemoglobin A1C <7.0% represents  optimal control in non-pregnant diabetic patients.  Different  metrics may apply to specific populations.   Standards of Medical Care in Diabetes - 2016.    For the purpose of screening for the presence of diabetes:  <5.7%     Consistent with the absence of diabetes  5.7-6.4%  Consistent with increasing risk for diabetes   (prediabetes)  >or=6.5%  Consistent with diabetes    Currently no consensus exists for use of hemoglobin A1C  for diagnosis of diabetes for children.          ASSESSMENT and PLAN:      1. T2DM with hyperglycemia, DM PN, CKD 3a-   Continue Novolog 70/30 22 u at breakfast and 22 u supper- take insulin prior to the meal  Increase Trulicity to 4.5 mg weekly  Check bg bid- log in 2 weeks - notify me sooner for consistent hypoglycemia   Declines his own personal sensor  Continue metformin at 500 mg bid      Recheck urine w RTC if remains abnormal will start ace i     2. HLP -  on statin therapy, lab w RTC    3. HIV- follows w Dr Carney -     4. Depression stable, chronic     Follow-up: in 3 months with lab prior

## 2023-06-15 ENCOUNTER — LAB VISIT (OUTPATIENT)
Dept: LAB | Facility: HOSPITAL | Age: 73
End: 2023-06-15
Attending: NURSE PRACTITIONER
Payer: MEDICARE

## 2023-06-15 ENCOUNTER — OFFICE VISIT (OUTPATIENT)
Dept: ENDOCRINOLOGY | Facility: CLINIC | Age: 73
End: 2023-06-15
Payer: MEDICARE

## 2023-06-15 VITALS
HEART RATE: 74 BPM | OXYGEN SATURATION: 99 % | WEIGHT: 176.5 LBS | BODY MASS INDEX: 23.91 KG/M2 | SYSTOLIC BLOOD PRESSURE: 130 MMHG | DIASTOLIC BLOOD PRESSURE: 84 MMHG | HEIGHT: 72 IN

## 2023-06-15 DIAGNOSIS — E11.42 TYPE 2 DIABETES MELLITUS WITH DIABETIC POLYNEUROPATHY, WITH LONG-TERM CURRENT USE OF INSULIN: ICD-10-CM

## 2023-06-15 DIAGNOSIS — E11.628 TYPE 2 DIABETES MELLITUS WITH OTHER SKIN COMPLICATION, WITHOUT LONG-TERM CURRENT USE OF INSULIN: ICD-10-CM

## 2023-06-15 DIAGNOSIS — N18.31 TYPE 2 DIABETES MELLITUS WITH STAGE 3A CHRONIC KIDNEY DISEASE, WITH LONG-TERM CURRENT USE OF INSULIN: Primary | ICD-10-CM

## 2023-06-15 DIAGNOSIS — Z79.4 TYPE 2 DIABETES MELLITUS WITH STAGE 3A CHRONIC KIDNEY DISEASE, WITH LONG-TERM CURRENT USE OF INSULIN: ICD-10-CM

## 2023-06-15 DIAGNOSIS — N18.31 TYPE 2 DIABETES MELLITUS WITH STAGE 3A CHRONIC KIDNEY DISEASE, WITH LONG-TERM CURRENT USE OF INSULIN: ICD-10-CM

## 2023-06-15 DIAGNOSIS — B20 HIV DISEASE: ICD-10-CM

## 2023-06-15 DIAGNOSIS — Z79.4 TYPE 2 DIABETES MELLITUS WITH STAGE 3A CHRONIC KIDNEY DISEASE, WITH LONG-TERM CURRENT USE OF INSULIN: Primary | ICD-10-CM

## 2023-06-15 DIAGNOSIS — E11.22 TYPE 2 DIABETES MELLITUS WITH STAGE 3A CHRONIC KIDNEY DISEASE, WITH LONG-TERM CURRENT USE OF INSULIN: ICD-10-CM

## 2023-06-15 DIAGNOSIS — E78.2 MIXED HYPERLIPIDEMIA: ICD-10-CM

## 2023-06-15 DIAGNOSIS — E11.22 TYPE 2 DIABETES MELLITUS WITH STAGE 3A CHRONIC KIDNEY DISEASE, WITH LONG-TERM CURRENT USE OF INSULIN: Primary | ICD-10-CM

## 2023-06-15 DIAGNOSIS — Z79.4 TYPE 2 DIABETES MELLITUS WITH DIABETIC POLYNEUROPATHY, WITH LONG-TERM CURRENT USE OF INSULIN: ICD-10-CM

## 2023-06-15 LAB
BACTERIA #/AREA URNS HPF: NORMAL /HPF
BILIRUB UR QL STRIP: NEGATIVE
CLARITY UR: CLEAR
COLOR UR: YELLOW
GLUCOSE UR QL STRIP: ABNORMAL
HGB UR QL STRIP: ABNORMAL
HYALINE CASTS #/AREA URNS LPF: 0 /LPF
KETONES UR QL STRIP: NEGATIVE
LEUKOCYTE ESTERASE UR QL STRIP: NEGATIVE
MICROSCOPIC COMMENT: NORMAL
NITRITE UR QL STRIP: NEGATIVE
PH UR STRIP: 6 [PH] (ref 5–8)
PROT UR QL STRIP: ABNORMAL
RBC #/AREA URNS HPF: 1 /HPF (ref 0–4)
SP GR UR STRIP: 1.02 (ref 1–1.03)
URN SPEC COLLECT METH UR: ABNORMAL
WBC #/AREA URNS HPF: 1 /HPF (ref 0–5)
YEAST URNS QL MICRO: NORMAL

## 2023-06-15 PROCEDURE — 1159F MED LIST DOCD IN RCRD: CPT | Mod: CPTII,S$GLB,, | Performed by: NURSE PRACTITIONER

## 2023-06-15 PROCEDURE — 81000 URINALYSIS NONAUTO W/SCOPE: CPT | Mod: PO | Performed by: NURSE PRACTITIONER

## 2023-06-15 PROCEDURE — 3060F PR POS MICROALBUMINURIA RESULT DOCUMENTED/REVIEW: ICD-10-PCS | Mod: CPTII,S$GLB,, | Performed by: NURSE PRACTITIONER

## 2023-06-15 PROCEDURE — 95251 PR GLUCOSE MONITOR, 72 HOUR, PHYS INTERP: ICD-10-PCS | Mod: S$GLB,,, | Performed by: NURSE PRACTITIONER

## 2023-06-15 PROCEDURE — 3075F SYST BP GE 130 - 139MM HG: CPT | Mod: CPTII,S$GLB,, | Performed by: NURSE PRACTITIONER

## 2023-06-15 PROCEDURE — 3046F PR MOST RECENT HEMOGLOBIN A1C LEVEL > 9.0%: ICD-10-PCS | Mod: CPTII,S$GLB,, | Performed by: NURSE PRACTITIONER

## 2023-06-15 PROCEDURE — 3060F POS MICROALBUMINURIA REV: CPT | Mod: CPTII,S$GLB,, | Performed by: NURSE PRACTITIONER

## 2023-06-15 PROCEDURE — 3066F NEPHROPATHY DOC TX: CPT | Mod: CPTII,S$GLB,, | Performed by: NURSE PRACTITIONER

## 2023-06-15 PROCEDURE — 95251 CONT GLUC MNTR ANALYSIS I&R: CPT | Mod: S$GLB,,, | Performed by: NURSE PRACTITIONER

## 2023-06-15 PROCEDURE — 99214 PR OFFICE/OUTPT VISIT, EST, LEVL IV, 30-39 MIN: ICD-10-PCS | Mod: S$GLB,,, | Performed by: NURSE PRACTITIONER

## 2023-06-15 PROCEDURE — 3008F PR BODY MASS INDEX (BMI) DOCUMENTED: ICD-10-PCS | Mod: CPTII,S$GLB,, | Performed by: NURSE PRACTITIONER

## 2023-06-15 PROCEDURE — 1126F PR PAIN SEVERITY QUANTIFIED, NO PAIN PRESENT: ICD-10-PCS | Mod: CPTII,S$GLB,, | Performed by: NURSE PRACTITIONER

## 2023-06-15 PROCEDURE — 1126F AMNT PAIN NOTED NONE PRSNT: CPT | Mod: CPTII,S$GLB,, | Performed by: NURSE PRACTITIONER

## 2023-06-15 PROCEDURE — 3288F PR FALLS RISK ASSESSMENT DOCUMENTED: ICD-10-PCS | Mod: CPTII,S$GLB,, | Performed by: NURSE PRACTITIONER

## 2023-06-15 PROCEDURE — 1101F PT FALLS ASSESS-DOCD LE1/YR: CPT | Mod: CPTII,S$GLB,, | Performed by: NURSE PRACTITIONER

## 2023-06-15 PROCEDURE — 3075F PR MOST RECENT SYSTOLIC BLOOD PRESS GE 130-139MM HG: ICD-10-PCS | Mod: CPTII,S$GLB,, | Performed by: NURSE PRACTITIONER

## 2023-06-15 PROCEDURE — 99214 OFFICE O/P EST MOD 30 MIN: CPT | Mod: S$GLB,,, | Performed by: NURSE PRACTITIONER

## 2023-06-15 PROCEDURE — 1101F PR PT FALLS ASSESS DOC 0-1 FALLS W/OUT INJ PAST YR: ICD-10-PCS | Mod: CPTII,S$GLB,, | Performed by: NURSE PRACTITIONER

## 2023-06-15 PROCEDURE — 3046F HEMOGLOBIN A1C LEVEL >9.0%: CPT | Mod: CPTII,S$GLB,, | Performed by: NURSE PRACTITIONER

## 2023-06-15 PROCEDURE — 3288F FALL RISK ASSESSMENT DOCD: CPT | Mod: CPTII,S$GLB,, | Performed by: NURSE PRACTITIONER

## 2023-06-15 PROCEDURE — 1159F PR MEDICATION LIST DOCUMENTED IN MEDICAL RECORD: ICD-10-PCS | Mod: CPTII,S$GLB,, | Performed by: NURSE PRACTITIONER

## 2023-06-15 PROCEDURE — 3066F PR DOCUMENTATION OF TREATMENT FOR NEPHROPATHY: ICD-10-PCS | Mod: CPTII,S$GLB,, | Performed by: NURSE PRACTITIONER

## 2023-06-15 PROCEDURE — 3079F PR MOST RECENT DIASTOLIC BLOOD PRESSURE 80-89 MM HG: ICD-10-PCS | Mod: CPTII,S$GLB,, | Performed by: NURSE PRACTITIONER

## 2023-06-15 PROCEDURE — 99999 PR PBB SHADOW E&M-EST. PATIENT-LVL IV: ICD-10-PCS | Mod: PBBFAC,,, | Performed by: NURSE PRACTITIONER

## 2023-06-15 PROCEDURE — 3079F DIAST BP 80-89 MM HG: CPT | Mod: CPTII,S$GLB,, | Performed by: NURSE PRACTITIONER

## 2023-06-15 PROCEDURE — 99999 PR PBB SHADOW E&M-EST. PATIENT-LVL IV: CPT | Mod: PBBFAC,,, | Performed by: NURSE PRACTITIONER

## 2023-06-15 PROCEDURE — 3008F BODY MASS INDEX DOCD: CPT | Mod: CPTII,S$GLB,, | Performed by: NURSE PRACTITIONER

## 2023-06-15 RX ORDER — METFORMIN HYDROCHLORIDE 500 MG/1
500 TABLET ORAL 2 TIMES DAILY WITH MEALS
Qty: 180 TABLET | Refills: 4 | Status: SHIPPED | OUTPATIENT
Start: 2023-06-15 | End: 2023-06-26

## 2023-06-15 RX ORDER — DULAGLUTIDE 4.5 MG/.5ML
4.5 INJECTION, SOLUTION SUBCUTANEOUS
Qty: 4 PEN | Refills: 11 | Status: SHIPPED | OUTPATIENT
Start: 2023-06-15 | End: 2024-06-14

## 2023-06-26 DIAGNOSIS — E11.628 TYPE 2 DIABETES MELLITUS WITH OTHER SKIN COMPLICATION, WITHOUT LONG-TERM CURRENT USE OF INSULIN: ICD-10-CM

## 2023-06-26 RX ORDER — METFORMIN HYDROCHLORIDE 500 MG/1
TABLET ORAL
Qty: 180 TABLET | Refills: 0 | Status: SHIPPED | OUTPATIENT
Start: 2023-06-26

## 2023-06-26 NOTE — TELEPHONE ENCOUNTER
Refill Decision Note   Brandon Parson  is requesting a refill authorization.  Brief Assessment and Rationale for Refill:  Approve     Medication Therapy Plan:         Comments:     Note composed:12:13 PM 06/26/2023             Appointments     Last Visit   12/15/2022 Francois Eugene MD   Next Visit   6/29/2023 Francois Eugene MD

## 2023-06-26 NOTE — TELEPHONE ENCOUNTER
No care due was identified.  [facility] Embedded Care Due Messages. Reference number: [id number].   6/26/2023 8:05:50 AM CDT

## 2023-06-29 ENCOUNTER — OFFICE VISIT (OUTPATIENT)
Dept: FAMILY MEDICINE | Facility: CLINIC | Age: 73
End: 2023-06-29
Payer: MEDICARE

## 2023-06-29 VITALS
HEIGHT: 72 IN | SYSTOLIC BLOOD PRESSURE: 110 MMHG | BODY MASS INDEX: 24.04 KG/M2 | OXYGEN SATURATION: 96 % | RESPIRATION RATE: 18 BRPM | HEART RATE: 73 BPM | DIASTOLIC BLOOD PRESSURE: 70 MMHG | WEIGHT: 177.5 LBS

## 2023-06-29 DIAGNOSIS — F32.A DEPRESSION, UNSPECIFIED DEPRESSION TYPE: ICD-10-CM

## 2023-06-29 DIAGNOSIS — E11.22 TYPE 2 DIABETES MELLITUS WITH STAGE 3A CHRONIC KIDNEY DISEASE, WITH LONG-TERM CURRENT USE OF INSULIN: ICD-10-CM

## 2023-06-29 DIAGNOSIS — Z12.11 ENCOUNTER FOR SCREENING FOR MALIGNANT NEOPLASM OF COLON: ICD-10-CM

## 2023-06-29 DIAGNOSIS — Z00.00 ROUTINE GENERAL MEDICAL EXAMINATION AT A HEALTH CARE FACILITY: Primary | ICD-10-CM

## 2023-06-29 DIAGNOSIS — Z79.4 TYPE 2 DIABETES MELLITUS WITH STAGE 3A CHRONIC KIDNEY DISEASE, WITH LONG-TERM CURRENT USE OF INSULIN: ICD-10-CM

## 2023-06-29 DIAGNOSIS — L98.9 SKIN LESION: ICD-10-CM

## 2023-06-29 DIAGNOSIS — N18.31 TYPE 2 DIABETES MELLITUS WITH STAGE 3A CHRONIC KIDNEY DISEASE, WITH LONG-TERM CURRENT USE OF INSULIN: ICD-10-CM

## 2023-06-29 PROBLEM — E11.621 DIABETIC ULCER OF TOE OF LEFT FOOT ASSOCIATED WITH TYPE 2 DIABETES MELLITUS, WITH BONE INVOLVEMENT WITHOUT EVIDENCE OF NECROSIS: Status: RESOLVED | Noted: 2019-08-16 | Resolved: 2023-06-29

## 2023-06-29 PROBLEM — L97.526 DIABETIC ULCER OF TOE OF LEFT FOOT ASSOCIATED WITH TYPE 2 DIABETES MELLITUS, WITH BONE INVOLVEMENT WITHOUT EVIDENCE OF NECROSIS: Status: RESOLVED | Noted: 2019-08-16 | Resolved: 2023-06-29

## 2023-06-29 PROBLEM — L97.509 TOE ULCER: Status: RESOLVED | Noted: 2019-08-16 | Resolved: 2023-06-29

## 2023-06-29 PROCEDURE — 3074F SYST BP LT 130 MM HG: CPT | Mod: CPTII,S$GLB,, | Performed by: STUDENT IN AN ORGANIZED HEALTH CARE EDUCATION/TRAINING PROGRAM

## 2023-06-29 PROCEDURE — 1159F MED LIST DOCD IN RCRD: CPT | Mod: CPTII,S$GLB,, | Performed by: STUDENT IN AN ORGANIZED HEALTH CARE EDUCATION/TRAINING PROGRAM

## 2023-06-29 PROCEDURE — 3288F FALL RISK ASSESSMENT DOCD: CPT | Mod: CPTII,S$GLB,, | Performed by: STUDENT IN AN ORGANIZED HEALTH CARE EDUCATION/TRAINING PROGRAM

## 2023-06-29 PROCEDURE — 3078F DIAST BP <80 MM HG: CPT | Mod: CPTII,S$GLB,, | Performed by: STUDENT IN AN ORGANIZED HEALTH CARE EDUCATION/TRAINING PROGRAM

## 2023-06-29 PROCEDURE — 1101F PT FALLS ASSESS-DOCD LE1/YR: CPT | Mod: CPTII,S$GLB,, | Performed by: STUDENT IN AN ORGANIZED HEALTH CARE EDUCATION/TRAINING PROGRAM

## 2023-06-29 PROCEDURE — 3060F POS MICROALBUMINURIA REV: CPT | Mod: CPTII,S$GLB,, | Performed by: STUDENT IN AN ORGANIZED HEALTH CARE EDUCATION/TRAINING PROGRAM

## 2023-06-29 PROCEDURE — 99999 PR PBB SHADOW E&M-EST. PATIENT-LVL V: ICD-10-PCS | Mod: PBBFAC,,, | Performed by: STUDENT IN AN ORGANIZED HEALTH CARE EDUCATION/TRAINING PROGRAM

## 2023-06-29 PROCEDURE — 1101F PR PT FALLS ASSESS DOC 0-1 FALLS W/OUT INJ PAST YR: ICD-10-PCS | Mod: CPTII,S$GLB,, | Performed by: STUDENT IN AN ORGANIZED HEALTH CARE EDUCATION/TRAINING PROGRAM

## 2023-06-29 PROCEDURE — 1160F PR REVIEW ALL MEDS BY PRESCRIBER/CLIN PHARMACIST DOCUMENTED: ICD-10-PCS | Mod: CPTII,S$GLB,, | Performed by: STUDENT IN AN ORGANIZED HEALTH CARE EDUCATION/TRAINING PROGRAM

## 2023-06-29 PROCEDURE — 3066F PR DOCUMENTATION OF TREATMENT FOR NEPHROPATHY: ICD-10-PCS | Mod: CPTII,S$GLB,, | Performed by: STUDENT IN AN ORGANIZED HEALTH CARE EDUCATION/TRAINING PROGRAM

## 2023-06-29 PROCEDURE — 3074F PR MOST RECENT SYSTOLIC BLOOD PRESSURE < 130 MM HG: ICD-10-PCS | Mod: CPTII,S$GLB,, | Performed by: STUDENT IN AN ORGANIZED HEALTH CARE EDUCATION/TRAINING PROGRAM

## 2023-06-29 PROCEDURE — 3008F PR BODY MASS INDEX (BMI) DOCUMENTED: ICD-10-PCS | Mod: CPTII,S$GLB,, | Performed by: STUDENT IN AN ORGANIZED HEALTH CARE EDUCATION/TRAINING PROGRAM

## 2023-06-29 PROCEDURE — 3060F PR POS MICROALBUMINURIA RESULT DOCUMENTED/REVIEW: ICD-10-PCS | Mod: CPTII,S$GLB,, | Performed by: STUDENT IN AN ORGANIZED HEALTH CARE EDUCATION/TRAINING PROGRAM

## 2023-06-29 PROCEDURE — 99214 OFFICE O/P EST MOD 30 MIN: CPT | Mod: S$GLB,,, | Performed by: STUDENT IN AN ORGANIZED HEALTH CARE EDUCATION/TRAINING PROGRAM

## 2023-06-29 PROCEDURE — 1159F PR MEDICATION LIST DOCUMENTED IN MEDICAL RECORD: ICD-10-PCS | Mod: CPTII,S$GLB,, | Performed by: STUDENT IN AN ORGANIZED HEALTH CARE EDUCATION/TRAINING PROGRAM

## 2023-06-29 PROCEDURE — 1126F AMNT PAIN NOTED NONE PRSNT: CPT | Mod: CPTII,S$GLB,, | Performed by: STUDENT IN AN ORGANIZED HEALTH CARE EDUCATION/TRAINING PROGRAM

## 2023-06-29 PROCEDURE — 3046F HEMOGLOBIN A1C LEVEL >9.0%: CPT | Mod: CPTII,S$GLB,, | Performed by: STUDENT IN AN ORGANIZED HEALTH CARE EDUCATION/TRAINING PROGRAM

## 2023-06-29 PROCEDURE — 3066F NEPHROPATHY DOC TX: CPT | Mod: CPTII,S$GLB,, | Performed by: STUDENT IN AN ORGANIZED HEALTH CARE EDUCATION/TRAINING PROGRAM

## 2023-06-29 PROCEDURE — 3078F PR MOST RECENT DIASTOLIC BLOOD PRESSURE < 80 MM HG: ICD-10-PCS | Mod: CPTII,S$GLB,, | Performed by: STUDENT IN AN ORGANIZED HEALTH CARE EDUCATION/TRAINING PROGRAM

## 2023-06-29 PROCEDURE — 99999 PR PBB SHADOW E&M-EST. PATIENT-LVL V: CPT | Mod: PBBFAC,,, | Performed by: STUDENT IN AN ORGANIZED HEALTH CARE EDUCATION/TRAINING PROGRAM

## 2023-06-29 PROCEDURE — 1126F PR PAIN SEVERITY QUANTIFIED, NO PAIN PRESENT: ICD-10-PCS | Mod: CPTII,S$GLB,, | Performed by: STUDENT IN AN ORGANIZED HEALTH CARE EDUCATION/TRAINING PROGRAM

## 2023-06-29 PROCEDURE — 3046F PR MOST RECENT HEMOGLOBIN A1C LEVEL > 9.0%: ICD-10-PCS | Mod: CPTII,S$GLB,, | Performed by: STUDENT IN AN ORGANIZED HEALTH CARE EDUCATION/TRAINING PROGRAM

## 2023-06-29 PROCEDURE — 99214 PR OFFICE/OUTPT VISIT, EST, LEVL IV, 30-39 MIN: ICD-10-PCS | Mod: S$GLB,,, | Performed by: STUDENT IN AN ORGANIZED HEALTH CARE EDUCATION/TRAINING PROGRAM

## 2023-06-29 PROCEDURE — 3008F BODY MASS INDEX DOCD: CPT | Mod: CPTII,S$GLB,, | Performed by: STUDENT IN AN ORGANIZED HEALTH CARE EDUCATION/TRAINING PROGRAM

## 2023-06-29 PROCEDURE — 3288F PR FALLS RISK ASSESSMENT DOCUMENTED: ICD-10-PCS | Mod: CPTII,S$GLB,, | Performed by: STUDENT IN AN ORGANIZED HEALTH CARE EDUCATION/TRAINING PROGRAM

## 2023-06-29 PROCEDURE — 1160F RVW MEDS BY RX/DR IN RCRD: CPT | Mod: CPTII,S$GLB,, | Performed by: STUDENT IN AN ORGANIZED HEALTH CARE EDUCATION/TRAINING PROGRAM

## 2023-06-29 NOTE — PROGRESS NOTES
[facility] CLINIC NOTE    Patient Name: Brandon Parson  YOB: 1950    PRESENTING HISTORY     History of Present Illness:  Mr. Brandon Parson is a 72 y.o. male here for routine f/u.     Working with endocrine on his DM2.   Recently increased Trulicity to 4.5mg Qweek.     Sees Dr. Hoover for HIV.     Due for C-scope.     Depression is well controlled on SSRI.         ROS      OBJECTIVE:   Vital Signs:  Vitals:    06/29/23 1320   BP: 110/70   Pulse: 73   Resp: 18   SpO2: 96%   Weight: 80.5 kg (177 lb 7.5 oz)   Height: 6' (1.829 m)         Physical Exam  Vitals and nursing note reviewed.   Constitutional:       Appearance: He is not diaphoretic.   HENT:      Head: Normocephalic and atraumatic.      Right Ear: External ear normal.      Left Ear: External ear normal.   Eyes:      General: No scleral icterus.     Conjunctiva/sclera: Conjunctivae normal.      Pupils: Pupils are equal, round, and reactive to light.   Neck:      Thyroid: No thyromegaly.   Cardiovascular:      Rate and Rhythm: Normal rate and regular rhythm.      Heart sounds: Normal heart sounds. No murmur heard.  Pulmonary:      Effort: Pulmonary effort is normal. No respiratory distress.      Breath sounds: Normal breath sounds. No wheezing or rales.   Musculoskeletal:         General: No tenderness or deformity. Normal range of motion.      Cervical back: Normal range of motion and neck supple.   Lymphadenopathy:      Cervical: No cervical adenopathy.   Skin:     General: Skin is warm and dry.      Comments: Approx 0.25cm crusted skin lesion near helix of both ears. No surrounding erythema.    Neurological:      Mental Status: He is alert and oriented to person, place, and time.      Gait: Gait is intact.   Psychiatric:         Mood and Affect: Mood and affect normal.         Cognition and Memory: Memory normal.         Judgment: Judgment normal.       ASSESSMENT & PLAN:     Routine general medical examination at a health care  facility    Encounter for screening for malignant neoplasm of colon  -     Case Request Endoscopy: COLONOSCOPY    Skin lesion  -     Ambulatory referral/consult to Dermatology; Future; Expected date: 07/06/2023  I am suspicious he may have some skin cancers. Go to Dermatology.     Depression, unspecified depression type  Continue current medications    Type 2 diabetes mellitus with stage 3a chronic kidney disease, with long-term current use of insulin  Continue current medications             Francois Eugene MD   Internal Medicine

## 2023-07-03 ENCOUNTER — OFFICE VISIT (OUTPATIENT)
Dept: INFECTIOUS DISEASES | Facility: CLINIC | Age: 73
End: 2023-07-03
Payer: MEDICARE

## 2023-07-03 ENCOUNTER — LAB VISIT (OUTPATIENT)
Dept: LAB | Facility: HOSPITAL | Age: 73
End: 2023-07-03
Attending: INTERNAL MEDICINE
Payer: MEDICARE

## 2023-07-03 VITALS
WEIGHT: 179.63 LBS | OXYGEN SATURATION: 99 % | SYSTOLIC BLOOD PRESSURE: 110 MMHG | HEIGHT: 72 IN | TEMPERATURE: 98 F | DIASTOLIC BLOOD PRESSURE: 66 MMHG | BODY MASS INDEX: 24.33 KG/M2 | HEART RATE: 58 BPM

## 2023-07-03 DIAGNOSIS — E08.65 DIABETES MELLITUS DUE TO UNDERLYING CONDITION WITH HYPERGLYCEMIA, WITH LONG-TERM CURRENT USE OF INSULIN: ICD-10-CM

## 2023-07-03 DIAGNOSIS — Z79.4 DIABETES MELLITUS DUE TO UNDERLYING CONDITION WITH HYPERGLYCEMIA, WITH LONG-TERM CURRENT USE OF INSULIN: ICD-10-CM

## 2023-07-03 DIAGNOSIS — E78.2 MIXED HYPERLIPIDEMIA: ICD-10-CM

## 2023-07-03 DIAGNOSIS — B20 HUMAN IMMUNODEFICIENCY VIRUS I INFECTION: ICD-10-CM

## 2023-07-03 DIAGNOSIS — B20 HUMAN IMMUNODEFICIENCY VIRUS I INFECTION: Primary | ICD-10-CM

## 2023-07-03 DIAGNOSIS — Z79.899 ENCOUNTER FOR LONG-TERM (CURRENT) USE OF MEDICATIONS: ICD-10-CM

## 2023-07-03 LAB
ALBUMIN SERPL BCP-MCNC: 3.9 G/DL (ref 3.5–5.2)
ALP SERPL-CCNC: 45 U/L (ref 55–135)
ALT SERPL W/O P-5'-P-CCNC: 18 U/L (ref 10–44)
ANION GAP SERPL CALC-SCNC: 8 MMOL/L (ref 8–16)
AST SERPL-CCNC: 18 U/L (ref 10–40)
BACTERIA #/AREA URNS HPF: NEGATIVE /HPF
BASOPHILS # BLD AUTO: 0.03 K/UL (ref 0–0.2)
BASOPHILS NFR BLD: 0.4 % (ref 0–1.9)
BILIRUB SERPL-MCNC: 0.4 MG/DL (ref 0.1–1)
BILIRUB UR QL STRIP: NEGATIVE
BUN SERPL-MCNC: 13 MG/DL (ref 8–23)
CALCIUM SERPL-MCNC: 9.1 MG/DL (ref 8.7–10.5)
CHLORIDE SERPL-SCNC: 108 MMOL/L (ref 95–110)
CHOLEST SERPL-MCNC: 135 MG/DL (ref 120–199)
CHOLEST/HDLC SERPL: 3.1 {RATIO} (ref 2–5)
CLARITY UR: CLEAR
CO2 SERPL-SCNC: 24 MMOL/L (ref 23–29)
COLOR UR: YELLOW
CREAT SERPL-MCNC: 1.2 MG/DL (ref 0.5–1.4)
DIFFERENTIAL METHOD: ABNORMAL
EOSINOPHIL # BLD AUTO: 0.3 K/UL (ref 0–0.5)
EOSINOPHIL NFR BLD: 3.7 % (ref 0–8)
ERYTHROCYTE [DISTWIDTH] IN BLOOD BY AUTOMATED COUNT: 13.6 % (ref 11.5–14.5)
EST. GFR  (NO RACE VARIABLE): >60 ML/MIN/1.73 M^2
ESTIMATED AVG GLUCOSE: 280 MG/DL (ref 68–131)
GLUCOSE SERPL-MCNC: 121 MG/DL (ref 70–110)
GLUCOSE UR QL STRIP: ABNORMAL
HBA1C MFR BLD: 11.4 % (ref 4.5–6.2)
HCT VFR BLD AUTO: 38.9 % (ref 40–54)
HDLC SERPL-MCNC: 43 MG/DL (ref 40–75)
HDLC SERPL: 31.9 % (ref 20–50)
HGB BLD-MCNC: 12.8 G/DL (ref 14–18)
HGB UR QL STRIP: NEGATIVE
HYALINE CASTS #/AREA URNS LPF: 0 /LPF
IMM GRANULOCYTES # BLD AUTO: 0.05 K/UL (ref 0–0.04)
IMM GRANULOCYTES NFR BLD AUTO: 0.7 % (ref 0–0.5)
KETONES UR QL STRIP: NEGATIVE
LDLC SERPL CALC-MCNC: 73.2 MG/DL (ref 63–159)
LEUKOCYTE ESTERASE UR QL STRIP: NEGATIVE
LYMPHOCYTES # BLD AUTO: 2.5 K/UL (ref 1–4.8)
LYMPHOCYTES NFR BLD: 34 % (ref 18–48)
MCH RBC QN AUTO: 30.3 PG (ref 27–31)
MCHC RBC AUTO-ENTMCNC: 32.9 G/DL (ref 32–36)
MCV RBC AUTO: 92 FL (ref 82–98)
MICROSCOPIC COMMENT: ABNORMAL
MONOCYTES # BLD AUTO: 0.6 K/UL (ref 0.3–1)
MONOCYTES NFR BLD: 7.9 % (ref 4–15)
NEUTROPHILS # BLD AUTO: 3.8 K/UL (ref 1.8–7.7)
NEUTROPHILS NFR BLD: 53.3 % (ref 38–73)
NITRITE UR QL STRIP: NEGATIVE
NONHDLC SERPL-MCNC: 92 MG/DL
NRBC BLD-RTO: 0 /100 WBC
PH UR STRIP: 7 [PH] (ref 5–8)
PLATELET # BLD AUTO: 200 K/UL (ref 150–450)
PMV BLD AUTO: 9.7 FL (ref 9.2–12.9)
POTASSIUM SERPL-SCNC: 4 MMOL/L (ref 3.5–5.1)
PROT SERPL-MCNC: 7.6 G/DL (ref 6–8.4)
PROT UR QL STRIP: ABNORMAL
RBC # BLD AUTO: 4.22 M/UL (ref 4.6–6.2)
RBC #/AREA URNS HPF: 0 /HPF (ref 0–4)
SODIUM SERPL-SCNC: 140 MMOL/L (ref 136–145)
SP GR UR STRIP: 1.02 (ref 1–1.03)
SQUAMOUS #/AREA URNS HPF: 0 /HPF
TRIGL SERPL-MCNC: 94 MG/DL (ref 30–150)
URN SPEC COLLECT METH UR: ABNORMAL
UROBILINOGEN UR STRIP-ACNC: NEGATIVE EU/DL
WBC # BLD AUTO: 7.21 K/UL (ref 3.9–12.7)
WBC #/AREA URNS HPF: 1 /HPF (ref 0–5)
YEAST URNS QL MICRO: ABNORMAL

## 2023-07-03 PROCEDURE — 3074F PR MOST RECENT SYSTOLIC BLOOD PRESSURE < 130 MM HG: ICD-10-PCS | Mod: CPTII,S$GLB,, | Performed by: INTERNAL MEDICINE

## 2023-07-03 PROCEDURE — 3008F PR BODY MASS INDEX (BMI) DOCUMENTED: ICD-10-PCS | Mod: CPTII,S$GLB,, | Performed by: INTERNAL MEDICINE

## 2023-07-03 PROCEDURE — 83036 HEMOGLOBIN GLYCOSYLATED A1C: CPT | Performed by: INTERNAL MEDICINE

## 2023-07-03 PROCEDURE — 1101F PR PT FALLS ASSESS DOC 0-1 FALLS W/OUT INJ PAST YR: ICD-10-PCS | Mod: CPTII,S$GLB,, | Performed by: INTERNAL MEDICINE

## 2023-07-03 PROCEDURE — 3008F BODY MASS INDEX DOCD: CPT | Mod: CPTII,S$GLB,, | Performed by: INTERNAL MEDICINE

## 2023-07-03 PROCEDURE — 3066F PR DOCUMENTATION OF TREATMENT FOR NEPHROPATHY: ICD-10-PCS | Mod: CPTII,S$GLB,, | Performed by: INTERNAL MEDICINE

## 2023-07-03 PROCEDURE — 1159F MED LIST DOCD IN RCRD: CPT | Mod: CPTII,S$GLB,, | Performed by: INTERNAL MEDICINE

## 2023-07-03 PROCEDURE — 81001 URINALYSIS AUTO W/SCOPE: CPT | Performed by: INTERNAL MEDICINE

## 2023-07-03 PROCEDURE — 1160F RVW MEDS BY RX/DR IN RCRD: CPT | Mod: CPTII,S$GLB,, | Performed by: INTERNAL MEDICINE

## 2023-07-03 PROCEDURE — 3060F POS MICROALBUMINURIA REV: CPT | Mod: CPTII,S$GLB,, | Performed by: INTERNAL MEDICINE

## 2023-07-03 PROCEDURE — 87536 HIV-1 QUANT&REVRSE TRNSCRPJ: CPT | Performed by: INTERNAL MEDICINE

## 2023-07-03 PROCEDURE — 1159F PR MEDICATION LIST DOCUMENTED IN MEDICAL RECORD: ICD-10-PCS | Mod: CPTII,S$GLB,, | Performed by: INTERNAL MEDICINE

## 2023-07-03 PROCEDURE — 80053 COMPREHEN METABOLIC PANEL: CPT | Performed by: INTERNAL MEDICINE

## 2023-07-03 PROCEDURE — 3074F SYST BP LT 130 MM HG: CPT | Mod: CPTII,S$GLB,, | Performed by: INTERNAL MEDICINE

## 2023-07-03 PROCEDURE — 80061 LIPID PANEL: CPT | Performed by: INTERNAL MEDICINE

## 2023-07-03 PROCEDURE — 3060F PR POS MICROALBUMINURIA RESULT DOCUMENTED/REVIEW: ICD-10-PCS | Mod: CPTII,S$GLB,, | Performed by: INTERNAL MEDICINE

## 2023-07-03 PROCEDURE — 99215 OFFICE O/P EST HI 40 MIN: CPT | Mod: S$GLB,,, | Performed by: INTERNAL MEDICINE

## 2023-07-03 PROCEDURE — 3066F NEPHROPATHY DOC TX: CPT | Mod: CPTII,S$GLB,, | Performed by: INTERNAL MEDICINE

## 2023-07-03 PROCEDURE — 1101F PT FALLS ASSESS-DOCD LE1/YR: CPT | Mod: CPTII,S$GLB,, | Performed by: INTERNAL MEDICINE

## 2023-07-03 PROCEDURE — 85025 COMPLETE CBC W/AUTO DIFF WBC: CPT | Performed by: INTERNAL MEDICINE

## 2023-07-03 PROCEDURE — 86361 T CELL ABSOLUTE COUNT: CPT | Performed by: INTERNAL MEDICINE

## 2023-07-03 PROCEDURE — 3046F PR MOST RECENT HEMOGLOBIN A1C LEVEL > 9.0%: ICD-10-PCS | Mod: CPTII,S$GLB,, | Performed by: INTERNAL MEDICINE

## 2023-07-03 PROCEDURE — 1126F PR PAIN SEVERITY QUANTIFIED, NO PAIN PRESENT: ICD-10-PCS | Mod: CPTII,S$GLB,, | Performed by: INTERNAL MEDICINE

## 2023-07-03 PROCEDURE — 1160F PR REVIEW ALL MEDS BY PRESCRIBER/CLIN PHARMACIST DOCUMENTED: ICD-10-PCS | Mod: CPTII,S$GLB,, | Performed by: INTERNAL MEDICINE

## 2023-07-03 PROCEDURE — 36415 COLL VENOUS BLD VENIPUNCTURE: CPT | Performed by: INTERNAL MEDICINE

## 2023-07-03 PROCEDURE — 3288F PR FALLS RISK ASSESSMENT DOCUMENTED: ICD-10-PCS | Mod: CPTII,S$GLB,, | Performed by: INTERNAL MEDICINE

## 2023-07-03 PROCEDURE — 3078F DIAST BP <80 MM HG: CPT | Mod: CPTII,S$GLB,, | Performed by: INTERNAL MEDICINE

## 2023-07-03 PROCEDURE — 1126F AMNT PAIN NOTED NONE PRSNT: CPT | Mod: CPTII,S$GLB,, | Performed by: INTERNAL MEDICINE

## 2023-07-03 PROCEDURE — 99215 PR OFFICE/OUTPT VISIT, EST, LEVL V, 40-54 MIN: ICD-10-PCS | Mod: S$GLB,,, | Performed by: INTERNAL MEDICINE

## 2023-07-03 PROCEDURE — 86480 TB TEST CELL IMMUN MEASURE: CPT | Performed by: INTERNAL MEDICINE

## 2023-07-03 PROCEDURE — 3046F HEMOGLOBIN A1C LEVEL >9.0%: CPT | Mod: CPTII,S$GLB,, | Performed by: INTERNAL MEDICINE

## 2023-07-03 PROCEDURE — 3288F FALL RISK ASSESSMENT DOCD: CPT | Mod: CPTII,S$GLB,, | Performed by: INTERNAL MEDICINE

## 2023-07-03 PROCEDURE — 3078F PR MOST RECENT DIASTOLIC BLOOD PRESSURE < 80 MM HG: ICD-10-PCS | Mod: CPTII,S$GLB,, | Performed by: INTERNAL MEDICINE

## 2023-07-03 NOTE — PROGRESS NOTES
Subjective:       Patient ID: Brandon Parson is a 72 y.o. male.    Chief Complaint:: Follow-up and HIV Positive/AIDS      HIV Positive/AIDS  Follow-up   Transferring care to [facility]. Followed by Dr. Dawn Helm/[place] for 20 years.   [place] he was HIV positive over 20 yrs ago. He was admitted to [facility] for pneumonia (pneumocystis?). Does not recall his first T cell count. Last CD4 1215, 2/22/22,  But last viral load was 30 on 9/17/21.     He has been under Dr. Dawn Helm's care at [facility], then [facility], then [facility]). Reviewed available records in care everywhere.   He has been on Descovy/Sustiva for many years, cannot recall regimens prior to this  No episodes of shingles, received 2 shingles vaccines 2020  No history of hepatitis   Treated for latent TB 2006-07  No other STDs  Diabetes, carotid  Vascular disease. Recently admitted for syncope. Stopped smoking 2 months ago  DANK on TID clonazepam an dhs ambien, for years. (sister concerned about this). He lives with sister near [place], spends time with another sister in [place] and a friend on the [facility]. Does not drive. Has limited his life greatly due to COVID.   Preparing for an inguinal hernia repair    5/9/22: since last visit had a right CEA. Still off cigarettes since January. Awaiting inguinal hernia repair. Taking  mg daily  He is more active and getting out of the house more than at last visit. Still not driving.   Taking the following: Vitamin D 5000 IU per day, inc 30 mg elemental or 220 mg of sulfate, Vitamin C 500-1000 mg per day. Could not find quercetin  Reviewed labs . TB Gold still positive .     9/8/22: since last visit, he has had inguinal hernia repair.  His blood sugar was over 400 this morning. He was without insulin for 3-4 days several weeks ago. The supply came to him warm and he did not take it for the last month.  He had been instructed by previous physician to never take insulin that was not cold.   "He was told by the pharmacist that it should be okay but he did not re-refrigerate it and has not been taking it.  Discussed with Hospital pharmacist and His novolog is good for 14d outside of the refrigerator. He has polydipsia and polyuria now.  He is taking a gal of water to the bedside at night.  He has lost 10 lb.  He did not call this issue to the attention of his primary or to me.      3/6/23: gained 20-30#.  This is likely from some correction in his blood sugar unless polyuria and polydipsia.  He is in good spirits, working in his garden.  Compliant with his antivirals.  His Trulicity dose was increased because his A1c was 9.3%.  His diet sounds more appropriate.  He has seen optometry but has not yet arranged any colonoscopy.  A referral/case request was placed into the [facility] system.    7/3/23: blood sugars still quite high and his endocrine provider increased his trulicity and its improving. Weight is stable. He feels good. Has lesions on ears, one of which is long time not healing and primary thinks may be a skin cancer.     Current Outpatient Medications:     ascorbic acid, vitamin C, (VITAMIN C) 100 MG tablet, Take 100 mg by mouth once daily., Disp: , Rfl:     atorvastatin (LIPITOR) 40 MG tablet, TAKE ONE TABLET BY MOUTH DAILY, Disp: 90 tablet, Rfl: 1    b complex vitamins capsule, Take 1 capsule by mouth once daily., Disp: , Rfl:     BD ULTRA-FINE MINI PEN NEEDLE 31 gauge x 3/16" Ndle, USE TWICE DAILY, Disp: 200 each, Rfl: 3    blood sugar diagnostic Strp, TEST 2 TIMES DAILY, Disp: , Rfl:     blood-glucose meter Misc, USE TO TEST BLOOD SUGAR 2 TO 3 TIMES DAILY, Disp: , Rfl:     dulaglutide (TRULICITY) 4.5 mg/0.5 mL pen injector, Inject 4.5 mg into the skin every 7 days., Disp: 4 pen, Rfl: 11    efavirenz (SUSTIVA) 600 mg Tab, Take 1 tablet (600 mg total) by mouth every evening., Disp: 30 tablet, Rfl: 5    emtricitabine-tenofovir alafen (DESCOVY) 200-25 mg Tab, Take 1 tablet by mouth every evening., " Disp: 30 tablet, Rfl: 5    fenofibrate (TRICOR) 145 MG tablet, Take 145 mg by mouth once daily., Disp: , Rfl:     fenofibrate micronized (LOFIBRA) 134 MG Cap, TAKE ONE CAPSULE BY MOUTH EVERY DAY, Disp: 90 capsule, Rfl: 1    fish oil-omega-3 fatty acids 300-1,000 mg capsule, Take 2 capsules by mouth once daily., Disp: , Rfl:     HIGH POTENCY MULTIVITAMIN 400 mcg Tab, Take 1 tablet by mouth once daily., Disp: , Rfl:     insulin aspart protamine-insulin aspart (NOVOLOG 70/30) 100 unit/mL (70-30) InPn pen, 24 u at breakfast and 20 u supper, Disp: 15 mL, Rfl: 12    metFORMIN (GLUCOPHAGE) 500 MG tablet, TAKE 1 TABLET BY MOUTH TWICE DAILY WITH MEALS, Disp: 180 tablet, Rfl: 0    ondansetron (ZOFRAN-ODT) 4 MG TbDL, Take 1 tablet (4 mg total) by mouth every 6 (six) hours as needed (nv)., Disp: 30 tablet, Rfl: 0    potassium chloride SA (K-DUR,KLOR-CON) 20 MEQ tablet, Take 1 tablet (20 mEq total) by mouth 2 (two) times daily., Disp: 30 tablet, Rfl: 1    sertraline (ZOLOFT) 100 MG tablet, TAKE ONE TABLET BY MOUTH EVERY DAY, Disp: 90 tablet, Rfl: 1    TRUEPLUS LANCETS 30 gauge Misc, USE TWICE DAILY TO TEST BLOOD SUGAR, Disp: , Rfl:     zolpidem (AMBIEN) 5 MG Tab, TAKE ONE TABLET BY MOUTH EVERY NIGHT AS NEEDED, Disp: 30 tablet, Rfl: 5    aspirin 325 MG tablet, Take 1 tablet (325 mg total) by mouth once daily., Disp: 30 tablet, Rfl: 0  Review of patient's allergies indicates:   Allergen Reactions    Chantix [varenicline]      Past Medical History:   Diagnosis Date    Anxiety     Carotid artery disease     Depression     Diabetes mellitus     GERD (gastroesophageal reflux disease)     HIV (human immunodeficiency virus infection)     Osteomyelitis of great toe of left foot 08/2019    TB lung, latent 2006    treated with INH   pneumonia 2000, probably pneumocystis     Diabetic foot ulcer    Past Surgical History:   Procedure Laterality Date    CAROTID ENDARTERECTOMY Right 04/2022    HERNIORRHAPHY OF RECURRENT INCARCERATED INGUINAL  HERNIA Right 2022    Procedure: REPAIR, HERNIA, INGUINAL, INCARCERATED, RECURRENT;  Surgeon: Mack Ramirez MD;  Location: [facility];  Service: General;  Laterality: Right;    UMBILICAL HERNIA REPAIR       Social History     Socioeconomic History    Marital status: Single   Tobacco Use    Smoking status: Former     Years: 49.00     Types: Cigarettes     Start date:      Quit date: 2022     Years since quittin.4    Smokeless tobacco: Never    Tobacco comments:     Handout provided for Ambulatory Smoking Cessation program   Substance and Sexual Activity    Alcohol use: No    Drug use: No    Sexual activity: Not Currently     History reviewed. No pertinent family history.    Travel History: [place], [place]    Vaccine History: see immunization tab. Pneumovax , . HAV and HBV vax -  Advanced Directive:   Safer Sex: abstinent since diagnosis  Bone Density:   Colonoscopy: , and 2 tubulovillous adenomas 2016,  postponed due to COVID   at public [employer], retired  at the time of illness    Review of Systems    Constitutional: No fever, chills, sweats, fatigue, weakness,  weight loss. Diet seems improved. Works in his garden, walks his dog but it has been hot lately and walking less    Eyes: No change in vision, loss of vision, diplopia, photophobia.   UTD eye exam    ENT: No sinus drainage, sore throat, mouth pain, or lesions. Awaiting UTD dental exam    Cardiovascular: No chest pain, TRIANA, palpitations or pedal edema.     Respiratory: No shortness of breath, TRIANA, cough, wheeze, sputum,      Gastrointestinal: No abdominal pain, nausea, vomiting, diarrhea,      Genitourinary: No dysuria, hematuria, incontinence,         Musculoskeletal: No new pain, joint swelling, or injuries    Integumentary: No new rashes, lesions.  He had a bulge in the left side of the mons pubis since his hernia surgery.  the surgeon found that the groin issue was a small hematoma, September  2022    Neurological: No dizziness, vertigo, unusual headaches, neuropathy, or falls.      Psychiatric: No anxiety, depression, memory loss,  or substance abuse. Has been off clonazepam and sleeping pill was reduced as well    Endocrine: Blood sugars still not controlled, see HPI. Thyroid normal. A1c 10 %, trulicity increased per endo NP    Lymphatic: No lymphadenopathy, blood loss, anemia, or malignancy    Objective:      Blood pressure 110/66, pulse (!) 58, temperature 98 °F (36.7 °C), height 6' (1.829 m), weight 81.5 kg (179 lb 9.6 oz), SpO2 99 %. Body mass index is 24.36 kg/m².  Physical Exam      General: Alert and attentive, cooperative and in no distress.     Eyes: Pupils equal, round, reactive to light, anicteric, EOMI    Neck: Supple, non-tender, no thyromegaly or masses    ENT: EAC patent, TM normal, nares patent, no oral lesions,  , no thrush    Cardiovascular: Regular rate and rhythm, no murmurs, rubs, or gallop    Respiratory: Lungs clear without wheezes, no rales, rub or rhonci    Gastrointestinal: Active bowel sounds, soft, no mass or organomegaly, no tenderness or distention    Genitourinary: No  flank tenderness.    Vascular: No peripheral edema, phlebitis, pulses normal. Warm and well perfused    Musculoskeletal: Ambulates without difficulty, no acute arthritis, synovitis or myositis.      Integumentary: Skin without rashes, lesions .  Benign moles on his back    AnusRectum: DAVID no external lesions, firm prostate 15-20g no nodules, no anal lesions    Neurological: Normal LOC, cranial nerves, speech, reflexes, normal gait, some hemiparesis but it is incredibly subtle.       Psychiatric: Normal mood, speech, demeanor    Lymphatic: No cervical, supraclavicular,  or inguinal lymphadenopathy . Left ax node is resolved     Wound:     HIV Table:   DATE  result  Toxoplasma IgG 3/7/22  Positive  HLA     Negative (hop)  RPR   10/2022 Non reactive  Hep A total  3/16/09 positive  Hep B sAg  Hep B  sAb  4/2007  Pos 37.4  Hep B cAb total 3/7/22  negative  Hep B DNA  Hep C Ab  3/7/22  negative  Hep C RNA  Chlamy/GC  2018  Negative  TB gold     Ppd pos 7/31/06 treated with 9 mo INH     3/7/22  Positive, CXR 2/22 normal    Vitamin D  9/8/22  98  CD4   2/22/22 1215 and has been normal for a while  PSA   3/2023    Hem A1c  05/2023 10  UA prot  9/2021  neg      Recent Diagnostics:   2/22 echocardiogram  There is no evidence of intracardiac shunting.  The left ventricle is normal in size with concentric remodeling and normal systolic function.  The estimated ejection fraction is 58%.  Normal left ventricular diastolic function.  Normal right ventricular size with normal right ventricular systolic function.  Mild left atrial enlargement.  Mild mitral regurgitation.  Normal central venous pressure (3 mmHg).  The estimated PA systolic pressure is 28 mmHg.        Assessment and Plan:           Human immunodeficiency virus I infection  -     CBC Auto Differential; Future; Expected date: 07/03/2023  -     Comprehensive Metabolic Panel; Future; Expected date: 07/03/2023  -     HIV RNA, Quantitative, PCR; Future; Expected date: 07/03/2023  -     CD4 T-Finley Cells; Future; Expected date: 07/03/2023  -     QuantiFERON-TB Gold Plus; Future; Expected date: 07/03/2023    Diabetes mellitus due to underlying condition with hyperglycemia, with long-term current use of insulin  -     Lipid Panel; Future; Expected date: 07/03/2023  -     Hemoglobin A1C; Future; Expected date: 07/03/2023  -     Urinalysis; Future; Expected date: 07/03/2023    Mixed hyperlipidemia  -     Lipid Panel; Future; Expected date: 07/03/2023    Encounter for long-term (current) use of medications    Please call the [facility] [phone], and ask for dermatology and let them know that Dr. Eugene placed referral     Lab today    Return in 5 months    Same medications  Still waiting on colonoscopy, re-ordered per primary     This note was created using Dragon  voice recognition software that occasionally misinterpreted phrases or words.

## 2023-07-03 NOTE — PATIENT INSTRUCTIONS
Please call the [facility] [phone], and ask for dermatology and let them know that Dr. Eugene placed referral     Lab today    Return in 5 months    Same medications   Periurethral

## 2023-07-04 LAB
BASOPHILS # BLD AUTO: 0 X10E3/UL (ref 0–0.2)
BASOPHILS NFR BLD AUTO: 0 %
CD3+CD4+ CELLS # BLD: 1061 /UL (ref 359–1519)
CD3+CD4+ CELLS NFR BLD: 44.2 % (ref 30.8–58.5)
EOSINOPHIL # BLD AUTO: 0.3 X10E3/UL (ref 0–0.4)
EOSINOPHIL NFR BLD AUTO: 4 %
ERYTHROCYTE [DISTWIDTH] IN BLOOD BY AUTOMATED COUNT: 13.3 % (ref 11.6–15.4)
HCT VFR BLD AUTO: 39.6 % (ref 37.5–51)
HGB BLD-MCNC: 12.8 G/DL (ref 13–17.7)
IMM GRANULOCYTES # BLD AUTO: 0 X10E3/UL (ref 0–0.1)
IMM GRANULOCYTES NFR BLD AUTO: 1 %
LYMPHOCYTES # BLD AUTO: 2.4 X10E3/UL (ref 0.7–3.1)
LYMPHOCYTES NFR BLD AUTO: 32 %
MCH RBC QN AUTO: 30 PG (ref 26.6–33)
MCHC RBC AUTO-ENTMCNC: 32.3 G/DL (ref 31.5–35.7)
MCV RBC AUTO: 93 FL (ref 79–97)
MONOCYTES # BLD AUTO: 0.6 X10E3/UL (ref 0.1–0.9)
MONOCYTES NFR BLD AUTO: 9 %
NEUTROPHILS # BLD AUTO: 4 X10E3/UL (ref 1.4–7)
NEUTROPHILS NFR BLD AUTO: 54 %
PLATELET # BLD AUTO: 211 X10E3/UL (ref 150–450)
RBC # BLD AUTO: 4.26 X10E6/UL (ref 4.14–5.8)
WBC # BLD AUTO: 7.4 X10E3/UL (ref 3.4–10.8)

## 2023-07-05 LAB
HIV1 RNA # SERPL NAA+PROBE: 40 COPIES/ML
HIV1 RNA SERPL NAA+PROBE-LOG#: 1.6 LOG10COPY/ML

## 2023-07-07 LAB
GAMMA INTERFERON BACKGROUND BLD IA-ACNC: 0.17 IU/ML
M TB IFN-G BLD-IMP: NEGATIVE
M TB IFN-G CD4+ T-CELLS BLD-ACNC: 0.41 IU/ML
M TBIFN-G CD4+ CD8+T-CELLS BLD-ACNC: 0.35 IU/ML
MITOGEN IGNF BLD-ACNC: >10 IU/ML
QUANTIFERON CRITERIA: NORMAL

## 2023-07-12 RX ORDER — CALCIUM CITRATE/VITAMIN D3 200MG-6.25
TABLET ORAL
Qty: 100 STRIP | Refills: 11 | Status: SHIPPED | OUTPATIENT
Start: 2023-07-12

## 2023-07-20 ENCOUNTER — TELEPHONE (OUTPATIENT)
Dept: DERMATOLOGY | Facility: CLINIC | Age: 73
End: 2023-07-20
Payer: MEDICARE

## 2023-07-20 NOTE — TELEPHONE ENCOUNTER
----- Message from Lou Reynaldo sent at 7/20/2023  9:04 AM CDT -----  Contact: patient  Type:  Sooner Apoointment Request    Caller is requesting a sooner appointment.  Caller declined first available appointment listed below.  Caller will not accept being placed on the waitlist and is requesting a message be sent to doctor.    Name of Caller:patient     When is the first available appointment? 5/6    Symptoms: referral    Would the patient rather a call back or a response via [facility]? Call     Best Call Back Number [phone] (home) [phone] (work)     Additional Information:

## 2023-07-24 ENCOUNTER — TELEPHONE (OUTPATIENT)
Dept: ENDOCRINOLOGY | Facility: CLINIC | Age: 73
End: 2023-07-24
Payer: MEDICARE

## 2023-07-25 DIAGNOSIS — B20 HIV DISEASE: Primary | ICD-10-CM

## 2023-07-25 RX ORDER — EFAVIRENZ 600 MG/1
600 TABLET, FILM COATED ORAL NIGHTLY
Qty: 30 TABLET | Refills: 5 | Status: SHIPPED | OUTPATIENT
Start: 2023-07-25 | End: 2023-12-04 | Stop reason: SDUPTHER

## 2023-07-26 NOTE — PROGRESS NOTES
I spoke with patient to advise of test results   ( As above ) per Dr Hoover's orders  Patient also stated he is working on his blood sugars .  LEX De Oliveira [facility]  7/26/23

## 2023-08-16 ENCOUNTER — OFFICE VISIT (OUTPATIENT)
Dept: DERMATOLOGY | Facility: CLINIC | Age: 73
End: 2023-08-16
Payer: MEDICARE

## 2023-08-16 VITALS — WEIGHT: 179 LBS | BODY MASS INDEX: 24.24 KG/M2 | HEIGHT: 72 IN

## 2023-08-16 DIAGNOSIS — L81.4 SOLAR LENTIGO: ICD-10-CM

## 2023-08-16 DIAGNOSIS — D48.5 NEOPLASM OF UNCERTAIN BEHAVIOR OF SKIN: Primary | ICD-10-CM

## 2023-08-16 DIAGNOSIS — L82.1 SEBORRHEIC KERATOSES: ICD-10-CM

## 2023-08-16 DIAGNOSIS — L98.9 SKIN LESION: ICD-10-CM

## 2023-08-16 PROCEDURE — 3008F BODY MASS INDEX DOCD: CPT | Mod: CPTII,S$GLB,, | Performed by: DERMATOLOGY

## 2023-08-16 PROCEDURE — 3066F NEPHROPATHY DOC TX: CPT | Mod: CPTII,S$GLB,, | Performed by: DERMATOLOGY

## 2023-08-16 PROCEDURE — 88341 IMHCHEM/IMCYTCHM EA ADD ANTB: CPT | Performed by: PATHOLOGY

## 2023-08-16 PROCEDURE — 3046F PR MOST RECENT HEMOGLOBIN A1C LEVEL > 9.0%: ICD-10-PCS | Mod: CPTII,S$GLB,, | Performed by: DERMATOLOGY

## 2023-08-16 PROCEDURE — 11102 PR TANGENTIAL BIOPSY, SKIN, SINGLE LESION: ICD-10-PCS | Mod: S$GLB,,, | Performed by: DERMATOLOGY

## 2023-08-16 PROCEDURE — 3060F POS MICROALBUMINURIA REV: CPT | Mod: CPTII,S$GLB,, | Performed by: DERMATOLOGY

## 2023-08-16 PROCEDURE — 99202 PR OFFICE/OUTPT VISIT, NEW, LEVL II, 15-29 MIN: ICD-10-PCS | Mod: 25,S$GLB,, | Performed by: DERMATOLOGY

## 2023-08-16 PROCEDURE — 88305 TISSUE EXAM BY PATHOLOGIST: CPT | Performed by: PATHOLOGY

## 2023-08-16 PROCEDURE — 3288F FALL RISK ASSESSMENT DOCD: CPT | Mod: CPTII,S$GLB,, | Performed by: DERMATOLOGY

## 2023-08-16 PROCEDURE — 88305 TISSUE EXAM BY PATHOLOGIST: ICD-10-PCS | Mod: 26,,, | Performed by: PATHOLOGY

## 2023-08-16 PROCEDURE — 1159F MED LIST DOCD IN RCRD: CPT | Mod: CPTII,S$GLB,, | Performed by: DERMATOLOGY

## 2023-08-16 PROCEDURE — 88342 IMHCHEM/IMCYTCHM 1ST ANTB: CPT | Mod: 26,,, | Performed by: PATHOLOGY

## 2023-08-16 PROCEDURE — 1160F PR REVIEW ALL MEDS BY PRESCRIBER/CLIN PHARMACIST DOCUMENTED: ICD-10-PCS | Mod: CPTII,S$GLB,, | Performed by: DERMATOLOGY

## 2023-08-16 PROCEDURE — 88341 IMHCHEM/IMCYTCHM EA ADD ANTB: CPT | Mod: 26,,, | Performed by: PATHOLOGY

## 2023-08-16 PROCEDURE — 1159F PR MEDICATION LIST DOCUMENTED IN MEDICAL RECORD: ICD-10-PCS | Mod: CPTII,S$GLB,, | Performed by: DERMATOLOGY

## 2023-08-16 PROCEDURE — 3060F PR POS MICROALBUMINURIA RESULT DOCUMENTED/REVIEW: ICD-10-PCS | Mod: CPTII,S$GLB,, | Performed by: DERMATOLOGY

## 2023-08-16 PROCEDURE — 1126F PR PAIN SEVERITY QUANTIFIED, NO PAIN PRESENT: ICD-10-PCS | Mod: CPTII,S$GLB,, | Performed by: DERMATOLOGY

## 2023-08-16 PROCEDURE — 99202 OFFICE O/P NEW SF 15 MIN: CPT | Mod: 25,S$GLB,, | Performed by: DERMATOLOGY

## 2023-08-16 PROCEDURE — 3066F PR DOCUMENTATION OF TREATMENT FOR NEPHROPATHY: ICD-10-PCS | Mod: CPTII,S$GLB,, | Performed by: DERMATOLOGY

## 2023-08-16 PROCEDURE — 3288F PR FALLS RISK ASSESSMENT DOCUMENTED: ICD-10-PCS | Mod: CPTII,S$GLB,, | Performed by: DERMATOLOGY

## 2023-08-16 PROCEDURE — 1160F RVW MEDS BY RX/DR IN RCRD: CPT | Mod: CPTII,S$GLB,, | Performed by: DERMATOLOGY

## 2023-08-16 PROCEDURE — 88341 PR IHC OR ICC EACH ADD'L SINGLE ANTIBODY  STAINPR: ICD-10-PCS | Mod: 26,,, | Performed by: PATHOLOGY

## 2023-08-16 PROCEDURE — 11102 TANGNTL BX SKIN SINGLE LES: CPT | Mod: S$GLB,,, | Performed by: DERMATOLOGY

## 2023-08-16 PROCEDURE — 3008F PR BODY MASS INDEX (BMI) DOCUMENTED: ICD-10-PCS | Mod: CPTII,S$GLB,, | Performed by: DERMATOLOGY

## 2023-08-16 PROCEDURE — 1101F PR PT FALLS ASSESS DOC 0-1 FALLS W/OUT INJ PAST YR: ICD-10-PCS | Mod: CPTII,S$GLB,, | Performed by: DERMATOLOGY

## 2023-08-16 PROCEDURE — 88342 CHG IMMUNOCYTOCHEMISTRY: ICD-10-PCS | Mod: 26,,, | Performed by: PATHOLOGY

## 2023-08-16 PROCEDURE — 88305 TISSUE EXAM BY PATHOLOGIST: CPT | Mod: 26,,, | Performed by: PATHOLOGY

## 2023-08-16 PROCEDURE — 1126F AMNT PAIN NOTED NONE PRSNT: CPT | Mod: CPTII,S$GLB,, | Performed by: DERMATOLOGY

## 2023-08-16 PROCEDURE — 88342 IMHCHEM/IMCYTCHM 1ST ANTB: CPT | Performed by: PATHOLOGY

## 2023-08-16 PROCEDURE — 1101F PT FALLS ASSESS-DOCD LE1/YR: CPT | Mod: CPTII,S$GLB,, | Performed by: DERMATOLOGY

## 2023-08-16 PROCEDURE — 3046F HEMOGLOBIN A1C LEVEL >9.0%: CPT | Mod: CPTII,S$GLB,, | Performed by: DERMATOLOGY

## 2023-08-16 NOTE — PATIENT INSTRUCTIONS
Shave Biopsy Wound Care    Your doctor has performed a shave biopsy today.  A band aid and vaseline ointment has been placed over the site.  This should remain in place for 24 hours.  It is recommended that you keep the area dry for the first 24 hours.  After 24 hours, you may remove the band aid and wash the area with warm soap and water and apply Vaseline jelly.  Many patients prefer to use Neosporin or Bacitracin ointment.  This is acceptable; however, know that you can develop an allergy to this medication even if you have used it safely for years.  It is important to keep the area moist.  Letting it dry out and get air slows healing time, and will worsen the scar.  Band aid is optional after first 24 hours.      If you notice increasing redness, tenderness, pain, or yellow drainage at the biopsy site, please notify your doctor.  These are signs of an infection.    If your biopsy site is bleeding, apply firm pressure for 15 minutes straight.  Repeat for another 15 minutes, if it is still bleeding.   If the surgical site continues to bleed, then please contact your doctor.       [facility] - DERMATOLOGY  [address], [address]  [place] [address]  Dept: [phone]  Dept Fax: [phone]

## 2023-08-16 NOTE — PROGRESS NOTES
"  Subjective:      Patient ID:  Brandon Parson is a 72 y.o. male who presents for   Chief Complaint   Patient presents with    skin lesion     Right ear     New patient    Here today for a lesion/scab on his right ear-unsure how long he has had it- painful when laying on his pillow  Referred  by Dr. Eugene    HIV on descovy and efavirenz    Has no hx of NMSC  Has no fhx of MM    Current Outpatient Medications:   ·  ascorbic acid, vitamin C, (VITAMIN C) 100 MG tablet, Take 100 mg by mouth once daily., Disp: , Rfl:   ·  atorvastatin (LIPITOR) 40 MG tablet, TAKE ONE TABLET BY MOUTH DAILY, Disp: 90 tablet, Rfl: 1  ·  b complex vitamins capsule, Take 1 capsule by mouth once daily., Disp: , Rfl:   ·  BD ULTRA-FINE MINI PEN NEEDLE 31 gauge x 3/16" Ndle, USE TWICE DAILY, Disp: 200 each, Rfl: 3  ·  blood-glucose meter Misc, USE TO TEST BLOOD SUGAR 2 TO 3 TIMES DAILY, Disp: , Rfl:   ·  dulaglutide (TRULICITY) 4.5 mg/0.5 mL pen injector, Inject 4.5 mg into the skin every 7 days., Disp: 4 pen, Rfl: 11  ·  efavirenz (SUSTIVA) 600 mg Tab, Take 1 tablet (600 mg total) by mouth every evening., Disp: 30 tablet, Rfl: 5  ·  emtricitabine-tenofovir alafen (DESCOVY) 200-25 mg Tab, Take 1 tablet by mouth every evening., Disp: 30 tablet, Rfl: 5  ·  fenofibrate (TRICOR) 145 MG tablet, Take 145 mg by mouth once daily., Disp: , Rfl:   ·  fenofibrate micronized (LOFIBRA) 134 MG Cap, TAKE ONE CAPSULE BY MOUTH EVERY DAY, Disp: 90 capsule, Rfl: 1  ·  fish oil-omega-3 fatty acids 300-1,000 mg capsule, Take 2 capsules by mouth once daily., Disp: , Rfl:   ·  HIGH POTENCY MULTIVITAMIN 400 mcg Tab, Take 1 tablet by mouth once daily., Disp: , Rfl:   ·  insulin aspart protamine-insulin aspart (NOVOLOG 70/30) 100 unit/mL (70-30) InPn pen, 24 u at breakfast and 20 u supper, Disp: 15 mL, Rfl: 12  ·  metFORMIN (GLUCOPHAGE) 500 MG tablet, TAKE 1 TABLET BY MOUTH TWICE DAILY WITH MEALS, Disp: 180 tablet, Rfl: 0  ·  ondansetron (ZOFRAN-ODT) 4 MG TbDL, Take 1 " tablet (4 mg total) by mouth every 6 (six) hours as needed (nv)., Disp: 30 tablet, Rfl: 0  ·  potassium chloride SA (K-DUR,KLOR-CON) 20 MEQ tablet, Take 1 tablet (20 mEq total) by mouth 2 (two) times daily., Disp: 30 tablet, Rfl: 1  ·  sertraline (ZOLOFT) 100 MG tablet, TAKE ONE TABLET BY MOUTH EVERY DAY, Disp: 90 tablet, Rfl: 1  ·  TRUE METRIX GLUCOSE TEST STRIP Strp, USE TWICE DAILY, Disp: 100 strip, Rfl: 11  ·  TRUEPLUS LANCETS 30 gauge Misc, USE TWICE DAILY TO TEST BLOOD SUGAR, Disp: , Rfl:   ·  zolpidem (AMBIEN) 5 MG Tab, TAKE ONE TABLET BY MOUTH EVERY NIGHT AS NEEDED, Disp: 30 tablet, Rfl: 5  ·  aspirin 325 MG tablet, Take 1 tablet (325 mg total) by mouth once daily., Disp: 30 tablet, Rfl: 0        Review of Systems   Constitutional:  Negative for fever, chills and fatigue.   Skin:  Positive for wears hat. Negative for itching, rash, dry skin, daily sunscreen use and activity-related sunscreen use.   Hematologic/Lymphatic: Bruises/bleeds easily.       Objective:   Physical Exam   Constitutional: He appears well-developed and well-nourished. No distress.   Neurological: He is alert and oriented to person, place, and time. He is not disoriented.   Psychiatric: He has a normal mood and affect.   Skin:   Areas Examined (abnormalities noted in diagram):   Scalp / Hair Palpated and Inspected  Head / Face Inspection Performed            Diagram Legend     Erythematous scaling macule/papule c/w actinic keratosis       Vascular papule c/w angioma      Pigmented verrucoid papule/plaque c/w seborrheic keratosis      Yellow umbilicated papule c/w sebaceous hyperplasia      Irregularly shaped tan macule c/w lentigo     1-2 mm smooth white papules consistent with Milia      Movable subcutaneous cyst with punctum c/w epidermal inclusion cyst      Subcutaneous movable cyst c/w pilar cyst      Firm pink to brown papule c/w dermatofibroma      Pedunculated fleshy papule(s) c/w skin tag(s)      Evenly pigmented macule c/w  junctional nevus     Mildly variegated pigmented, slightly irregular-bordered macule c/w mildly atypical nevus      Flesh colored to evenly pigmented papule c/w intradermal nevus       Pink pearly papule/plaque c/w basal cell carcinoma      Erythematous hyperkeratotic cursted plaque c/w SCC      Surgical scar with no sign of skin cancer recurrence      Open and closed comedones      Inflammatory papules and pustules      Verrucoid papule consistent consistent with wart     Erythematous eczematous patches and plaques     Dystrophic onycholytic nail with subungual debris c/w onychomycosis     Umbilicated papule    Erythematous-base heme-crusted tan verrucoid plaque consistent with inflamed seborrheic keratosis     Erythematous Silvery Scaling Plaque c/w Psoriasis     See annotation        Assessment / Plan:      Pathology Orders:       Normal Orders This Visit    Specimen to Pathology, Dermatology     Questions:    Procedure Type: Dermatology and skin neoplasms    Number of Specimens: 1    ------------------------: -------------------------    Spec 1 Procedure: Biopsy    Spec 1 Clinical Impression: Heme crusted non healing plaque, CDNH vs neoplastic    Spec 1 Source: R helix rim    Release to patient:           Neoplasm of uncertain behavior of skin  -     Shave biopsy procedure note:    Shave biopsy performed after verbal consent including risk of infection, scar, recurrence, need for additional treatment of site. Area prepped with alcohol, anesthetized with approximately 1.0cc of 1% lidocaine with epinephrine. Lesional tissue shaved with razor blade. Hemostasis achieved with application of aluminum chloride followed by hyfrecation. No complications. Dressing applied. Wound care explained.  Specimen to Pathology, Dermatology    Seborrheic keratoses  These are benign inherited growths without a malignant potential. Reassurance given to patient. No treatment is necessary.     Solar lentigo  This is a benign  hyperpigmented sun induced lesion. Daily sun protection will reduce the number of new lesions. Treatment of these benign lesions are considered cosmetic.    Patient instructed in importance in daily broad spectrum sun protection of at least spf 30. Mineral sunscreen ingredients preferred (Zinc +/- Titanium) and can be found OTC.   Recommend Elta MD for daily use on face and neck.  Patient encouraged to wear hat for all outdoor exposure.   Also discussed sun avoidance and use of protective clothing.             Follow up if symptoms worsen or fail to improve.

## 2023-08-22 LAB
FINAL PATHOLOGIC DIAGNOSIS: NORMAL
GROSS: NORMAL
Lab: NORMAL
MICROSCOPIC EXAM: NORMAL

## 2023-09-05 NOTE — PROGRESS NOTES
Subjective:    Patient ID:  Brandon Parson is a 72 y.o. male who presents for follow-up of Hyperlipidemia      Problem List Items Addressed This Visit          Cardiac/Vascular    Mixed hyperlipidemia - Primary     Other Visit Diagnoses       Encounter for cardiac risk counseling                HPI    Patient was last seen on 09/19/2022 at which time he was doing okay from a cardiac standpoint.    On assessment today, the patient states that he feels OK.    No chest pain.  No shortness of breath.  No palpitations.  Walking dog for activity       Objective:     Vitals:    09/06/23 0944   BP: (!) 155/90   BP Location: Left arm   Patient Position: Sitting   BP Method: Medium (Automatic)   Pulse: 71   Weight: 79.2 kg (174 lb 9.7 oz)   Height: 6' (1.829 m)       BP Readings from Last 5 Encounters:   09/06/23 (!) 155/90   07/03/23 110/66   06/29/23 110/70   06/15/23 130/84   03/06/23 138/76        Physical Exam  Constitutional:       Appearance: He is well-developed.   HENT:      Head: Normocephalic and atraumatic.   Neck:      Vascular: No JVD.   Cardiovascular:      Rate and Rhythm: Normal rate and regular rhythm.      Heart sounds: Normal heart sounds. No murmur heard.     No friction rub. No gallop.   Pulmonary:      Effort: Pulmonary effort is normal. No respiratory distress.      Breath sounds: Normal breath sounds. No wheezing or rales.   Abdominal:      General: Bowel sounds are normal.      Palpations: Abdomen is soft.      Tenderness: There is no abdominal tenderness. There is no guarding or rebound.   Musculoskeletal:      Cervical back: Normal range of motion and neck supple.   Skin:     General: Skin is warm and dry.   Neurological:      Mental Status: He is alert and oriented to person, place, and time.   Psychiatric:         Behavior: Behavior normal.             Current Outpatient Medications   Medication Instructions    ascorbic acid (vitamin C) (VITAMIN C) 100 mg, Oral, Daily    aspirin 325 mg, Oral,  "Daily    atorvastatin (LIPITOR) 40 MG tablet TAKE ONE TABLET BY MOUTH DAILY    b complex vitamins capsule 1 capsule, Oral, Daily    BD ULTRA-FINE MINI PEN NEEDLE 31 gauge x 3/16" Ndle USE TWICE DAILY    blood-glucose meter Misc USE TO TEST BLOOD SUGAR 2 TO 3 TIMES DAILY    efavirenz (SUSTIVA) 600 mg, Oral, Nightly    emtricitabine-tenofovir alafen (DESCOVY) 200-25 mg Tab 1 tablet, Oral, Nightly    fenofibrate (TRICOR) 145 mg, Oral, Daily    fenofibrate micronized (LOFIBRA) 134 MG Cap TAKE ONE CAPSULE BY MOUTH EVERY DAY    fish oil-omega-3 fatty acids 300-1,000 mg capsule 2 capsules, Oral, Daily    HIGH POTENCY MULTIVITAMIN 400 mcg Tab 1 tablet, Oral, Daily    insulin aspart protamine-insulin aspart (NOVOLOG 70/30) 100 unit/mL (70-30) InPn pen 24 u at breakfast and 20 u supper    metFORMIN (GLUCOPHAGE) 500 MG tablet TAKE 1 TABLET BY MOUTH TWICE DAILY WITH MEALS    ondansetron (ZOFRAN-ODT) 4 mg, Oral, Every 6 hours PRN    potassium chloride SA (K-DUR,KLOR-CON) 20 MEQ tablet 20 mEq, Oral, 2 times daily    sertraline (ZOLOFT) 100 MG tablet TAKE ONE TABLET BY MOUTH EVERY DAY    TRUE METRIX GLUCOSE TEST STRIP Strp USE TWICE DAILY    TRUEPLUS LANCETS 30 gauge Misc USE TWICE DAILY TO TEST BLOOD SUGAR    TRULICITY 4.5 mg, Subcutaneous, Every 7 days    zolpidem (AMBIEN) 5 MG Tab TAKE ONE TABLET BY MOUTH EVERY NIGHT AS NEEDED       Lipid Panel:   Lab Results   Component Value Date    CHOL 135 07/03/2023    HDL 43 07/03/2023    LDLCALC 73.2 07/03/2023    TRIG 94 07/03/2023    CHOLHDL 31.9 07/03/2023       The 10-year ASCVD risk score (Matteo DK, et al., 2019) is: 45.3%    Values used to calculate the score:      Age: 72 years      Sex: Male      Is Non- : No      Diabetic: Yes      Tobacco smoker: No      Systolic Blood Pressure: 155 mmHg      Is BP treated: No      HDL Cholesterol: 43 mg/dL      Total Cholesterol: 135 mg/dL    All pertinent labs, imaging, and EKGs reviewed.  Patient's most recent EKG " tracing was personally interpreted by this provider.    Most Recent EKG Results  Results for orders placed or performed during the hospital encounter of 02/22/22   EKG and show to ED MD    Collection Time: 02/22/22  4:21 PM    Narrative    Test Reason : R55,    Vent. Rate : 062 BPM     Atrial Rate : 062 BPM     P-R Int : 218 ms          QRS Dur : 110 ms      QT Int : 430 ms       P-R-T Axes : 061 -53 073 degrees     QTc Int : 436 ms    Sinus rhythm with 1st degree A-V block  Low voltage QRS  Incomplete right bundle branch block  Left anterior fascicular block  Abnormal ECG  No previous ECGs available  Confirmed by James Thomas MD ([id number]) on 2/26/2022 6:34:19 PM    Referred By: BELLA   SELF           Confirmed By:James Thomas MD       Most Recent Echocardiogram Results  Results for orders placed during the hospital encounter of 02/22/22    Echo Saline Bubble? Yes    Interpretation Summary  · There is no evidence of intracardiac shunting.  · The left ventricle is normal in size with concentric remodeling and normal systolic function.  · The estimated ejection fraction is 58%.  · Normal left ventricular diastolic function.  · Normal right ventricular size with normal right ventricular systolic function.  · Mild left atrial enlargement.  · Mild mitral regurgitation.  · Normal central venous pressure (3 mmHg).  · The estimated PA systolic pressure is 28 mmHg.      Most Recent Nuclear Stress Test Results  Results for orders placed during the hospital encounter of 03/24/22    Nuclear Stress - Cardiology Interpreted    Interpretation Summary    Normal myocardial perfusion scan. There is no evidence of myocardial ischemia or infarction.    There is a  mild intensity fixed perfusion abnormality in the  wall of the left ventricle secondary to diaphragm attenuation.    The gated perfusion images showed an ejection fraction of 66% at rest.    There is normal wall motion at rest.    The EKG portion of this study is  negative for ischemia.    There are no prior studies for comparison.      Most Recent Cardiac PET Stress Test Results  No results found for this or any previous visit.      Most Recent Cardiovascular Angiogram results  No results found for this or any previous visit.      Other Most Recent Cardiology Results  Results for orders placed during the hospital encounter of 08/01/22    CARDIAC MONITORING STRIPS        Assessment:       1. Mixed hyperlipidemia    2. Encounter for cardiac risk counseling         Plan:     Symptoms OK today  BP borderline today  Most recent echocardiogram reviewed personally     Continue aspirin   Continue atorvastatin 40 mg PO Daily  Continue fenofibrate    Continue other cardiac medications  Mediterranean Diet/Cardiovascular Exercise Program    Patient queried and all questions were answered.    F/u in 1 year to reassess      Signed:    Paolo Webb MD  9/6/2023 3:30 PM

## 2023-09-06 ENCOUNTER — OFFICE VISIT (OUTPATIENT)
Dept: CARDIOLOGY | Facility: CLINIC | Age: 73
End: 2023-09-06
Payer: MEDICARE

## 2023-09-06 VITALS
HEIGHT: 72 IN | WEIGHT: 174.63 LBS | HEART RATE: 71 BPM | DIASTOLIC BLOOD PRESSURE: 90 MMHG | BODY MASS INDEX: 23.65 KG/M2 | SYSTOLIC BLOOD PRESSURE: 155 MMHG

## 2023-09-06 DIAGNOSIS — Z71.89 ENCOUNTER FOR CARDIAC RISK COUNSELING: ICD-10-CM

## 2023-09-06 DIAGNOSIS — E78.2 MIXED HYPERLIPIDEMIA: Primary | ICD-10-CM

## 2023-09-06 PROCEDURE — 3080F DIAST BP >= 90 MM HG: CPT | Mod: CPTII,S$GLB,, | Performed by: INTERNAL MEDICINE

## 2023-09-06 PROCEDURE — 3077F SYST BP >= 140 MM HG: CPT | Mod: CPTII,S$GLB,, | Performed by: INTERNAL MEDICINE

## 2023-09-06 PROCEDURE — 3046F PR MOST RECENT HEMOGLOBIN A1C LEVEL > 9.0%: ICD-10-PCS | Mod: CPTII,S$GLB,, | Performed by: INTERNAL MEDICINE

## 2023-09-06 PROCEDURE — 99214 OFFICE O/P EST MOD 30 MIN: CPT | Mod: S$GLB,,, | Performed by: INTERNAL MEDICINE

## 2023-09-06 PROCEDURE — 3046F HEMOGLOBIN A1C LEVEL >9.0%: CPT | Mod: CPTII,S$GLB,, | Performed by: INTERNAL MEDICINE

## 2023-09-06 PROCEDURE — 3060F POS MICROALBUMINURIA REV: CPT | Mod: CPTII,S$GLB,, | Performed by: INTERNAL MEDICINE

## 2023-09-06 PROCEDURE — 1101F PT FALLS ASSESS-DOCD LE1/YR: CPT | Mod: CPTII,S$GLB,, | Performed by: INTERNAL MEDICINE

## 2023-09-06 PROCEDURE — 99999 PR PBB SHADOW E&M-EST. PATIENT-LVL IV: ICD-10-PCS | Mod: PBBFAC,,, | Performed by: INTERNAL MEDICINE

## 2023-09-06 PROCEDURE — 1126F PR PAIN SEVERITY QUANTIFIED, NO PAIN PRESENT: ICD-10-PCS | Mod: CPTII,S$GLB,, | Performed by: INTERNAL MEDICINE

## 2023-09-06 PROCEDURE — 99999 PR PBB SHADOW E&M-EST. PATIENT-LVL IV: CPT | Mod: PBBFAC,,, | Performed by: INTERNAL MEDICINE

## 2023-09-06 PROCEDURE — 1160F RVW MEDS BY RX/DR IN RCRD: CPT | Mod: CPTII,S$GLB,, | Performed by: INTERNAL MEDICINE

## 2023-09-06 PROCEDURE — 3080F PR MOST RECENT DIASTOLIC BLOOD PRESSURE >= 90 MM HG: ICD-10-PCS | Mod: CPTII,S$GLB,, | Performed by: INTERNAL MEDICINE

## 2023-09-06 PROCEDURE — 3060F PR POS MICROALBUMINURIA RESULT DOCUMENTED/REVIEW: ICD-10-PCS | Mod: CPTII,S$GLB,, | Performed by: INTERNAL MEDICINE

## 2023-09-06 PROCEDURE — 3288F PR FALLS RISK ASSESSMENT DOCUMENTED: ICD-10-PCS | Mod: CPTII,S$GLB,, | Performed by: INTERNAL MEDICINE

## 2023-09-06 PROCEDURE — 1160F PR REVIEW ALL MEDS BY PRESCRIBER/CLIN PHARMACIST DOCUMENTED: ICD-10-PCS | Mod: CPTII,S$GLB,, | Performed by: INTERNAL MEDICINE

## 2023-09-06 PROCEDURE — 3288F FALL RISK ASSESSMENT DOCD: CPT | Mod: CPTII,S$GLB,, | Performed by: INTERNAL MEDICINE

## 2023-09-06 PROCEDURE — 1126F AMNT PAIN NOTED NONE PRSNT: CPT | Mod: CPTII,S$GLB,, | Performed by: INTERNAL MEDICINE

## 2023-09-06 PROCEDURE — 3008F BODY MASS INDEX DOCD: CPT | Mod: CPTII,S$GLB,, | Performed by: INTERNAL MEDICINE

## 2023-09-06 PROCEDURE — 1159F MED LIST DOCD IN RCRD: CPT | Mod: CPTII,S$GLB,, | Performed by: INTERNAL MEDICINE

## 2023-09-06 PROCEDURE — 3066F PR DOCUMENTATION OF TREATMENT FOR NEPHROPATHY: ICD-10-PCS | Mod: CPTII,S$GLB,, | Performed by: INTERNAL MEDICINE

## 2023-09-06 PROCEDURE — 3066F NEPHROPATHY DOC TX: CPT | Mod: CPTII,S$GLB,, | Performed by: INTERNAL MEDICINE

## 2023-09-06 PROCEDURE — 3008F PR BODY MASS INDEX (BMI) DOCUMENTED: ICD-10-PCS | Mod: CPTII,S$GLB,, | Performed by: INTERNAL MEDICINE

## 2023-09-06 PROCEDURE — 1101F PR PT FALLS ASSESS DOC 0-1 FALLS W/OUT INJ PAST YR: ICD-10-PCS | Mod: CPTII,S$GLB,, | Performed by: INTERNAL MEDICINE

## 2023-09-06 PROCEDURE — 3077F PR MOST RECENT SYSTOLIC BLOOD PRESSURE >= 140 MM HG: ICD-10-PCS | Mod: CPTII,S$GLB,, | Performed by: INTERNAL MEDICINE

## 2023-09-06 PROCEDURE — 99214 PR OFFICE/OUTPT VISIT, EST, LEVL IV, 30-39 MIN: ICD-10-PCS | Mod: S$GLB,,, | Performed by: INTERNAL MEDICINE

## 2023-09-06 PROCEDURE — 1159F PR MEDICATION LIST DOCUMENTED IN MEDICAL RECORD: ICD-10-PCS | Mod: CPTII,S$GLB,, | Performed by: INTERNAL MEDICINE

## 2023-09-12 ENCOUNTER — LAB VISIT (OUTPATIENT)
Dept: LAB | Facility: HOSPITAL | Age: 73
End: 2023-09-12
Payer: MEDICARE

## 2023-09-12 DIAGNOSIS — E11.22 TYPE 2 DIABETES MELLITUS WITH STAGE 3A CHRONIC KIDNEY DISEASE, WITH LONG-TERM CURRENT USE OF INSULIN: ICD-10-CM

## 2023-09-12 DIAGNOSIS — Z79.4 TYPE 2 DIABETES MELLITUS WITH STAGE 3A CHRONIC KIDNEY DISEASE, WITH LONG-TERM CURRENT USE OF INSULIN: ICD-10-CM

## 2023-09-12 DIAGNOSIS — N18.31 TYPE 2 DIABETES MELLITUS WITH STAGE 3A CHRONIC KIDNEY DISEASE, WITH LONG-TERM CURRENT USE OF INSULIN: ICD-10-CM

## 2023-09-12 LAB
ALBUMIN/CREAT UR: 97.1 UG/MG (ref 0–30)
CREAT UR-MCNC: 138 MG/DL (ref 23–375)
MICROALBUMIN UR DL<=1MG/L-MCNC: 134 UG/ML

## 2023-09-12 PROCEDURE — 82570 ASSAY OF URINE CREATININE: CPT | Performed by: NURSE PRACTITIONER

## 2023-09-17 NOTE — TELEPHONE ENCOUNTER
No care due was identified.  [facility] Embedded Care Due Messages. Reference number: [id number].   9/17/2023 8:03:49 AM CDT

## 2023-09-18 ENCOUNTER — TELEPHONE (OUTPATIENT)
Dept: ENDOCRINOLOGY | Facility: CLINIC | Age: 73
End: 2023-09-18
Payer: MEDICARE

## 2023-09-18 RX ORDER — ATORVASTATIN CALCIUM 40 MG/1
TABLET, FILM COATED ORAL
Qty: 90 TABLET | Refills: 2 | Status: SHIPPED | OUTPATIENT
Start: 2023-09-18

## 2023-09-18 NOTE — TELEPHONE ENCOUNTER
Refill Decision Note   Brandon Parson  is requesting a refill authorization.  Brief Assessment and Rationale for Refill:  Approve     Medication Therapy Plan:         Comments:     Note composed:12:04 PM 09/18/2023

## 2023-09-18 NOTE — TELEPHONE ENCOUNTER
----- Message from Kimcecy Caceres sent at 9/15/2023  9:56 AM CDT -----  Regarding: sooner appt  Contact: pt  Type:  Sooner Apoointment Request    Caller is requesting a sooner appointment.  Caller declined first available appointment listed below.  Caller will not accept being placed on the waitlist and is requesting a message be sent to doctor.  Name of Caller:pt  When is the first available appointment?1/23/24    Would the patient rather a call back or a response via [facility]? Call back    Best Call Back Number:[phone]    Additional Information: sts he had to cancel his appt for today dur to transportation and would like a sooner appt--please advise an thank you

## 2023-10-05 ENCOUNTER — TELEPHONE (OUTPATIENT)
Dept: GASTROENTEROLOGY | Facility: CLINIC | Age: 73
End: 2023-10-05
Payer: MEDICARE

## 2023-10-12 ENCOUNTER — OFFICE VISIT (OUTPATIENT)
Dept: DERMATOLOGY | Facility: CLINIC | Age: 73
End: 2023-10-12
Payer: MEDICARE

## 2023-10-12 VITALS — WEIGHT: 174 LBS | BODY MASS INDEX: 23.57 KG/M2 | HEIGHT: 72 IN

## 2023-10-12 DIAGNOSIS — L57.0 ACTINIC KERATOSES: ICD-10-CM

## 2023-10-12 DIAGNOSIS — D48.5 NEOPLASM OF UNCERTAIN BEHAVIOR OF SKIN: Primary | ICD-10-CM

## 2023-10-12 PROCEDURE — 88305 TISSUE EXAM BY PATHOLOGIST: CPT | Performed by: PATHOLOGY

## 2023-10-12 PROCEDURE — 99499 UNLISTED E&M SERVICE: CPT | Mod: S$GLB,,, | Performed by: DERMATOLOGY

## 2023-10-12 PROCEDURE — 99499 NO LOS: ICD-10-PCS | Mod: S$GLB,,, | Performed by: DERMATOLOGY

## 2023-10-12 PROCEDURE — 11102 PR TANGENTIAL BIOPSY, SKIN, SINGLE LESION: ICD-10-PCS | Mod: S$GLB,,, | Performed by: DERMATOLOGY

## 2023-10-12 PROCEDURE — 88341 PR IHC OR ICC EACH ADD'L SINGLE ANTIBODY  STAINPR: ICD-10-PCS | Mod: 26,,, | Performed by: PATHOLOGY

## 2023-10-12 PROCEDURE — 11103 TANGNTL BX SKIN EA SEP/ADDL: CPT | Mod: S$GLB,,, | Performed by: DERMATOLOGY

## 2023-10-12 PROCEDURE — 17000 PR DESTRUCTION(LASER SURGERY,CRYOSURGERY,CHEMOSURGERY),PREMALIGNANT LESIONS,FIRST LESION: ICD-10-PCS | Mod: XS,S$GLB,, | Performed by: DERMATOLOGY

## 2023-10-12 PROCEDURE — 88342 CHG IMMUNOCYTOCHEMISTRY: ICD-10-PCS | Mod: 26,,, | Performed by: PATHOLOGY

## 2023-10-12 PROCEDURE — 11103 PR TANGENTIAL BIOPSY, SKIN, EA ADDTL LESION: ICD-10-PCS | Mod: S$GLB,,, | Performed by: DERMATOLOGY

## 2023-10-12 PROCEDURE — 17003 DESTRUCTION, PREMALIGNANT LESIONS; SECOND THROUGH 14 LESIONS: ICD-10-PCS | Mod: S$GLB,,, | Performed by: DERMATOLOGY

## 2023-10-12 PROCEDURE — 88342 IMHCHEM/IMCYTCHM 1ST ANTB: CPT | Mod: 26,,, | Performed by: PATHOLOGY

## 2023-10-12 PROCEDURE — 88341 IMHCHEM/IMCYTCHM EA ADD ANTB: CPT | Mod: 26,,, | Performed by: PATHOLOGY

## 2023-10-12 PROCEDURE — 88342 IMHCHEM/IMCYTCHM 1ST ANTB: CPT | Performed by: PATHOLOGY

## 2023-10-12 PROCEDURE — 11102 TANGNTL BX SKIN SINGLE LES: CPT | Mod: S$GLB,,, | Performed by: DERMATOLOGY

## 2023-10-12 PROCEDURE — 88305 TISSUE EXAM BY PATHOLOGIST: CPT | Mod: 26,,, | Performed by: PATHOLOGY

## 2023-10-12 PROCEDURE — 88341 IMHCHEM/IMCYTCHM EA ADD ANTB: CPT | Performed by: PATHOLOGY

## 2023-10-12 PROCEDURE — 88305 TISSUE EXAM BY PATHOLOGIST: ICD-10-PCS | Mod: 26,,, | Performed by: PATHOLOGY

## 2023-10-12 PROCEDURE — 17000 DESTRUCT PREMALG LESION: CPT | Mod: XS,S$GLB,, | Performed by: DERMATOLOGY

## 2023-10-12 PROCEDURE — 17003 DESTRUCT PREMALG LES 2-14: CPT | Mod: S$GLB,,, | Performed by: DERMATOLOGY

## 2023-10-12 NOTE — PROGRESS NOTES
"  Subjective:      Patient ID:  Brandon Parson is a 73 y.o. male who presents for   Chief Complaint   Patient presents with    Follow-up     Right helix rim     LOV 8/16/23 NUB    Skin, right helix rim, shave biopsy:   -SIGNIFICANTLY TRAUMATIZED AND INFLAMED ACTINIC KERATOSIS, INVOLVING HAIR FOLLICLES AND EXTENDING TO THE BASE OF THE BIOPSY, see comment     Patient here to follow up for right helix rim results.    HIV on descovy and efavirenz    Has no hx of NMSC  Has no fhx of MM    Current Outpatient Medications:   ·  ascorbic acid, vitamin C, (VITAMIN C) 100 MG tablet, Take 100 mg by mouth once daily., Disp: , Rfl:   ·  atorvastatin (LIPITOR) 40 MG tablet, TAKE ONE TABLET BY MOUTH DAILY, Disp: 90 tablet, Rfl: 2  ·  b complex vitamins capsule, Take 1 capsule by mouth once daily., Disp: , Rfl:   ·  BD ULTRA-FINE MINI PEN NEEDLE 31 gauge x 3/16" Ndle, USE TWICE DAILY, Disp: 200 each, Rfl: 3  ·  blood-glucose meter Misc, USE TO TEST BLOOD SUGAR 2 TO 3 TIMES DAILY, Disp: , Rfl:   ·  dulaglutide (TRULICITY) 4.5 mg/0.5 mL pen injector, Inject 4.5 mg into the skin every 7 days., Disp: 4 pen, Rfl: 11  ·  efavirenz (SUSTIVA) 600 mg Tab, Take 1 tablet (600 mg total) by mouth every evening., Disp: 30 tablet, Rfl: 5  ·  emtricitabine-tenofovir alafen (DESCOVY) 200-25 mg Tab, Take 1 tablet by mouth every evening., Disp: 30 tablet, Rfl: 5  ·  fenofibrate (TRICOR) 145 MG tablet, Take 145 mg by mouth once daily., Disp: , Rfl:   ·  fenofibrate micronized (LOFIBRA) 134 MG Cap, TAKE ONE CAPSULE BY MOUTH EVERY DAY, Disp: 90 capsule, Rfl: 1  ·  fish oil-omega-3 fatty acids 300-1,000 mg capsule, Take 2 capsules by mouth once daily., Disp: , Rfl:   ·  HIGH POTENCY MULTIVITAMIN 400 mcg Tab, Take 1 tablet by mouth once daily., Disp: , Rfl:   ·  insulin aspart protamine-insulin aspart (NOVOLOG 70/30) 100 unit/mL (70-30) InPn pen, 24 u at breakfast and 20 u supper, Disp: 15 mL, Rfl: 12  ·  metFORMIN (GLUCOPHAGE) 500 MG tablet, TAKE 1 TABLET " BY MOUTH TWICE DAILY WITH MEALS, Disp: 180 tablet, Rfl: 0  ·  ondansetron (ZOFRAN-ODT) 4 MG TbDL, Take 1 tablet (4 mg total) by mouth every 6 (six) hours as needed (nv)., Disp: 30 tablet, Rfl: 0  ·  potassium chloride SA (K-DUR,KLOR-CON) 20 MEQ tablet, Take 1 tablet (20 mEq total) by mouth 2 (two) times daily., Disp: 30 tablet, Rfl: 1  ·  sertraline (ZOLOFT) 100 MG tablet, TAKE ONE TABLET BY MOUTH EVERY DAY, Disp: 90 tablet, Rfl: 1  ·  TRUE METRIX GLUCOSE TEST STRIP Strp, USE TWICE DAILY, Disp: 100 strip, Rfl: 11  ·  TRUEPLUS LANCETS 30 gauge Misc, USE TWICE DAILY TO TEST BLOOD SUGAR, Disp: , Rfl:   ·  zolpidem (AMBIEN) 5 MG Tab, TAKE ONE TABLET BY MOUTH EVERY NIGHT AS NEEDED, Disp: 30 tablet, Rfl: 5  ·  aspirin 325 MG tablet, Take 1 tablet (325 mg total) by mouth once daily., Disp: 30 tablet, Rfl: 0          Review of Systems   Constitutional:  Negative for fever, chills and fatigue.   Skin:  Positive for wears hat. Negative for itching, rash, dry skin, daily sunscreen use and activity-related sunscreen use.   Hematologic/Lymphatic: Bruises/bleeds easily.       Objective:   Physical Exam   Constitutional: He appears well-developed and well-nourished. No distress.   Neurological: He is alert and oriented to person, place, and time. He is not disoriented.   Psychiatric: He has a normal mood and affect.   Skin:   Areas Examined (abnormalities noted in diagram):   Scalp / Hair Palpated and Inspected  Head / Face Inspection Performed            Diagram Legend     Erythematous scaling macule/papule c/w actinic keratosis       Vascular papule c/w angioma      Pigmented verrucoid papule/plaque c/w seborrheic keratosis      Yellow umbilicated papule c/w sebaceous hyperplasia      Irregularly shaped tan macule c/w lentigo     1-2 mm smooth white papules consistent with Milia      Movable subcutaneous cyst with punctum c/w epidermal inclusion cyst      Subcutaneous movable cyst c/w pilar cyst      Firm pink to brown papule  c/w dermatofibroma      Pedunculated fleshy papule(s) c/w skin tag(s)      Evenly pigmented macule c/w junctional nevus     Mildly variegated pigmented, slightly irregular-bordered macule c/w mildly atypical nevus      Flesh colored to evenly pigmented papule c/w intradermal nevus       Pink pearly papule/plaque c/w basal cell carcinoma      Erythematous hyperkeratotic cursted plaque c/w SCC      Surgical scar with no sign of skin cancer recurrence      Open and closed comedones      Inflammatory papules and pustules      Verrucoid papule consistent consistent with wart     Erythematous eczematous patches and plaques     Dystrophic onycholytic nail with subungual debris c/w onychomycosis     Umbilicated papule    Erythematous-base heme-crusted tan verrucoid plaque consistent with inflamed seborrheic keratosis     Erythematous Silvery Scaling Plaque c/w Psoriasis     See annotation              Assessment / Plan:      Pathology Orders:       Normal Orders This Visit    Specimen to Pathology, Dermatology     Questions:    Procedure Type: Dermatology and skin neoplasms    Number of Specimens: 2    ------------------------: -------------------------    Spec 1 Procedure: Biopsy    Spec 1 Clinical Impression: SCNH vs SCC vs BCC    Spec 1 Source: left antihelix    ------------------------: -------------------------    Spec 2 Procedure: Biopsy    Spec 2 Clinical Impression: rebiopsy of at least AK, r/o SCCIS    Spec 2 Source: R helix rim    Release to patient:           Neoplasm of uncertain behavior of skin  -     Specimen to Pathology, Dermatology  Shave biopsy procedure note:x2    Shave biopsy performed after verbal consent including risk of infection, scar, recurrence, need for additional treatment of site. Area prepped with alcohol, anesthetized with approximately 1.0cc of 1% lidocaine with epinephrine. Lesional tissue shaved with razor blade. Hemostasis achieved with application of aluminum chloride followed by  hyfrecation. No complications. Dressing applied. Wound care explained.    Actinic keratoses  Cryosurgery Procedure Note    Verbal consent from the patient is obtained and the patient is aware of the precancerous quality and need for treatment of these lesions. Liquid nitrogen cryosurgery is applied to the 2 actinic keratoses, as detailed in the physical exam, to produce a freeze injury. The patient is aware that blisters may form and is instructed on wound care with gentle cleansing and use of vaseline ointment to keep moist until healed. The patient is supplied a handout on cryosurgery and is instructed to call if lesions do not completely resolve.             No follow-ups on file.

## 2023-10-12 NOTE — PATIENT INSTRUCTIONS
Shave Biopsy Wound Care    Your doctor has performed a shave biopsy today.  A band aid and vaseline ointment has been placed over the site.  This should remain in place for 24 hours.  It is recommended that you keep the area dry for the first 24 hours.  After 24 hours, you may remove the band aid and wash the area with warm soap and water and apply Vaseline jelly.  Many patients prefer to use Neosporin or Bacitracin ointment.  This is acceptable; however, know that you can develop an allergy to this medication even if you have used it safely for years.  It is important to keep the area moist.  Letting it dry out and get air slows healing time, and will worsen the scar.  Band aid is optional after first 24 hours.      If you notice increasing redness, tenderness, pain, or yellow drainage at the biopsy site, please notify your doctor.  These are signs of an infection.    If your biopsy site is bleeding, apply firm pressure for 15 minutes straight.  Repeat for another 15 minutes, if it is still bleeding.   If the surgical site continues to bleed, then please contact your doctor.       [facility] - DERMATOLOGY  [address], [address]  [place] [address]  Dept: [phone]  Dept Fax: [phone]

## 2023-10-19 LAB
FINAL PATHOLOGIC DIAGNOSIS: NORMAL
GROSS: NORMAL
Lab: NORMAL
MICROSCOPIC EXAM: NORMAL

## 2023-10-20 DIAGNOSIS — C44.92 SCCA (SQUAMOUS CELL CARCINOMA) OF SKIN: Primary | ICD-10-CM

## 2023-10-20 DIAGNOSIS — D04.22 SQUAMOUS CELL CARCINOMA IN SITU (SCCIS) OF SKIN OF HELIX OF LEFT EAR: ICD-10-CM

## 2023-10-31 DIAGNOSIS — E11.628 TYPE 2 DIABETES MELLITUS WITH OTHER SKIN COMPLICATION, WITHOUT LONG-TERM CURRENT USE OF INSULIN: ICD-10-CM

## 2023-10-31 RX ORDER — INSULIN ASPART 100 [IU]/ML
INJECTION, SUSPENSION SUBCUTANEOUS
Qty: 15 ML | Refills: 12 | Status: SHIPPED | OUTPATIENT
Start: 2023-10-31 | End: 2023-11-17 | Stop reason: SDUPTHER

## 2023-11-15 DIAGNOSIS — F33.9 RECURRENT MAJOR DEPRESSIVE DISORDER, REMISSION STATUS UNSPECIFIED: ICD-10-CM

## 2023-11-15 DIAGNOSIS — E78.5 HYPERLIPIDEMIA, UNSPECIFIED HYPERLIPIDEMIA TYPE: ICD-10-CM

## 2023-11-15 DIAGNOSIS — G47.00 INSOMNIA, UNSPECIFIED TYPE: ICD-10-CM

## 2023-11-15 RX ORDER — SERTRALINE HYDROCHLORIDE 100 MG/1
100 TABLET, FILM COATED ORAL DAILY
Qty: 90 TABLET | Refills: 2 | Status: SHIPPED | OUTPATIENT
Start: 2023-11-15

## 2023-11-15 NOTE — TELEPHONE ENCOUNTER
No care due was identified.  [facility] Embedded Care Due Messages. Reference number: [id number].   11/15/2023 8:04:13 AM CST

## 2023-11-15 NOTE — TELEPHONE ENCOUNTER
Refill Routing Note   Medication(s) are not appropriate for processing by [facility] for the following reason(s):        Outside of protocol  Required labs abnormal:     [facility] action(s):  Defer  Route      Medication Therapy Plan:       Pharmacist review requested: Yes     Appointments  past 12m or future 3m with PCP    Date Provider   Last Visit   6/29/2023 Francois Eugene MD   Next Visit   1/19/2024 Francois Eugene MD   ED visits in past 90 days: 0        Note composed:9:53 AM 11/15/2023

## 2023-11-15 NOTE — TELEPHONE ENCOUNTER
Refill Routing Note   Medication(s) are not appropriate for processing by [facility] for the following reason(s):        Outside of protocol: ambien  Required labs abnormal: lofibra    [facility] action(s):  Defer  Route  Approve             Pharmacist review requested: Yes     Appointments  past 12m or future 3m with PCP    Date Provider   Last Visit   6/29/2023 Francois Eugene MD   Next Visit   1/19/2024 Francois Eugene MD   ED visits in past 90 days: 0        Note composed:2:13 PM 11/15/2023

## 2023-11-17 ENCOUNTER — OFFICE VISIT (OUTPATIENT)
Dept: ENDOCRINOLOGY | Facility: CLINIC | Age: 73
End: 2023-11-17
Payer: MEDICARE

## 2023-11-17 VITALS
HEART RATE: 75 BPM | BODY MASS INDEX: 24.44 KG/M2 | SYSTOLIC BLOOD PRESSURE: 106 MMHG | OXYGEN SATURATION: 98 % | WEIGHT: 180.44 LBS | HEIGHT: 72 IN | DIASTOLIC BLOOD PRESSURE: 74 MMHG

## 2023-11-17 DIAGNOSIS — E11.628 TYPE 2 DIABETES MELLITUS WITH OTHER SKIN COMPLICATION, WITHOUT LONG-TERM CURRENT USE OF INSULIN: ICD-10-CM

## 2023-11-17 DIAGNOSIS — Z79.4 TYPE 2 DIABETES MELLITUS WITH DIABETIC POLYNEUROPATHY, WITH LONG-TERM CURRENT USE OF INSULIN: Primary | ICD-10-CM

## 2023-11-17 DIAGNOSIS — B20 HIV DISEASE: ICD-10-CM

## 2023-11-17 DIAGNOSIS — E78.2 MIXED HYPERLIPIDEMIA: ICD-10-CM

## 2023-11-17 DIAGNOSIS — E11.22 TYPE 2 DIABETES MELLITUS WITH STAGE 3A CHRONIC KIDNEY DISEASE, WITH LONG-TERM CURRENT USE OF INSULIN: ICD-10-CM

## 2023-11-17 DIAGNOSIS — E11.42 TYPE 2 DIABETES MELLITUS WITH DIABETIC POLYNEUROPATHY, WITH LONG-TERM CURRENT USE OF INSULIN: Primary | ICD-10-CM

## 2023-11-17 DIAGNOSIS — N18.31 TYPE 2 DIABETES MELLITUS WITH STAGE 3A CHRONIC KIDNEY DISEASE, WITH LONG-TERM CURRENT USE OF INSULIN: ICD-10-CM

## 2023-11-17 DIAGNOSIS — Z79.4 TYPE 2 DIABETES MELLITUS WITH STAGE 3A CHRONIC KIDNEY DISEASE, WITH LONG-TERM CURRENT USE OF INSULIN: ICD-10-CM

## 2023-11-17 LAB
GLUCOSE SERPL-MCNC: 57 MG/DL (ref 70–110)
GLUCOSE SERPL-MCNC: 68 MG/DL (ref 70–110)

## 2023-11-17 PROCEDURE — 1126F PR PAIN SEVERITY QUANTIFIED, NO PAIN PRESENT: ICD-10-PCS | Mod: HCNC,CPTII,S$GLB, | Performed by: NURSE PRACTITIONER

## 2023-11-17 PROCEDURE — 3046F HEMOGLOBIN A1C LEVEL >9.0%: CPT | Mod: HCNC,CPTII,S$GLB, | Performed by: NURSE PRACTITIONER

## 2023-11-17 PROCEDURE — 3008F PR BODY MASS INDEX (BMI) DOCUMENTED: ICD-10-PCS | Mod: HCNC,CPTII,S$GLB, | Performed by: NURSE PRACTITIONER

## 2023-11-17 PROCEDURE — 3074F PR MOST RECENT SYSTOLIC BLOOD PRESSURE < 130 MM HG: ICD-10-PCS | Mod: HCNC,CPTII,S$GLB, | Performed by: NURSE PRACTITIONER

## 2023-11-17 PROCEDURE — 3008F BODY MASS INDEX DOCD: CPT | Mod: HCNC,CPTII,S$GLB, | Performed by: NURSE PRACTITIONER

## 2023-11-17 PROCEDURE — 82962 GLUCOSE BLOOD TEST: CPT | Mod: HCNC,S$GLB,, | Performed by: NURSE PRACTITIONER

## 2023-11-17 PROCEDURE — 3060F PR POS MICROALBUMINURIA RESULT DOCUMENTED/REVIEW: ICD-10-PCS | Mod: HCNC,CPTII,S$GLB, | Performed by: NURSE PRACTITIONER

## 2023-11-17 PROCEDURE — 82962 POCT GLUCOSE, HAND-HELD DEVICE: ICD-10-PCS | Mod: HCNC,S$GLB,, | Performed by: NURSE PRACTITIONER

## 2023-11-17 PROCEDURE — 1101F PR PT FALLS ASSESS DOC 0-1 FALLS W/OUT INJ PAST YR: ICD-10-PCS | Mod: HCNC,CPTII,S$GLB, | Performed by: NURSE PRACTITIONER

## 2023-11-17 PROCEDURE — 1159F MED LIST DOCD IN RCRD: CPT | Mod: HCNC,CPTII,S$GLB, | Performed by: NURSE PRACTITIONER

## 2023-11-17 PROCEDURE — 2023F PR DILATED RETINAL EXAM W/O EVID OF RETINOPATHY: ICD-10-PCS | Mod: HCNC,CPTII,S$GLB, | Performed by: NURSE PRACTITIONER

## 2023-11-17 PROCEDURE — 99999 PR PBB SHADOW E&M-EST. PATIENT-LVL IV: CPT | Mod: PBBFAC,HCNC,, | Performed by: NURSE PRACTITIONER

## 2023-11-17 PROCEDURE — 1126F AMNT PAIN NOTED NONE PRSNT: CPT | Mod: HCNC,CPTII,S$GLB, | Performed by: NURSE PRACTITIONER

## 2023-11-17 PROCEDURE — 1101F PT FALLS ASSESS-DOCD LE1/YR: CPT | Mod: HCNC,CPTII,S$GLB, | Performed by: NURSE PRACTITIONER

## 2023-11-17 PROCEDURE — 3074F SYST BP LT 130 MM HG: CPT | Mod: HCNC,CPTII,S$GLB, | Performed by: NURSE PRACTITIONER

## 2023-11-17 PROCEDURE — 3046F PR MOST RECENT HEMOGLOBIN A1C LEVEL > 9.0%: ICD-10-PCS | Mod: HCNC,CPTII,S$GLB, | Performed by: NURSE PRACTITIONER

## 2023-11-17 PROCEDURE — 2023F DILAT RTA XM W/O RTNOPTHY: CPT | Mod: HCNC,CPTII,S$GLB, | Performed by: NURSE PRACTITIONER

## 2023-11-17 PROCEDURE — 99214 PR OFFICE/OUTPT VISIT, EST, LEVL IV, 30-39 MIN: ICD-10-PCS | Mod: HCNC,S$GLB,, | Performed by: NURSE PRACTITIONER

## 2023-11-17 PROCEDURE — 3288F FALL RISK ASSESSMENT DOCD: CPT | Mod: HCNC,CPTII,S$GLB, | Performed by: NURSE PRACTITIONER

## 2023-11-17 PROCEDURE — 99999 PR PBB SHADOW E&M-EST. PATIENT-LVL IV: ICD-10-PCS | Mod: PBBFAC,HCNC,, | Performed by: NURSE PRACTITIONER

## 2023-11-17 PROCEDURE — 1159F PR MEDICATION LIST DOCUMENTED IN MEDICAL RECORD: ICD-10-PCS | Mod: HCNC,CPTII,S$GLB, | Performed by: NURSE PRACTITIONER

## 2023-11-17 PROCEDURE — 3078F PR MOST RECENT DIASTOLIC BLOOD PRESSURE < 80 MM HG: ICD-10-PCS | Mod: HCNC,CPTII,S$GLB, | Performed by: NURSE PRACTITIONER

## 2023-11-17 PROCEDURE — 3066F NEPHROPATHY DOC TX: CPT | Mod: HCNC,CPTII,S$GLB, | Performed by: NURSE PRACTITIONER

## 2023-11-17 PROCEDURE — 3288F PR FALLS RISK ASSESSMENT DOCUMENTED: ICD-10-PCS | Mod: HCNC,CPTII,S$GLB, | Performed by: NURSE PRACTITIONER

## 2023-11-17 PROCEDURE — 3066F PR DOCUMENTATION OF TREATMENT FOR NEPHROPATHY: ICD-10-PCS | Mod: HCNC,CPTII,S$GLB, | Performed by: NURSE PRACTITIONER

## 2023-11-17 PROCEDURE — 3060F POS MICROALBUMINURIA REV: CPT | Mod: HCNC,CPTII,S$GLB, | Performed by: NURSE PRACTITIONER

## 2023-11-17 PROCEDURE — 99214 OFFICE O/P EST MOD 30 MIN: CPT | Mod: HCNC,S$GLB,, | Performed by: NURSE PRACTITIONER

## 2023-11-17 PROCEDURE — 3078F DIAST BP <80 MM HG: CPT | Mod: HCNC,CPTII,S$GLB, | Performed by: NURSE PRACTITIONER

## 2023-11-17 RX ORDER — INSULIN ASPART 100 [IU]/ML
INJECTION, SUSPENSION SUBCUTANEOUS
Qty: 15 ML | Refills: 12
Start: 2023-11-17 | End: 2023-11-17 | Stop reason: SDUPTHER

## 2023-11-17 RX ORDER — ZOLPIDEM TARTRATE 5 MG/1
TABLET ORAL
Qty: 30 TABLET | Refills: 1 | Status: SHIPPED | OUTPATIENT
Start: 2023-11-17 | End: 2024-01-11

## 2023-11-17 RX ORDER — FENOFIBRATE 134 MG/1
134 CAPSULE ORAL DAILY
Qty: 90 CAPSULE | Refills: 2 | Status: SHIPPED | OUTPATIENT
Start: 2023-11-17

## 2023-11-17 RX ORDER — INSULIN ASPART 100 [IU]/ML
INJECTION, SUSPENSION SUBCUTANEOUS
Qty: 15 ML | Refills: 12
Start: 2023-11-17 | End: 2024-03-18

## 2023-11-17 NOTE — PROGRESS NOTES
CC: This 73 y.o. male presents for management of diabetes  and chronic conditions pending review including  HLP, HIV, DM PN    HPI: He was diagnosed with T2DM in ~ 2010. Has never been hospitalized r/t DM.  Family hx of DM: mother     Since last visit   Took his insulin this am, did not eat; BG is 57 in office - he reports this is very rare   Checking his bg fasting 140s  Pre-supper 130-150s    Diet: Eats 2- 3  Meals a day, snacks- grapes, apples, banana   Exercise: walks his dog - 3 times a day for ~ 20-30 mins   CURRENT DM MEDS:   metformin 500 mg bid, Novolog 70/30 16 u breakfast and 18u supper u bid, Trulicity 4.5 mg weekly  (Monday)  Standards of Care:  Eye exam: 2/2023 Dr Parker     ROS:   Gen: Appetite good,  weight  gain 4 lbs  Eyes: Denies visual disturbances  Resp: no SOB or TRIANA   Cardiac: No palpitations, chest pain   GI: No nausea or vomiting, diarrhea, constipation   /GYN: 2-3+ nocturia, no burning or pain.   MS/Neuro: Denies numbness/ tingling in BLE; Gait steady, speech clear  Psych: Denies drug/ETOH abuse, + h/o depression.  Other systems: negative.    PE:  GENERAL: Well developed, well nourished.  PSYCH: AAOx3, appropriate mood and affect, pleasant expression, conversant, appears relaxed, well groomed.   EYES: Conjunctiva, corneas clear  NECK: Supple, trachea midline   ABDOMEN: Soft, non-tender, non-distended   NEURO: Gait steady  FOOT EXAMINATION: 2/27/2023  No foot deformity, corns or callus formation,  nails in good condiiton and well trimmed, no interspace maceration or ulceration noted.  Decreased hair growth present over toes/feet.  Protective sensation intact with 10 gram monofilament.  +2 dorsalis pedis and posterior pulses noted.     Personally reviewed Past Medical, Surgical, Social History.    /74 (BP Method: Large (Manual))   Pulse 75   Ht 6' (1.829 m)   Wt 81.8 kg (180 lb 7.1 oz)   SpO2 98%   BMI 24.47 kg/m²      Personally reviewed the below labs:      Chemistry         Component Value Date/Time     09/12/2023 0821    K 4.4 09/12/2023 0821     09/12/2023 0821    CO2 19 (L) 09/12/2023 0821    BUN 23 09/12/2023 0821    CREATININE 1.5 (H) 09/12/2023 0821     (H) 09/12/2023 0821        Component Value Date/Time    CALCIUM 9.7 09/12/2023 0821    ALKPHOS 51 (L) 09/12/2023 0821    AST 23 09/12/2023 0821    ALT 21 09/12/2023 0821    BILITOT 0.3 09/12/2023 0821    ESTGFRAFRICA 58 (A) 07/29/2022 0934    EGFRNONAA 50 (A) 07/29/2022 0934            Lab Results   Component Value Date    TSH 2.618 12/15/2022       Recent Labs   Lab 09/12/23  0821   LDL Cholesterol 83.6   HDL 34 L   Cholesterol 148        Results for orders placed or performed in visit on 09/08/22   Vitamin D   Result Value Ref Range    Vit D, 25-Hydroxy 98 (H) 30 - 96 ng/mL     No results found for this or any previous visit.    Lab Results   Component Value Date    MICALBCREAT 97.1 (H) 09/12/2023       Hemoglobin A1C   Date Value Ref Range Status   09/12/2023 10.5 (H) 4.0 - 5.6 % Final     Comment:     ADA Screening Guidelines:  5.7-6.4%  Consistent with prediabetes  >or=6.5%  Consistent with diabetes    High levels of fetal hemoglobin interfere with the HbA1C  assay. Heterozygous hemoglobin variants (HbS, HgC, etc)do  not significantly interfere with this assay.   However, presence of multiple variants may affect accuracy.     07/03/2023 11.4 (H) 4.5 - 6.2 % Final     Comment:     According to ADA guidelines, hemoglobin A1C <7.0% represents  optimal control in non-pregnant diabetic patients.  Different  metrics may apply to specific populations.   Standards of Medical Care in Diabetes - 2016.    For the purpose of screening for the presence of diabetes:  <5.7%     Consistent with the absence of diabetes  5.7-6.4%  Consistent with increasing risk for diabetes   (prediabetes)  >or=6.5%  Consistent with diabetes    Currently no consensus exists for use of hemoglobin A1C  for diagnosis of diabetes for  children.     05/01/2023 10.6 (H) 4.0 - 5.6 % Final     Comment:     ADA Screening Guidelines:  5.7-6.4%  Consistent with prediabetes  >or=6.5%  Consistent with diabetes    High levels of fetal hemoglobin interfere with the HbA1C  assay. Heterozygous hemoglobin variants (HbS, HgC, etc)do  not significantly interfere with this assay.   However, presence of multiple variants may affect accuracy.          ASSESSMENT and PLAN:      1. T2DM with hyperglycemia, DM PN, CKD 3a-   Suspect his A1C runs falsely high  Bg on CGMS pro 5/2023 showed some pp excursions post supper but not extreme and not daily GMI was 7.6%  Reports bg and log previously brought to clinic also typically show stable control   Increase  Novolog 70/30 to 16 u at breakfast and 20 u supper- take insulin prior to the meal; Continue Trulicity to 4.5 mg weekly; metformin at 500 mg bid      Check bg bid-  Do not take insulin unless he;s eating a meal- Take insulin 5-15 minutes prior to breakfast and supper  bg treated in office with 2 packs of tommie crackers and PB  Declines his own personal sensor    2. HLP -  on statin therapy, LFT's WNL    3. HIV- follows w Dr Carney -     4. Depression stable, chronic     Follow-up: in 3 months with A1C, fructosamine

## 2023-12-04 ENCOUNTER — OFFICE VISIT (OUTPATIENT)
Dept: INFECTIOUS DISEASES | Facility: CLINIC | Age: 73
End: 2023-12-04
Payer: MEDICARE

## 2023-12-04 ENCOUNTER — LAB VISIT (OUTPATIENT)
Dept: LAB | Facility: HOSPITAL | Age: 73
End: 2023-12-04
Attending: INTERNAL MEDICINE
Payer: MEDICARE

## 2023-12-04 VITALS
WEIGHT: 175 LBS | HEART RATE: 57 BPM | BODY MASS INDEX: 23.7 KG/M2 | HEIGHT: 72 IN | DIASTOLIC BLOOD PRESSURE: 74 MMHG | TEMPERATURE: 98 F | SYSTOLIC BLOOD PRESSURE: 134 MMHG

## 2023-12-04 DIAGNOSIS — B20 HIV DISEASE: ICD-10-CM

## 2023-12-04 DIAGNOSIS — E08.65 DIABETES MELLITUS DUE TO UNDERLYING CONDITION WITH HYPERGLYCEMIA, WITH LONG-TERM CURRENT USE OF INSULIN: ICD-10-CM

## 2023-12-04 DIAGNOSIS — Z79.4 DIABETES MELLITUS DUE TO UNDERLYING CONDITION WITH HYPERGLYCEMIA, WITH LONG-TERM CURRENT USE OF INSULIN: ICD-10-CM

## 2023-12-04 DIAGNOSIS — Z23 ENCOUNTER FOR IMMUNIZATION: ICD-10-CM

## 2023-12-04 DIAGNOSIS — Z79.899 ENCOUNTER FOR LONG-TERM (CURRENT) USE OF MEDICATIONS: ICD-10-CM

## 2023-12-04 DIAGNOSIS — B20 HIV DISEASE: Primary | ICD-10-CM

## 2023-12-04 LAB
BASOPHILS # BLD AUTO: 0.03 K/UL (ref 0–0.2)
BASOPHILS NFR BLD: 0.3 % (ref 0–1.9)
DIFFERENTIAL METHOD: ABNORMAL
EOSINOPHIL # BLD AUTO: 0.2 K/UL (ref 0–0.5)
EOSINOPHIL NFR BLD: 1.6 % (ref 0–8)
ERYTHROCYTE [DISTWIDTH] IN BLOOD BY AUTOMATED COUNT: 13 % (ref 11.5–14.5)
ESTIMATED AVG GLUCOSE: 212 MG/DL (ref 68–131)
HBA1C MFR BLD: 9 % (ref 4.5–6.2)
HCT VFR BLD AUTO: 40.9 % (ref 40–54)
HGB BLD-MCNC: 13.5 G/DL (ref 14–18)
IMM GRANULOCYTES # BLD AUTO: 0.05 K/UL (ref 0–0.04)
IMM GRANULOCYTES NFR BLD AUTO: 0.5 % (ref 0–0.5)
LYMPHOCYTES # BLD AUTO: 2.1 K/UL (ref 1–4.8)
LYMPHOCYTES NFR BLD: 21.6 % (ref 18–48)
MCH RBC QN AUTO: 31.1 PG (ref 27–31)
MCHC RBC AUTO-ENTMCNC: 33 G/DL (ref 32–36)
MCV RBC AUTO: 94 FL (ref 82–98)
MONOCYTES # BLD AUTO: 0.9 K/UL (ref 0.3–1)
MONOCYTES NFR BLD: 8.9 % (ref 4–15)
NEUTROPHILS # BLD AUTO: 6.5 K/UL (ref 1.8–7.7)
NEUTROPHILS NFR BLD: 67.1 % (ref 38–73)
NRBC BLD-RTO: 0 /100 WBC
PLATELET # BLD AUTO: 227 K/UL (ref 150–450)
PMV BLD AUTO: 9.7 FL (ref 9.2–12.9)
RBC # BLD AUTO: 4.34 M/UL (ref 4.6–6.2)
WBC # BLD AUTO: 9.63 K/UL (ref 3.9–12.7)

## 2023-12-04 PROCEDURE — 3075F PR MOST RECENT SYSTOLIC BLOOD PRESS GE 130-139MM HG: ICD-10-PCS | Mod: CPTII,S$GLB,, | Performed by: INTERNAL MEDICINE

## 2023-12-04 PROCEDURE — 1126F PR PAIN SEVERITY QUANTIFIED, NO PAIN PRESENT: ICD-10-PCS | Mod: CPTII,S$GLB,, | Performed by: INTERNAL MEDICINE

## 2023-12-04 PROCEDURE — 3060F PR POS MICROALBUMINURIA RESULT DOCUMENTED/REVIEW: ICD-10-PCS | Mod: CPTII,S$GLB,, | Performed by: INTERNAL MEDICINE

## 2023-12-04 PROCEDURE — 1159F PR MEDICATION LIST DOCUMENTED IN MEDICAL RECORD: ICD-10-PCS | Mod: CPTII,S$GLB,, | Performed by: INTERNAL MEDICINE

## 2023-12-04 PROCEDURE — 1126F AMNT PAIN NOTED NONE PRSNT: CPT | Mod: CPTII,S$GLB,, | Performed by: INTERNAL MEDICINE

## 2023-12-04 PROCEDURE — 3052F HG A1C>EQUAL 8.0%<EQUAL 9.0%: CPT | Mod: CPTII,S$GLB,, | Performed by: INTERNAL MEDICINE

## 2023-12-04 PROCEDURE — 90694 VACC AIIV4 NO PRSRV 0.5ML IM: CPT | Mod: S$GLB,,, | Performed by: INTERNAL MEDICINE

## 2023-12-04 PROCEDURE — 99214 PR OFFICE/OUTPT VISIT, EST, LEVL IV, 30-39 MIN: ICD-10-PCS | Mod: 25,S$GLB,, | Performed by: INTERNAL MEDICINE

## 2023-12-04 PROCEDURE — G0008 ADMIN INFLUENZA VIRUS VAC: HCPCS | Mod: S$GLB,,, | Performed by: INTERNAL MEDICINE

## 2023-12-04 PROCEDURE — 85025 COMPLETE CBC W/AUTO DIFF WBC: CPT | Performed by: INTERNAL MEDICINE

## 2023-12-04 PROCEDURE — 3066F PR DOCUMENTATION OF TREATMENT FOR NEPHROPATHY: ICD-10-PCS | Mod: CPTII,S$GLB,, | Performed by: INTERNAL MEDICINE

## 2023-12-04 PROCEDURE — 3075F SYST BP GE 130 - 139MM HG: CPT | Mod: CPTII,S$GLB,, | Performed by: INTERNAL MEDICINE

## 2023-12-04 PROCEDURE — G0008 FLU VACCINE - QUADRIVALENT - ADJUVANTED: ICD-10-PCS | Mod: S$GLB,,, | Performed by: INTERNAL MEDICINE

## 2023-12-04 PROCEDURE — 1101F PR PT FALLS ASSESS DOC 0-1 FALLS W/OUT INJ PAST YR: ICD-10-PCS | Mod: CPTII,S$GLB,, | Performed by: INTERNAL MEDICINE

## 2023-12-04 PROCEDURE — 3052F PR MOST RECENT HEMOGLOBIN A1C LEVEL 8.0 - < 9.0%: ICD-10-PCS | Mod: CPTII,S$GLB,, | Performed by: INTERNAL MEDICINE

## 2023-12-04 PROCEDURE — 1101F PT FALLS ASSESS-DOCD LE1/YR: CPT | Mod: CPTII,S$GLB,, | Performed by: INTERNAL MEDICINE

## 2023-12-04 PROCEDURE — 87536 HIV-1 QUANT&REVRSE TRNSCRPJ: CPT | Performed by: INTERNAL MEDICINE

## 2023-12-04 PROCEDURE — 1160F RVW MEDS BY RX/DR IN RCRD: CPT | Mod: CPTII,S$GLB,, | Performed by: INTERNAL MEDICINE

## 2023-12-04 PROCEDURE — 3008F PR BODY MASS INDEX (BMI) DOCUMENTED: ICD-10-PCS | Mod: CPTII,S$GLB,, | Performed by: INTERNAL MEDICINE

## 2023-12-04 PROCEDURE — 83036 HEMOGLOBIN GLYCOSYLATED A1C: CPT | Performed by: INTERNAL MEDICINE

## 2023-12-04 PROCEDURE — 36415 COLL VENOUS BLD VENIPUNCTURE: CPT | Performed by: INTERNAL MEDICINE

## 2023-12-04 PROCEDURE — 3060F POS MICROALBUMINURIA REV: CPT | Mod: CPTII,S$GLB,, | Performed by: INTERNAL MEDICINE

## 2023-12-04 PROCEDURE — 3008F BODY MASS INDEX DOCD: CPT | Mod: CPTII,S$GLB,, | Performed by: INTERNAL MEDICINE

## 2023-12-04 PROCEDURE — 99214 OFFICE O/P EST MOD 30 MIN: CPT | Mod: 25,S$GLB,, | Performed by: INTERNAL MEDICINE

## 2023-12-04 PROCEDURE — 90694 FLU VACCINE - QUADRIVALENT - ADJUVANTED: ICD-10-PCS | Mod: S$GLB,,, | Performed by: INTERNAL MEDICINE

## 2023-12-04 PROCEDURE — 3288F PR FALLS RISK ASSESSMENT DOCUMENTED: ICD-10-PCS | Mod: CPTII,S$GLB,, | Performed by: INTERNAL MEDICINE

## 2023-12-04 PROCEDURE — 3078F PR MOST RECENT DIASTOLIC BLOOD PRESSURE < 80 MM HG: ICD-10-PCS | Mod: CPTII,S$GLB,, | Performed by: INTERNAL MEDICINE

## 2023-12-04 PROCEDURE — 3288F FALL RISK ASSESSMENT DOCD: CPT | Mod: CPTII,S$GLB,, | Performed by: INTERNAL MEDICINE

## 2023-12-04 PROCEDURE — 1160F PR REVIEW ALL MEDS BY PRESCRIBER/CLIN PHARMACIST DOCUMENTED: ICD-10-PCS | Mod: CPTII,S$GLB,, | Performed by: INTERNAL MEDICINE

## 2023-12-04 PROCEDURE — 3066F NEPHROPATHY DOC TX: CPT | Mod: CPTII,S$GLB,, | Performed by: INTERNAL MEDICINE

## 2023-12-04 PROCEDURE — 86592 SYPHILIS TEST NON-TREP QUAL: CPT | Mod: HCNC | Performed by: INTERNAL MEDICINE

## 2023-12-04 PROCEDURE — 3078F DIAST BP <80 MM HG: CPT | Mod: CPTII,S$GLB,, | Performed by: INTERNAL MEDICINE

## 2023-12-04 PROCEDURE — 1159F MED LIST DOCD IN RCRD: CPT | Mod: CPTII,S$GLB,, | Performed by: INTERNAL MEDICINE

## 2023-12-04 RX ORDER — EFAVIRENZ 600 MG/1
600 TABLET, FILM COATED ORAL NIGHTLY
Qty: 30 TABLET | Refills: 5 | Status: SHIPPED | OUTPATIENT
Start: 2023-12-04 | End: 2024-06-01

## 2023-12-04 NOTE — PROGRESS NOTES
Subjective:       Patient ID: Brandon Parson is a 73 y.o. male.    Chief Complaint:: Follow-up and HIV Positive/AIDS      HIV Positive/AIDS  Follow-up     Transferring care to [facility]. Followed by Dr. Dawn Helm/[place] for 20 years.   [place] he was HIV positive over 20 yrs ago. He was admitted to [facility] for pneumonia (pneumocystis?). Does not recall his first T cell count. Last CD4 1215, 2/22/22,  But last viral load was 30 on 9/17/21.     He has been under Dr. Dawn Helm's care at [facility], then [facility], then [facility]). Reviewed available records in care everywhere.   He has been on Descovy/Sustiva for many years, cannot recall regimens prior to this  No episodes of shingles, received 2 shingles vaccines 2020  No history of hepatitis   Treated for latent TB 2006-07  No other STDs  Diabetes, carotid  Vascular disease. Recently admitted for syncope. Stopped smoking 2 months ago  DANK on TID clonazepam an dhs ambien, for years. (sister concerned about this). He lives with sister near [place], spends time with another sister in [place] and a friend on the [facility]. Does not drive. Has limited his life greatly due to COVID.   Preparing for an inguinal hernia repair    5/9/22: since last visit had a right CEA. Still off cigarettes since January. Awaiting inguinal hernia repair. Taking  mg daily  He is more active and getting out of the house more than at last visit. Still not driving.   Taking the following: Vitamin D 5000 IU per day, inc 30 mg elemental or 220 mg of sulfate, Vitamin C 500-1000 mg per day. Could not find quercetin  Reviewed labs . TB Gold still positive .     9/8/22: since last visit, he has had inguinal hernia repair.  His blood sugar was over 400 this morning. He was without insulin for 3-4 days several weeks ago. The supply came to him warm and he did not take it for the last month.  He had been instructed by previous physician to never take insulin that was not cold.  " He was told by the pharmacist that it should be okay but he did not re-refrigerate it and has not been taking it.  Discussed with Hospital pharmacist and His novolog is good for 14d outside of the refrigerator. He has polydipsia and polyuria now.  He is taking a gal of water to the bedside at night.  He has lost 10 lb.  He did not call this issue to the attention of his primary or to me.      3/6/23: gained 20-30#.  This is likely from some correction in his blood sugar unless polyuria and polydipsia.  He is in good spirits, working in his garden.  Compliant with his antivirals.  His Trulicity dose was increased because his A1c was 9.3%.  His diet sounds more appropriate.  He has seen optometry but has not yet arranged any colonoscopy.  A referral/case request was placed into the [facility] system.    7/3/23: blood sugars still quite high and his endocrine provider increased his trulicity and its improving. Weight is stable. He feels good. Has lesions on ears, one of which is long time not healing and primary thinks may be a skin cancer.     12/4/23: meds reviewed. In good spirits, doing well. Blood sugars are still elevated, seeing endocrine, A1c over 10%. Walks his dog for exercise, tends to chickens/ducks/cats.     Current Outpatient Medications:     ascorbic acid, vitamin C, (VITAMIN C) 100 MG tablet, Take 100 mg by mouth once daily., Disp: , Rfl:     atorvastatin (LIPITOR) 40 MG tablet, TAKE ONE TABLET BY MOUTH DAILY, Disp: 90 tablet, Rfl: 2    b complex vitamins capsule, Take 1 capsule by mouth once daily., Disp: , Rfl:     BD ULTRA-FINE MINI PEN NEEDLE 31 gauge x 3/16" Ndle, USE TWICE DAILY, Disp: 200 each, Rfl: 3    blood-glucose meter Misc, USE TO TEST BLOOD SUGAR 2 TO 3 TIMES DAILY, Disp: , Rfl:     dulaglutide (TRULICITY) 4.5 mg/0.5 mL pen injector, Inject 4.5 mg into the skin every 7 days., Disp: 4 pen, Rfl: 11    fenofibrate (TRICOR) 145 MG tablet, Take 145 mg by mouth once daily., Disp: , Rfl:     " fenofibrate micronized (LOFIBRA) 134 MG Cap, Take 1 capsule (134 mg total) by mouth once daily., Disp: 90 capsule, Rfl: 2    fish oil-omega-3 fatty acids 300-1,000 mg capsule, Take 2 capsules by mouth once daily., Disp: , Rfl:     HIGH POTENCY MULTIVITAMIN 400 mcg Tab, Take 1 tablet by mouth once daily., Disp: , Rfl:     insulin aspart protamine-insulin aspart (NOVOLOG MIX 70-30FLEXPEN U-100) 100 unit/mL (70-30) InPn pen, INJECT 16 UNITS breakfast and 20 u supper, Disp: 15 mL, Rfl: 12    metFORMIN (GLUCOPHAGE) 500 MG tablet, TAKE 1 TABLET BY MOUTH TWICE DAILY WITH MEALS, Disp: 180 tablet, Rfl: 0    ondansetron (ZOFRAN-ODT) 4 MG TbDL, Take 1 tablet (4 mg total) by mouth every 6 (six) hours as needed (nv)., Disp: 30 tablet, Rfl: 0    potassium chloride SA (K-DUR,KLOR-CON) 20 MEQ tablet, Take 1 tablet (20 mEq total) by mouth 2 (two) times daily., Disp: 30 tablet, Rfl: 1    sertraline (ZOLOFT) 100 MG tablet, Take 1 tablet (100 mg total) by mouth once daily., Disp: 90 tablet, Rfl: 2    TRUE METRIX GLUCOSE TEST STRIP Strp, USE TWICE DAILY, Disp: 100 strip, Rfl: 11    TRUEPLUS LANCETS 30 gauge Misc, USE TWICE DAILY TO TEST BLOOD SUGAR, Disp: , Rfl:     zolpidem (AMBIEN) 5 MG Tab, TAKE ONE TABLET BY MOUTH EVERY NIGHT AS NEEDED, Disp: 30 tablet, Rfl: 1    aspirin 325 MG tablet, Take 1 tablet (325 mg total) by mouth once daily., Disp: 30 tablet, Rfl: 0    efavirenz (SUSTIVA) 600 mg Tab, Take 1 tablet (600 mg total) by mouth every evening., Disp: 30 tablet, Rfl: 5    emtricitabine-tenofovir alafen (DESCOVY) 200-25 mg Tab, Take 1 tablet by mouth every evening., Disp: 30 tablet, Rfl: 5  Review of patient's allergies indicates:   Allergen Reactions    Chantix [varenicline]      Past Medical History:   Diagnosis Date    Anxiety     Carotid artery disease     Depression     Diabetes mellitus     GERD (gastroesophageal reflux disease)     HIV (human immunodeficiency virus infection)     Osteomyelitis of great toe of left foot  2019    TB lung, latent 2006    treated with INH   pneumonia , probably pneumocystis     Diabetic foot ulcer    Past Surgical History:   Procedure Laterality Date    CAROTID ENDARTERECTOMY Right 2022    HERNIORRHAPHY OF RECURRENT INCARCERATED INGUINAL HERNIA Right 2022    Procedure: REPAIR, HERNIA, INGUINAL, INCARCERATED, RECURRENT;  Surgeon: Mack Ramirez MD;  Location: [facility];  Service: General;  Laterality: Right;    UMBILICAL HERNIA REPAIR       Social History     Socioeconomic History    Marital status: Single   Tobacco Use    Smoking status: Former     Current packs/day: 0.00     Types: Cigarettes     Start date:      Quit date: 2022     Years since quittin.8    Smokeless tobacco: Never    Tobacco comments:     Handout provided for Ambulatory Smoking Cessation program   Substance and Sexual Activity    Alcohol use: No    Drug use: No    Sexual activity: Not Currently     No family history on file.    Travel History: [place], [place]    Vaccine History: see immunization tab. Pneumovax , , 2018. HAV and HBV vax -  Advanced Directive:   Safer Sex: abstinent since diagnosis  Bone Density:   Colonoscopy: , and 2 tubulovillous adenomas 2016,  postponed due to COVID   at public [employer], retired  at the time of illness    Review of Systems    Constitutional: No fever, chills, sweats, fatigue, weakness,  weight loss. Diet seems improved. Works in his garden, walks his dog     Eyes: No change in vision, loss of vision, diplopia, photophobia.   UTD eye exam    ENT: No sinus drainage, sore throat, mouth pain, or lesions. Not UTD dental exam    Cardiovascular: No chest pain, TRIANA, palpitations or pedal edema.     Respiratory: No shortness of breath, TRIANA, cough, wheeze, sputum,      Gastrointestinal: No abdominal pain, nausea, vomiting, diarrhea,      Genitourinary: No dysuria, hematuria, incontinence,         Musculoskeletal: No new pain, joint swelling,  or injuries    Integumentary: No new rashes, lesions.  He had a bulge in the left side of the mons pubis since his hernia surgery, which was a hematoma and his now resolved. Having bilateral ear skin cancers removed    Neurological: No dizziness, vertigo, unusual headaches, neuropathy, or falls.      Psychiatric: No anxiety, depression, memory loss,  or substance abuse.      Endocrine: Blood sugars still not controlled, see HPI. Thyroid normal. A1c 10 %, novolin recently increased per endo NP    Lymphatic: No lymphadenopathy, blood loss, anemia, or malignancy    Objective:      Blood pressure 134/74, pulse (!) 57, temperature 97.7 °F (36.5 °C), height 6' (1.829 m), weight 79.4 kg (175 lb), SpO2 (P) 98 %. Body mass index is 23.73 kg/m².  Physical Exam      General: Alert and attentive, cooperative and in no distress.     Eyes: Pupils equal, round, reactive to light, anicteric, EOMI    Neck: Supple, non-tender, no thyromegaly or masses    ENT: EAC patent, TM normal, nares patent, no oral lesions,  , no thrush    Cardiovascular: Regular rate and rhythm, no murmurs, rubs, or gallop    Respiratory: Lungs clear without wheezes, no rales, rub or rhonci    Gastrointestinal: Active bowel sounds, soft, no mass or organomegaly, no tenderness or distention    Genitourinary: No  flank tenderness.    Vascular: No peripheral edema, phlebitis, pulses normal. Warm and well perfused    Musculoskeletal: Ambulates without difficulty, no acute arthritis, synovitis or myositis.      Integumentary: Skin without rashes, lesions .  Benign moles on his back.    Has abulge right flank that he was told it was a lipoma, but I think it is abd wall weakness and not a mass(reviewed CT from 3/2022 and there is no lipoma just an apparent weakness in abdominal wall.    AnusRectum: DAVID no external lesions, firm prostate 15-20g no nodules, no anal lesions, 6/2023    Neurological: Normal LOC, cranial nerves, speech, reflexes, normal gait, some  hemiparesis but it is incredibly subtle.       Psychiatric: Normal mood, speech, demeanor    Lymphatic: No cervical, supraclavicular,  or inguinal lymphadenopathy       Wound:     HIV Table:   DATE  result  Toxoplasma IgG 3/7/22  Positive  HLA     Negative (hop)  RPR   10/2022 Non reactive  Hep A total  3/16/09 positive  Hep B sAg  Hep B sAb  4/2007  Pos 37.4  Hep B cAb total 3/7/22  negative  Hep B DNA  Hep C Ab  3/7/22  negative  Hep C RNA  Chlamy/GC  2018  Negative  TB gold     Ppd pos 7/31/06 treated with 9 mo INH     3/7/22  Positive, CXR 2/22 normal    Vitamin D  9/8/22  98  CD4   2/22/22 1215 and has been normal for a while  PSA   3/2023    Hem A1c  12/4/23 9%  UA prot  9/2021  neg      Recent Diagnostics:   2/22 echocardiogram  There is no evidence of intracardiac shunting.  The left ventricle is normal in size with concentric remodeling and normal systolic function.  The estimated ejection fraction is 58%.  Normal left ventricular diastolic function.  Normal right ventricular size with normal right ventricular systolic function.  Mild left atrial enlargement.  Mild mitral regurgitation.  Normal central venous pressure (3 mmHg).  The estimated PA systolic pressure is 28 mmHg.        Assessment and Plan:           HIV disease  -     efavirenz (SUSTIVA) 600 mg Tab; Take 1 tablet (600 mg total) by mouth every evening.  Dispense: 30 tablet; Refill: 5  -     emtricitabine-tenofovir alafen (DESCOVY) 200-25 mg Tab; Take 1 tablet by mouth every evening.  Dispense: 30 tablet; Refill: 5  -     CBC Auto Differential; Future; Expected date: 12/04/2023  -     HIV RNA, Quantitative, PCR; Future; Expected date: 12/04/2023  -     RPR; Future; Expected date: 12/04/2023    Diabetes mellitus due to underlying condition with hyperglycemia, with long-term current use of insulin  -     Hemoglobin A1C; Future; Expected date: 12/04/2023    Encounter for long-term (current) use of medications    Encounter for  immunization    Other orders  -     Influenza (FLUAD) - Quadrivalent (Adjuvanted) *Preferred* (65+) (PF)  -     Discontinue: meningococcal polysaccharide (MENACTRA) 4 mcg/0.5 mL injection; Inject 0.5 mLs into the muscle once. for 1 dose  Dispense: 0.5 mL; Refill: 0    Continue descovy and efavirenz(sustiva)    Influenza vaccine    Still need a colonoscopy, please get this scheduled    Lab today    It has been a pleasure and my honor to be your physician    We will make your next appointment for 6 months    12/7/23: ADDENDMUM: viral load wa s120. He reinforces complete adherence. Will check genosure Archive to see if we can catch early resistance. His viral load has not been undetectable in a year.    Latest Reference Range & Units 03/07/22 15:22 09/08/22 12:18 10/14/22 09:40 07/03/23 10:30 12/04/23 11:06   HIV 1 RNA Ultra copies/mL <20 300 40 40 120     This note was created using Dragon voice recognition software that occasionally misinterpreted phrases or words.

## 2023-12-04 NOTE — PATIENT INSTRUCTIONS
Continue descovy and efavirenz(sustiva)    Influenza vaccine    Still need a colonoscopy, please get this scheduled    Lab today    It has been a pleasure and my honor to be your physician    We will make your next appointment for 6 months

## 2023-12-05 LAB — RPR SER QL: NORMAL

## 2023-12-06 ENCOUNTER — PROCEDURE VISIT (OUTPATIENT)
Dept: DERMATOLOGY | Facility: CLINIC | Age: 73
End: 2023-12-06
Payer: MEDICARE

## 2023-12-06 VITALS
HEIGHT: 73 IN | BODY MASS INDEX: 23.2 KG/M2 | DIASTOLIC BLOOD PRESSURE: 86 MMHG | SYSTOLIC BLOOD PRESSURE: 177 MMHG | WEIGHT: 175.06 LBS | HEART RATE: 71 BPM

## 2023-12-06 DIAGNOSIS — D04.22 SQUAMOUS CELL CARCINOMA IN SITU OF SKIN OF LEFT EAR: Primary | ICD-10-CM

## 2023-12-06 DIAGNOSIS — C44.92 SCCA (SQUAMOUS CELL CARCINOMA) OF SKIN: ICD-10-CM

## 2023-12-06 LAB
HIV1 RNA # SERPL NAA+PROBE: 120 COPIES/ML
HIV1 RNA SERPL NAA+PROBE-LOG#: 2.08 LOG10COPY/ML

## 2023-12-06 PROCEDURE — 17311: ICD-10-PCS | Mod: HCNC,S$GLB,, | Performed by: DERMATOLOGY

## 2023-12-06 PROCEDURE — 17312 MOHS ADDL STAGE: CPT | Mod: HCNC,S$GLB,, | Performed by: DERMATOLOGY

## 2023-12-06 PROCEDURE — 17312: ICD-10-PCS | Mod: HCNC,S$GLB,, | Performed by: DERMATOLOGY

## 2023-12-06 PROCEDURE — 17311 MOHS 1 STAGE H/N/HF/G: CPT | Mod: HCNC,S$GLB,, | Performed by: DERMATOLOGY

## 2023-12-06 NOTE — PROGRESS NOTES
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  Surgical Preparation: povidone-iodine     Description of Operation:  The nature and purpose of the procedure, associated risks and alternative treatments were explained to the patient in detail. All patient questions were answered completely. An informed operative consent and photography permit were obtained. The tumor location was then identified and marked with agreement by the patient of the correct location. The patient was positioned, prepped, and draped in the usual sterile manner. Local anesthesia was obtained with 0.8 cc(s) of 1% Lidocaine with 1:200,000 epinephrine. The lesion pre-operatively measures 0.6 by 0.4 cm.     1ST STAGE:  A 2+ mm rim of normal appearing skin was marked circumferentially around the lesion after scraping with a curette to define the margin. The area thus outlined was excised at a 45 degree angle. Hemostasis was obtained with electrodesiccation. The specimen was oriented, mapped, and subdivided into at least two sections. Sections were then chromacoded and submitted for horizontal frozen sections. The patient tolerated the procedure well and there were no complications. Upon microscopic examination of the processed horizontal frozen sections of this stage:  Tumor was present at the margin of the specimen; these areas of residual tumor were marked on the reference map with red pencil, pinpointing the location in which further tissue excision  was necessary.  Tumor type noted on stage 1: Well-differentiated squamous cell carcinoma: Proliferation of squamous cells exhibiting atypia and infiltrating within the dermis. W/ some histologic features of CNH.      SUBSEQUENT STAGES:  The patient returned to the operating room for additional stages of tumor removal, and prepped in the manner described above. Surgery was directed to the areas having residual tumor, with thin layers of tissue being excised from these regions. A new reference map was prepared during the surgery to maintain precise orientation as described above. Hemostasis was achieved and the excised tissue was processed for microscopic analysis.  These sections were then examined by the Mohs surgeon, and areas of persistent tumor were indicated on the reference map. This process was repeated until the frozen horizontal sections of STAGE 2 revealed no further tumor cells and tumor eradication was considered to be  complete.  Tumor type noted on subsequent stages: No tumor seen.     SUMMARY:  The tumor was extirpated in 2 Mohs Stages resulting in a final defect measuring 1.4 by 0.5 cm².   The final defect extended deep to  perichondrium  The patient tolerated the procedure well and no complications were noted.   Due to location and patient preference, lesion will heal by second intention.    PHOTOS:      Edna Humphries MD

## 2023-12-08 ENCOUNTER — LAB VISIT (OUTPATIENT)
Dept: LAB | Facility: HOSPITAL | Age: 73
End: 2023-12-08
Attending: INTERNAL MEDICINE
Payer: MEDICARE

## 2023-12-08 DIAGNOSIS — B20 HIV DISEASE: ICD-10-CM

## 2023-12-08 PROCEDURE — 36415 COLL VENOUS BLD VENIPUNCTURE: CPT | Performed by: INTERNAL MEDICINE

## 2023-12-08 PROCEDURE — 30000890 LABCORP MISCELLANEOUS TEST: Performed by: INTERNAL MEDICINE

## 2023-12-12 ENCOUNTER — TELEPHONE (OUTPATIENT)
Dept: DERMATOLOGY | Facility: CLINIC | Age: 73
End: 2023-12-12
Payer: MEDICARE

## 2023-12-13 ENCOUNTER — PROCEDURE VISIT (OUTPATIENT)
Dept: DERMATOLOGY | Facility: CLINIC | Age: 73
End: 2023-12-13
Payer: MEDICARE

## 2023-12-13 VITALS
HEIGHT: 72 IN | SYSTOLIC BLOOD PRESSURE: 135 MMHG | BODY MASS INDEX: 23.7 KG/M2 | WEIGHT: 175 LBS | HEART RATE: 67 BPM | DIASTOLIC BLOOD PRESSURE: 77 MMHG

## 2023-12-13 DIAGNOSIS — D48.5 NEOPLASM OF UNCERTAIN BEHAVIOR OF SKIN: ICD-10-CM

## 2023-12-13 DIAGNOSIS — C44.222 SQUAMOUS CELL CARCINOMA OF SKIN OF RIGHT EAR AND EXTERNAL AURICULAR CANAL: Primary | ICD-10-CM

## 2023-12-13 PROCEDURE — 14060 TIS TRNFR E/N/E/L 10 SQ CM/<: CPT | Mod: HCNC,S$GLB,, | Performed by: DERMATOLOGY

## 2023-12-13 PROCEDURE — 11102 PR TANGENTIAL BIOPSY, SKIN, SINGLE LESION: ICD-10-PCS | Mod: HCNC,XS,51,S$GLB | Performed by: DERMATOLOGY

## 2023-12-13 PROCEDURE — 17311 MOHS 1 STAGE H/N/HF/G: CPT | Mod: HCNC,51,S$GLB, | Performed by: DERMATOLOGY

## 2023-12-13 PROCEDURE — 88305 TISSUE EXAM BY PATHOLOGIST: CPT | Mod: HCNC,PO | Performed by: STUDENT IN AN ORGANIZED HEALTH CARE EDUCATION/TRAINING PROGRAM

## 2023-12-13 PROCEDURE — 17311: ICD-10-PCS | Mod: HCNC,51,S$GLB, | Performed by: DERMATOLOGY

## 2023-12-13 PROCEDURE — 88305 TISSUE EXAM BY PATHOLOGIST: ICD-10-PCS | Mod: 26,HCNC,, | Performed by: STUDENT IN AN ORGANIZED HEALTH CARE EDUCATION/TRAINING PROGRAM

## 2023-12-13 PROCEDURE — 14060 PR ADJ TISS XFER LID,NOS,EAR <10 SQCM: ICD-10-PCS | Mod: HCNC,S$GLB,, | Performed by: DERMATOLOGY

## 2023-12-13 PROCEDURE — 88305 TISSUE EXAM BY PATHOLOGIST: CPT | Mod: 26,HCNC,, | Performed by: STUDENT IN AN ORGANIZED HEALTH CARE EDUCATION/TRAINING PROGRAM

## 2023-12-13 PROCEDURE — 11102 TANGNTL BX SKIN SINGLE LES: CPT | Mod: HCNC,XS,51,S$GLB | Performed by: DERMATOLOGY

## 2023-12-13 NOTE — Clinical Note
Got a dermpath consult from Dr. Grullon (bro in law) on the 1st stage from the left ear - he thought it was clearly CNH as did I. Right ear had nothing on mohs, developed a new antitragal papule in interim so I biopsied it but guessing it may also be CNH.

## 2023-12-13 NOTE — PROGRESS NOTES
MOHS MICROGRAPHIC SURGERY OPERATIVE NOTE  Name:  Brandon Parson  Date: 2023  Patient : 1950  Attending Surgeon: Edna Humphries MD  Assistants: Yaima Sykes - Surgical Technician  Anesthetic Agent: 1% Lidocaine with 1:200,000 epinephrine  Clinical Diagnosis:  squamous cell carcinoma- superficial invasive superimposed CNH  Operation: Mohs Micrographic Surgery  Location: Right helix  Indications: Location in mask areas of face including central face, nose, eyelids, eyebrows, lips, chin, preauricular, temple, and ear.  Surgical Preparation: povidone-iodine     Description of Operation:  The nature and purpose of the procedure, associated risks and alternative treatments were explained to the patient in detail. All patient questions were answered completely. An informed operative consent and photography permit were obtained. The tumor location was then identified and marked with agreement by the patient of the correct location. The patient was positioned, prepped, and draped in the usual sterile manner. Local anesthesia was obtained with 1 cc(s) of 1% Lidocaine with 1:200,000 epinephrine. The lesion pre-operatively measures 0.7 by 0.4 cm.     1ST STAGE:  A 2+ mm rim of normal appearing skin was marked circumferentially around the lesion after scraping with a curette to define the margin. The area thus outlined was excised at a 45 degree angle. Hemostasis was obtained with electrodesiccation. The specimen was oriented, mapped, and subdivided into at least two sections. Sections were then chromacoded and submitted for horizontal frozen sections. The patient tolerated the procedure well and there were no complications. Upon microscopic examination of the processed horizontal frozen sections of this stage:  No residual tumor was noted.  Tumor type noted on stage 1: No tumor seen.    SUMMARY:  The tumor was extirpated in 1 Mohs Stages resulting in a final defect measuring 1.0 by 0.5 cm².   The final  defect extended deep to subcutaneous fat  The patient tolerated the procedure well and no complications were noted.     PHOTOS:      Edna Humphries MD    FLAP CLOSURE OF MOHS DEFECT OPERATIVE REPORT  Patient Name: Brandon Parson  Patient YOB: 1950  Date of procedure: 12/13/2023  Attending Surgeon: Edna Humphries MD FAAD  Anesthetic Agent: 1% Lidocaine with 1:200,000 epinephrine   Assistant: Yaima Sykes - Surgical Technician  Clinical Diagnosis: A 1.0 x 0.5 cm² defect after Mohs Micrographic Surgery  Location: Right helix  Operation:   helical rim flap   Surgical Preparation: povidone-iodine    Description of Operation:  The nature and purpose of the procedure, associated risks and alternative treatments were explained to the patient in detail. All patient questions were answered completely. In order to minimize tension on the closure and avoid compromising the anatomic contour of the anatomic region and maximize functional capacity, the above noted adjacent tissue transfer was performed.    An informed operative consent and photography permit were obtained. The patient was positioned, prepped, and draped in the usual sterile manner. Local anesthesia was obtained with 3 cc(s) of 1% Lidocaine with 1:200,000 epinephrine The defect edges were debeveled with a #15 blade. Using gentian violet, the flap was designed adjacent to the defect including all anticipated dog ears. The area thus outlined was incised deep to subcutaneous fat with the scalpel. This resulted in a final undermined and elevated flap defect measuring 3.5 x 2.3 cm².   The flap and skin margins were then undermined 50 to 75% of the defects diameter in all directions utilizing supercut iris scissors. Hemostasis was achieved with electrodessication. The secondary defect was closed first utilizing 5-0 Monocryl interrupted buried subcutaneous vertical mattress sutures.     The adjacent tissue flap was then mobilized into place  and anchored with additional 5-0 Monocryl interrupted buried subcutaneous vertical mattress sutures. The flap and recipient sites were sculpted in the adipose and dermal planes for a good fit. The skin edges were then reapposed with 6-0 Prolene. Redundant tissue was noted at the inferior and superior aspect of the adjacent tissue transfer. Burows triangles were removed utilizing a #15 blade and the epidermal edges reapposed with 6-0 Prolene. This repair resulted in excellent contour with normal symmetry. The patient tolerated the procedure well and there were no complications.   Polysporin, non-adherent gauze and a pressure dressing were applied to wound after gentle cleansing with saline.    Post op meds: None    Photos:      MD SOLA Vasquez

## 2023-12-15 NOTE — PROGRESS NOTES
Patient noted to have suspicious lesion on the right antihelix that developed in interim since previous biopsies      Shave Biopsy Procedure Note  Risks, benefits, limitations of shave biopsy discussed. Areas cleansed with alcohol, photographs of suspicious lesions taken in [place]. 1 lesion(s) numbed with 1% lidocaine with 1:200,000 epinephrine. Lesions removed with razor surgery and sent for pathology in formalin. Hemostasis achieved with monopolar electrodesiccation. Polysporin and a band-aid applied.  Will call patient with results once available. Patient tolerated procedure well.

## 2023-12-22 ENCOUNTER — CLINICAL SUPPORT (OUTPATIENT)
Dept: DERMATOLOGY | Facility: CLINIC | Age: 73
End: 2023-12-22
Payer: MEDICARE

## 2023-12-22 DIAGNOSIS — Z48.02 VISIT FOR SUTURE REMOVAL: Primary | ICD-10-CM

## 2023-12-22 LAB
FINAL PATHOLOGIC DIAGNOSIS: NORMAL
Lab: NORMAL

## 2023-12-22 PROCEDURE — 99024 PR POST-OP FOLLOW-UP VISIT: ICD-10-PCS | Mod: HCNC,S$GLB,, | Performed by: DERMATOLOGY

## 2023-12-22 PROCEDURE — 99024 POSTOP FOLLOW-UP VISIT: CPT | Mod: HCNC,S$GLB,, | Performed by: DERMATOLOGY

## 2023-12-22 NOTE — PROGRESS NOTES
Mohs Surgery Suture Removal Note   Patient Name: Brandon Parson  Patient : 1950  Date of Service: 2023    CC: Pt. Presents for removal of sutures on the right ear today  Subjective: patient reports wound healing as expected     Objective: Wound well healed, sutures clean/dry/intact. No evidence of any complications noted.     Visit For Suture Removal:  - Suture removal today  - Steri strips/mastisol were not applied  - Patient counseled on silicone gel/silicone sheeting and their use in the management of post-operative scars  - Handout on silicone gel/silicone gel sheeting was provided  - Patient to follow up with their referring provider in 3 months for further skin evaluation    Claire Faulkner

## 2023-12-24 LAB
LABCORP MISC TEST CODE: NORMAL
LABCORP MISC TEST NAME: NORMAL
LABCORP MISCELLANEOUS TEST: NORMAL

## 2023-12-28 ENCOUNTER — TELEPHONE (OUTPATIENT)
Dept: INFECTIOUS DISEASES | Facility: HOSPITAL | Age: 73
End: 2023-12-28

## 2023-12-28 NOTE — TELEPHONE ENCOUNTER
Left him a voicemail. The resistance test is all sensitive. This suggests lapse in adherence. I encouraged him to make the medication part of his daily routine, and when Dr. Middleton gets settled that a repeat viral load is done.

## 2024-01-10 DIAGNOSIS — G47.00 INSOMNIA, UNSPECIFIED TYPE: ICD-10-CM

## 2024-01-10 NOTE — TELEPHONE ENCOUNTER
No care due was identified.  [facility] Embedded Care Due Messages. Reference number: [id number].   1/10/2024 8:02:22 AM CST

## 2024-01-11 RX ORDER — ZOLPIDEM TARTRATE 5 MG/1
TABLET ORAL
Qty: 30 TABLET | Refills: 0 | Status: SHIPPED | OUTPATIENT
Start: 2024-01-11 | End: 2024-01-25 | Stop reason: SDUPTHER

## 2024-01-25 ENCOUNTER — OFFICE VISIT (OUTPATIENT)
Dept: FAMILY MEDICINE | Facility: CLINIC | Age: 74
End: 2024-01-25
Payer: MEDICARE

## 2024-01-25 VITALS
SYSTOLIC BLOOD PRESSURE: 130 MMHG | BODY MASS INDEX: 23.89 KG/M2 | WEIGHT: 176.13 LBS | DIASTOLIC BLOOD PRESSURE: 72 MMHG | OXYGEN SATURATION: 97 % | HEART RATE: 67 BPM

## 2024-01-25 DIAGNOSIS — Z00.00 ROUTINE GENERAL MEDICAL EXAMINATION AT A HEALTH CARE FACILITY: Primary | ICD-10-CM

## 2024-01-25 DIAGNOSIS — N18.31 TYPE 2 DIABETES MELLITUS WITH STAGE 3A CHRONIC KIDNEY DISEASE, WITH LONG-TERM CURRENT USE OF INSULIN: ICD-10-CM

## 2024-01-25 DIAGNOSIS — Z12.11 ENCOUNTER FOR SCREENING FOR MALIGNANT NEOPLASM OF COLON: ICD-10-CM

## 2024-01-25 DIAGNOSIS — Z79.4 TYPE 2 DIABETES MELLITUS WITH DIABETIC POLYNEUROPATHY, WITH LONG-TERM CURRENT USE OF INSULIN: ICD-10-CM

## 2024-01-25 DIAGNOSIS — E11.42 TYPE 2 DIABETES MELLITUS WITH DIABETIC POLYNEUROPATHY, WITH LONG-TERM CURRENT USE OF INSULIN: ICD-10-CM

## 2024-01-25 DIAGNOSIS — E11.22 TYPE 2 DIABETES MELLITUS WITH STAGE 3A CHRONIC KIDNEY DISEASE, WITH LONG-TERM CURRENT USE OF INSULIN: ICD-10-CM

## 2024-01-25 DIAGNOSIS — E08.65 DIABETES MELLITUS DUE TO UNDERLYING CONDITION WITH HYPERGLYCEMIA, WITH LONG-TERM CURRENT USE OF INSULIN: ICD-10-CM

## 2024-01-25 DIAGNOSIS — Z79.4 DIABETES MELLITUS DUE TO UNDERLYING CONDITION WITH HYPERGLYCEMIA, WITH LONG-TERM CURRENT USE OF INSULIN: ICD-10-CM

## 2024-01-25 DIAGNOSIS — B20 HIV DISEASE: ICD-10-CM

## 2024-01-25 DIAGNOSIS — Z87.891 HISTORY OF SMOKING: ICD-10-CM

## 2024-01-25 DIAGNOSIS — Z79.4 TYPE 2 DIABETES MELLITUS WITH STAGE 3A CHRONIC KIDNEY DISEASE, WITH LONG-TERM CURRENT USE OF INSULIN: ICD-10-CM

## 2024-01-25 DIAGNOSIS — G47.00 INSOMNIA, UNSPECIFIED TYPE: ICD-10-CM

## 2024-01-25 PROCEDURE — 3288F FALL RISK ASSESSMENT DOCD: CPT | Mod: HCNC,CPTII,S$GLB, | Performed by: STUDENT IN AN ORGANIZED HEALTH CARE EDUCATION/TRAINING PROGRAM

## 2024-01-25 PROCEDURE — 3078F DIAST BP <80 MM HG: CPT | Mod: HCNC,CPTII,S$GLB, | Performed by: STUDENT IN AN ORGANIZED HEALTH CARE EDUCATION/TRAINING PROGRAM

## 2024-01-25 PROCEDURE — 3075F SYST BP GE 130 - 139MM HG: CPT | Mod: HCNC,CPTII,S$GLB, | Performed by: STUDENT IN AN ORGANIZED HEALTH CARE EDUCATION/TRAINING PROGRAM

## 2024-01-25 PROCEDURE — 99213 OFFICE O/P EST LOW 20 MIN: CPT | Mod: HCNC,S$GLB,, | Performed by: STUDENT IN AN ORGANIZED HEALTH CARE EDUCATION/TRAINING PROGRAM

## 2024-01-25 PROCEDURE — 3008F BODY MASS INDEX DOCD: CPT | Mod: HCNC,CPTII,S$GLB, | Performed by: STUDENT IN AN ORGANIZED HEALTH CARE EDUCATION/TRAINING PROGRAM

## 2024-01-25 PROCEDURE — G2211 COMPLEX E/M VISIT ADD ON: HCPCS | Mod: HCNC,S$GLB,, | Performed by: STUDENT IN AN ORGANIZED HEALTH CARE EDUCATION/TRAINING PROGRAM

## 2024-01-25 PROCEDURE — 1159F MED LIST DOCD IN RCRD: CPT | Mod: HCNC,CPTII,S$GLB, | Performed by: STUDENT IN AN ORGANIZED HEALTH CARE EDUCATION/TRAINING PROGRAM

## 2024-01-25 PROCEDURE — 99999 PR PBB SHADOW E&M-EST. PATIENT-LVL V: CPT | Mod: PBBFAC,HCNC,, | Performed by: STUDENT IN AN ORGANIZED HEALTH CARE EDUCATION/TRAINING PROGRAM

## 2024-01-25 PROCEDURE — 1160F RVW MEDS BY RX/DR IN RCRD: CPT | Mod: HCNC,CPTII,S$GLB, | Performed by: STUDENT IN AN ORGANIZED HEALTH CARE EDUCATION/TRAINING PROGRAM

## 2024-01-25 PROCEDURE — 1126F AMNT PAIN NOTED NONE PRSNT: CPT | Mod: HCNC,CPTII,S$GLB, | Performed by: STUDENT IN AN ORGANIZED HEALTH CARE EDUCATION/TRAINING PROGRAM

## 2024-01-25 PROCEDURE — 1101F PT FALLS ASSESS-DOCD LE1/YR: CPT | Mod: HCNC,CPTII,S$GLB, | Performed by: STUDENT IN AN ORGANIZED HEALTH CARE EDUCATION/TRAINING PROGRAM

## 2024-01-25 RX ORDER — ZOLPIDEM TARTRATE 5 MG/1
TABLET ORAL
Qty: 30 TABLET | Refills: 5 | Status: SHIPPED | OUTPATIENT
Start: 2024-01-25

## 2024-01-25 NOTE — PROGRESS NOTES
[facility] NOTE    Patient Name: Brandon Parson  YOB: 1950    PRESENTING HISTORY     History of Present Illness:  Mr. Brandon Parson is a 73 y.o. male here for routine f/u.     HIV- seeing ID, last viral load 120.    T2DM- usually running aroun 150s. No low BG. Has endocrine f/u.     Typical diet: banana 1/2 this AM.    Eats a lot of vegetables includin gpotatoes, cauliflower.    Cut out bread.    High beans. Lot of nuts.     Smoked from age 25 until 2 years ago.   About 1/2 ppd.   We discussed the risks and benefits of CT screening including the risk of incidental nodules and increased procedures. He understands and agrees to proceed.     Discussed routine cancer screenings.     Chronic ambien- needs to sleep. Wants to continue despite risk. Multiple other sedating medications have been stopped.     ROS      OBJECTIVE:   Vital Signs:  Vitals:    01/25/24 1103   BP: 130/72   Pulse: 67   SpO2: 97%   Weight: 79.9 kg (176 lb 2.4 oz)          Physical Exam: Normal, no change.     Physical Exam    ASSESSMENT & PLAN:     Routine general medical examination at a health care facility    Insomnia, unspecified type  -     zolpidem (AMBIEN) 5 MG Tab; TAKE ONE TABLET BY MOUTH EVERY NIGHT AS NEEDED  Dispense: 30 tablet; Refill: 5    Encounter for screening for malignant neoplasm of colon  -     Case Request Endoscopy: COLONOSCOPY    History of smoking  -     US Abdominal Aorta; Future; Expected date: 01/25/2024    HIV disease  Stable, continue current medications    Type 2 diabetes mellitus with diabetic polyneuropathy, with long-term current use of insulin  Will not adjust meds, wait for endocrine eval.     Type 2 diabetes mellitus with stage 3a chronic kidney disease, with long-term current use of insulin  See above    Diabetes mellitus due to underlying condition with hyperglycemia, with long-term current use of insulin  See above.            Francois Eugene  Internal Medicine    Visit  complexity inherent to evaluation and management associated with medical care services that serve as the continuing focal point for all needed health care services and/or with medical care services that are part of ongoing care related to a patient's single, serious condition or a complex condition provided today

## 2024-01-25 NOTE — PATIENT INSTRUCTIONS
Chiki Taylor,     If you are due for any health screening(s) below please notify me so we can arrange them to be ordered and scheduled to maintain your health. Most healthy patients complete it. Don't lose out on improving your health.     Tests to Keep You Healthy    Eye Exam: Met on 2/2/2023  Colon Cancer Screening: ORDERED BUT NOT SCHEDULED  Last HbA1c < 8 (12/04/2023): NO         Colon Cancer Screening    Of cancers affecting both men and women, colorectal cancer is the third leading cancer killer in the [place]. But it doesnt have to be. Screening can prevent colorectal cancer or find it at an early stage when treatment often leads to a cure.    A colonoscopy is the preferred test for detecting colon cancer. It is needed only once every 10 years if results are negative. While sedated, a flexible, lighted tube with a tiny camera is inserted into the rectum and advanced through the colon to look for cancers. An alternative screening test that is used at home and returned to the lab may also be used. It detects hidden blood in bowel movements which could indicate cancer in the colon. If results are positive, you will need a colonoscopy to determine if the blood is a sign of cancer. This type of follow up (diagnostic) colonoscopy usually requires additional copays as required by your insurance provider. Please contact your PCP if you have any questions.              A1c every 3-6 months, lipid panel every year, microalbumin (urine test) once per year, comprehensive foot exam once per year, dilated eye exam at least once per year, dental exam every 6 months, flu vaccination every year, and pneumonia vaccination(s) as recommended

## 2024-01-31 ENCOUNTER — TELEPHONE (OUTPATIENT)
Dept: ADMINISTRATIVE | Facility: CLINIC | Age: 74
End: 2024-01-31
Payer: MEDICARE

## 2024-01-31 ENCOUNTER — HOSPITAL ENCOUNTER (OUTPATIENT)
Dept: RADIOLOGY | Facility: HOSPITAL | Age: 74
Discharge: HOME | End: 2024-01-31
Attending: STUDENT IN AN ORGANIZED HEALTH CARE EDUCATION/TRAINING PROGRAM
Payer: MEDICARE

## 2024-01-31 ENCOUNTER — TELEPHONE (OUTPATIENT)
Dept: FAMILY MEDICINE | Facility: CLINIC | Age: 74
End: 2024-01-31
Payer: MEDICARE

## 2024-01-31 DIAGNOSIS — Z87.891 HISTORY OF SMOKING: ICD-10-CM

## 2024-01-31 PROCEDURE — 76775 US EXAM ABDO BACK WALL LIM: CPT | Mod: TC

## 2024-01-31 PROCEDURE — 76706 US ABDL AORTA SCREEN AAA: CPT | Mod: 26,,, | Performed by: RADIOLOGY

## 2024-01-31 NOTE — TELEPHONE ENCOUNTER
----- Message from Francois Eugene MD sent at 1/31/2024 12:48 PM CST -----  No aneurysm, nothing more to do.

## 2024-01-31 NOTE — TELEPHONE ENCOUNTER
Patient returned call to office. Call transferred directly to writer per patient access representative Cas Dumont.  Reviewed results with patient who verbalized understanding.

## 2024-01-31 NOTE — TELEPHONE ENCOUNTER
Call placed to patient at contact number [phone].  No answer received.  Left voicemail requesting return call to office.  Call placed to patient at contact number [phone], call answered by patient's relative who stated patient was unavailable at time of call.  Left message with patient's relative requesting for patient to return call to office.

## 2024-02-02 ENCOUNTER — TELEPHONE (OUTPATIENT)
Dept: FAMILY MEDICINE | Facility: CLINIC | Age: 74
End: 2024-02-02
Payer: MEDICARE

## 2024-02-02 NOTE — TELEPHONE ENCOUNTER
Call placed to patient. No answer received. Left message requesting return call to office.    [phone] --no answer, left message   [phone] --no answer, left message

## 2024-02-02 NOTE — TELEPHONE ENCOUNTER
Marlene Eric Staff     ----- Message from Marlene Eric sent at 2/2/2024  8:21 AM CST -----  Type:  Patient Returning Call    Who Called:pt     Who Left Message for Patient:Cassandra Green    Does the patient know what this is regarding?:yes     Would the patient rather a call back or a response via [facility]? Callback     Best Call Back Number:[phone] (home) [phone] (work)      Additional Information:  please advise thank you

## 2024-02-06 ENCOUNTER — TELEPHONE (OUTPATIENT)
Dept: GASTROENTEROLOGY | Facility: CLINIC | Age: 74
End: 2024-02-06
Payer: MEDICARE

## 2024-02-06 NOTE — TELEPHONE ENCOUNTER
Left message for patient to call office to schedule colonoscopy. No active Portal no message sent. Letter mailed.

## 2024-02-07 LAB
LEFT EYE DM RETINOPATHY: NEGATIVE
RIGHT EYE DM RETINOPATHY: NEGATIVE

## 2024-02-16 DIAGNOSIS — E11.9 TYPE 2 DIABETES MELLITUS WITHOUT COMPLICATION, UNSPECIFIED WHETHER LONG TERM INSULIN USE: ICD-10-CM

## 2024-02-19 ENCOUNTER — TELEPHONE (OUTPATIENT)
Dept: GASTROENTEROLOGY | Facility: CLINIC | Age: 74
End: 2024-02-19
Payer: MEDICARE

## 2024-02-19 NOTE — TELEPHONE ENCOUNTER
Colonoscopy 4/30 at 930am arrive for 830am  instructions reviewed and patient states understanding. Copy mailed and address verified patient states he is not taking the trulicity at this time unable to get it but will hold if gets back on it.

## 2024-02-20 ENCOUNTER — TELEPHONE (OUTPATIENT)
Dept: ADMINISTRATIVE | Facility: CLINIC | Age: 74
End: 2024-02-20
Payer: MEDICARE

## 2024-02-21 ENCOUNTER — OFFICE VISIT (OUTPATIENT)
Dept: FAMILY MEDICINE | Facility: CLINIC | Age: 74
End: 2024-02-21
Payer: MEDICARE

## 2024-02-21 VITALS
OXYGEN SATURATION: 96 % | BODY MASS INDEX: 24.42 KG/M2 | HEART RATE: 74 BPM | RESPIRATION RATE: 15 BRPM | TEMPERATURE: 98 F | HEIGHT: 72 IN | DIASTOLIC BLOOD PRESSURE: 74 MMHG | WEIGHT: 180.31 LBS | SYSTOLIC BLOOD PRESSURE: 126 MMHG

## 2024-02-21 DIAGNOSIS — Z79.4 TYPE 2 DIABETES MELLITUS WITH STAGE 3A CHRONIC KIDNEY DISEASE, WITH LONG-TERM CURRENT USE OF INSULIN: ICD-10-CM

## 2024-02-21 DIAGNOSIS — E11.69 HYPERLIPIDEMIA ASSOCIATED WITH TYPE 2 DIABETES MELLITUS: ICD-10-CM

## 2024-02-21 DIAGNOSIS — D64.9 ANEMIA, UNSPECIFIED TYPE: ICD-10-CM

## 2024-02-21 DIAGNOSIS — Z00.00 ENCOUNTER FOR PREVENTIVE HEALTH EXAMINATION: Primary | ICD-10-CM

## 2024-02-21 DIAGNOSIS — I70.0 AORTIC ATHEROSCLEROSIS: ICD-10-CM

## 2024-02-21 DIAGNOSIS — E11.22 TYPE 2 DIABETES MELLITUS WITH STAGE 3A CHRONIC KIDNEY DISEASE, WITH LONG-TERM CURRENT USE OF INSULIN: ICD-10-CM

## 2024-02-21 DIAGNOSIS — N18.31 TYPE 2 DIABETES MELLITUS WITH STAGE 3A CHRONIC KIDNEY DISEASE, WITH LONG-TERM CURRENT USE OF INSULIN: ICD-10-CM

## 2024-02-21 DIAGNOSIS — Z79.4 TYPE 2 DIABETES MELLITUS WITH DIABETIC POLYNEUROPATHY, WITH LONG-TERM CURRENT USE OF INSULIN: ICD-10-CM

## 2024-02-21 DIAGNOSIS — Z12.11 COLON CANCER SCREENING: ICD-10-CM

## 2024-02-21 DIAGNOSIS — D69.2 SENILE PURPURA: ICD-10-CM

## 2024-02-21 DIAGNOSIS — E11.42 TYPE 2 DIABETES MELLITUS WITH DIABETIC POLYNEUROPATHY, WITH LONG-TERM CURRENT USE OF INSULIN: ICD-10-CM

## 2024-02-21 DIAGNOSIS — Z85.828 HISTORY OF SKIN CANCER IN ADULTHOOD: ICD-10-CM

## 2024-02-21 DIAGNOSIS — E78.5 HYPERLIPIDEMIA ASSOCIATED WITH TYPE 2 DIABETES MELLITUS: ICD-10-CM

## 2024-02-21 DIAGNOSIS — B20 HIV DISEASE: ICD-10-CM

## 2024-02-21 PROCEDURE — 99999 PR PBB SHADOW E&M-EST. PATIENT-LVL V: CPT | Mod: PBBFAC,HCNC,,

## 2024-02-21 PROCEDURE — G0439 PPPS, SUBSEQ VISIT: HCPCS | Mod: HCNC,S$GLB,,

## 2024-02-21 PROCEDURE — 1160F RVW MEDS BY RX/DR IN RCRD: CPT | Mod: HCNC,CPTII,S$GLB,

## 2024-02-21 PROCEDURE — 3074F SYST BP LT 130 MM HG: CPT | Mod: HCNC,CPTII,S$GLB,

## 2024-02-21 PROCEDURE — 1170F FXNL STATUS ASSESSED: CPT | Mod: HCNC,CPTII,S$GLB,

## 2024-02-21 PROCEDURE — 1126F AMNT PAIN NOTED NONE PRSNT: CPT | Mod: HCNC,CPTII,S$GLB,

## 2024-02-21 PROCEDURE — 1159F MED LIST DOCD IN RCRD: CPT | Mod: HCNC,CPTII,S$GLB,

## 2024-02-21 PROCEDURE — G9919 SCRN ND POS ND PROV OF REC: HCPCS | Mod: HCNC,CPTII,S$GLB,

## 2024-02-21 PROCEDURE — 1101F PT FALLS ASSESS-DOCD LE1/YR: CPT | Mod: HCNC,CPTII,S$GLB,

## 2024-02-21 PROCEDURE — 3078F DIAST BP <80 MM HG: CPT | Mod: HCNC,CPTII,S$GLB,

## 2024-02-21 PROCEDURE — 3288F FALL RISK ASSESSMENT DOCD: CPT | Mod: HCNC,CPTII,S$GLB,

## 2024-02-21 NOTE — PATIENT INSTRUCTIONS
Chiki Taylor,     If you are due for any health screening(s) below please notify me so we can arrange them to be ordered and scheduled to maintain your health. Most healthy patients complete it. Don't lose out on improving your health.     Tests to Keep You Healthy    Eye Exam: ORDERED BUT NOT SCHEDULED  Colon Cancer Screening: SCHEDULED  Last HbA1c < 8 (12/04/2023): NO         Colon Cancer Screening    Of cancers affecting both men and women, colorectal cancer is the third leading cancer killer in the [place]. But it doesnt have to be. Screening can prevent colorectal cancer or find it at an early stage when treatment often leads to a cure.    A colonoscopy is the preferred test for detecting colon cancer. It is needed only once every 10 years if results are negative. While sedated, a flexible, lighted tube with a tiny camera is inserted into the rectum and advanced through the colon to look for cancers. An alternative screening test that is used at home and returned to the lab may also be used. It detects hidden blood in bowel movements which could indicate cancer in the colon. If results are positive, you will need a colonoscopy to determine if the blood is a sign of cancer. This type of follow up (diagnostic) colonoscopy usually requires additional copays as required by your insurance provider. Please contact your PCP if you have any questions.         Diabetic Retinal Eye Exam    Diabetes is the #1 cause of blindness in the [place] - early detection before signs or symptoms develop can prevent debilitating blindness.    Once-a-year screening is recommended for all diabetic patients. This exam can prevent and treat diabetes complications in the eye before developing symptoms. This can be done with a special camera is used to take photographs of the back of your eye without having to dilate them, or you can see an eye doctor for a full dilated exam.      A1c every 3-6 months, lipid panel every year, microalbumin  (urine test) once per year, comprehensive foot exam once per year, dilated eye exam at least once per year, dental exam every 6 months, flu vaccination every year, and pneumonia vaccination(s) as recommended      Counseling and Referral of Other Preventative  (Italic type indicates deductible and co-insurance are waived)    Patient Name: Brandon Parson  Today's Date: 2/21/2024    Health Maintenance       Date Due Completion Date    RSV Vaccine (Age 60+ and Pregnant patients) (1 - 1-dose 60+ series) Never done ---    Colorectal Cancer Screening 11/03/2015 11/3/2005    COVID-19 Vaccine (3 - 2023-24 season) 09/01/2023 2/4/2021    Eye Exam 02/02/2024 2/2/2023    Foot Exam 02/27/2024 2/27/2023    Hemoglobin A1c 03/04/2024 12/4/2023    Diabetes Urine Screening 09/12/2024 9/12/2023    Lipid Panel 09/12/2024 9/12/2023    Low Dose Statin 01/25/2025 1/25/2024    TETANUS VACCINE 01/25/2026 1/25/2016        No orders of the defined types were placed in this encounter.      The following information is provided to all patients.  This information is to help you find resources for any of the problems found today that may be affecting your health:                  Living healthy guide: www.[facility].[place].gov      Understanding Diabetes: www.diabetes.org      Eating healthy: www.cdc.gov/healthyweight      CDC home safety checklist: www.cdc.gov/steadi/patient.html      Agency on Aging: www.goea.[place].[facility]      Alcoholics anonymous (AA): www.aa.org      Physical Activity: www.yo.nih.gov/ie5ycwd      Tobacco use: www.quitwithusla.org

## 2024-02-21 NOTE — PROGRESS NOTES
"  Brandon Parson presented for a  Medicare AWV and comprehensive Health Risk Assessment today. The following components were reviewed and updated:    Medical history  Family History  Social history  Allergies and Current Medications  Health Risk Assessment  Health Maintenance  Care Team         ** See Completed Assessments for Annual Wellness Visit within the encounter summary.**         The following assessments were completed:  Living Situation  CAGE  Depression Screening  Timed Get Up and Go  Whisper Test  Cognitive Function Screening  Nutrition Screening  ADL Screening  PAQ Screening    Clock in Media        Review for Opioid Screening: Pt does not have Rx for Opioids      Review for Substance Use Disorders: Patient does not use substance        Current Outpatient Medications:     ascorbic acid, vitamin C, (VITAMIN C) 100 MG tablet, Take 100 mg by mouth once daily., Disp: , Rfl:     aspirin 325 MG tablet, Take 1 tablet (325 mg total) by mouth once daily., Disp: 30 tablet, Rfl: 0    atorvastatin (LIPITOR) 40 MG tablet, TAKE ONE TABLET BY MOUTH DAILY, Disp: 90 tablet, Rfl: 2    b complex vitamins capsule, Take 1 capsule by mouth once daily., Disp: , Rfl:     BD ULTRA-FINE MINI PEN NEEDLE 31 gauge x 3/16" Ndle, USE TWICE DAILY, Disp: 200 each, Rfl: 3    blood-glucose meter Misc, USE TO TEST BLOOD SUGAR 2 TO 3 TIMES DAILY, Disp: , Rfl:     dulaglutide (TRULICITY) 4.5 mg/0.5 mL pen injector, Inject 4.5 mg into the skin every 7 days., Disp: 4 pen, Rfl: 11    efavirenz (SUSTIVA) 600 mg Tab, Take 1 tablet (600 mg total) by mouth every evening., Disp: 30 tablet, Rfl: 5    emtricitabine-tenofovir alafen (DESCOVY) 200-25 mg Tab, Take 1 tablet by mouth every evening., Disp: 30 tablet, Rfl: 5    fenofibrate (TRICOR) 145 MG tablet, Take 145 mg by mouth once daily., Disp: , Rfl:     fenofibrate micronized (LOFIBRA) 134 MG Cap, Take 1 capsule (134 mg total) by mouth once daily., Disp: 90 capsule, Rfl: 2    fish oil-omega-3 fatty " acids 300-1,000 mg capsule, Take 2 capsules by mouth once daily., Disp: , Rfl:     HIGH POTENCY MULTIVITAMIN 400 mcg Tab, Take 1 tablet by mouth once daily., Disp: , Rfl:     insulin aspart protamine-insulin aspart (NOVOLOG MIX 70-30FLEXPEN U-100) 100 unit/mL (70-30) InPn pen, INJECT 16 UNITS breakfast and 20 u supper, Disp: 15 mL, Rfl: 12    metFORMIN (GLUCOPHAGE) 500 MG tablet, TAKE 1 TABLET BY MOUTH TWICE DAILY WITH MEALS, Disp: 180 tablet, Rfl: 0    ondansetron (ZOFRAN-ODT) 4 MG TbDL, Take 1 tablet (4 mg total) by mouth every 6 (six) hours as needed (nv)., Disp: 30 tablet, Rfl: 0    potassium chloride SA (K-DUR,KLOR-CON) 20 MEQ tablet, Take 1 tablet (20 mEq total) by mouth 2 (two) times daily. (Patient not taking: Reported on 1/25/2024), Disp: 30 tablet, Rfl: 1    sertraline (ZOLOFT) 100 MG tablet, Take 1 tablet (100 mg total) by mouth once daily., Disp: 90 tablet, Rfl: 2    TRUE METRIX GLUCOSE TEST STRIP Strp, USE TWICE DAILY, Disp: 100 strip, Rfl: 11    TRUEPLUS LANCETS 30 gauge Misc, USE TWICE DAILY TO TEST BLOOD SUGAR, Disp: , Rfl:     zolpidem (AMBIEN) 5 MG Tab, TAKE ONE TABLET BY MOUTH EVERY NIGHT AS NEEDED, Disp: 30 tablet, Rfl: 5       Vitals:    02/21/24 1337   BP: 126/74   Pulse: 74   Resp: 15   Temp: 97.7 °F (36.5 °C)   TempSrc: Oral   SpO2: 96%   Weight: 81.8 kg (180 lb 5.4 oz)   Height: 6' (1.829 m)   PainSc: 0-No pain      Physical Exam  Vitals reviewed.   Constitutional:       General: He is not in acute distress.     Appearance: Normal appearance. He is normal weight. He is not ill-appearing, toxic-appearing or diaphoretic.   HENT:      Head: Normocephalic.      Right Ear: External ear normal.      Left Ear: External ear normal.      Nose: Nose normal. No congestion or rhinorrhea.      Mouth/Throat:      Mouth: Mucous membranes are moist.      Pharynx: Oropharynx is clear.   Eyes:      General: No scleral icterus.        Right eye: No discharge.         Left eye: No discharge.      Extraocular  Movements: Extraocular movements intact.      Conjunctiva/sclera: Conjunctivae normal.   Cardiovascular:      Rate and Rhythm: Normal rate.   Pulmonary:      Effort: Pulmonary effort is normal. No respiratory distress.   Musculoskeletal:         General: No swelling, tenderness or deformity. Normal range of motion.      Cervical back: Normal range of motion.      Right lower leg: No edema.      Left lower leg: No edema.   Skin:     General: Skin is warm and dry.      Capillary Refill: Capillary refill takes less than 2 seconds.      Coloration: Skin is not jaundiced.      Findings: Bruising present. No erythema, lesion or rash.   Neurological:      Mental Status: He is alert and oriented to person, place, and time.      Gait: Gait normal.   Psychiatric:         Mood and Affect: Mood normal.         Behavior: Behavior normal.         Thought Content: Thought content normal.         Judgment: Judgment normal.         Protective Sensation (w/ 10 gram monofilament):  Right: Intact  Left: Intact    Visual Inspection:  Normal -  Bilateral    Pedal Pulses:   Right: Present  Left: Present    Posterior Tibialis Pulses:   Right:Present  Left: Present        Diagnoses and health risks identified today and associated recommendations/orders:    1. Encounter for preventive health examination  Discussed health maintenance guidelines appropriate for age.    - PTH, intact; Future    2. Type 2 diabetes mellitus with stage 3a chronic kidney disease, with long-term current use of insulin        -    Stable. Continue meds. Will continue to monitor.       3. Type 2 diabetes mellitus with diabetic polyneuropathy, with long-term current use of insulin        -    Stable. Continue meds. Will continue to monitor.       4. Aortic atherosclerosis        -    Stable. Continue meds. Will continue to monitor.       5. HIV disease        -   Follows with ID routinely. Continue meds. Will continue to monitor.       6. Hyperlipidemia associated with  type 2 diabetes mellitus        -    Stable. Continue meds. Will continue to monitor.       7. Anemia, unspecified type        -    Stable. Will continue to monitor.       8. Colon cancer screening       -     Scheduled for April with Dr. Mejias     9. Senile purpura        -    Stable. Will continue to monitor.       10. History of skin cancer in adulthood        -   Established with derm in [place]       Provided Brandon with a 5-10 year written screening schedule and personal prevention plan. Recommendations were developed using the USPSTF age appropriate recommendations. Education, counseling, and referrals were provided as needed. After Visit Summary printed and given to patient which includes a list of additional screenings\tests needed.    Follow up in one year for Annual Wellness Visit.      Arsen Louis PA-C  Family Medicine Physician Assistant       Arsen Louis PA-C    Advance Care Planning       I offered to discuss advanced care planning, including how to pick a person who would make decisions for you if you were unable to make them for yourself, called a health care power of , and what kind of decisions you might make such as use of life sustaining treatments such as ventilators and tube feeding when faced with a life limiting illness recorded on a living will that they will need to know. (How you want to be cared for as you near the end of your natural life)     X  Patient has advanced directive written but has opted not to place them on file with the institution.

## 2024-02-22 ENCOUNTER — PATIENT OUTREACH (OUTPATIENT)
Dept: ADMINISTRATIVE | Facility: HOSPITAL | Age: 74
End: 2024-02-22
Payer: MEDICARE

## 2024-02-22 NOTE — PROGRESS NOTES
Population Health Chart Review & Patient Outreach Details    Outreach Performed: NO    Additional Pop Health Notes:           Updates Requested / Reviewed:      Updated Care Coordination Note         Health Maintenance Topics Overdue:    Health Maintenance Due   Topic Date Due    RSV Vaccine (Age 60+ and Pregnant patients) (1 - 1-dose 60+ series) Never done    Colorectal Cancer Screening  11/03/2015    COVID-19 Vaccine (3 - 2023-24 season) 09/01/2023    Eye Exam  02/02/2024    Hemoglobin A1c  03/04/2024         Health Maintenance Topic(s) Outreach Outcomes & Actions Taken:    Eye Exam - Outreach Outcomes & Actions Taken  : External Records Requested & Care Team Updated if Applicable

## 2024-02-22 NOTE — LETTER
FAX      AUTHORIZATION FOR RELEASE OF   CONFIDENTIAL INFORMATION        Dear Dr Colbert,      We are seeing Brandon Parson, date of birth 1950, in the clinic at [facility]. Francois Eugene MD is the patient's PCP. Brandon Parson has an outstanding lab/procedure at the time we reviewed their chart. In order to help keep their health information updated, Brandon Parson has authorized us to request the following medical record(s):     ()  MAMMOGRAM (WITHIN 2 YRS)  ()  COLON/PATH W/RECALL (WITHIN 10 YRS)     ()  PAP SMEAR (WITHIN 3 YRS)      ()  OUTSIDE LAB RESULTS    ()  DEXA SCAN (WITHIN 3 YRS)      (x) DM EYE EXAM (WITHIN 48 MONTHS)          ()  FOOT EXAM (WITHIN 1yr.)          () OUTSIDE IMMUNIZATIONS    ()  OTHER                                   Please fax records to [facility] [phone].     If you have any questions, please contact Fanny at [phone]                Patient Name: Brandon Parson  : 1950  Patient Phone #: [phone]

## 2024-02-27 ENCOUNTER — PATIENT OUTREACH (OUTPATIENT)
Dept: ADMINISTRATIVE | Facility: HOSPITAL | Age: 74
End: 2024-02-27
Payer: MEDICARE

## 2024-02-27 NOTE — PROGRESS NOTES
Population Health Chart Review & Patient Outreach Details    Outreach Performed: NO    Additional Pop Health Notes:           Updates Requested / Reviewed:      Updated Care Coordination Note, , and Care Team Updated         Health Maintenance Topics Overdue:    Health Maintenance Due   Topic Date Due    RSV Vaccine (Age 60+ and Pregnant patients) (1 - 1-dose 60+ series) Never done    Colorectal Cancer Screening  11/03/2015    COVID-19 Vaccine (3 - 2023-24 season) 09/01/2023    Eye Exam  02/02/2024    Hemoglobin A1c  03/04/2024         Health Maintenance Topic(s) Outreach Outcomes & Actions Taken:    Eye Exam - Outreach Outcomes & Actions Taken  : Diabetic Eye External Records Uploaded, Care Team & History Updated if Applicable

## 2024-03-11 ENCOUNTER — LAB VISIT (OUTPATIENT)
Dept: LAB | Facility: HOSPITAL | Age: 74
End: 2024-03-11
Attending: NURSE PRACTITIONER
Payer: MEDICARE

## 2024-03-11 DIAGNOSIS — Z79.4 TYPE 2 DIABETES MELLITUS WITH DIABETIC POLYNEUROPATHY, WITH LONG-TERM CURRENT USE OF INSULIN: ICD-10-CM

## 2024-03-11 DIAGNOSIS — Z00.00 ENCOUNTER FOR PREVENTIVE HEALTH EXAMINATION: ICD-10-CM

## 2024-03-11 DIAGNOSIS — E11.42 TYPE 2 DIABETES MELLITUS WITH DIABETIC POLYNEUROPATHY, WITH LONG-TERM CURRENT USE OF INSULIN: ICD-10-CM

## 2024-03-11 LAB
ESTIMATED AVG GLUCOSE: 189 MG/DL (ref 68–131)
HBA1C MFR BLD: 8.2 % (ref 4–5.6)
PTH-INTACT SERPL-MCNC: 23.1 PG/ML (ref 9–77)

## 2024-03-11 PROCEDURE — 36415 COLL VENOUS BLD VENIPUNCTURE: CPT | Mod: HCNC,PO | Performed by: NURSE PRACTITIONER

## 2024-03-11 PROCEDURE — 83970 ASSAY OF PARATHORMONE: CPT | Mod: HCNC

## 2024-03-11 PROCEDURE — 83036 HEMOGLOBIN GLYCOSYLATED A1C: CPT | Mod: HCNC | Performed by: NURSE PRACTITIONER

## 2024-03-11 PROCEDURE — 82985 ASSAY OF GLYCATED PROTEIN: CPT | Mod: HCNC | Performed by: NURSE PRACTITIONER

## 2024-03-14 LAB — FRUCTOSAMINE SERPL-SCNC: 368 UMOL /L

## 2024-03-15 ENCOUNTER — TELEPHONE (OUTPATIENT)
Dept: FAMILY MEDICINE | Facility: CLINIC | Age: 74
End: 2024-03-15
Payer: MEDICARE

## 2024-03-15 NOTE — TELEPHONE ENCOUNTER
Patient returned call to office. Call transferred directly to writer per patient access representative Kayleigh Villa. Reviewed attached results with patient who verbally indicated understanding.

## 2024-03-15 NOTE — PROGRESS NOTES
CC: This 73 y.o. male presents for management of diabetes  and chronic conditions pending review including  HLP, HIV, DM PN    HPI: He was diagnosed with T2DM in ~ 2010. Has never been hospitalized r/t DM.  Family hx of DM: mother     Since last visit off trulicity for 2 months, resumed in March    Checking his bg fasting 147-219s  Pre-supper 140s-200s  No hypoglycemia     Diet: Eats 2- 3  Meals a day, snacks- cookie or vanilla wafer during the day  Exercise: walks his dog - 3 times a day for ~ 20-30 mins, working in the yard   CURRENT DM MEDS:   metformin 500 mg bid, Novolog 70/30 16 u breakfast and 18u supper u bid, Trulicity 4.5 mg weekly  (Monday)  Standards of Care:  Eye exam: 2/2024 Dr Parker     ROS:   Gen: Appetite good,  weight loss 2 lbs  Eyes: Denies visual disturbances  Resp: no SOB or TRIANA   Cardiac: No palpitations, chest pain   GI: No nausea or vomiting, diarrhea, constipation   /GYN: 2+ nocturia, no burning or pain.   MS/Neuro: Denies numbness/ tingling in BLE; Gait steady, speech clear  Psych: Denies drug/ETOH abuse, + h/o depression.  Other systems: negative.    PE:  GENERAL: Well developed, well nourished.  PSYCH: AAOx3, appropriate mood and affect, pleasant expression, conversant, appears relaxed, well groomed.   EYES: Conjunctiva, corneas clear  NECK: Supple, trachea midline   ABDOMEN: Soft, non-tender, non-distended   NEURO: Gait steady  FOOT EXAMINATION: 2/2024     Personally reviewed Past Medical, Surgical, Social History.    BP (!) 140/82 (BP Location: Left arm, Patient Position: Sitting, BP Method: Medium (Manual))   Pulse 61   Ht 6' (1.829 m)   Wt 81.1 kg (178 lb 14.5 oz)   SpO2 100%   BMI 24.26 kg/m²      Personally reviewed the below labs:      Chemistry        Component Value Date/Time     09/12/2023 0821    K 4.4 09/12/2023 0821     09/12/2023 0821    CO2 19 (L) 09/12/2023 0821    BUN 23 09/12/2023 0821    CREATININE 1.5 (H) 09/12/2023 0821     (H) 09/12/2023  0821        Component Value Date/Time    CALCIUM 9.7 09/12/2023 0821    ALKPHOS 51 (L) 09/12/2023 0821    AST 23 09/12/2023 0821    ALT 21 09/12/2023 0821    BILITOT 0.3 09/12/2023 0821    ESTGFRAFRICA 58 (A) 07/29/2022 0934    EGFRNONAA 50 (A) 07/29/2022 0934            Lab Results   Component Value Date    TSH 2.618 12/15/2022       Recent Labs   Lab 09/12/23  0821   LDL Cholesterol 83.6   HDL 34 L   Cholesterol 148        Results for orders placed or performed in visit on 09/08/22   Vitamin D   Result Value Ref Range    Vit D, 25-Hydroxy 98 (H) 30 - 96 ng/mL     No results found for this or any previous visit.    Lab Results   Component Value Date    MICALBCREAT 97.1 (H) 09/12/2023       Hemoglobin A1C   Date Value Ref Range Status   03/11/2024 8.2 (H) 4.0 - 5.6 % Final     Comment:     ADA Screening Guidelines:  5.7-6.4%  Consistent with prediabetes  >or=6.5%  Consistent with diabetes    High levels of fetal hemoglobin interfere with the HbA1C  assay. Heterozygous hemoglobin variants (HbS, HgC, etc)do  not significantly interfere with this assay.   However, presence of multiple variants may affect accuracy.     12/04/2023 9.0 (H) 4.5 - 6.2 % Final     Comment:     According to ADA guidelines, hemoglobin A1C <7.0% represents  optimal control in non-pregnant diabetic patients.  Different  metrics may apply to specific populations.   Standards of Medical Care in Diabetes - 2016.    For the purpose of screening for the presence of diabetes:  <5.7%     Consistent with the absence of diabetes  5.7-6.4%  Consistent with increasing risk for diabetes   (prediabetes)  >or=6.5%  Consistent with diabetes    Currently no consensus exists for use of hemoglobin A1C  for diagnosis of diabetes for children.     09/12/2023 10.5 (H) 4.0 - 5.6 % Final     Comment:     ADA Screening Guidelines:  5.7-6.4%  Consistent with prediabetes  >or=6.5%  Consistent with diabetes    High levels of fetal hemoglobin interfere with the  HbA1C  assay. Heterozygous hemoglobin variants (HbS, HgC, etc)do  not significantly interfere with this assay.   However, presence of multiple variants may affect accuracy.          ASSESSMENT and PLAN:      1. T2DM with hyperglycemia, DM PN, CKD 3a-    Increase  Novolog 70/30 to 18 u at breakfast and 22 u supper- take insulin prior to the meal;   Continue Trulicity to 4.5 mg weekly; metformin at 500 mg bid      Check bg bid-  log in 1-2 weeks  Declines his own personal sensor    2. HLP -  on statin therapy     3. HIV- follows w Dr Carney - will be seeing the new MD in her office    4. Depression stable, chronic     Follow-up: in 3 months with A1C

## 2024-03-18 ENCOUNTER — OFFICE VISIT (OUTPATIENT)
Dept: ENDOCRINOLOGY | Facility: CLINIC | Age: 74
End: 2024-03-18
Payer: MEDICARE

## 2024-03-18 VITALS
OXYGEN SATURATION: 100 % | SYSTOLIC BLOOD PRESSURE: 140 MMHG | HEIGHT: 72 IN | WEIGHT: 178.88 LBS | HEART RATE: 61 BPM | BODY MASS INDEX: 24.23 KG/M2 | DIASTOLIC BLOOD PRESSURE: 82 MMHG

## 2024-03-18 DIAGNOSIS — B20 HIV DISEASE: ICD-10-CM

## 2024-03-18 DIAGNOSIS — E11.628 TYPE 2 DIABETES MELLITUS WITH OTHER SKIN COMPLICATION, WITHOUT LONG-TERM CURRENT USE OF INSULIN: ICD-10-CM

## 2024-03-18 DIAGNOSIS — E11.22 TYPE 2 DIABETES MELLITUS WITH STAGE 3A CHRONIC KIDNEY DISEASE, WITH LONG-TERM CURRENT USE OF INSULIN: ICD-10-CM

## 2024-03-18 DIAGNOSIS — E78.2 MIXED HYPERLIPIDEMIA: ICD-10-CM

## 2024-03-18 DIAGNOSIS — E11.42 TYPE 2 DIABETES MELLITUS WITH DIABETIC POLYNEUROPATHY, WITH LONG-TERM CURRENT USE OF INSULIN: Primary | ICD-10-CM

## 2024-03-18 DIAGNOSIS — N18.31 TYPE 2 DIABETES MELLITUS WITH STAGE 3A CHRONIC KIDNEY DISEASE, WITH LONG-TERM CURRENT USE OF INSULIN: ICD-10-CM

## 2024-03-18 DIAGNOSIS — Z79.4 TYPE 2 DIABETES MELLITUS WITH DIABETIC POLYNEUROPATHY, WITH LONG-TERM CURRENT USE OF INSULIN: Primary | ICD-10-CM

## 2024-03-18 DIAGNOSIS — Z79.4 TYPE 2 DIABETES MELLITUS WITH STAGE 3A CHRONIC KIDNEY DISEASE, WITH LONG-TERM CURRENT USE OF INSULIN: ICD-10-CM

## 2024-03-18 PROCEDURE — 1159F MED LIST DOCD IN RCRD: CPT | Mod: HCNC,CPTII,S$GLB, | Performed by: NURSE PRACTITIONER

## 2024-03-18 PROCEDURE — 3052F HG A1C>EQUAL 8.0%<EQUAL 9.0%: CPT | Mod: HCNC,CPTII,S$GLB, | Performed by: NURSE PRACTITIONER

## 2024-03-18 PROCEDURE — 99999 PR PBB SHADOW E&M-EST. PATIENT-LVL IV: CPT | Mod: PBBFAC,HCNC,, | Performed by: NURSE PRACTITIONER

## 2024-03-18 PROCEDURE — G2211 COMPLEX E/M VISIT ADD ON: HCPCS | Mod: HCNC,S$GLB,, | Performed by: NURSE PRACTITIONER

## 2024-03-18 PROCEDURE — 1126F AMNT PAIN NOTED NONE PRSNT: CPT | Mod: HCNC,CPTII,S$GLB, | Performed by: NURSE PRACTITIONER

## 2024-03-18 PROCEDURE — 2023F DILAT RTA XM W/O RTNOPTHY: CPT | Mod: HCNC,CPTII,S$GLB, | Performed by: NURSE PRACTITIONER

## 2024-03-18 PROCEDURE — 3008F BODY MASS INDEX DOCD: CPT | Mod: HCNC,CPTII,S$GLB, | Performed by: NURSE PRACTITIONER

## 2024-03-18 PROCEDURE — 3079F DIAST BP 80-89 MM HG: CPT | Mod: HCNC,CPTII,S$GLB, | Performed by: NURSE PRACTITIONER

## 2024-03-18 PROCEDURE — 99214 OFFICE O/P EST MOD 30 MIN: CPT | Mod: HCNC,S$GLB,, | Performed by: NURSE PRACTITIONER

## 2024-03-18 PROCEDURE — 3077F SYST BP >= 140 MM HG: CPT | Mod: HCNC,CPTII,S$GLB, | Performed by: NURSE PRACTITIONER

## 2024-03-18 PROCEDURE — 1101F PT FALLS ASSESS-DOCD LE1/YR: CPT | Mod: HCNC,CPTII,S$GLB, | Performed by: NURSE PRACTITIONER

## 2024-03-18 PROCEDURE — 3288F FALL RISK ASSESSMENT DOCD: CPT | Mod: HCNC,CPTII,S$GLB, | Performed by: NURSE PRACTITIONER

## 2024-03-18 RX ORDER — INSULIN ASPART 100 [IU]/ML
INJECTION, SUSPENSION SUBCUTANEOUS
Qty: 15 ML | Refills: 12
Start: 2024-03-18

## 2024-04-30 ENCOUNTER — ANESTHESIA EVENT (OUTPATIENT)
Dept: ENDOSCOPY | Facility: HOSPITAL | Age: 74
End: 2024-04-30
Payer: MEDICARE

## 2024-04-30 ENCOUNTER — ANESTHESIA (OUTPATIENT)
Dept: ENDOSCOPY | Facility: HOSPITAL | Age: 74
End: 2024-04-30
Payer: MEDICARE

## 2024-04-30 ENCOUNTER — HOSPITAL ENCOUNTER (OUTPATIENT)
Facility: HOSPITAL | Age: 74
Discharge: HOME | End: 2024-04-30
Attending: INTERNAL MEDICINE | Admitting: INTERNAL MEDICINE
Payer: MEDICARE

## 2024-04-30 DIAGNOSIS — K63.5 POLYP OF COLON, UNSPECIFIED PART OF COLON, UNSPECIFIED TYPE: Primary | ICD-10-CM

## 2024-04-30 DIAGNOSIS — Z12.11 SCREENING FOR COLON CANCER: ICD-10-CM

## 2024-04-30 DIAGNOSIS — K57.90 DIVERTICULOSIS: ICD-10-CM

## 2024-04-30 DIAGNOSIS — K64.8 INTERNAL HEMORRHOIDS: ICD-10-CM

## 2024-04-30 PROCEDURE — 45380 COLONOSCOPY AND BIOPSY: CPT | Mod: 59,PT | Performed by: INTERNAL MEDICINE

## 2024-04-30 PROCEDURE — 27201089 HC SNARE, DISP (ANY): Performed by: INTERNAL MEDICINE

## 2024-04-30 PROCEDURE — 37000009 HC ANESTHESIA EA ADD 15 MINS: Performed by: INTERNAL MEDICINE

## 2024-04-30 PROCEDURE — 37000008 HC ANESTHESIA 1ST 15 MINUTES: Performed by: INTERNAL MEDICINE

## 2024-04-30 PROCEDURE — 63600175 PHARM REV CODE 636 W HCPCS: Performed by: NURSE ANESTHETIST, CERTIFIED REGISTERED

## 2024-04-30 PROCEDURE — 88305 TISSUE EXAM BY PATHOLOGIST: CPT | Mod: TC,59 | Performed by: PATHOLOGY

## 2024-04-30 PROCEDURE — D9220A PRA ANESTHESIA: Mod: PT,CRNA,, | Performed by: NURSE ANESTHETIST, CERTIFIED REGISTERED

## 2024-04-30 PROCEDURE — 45380 COLONOSCOPY AND BIOPSY: CPT | Mod: 59,PT,, | Performed by: INTERNAL MEDICINE

## 2024-04-30 PROCEDURE — 45385 COLONOSCOPY W/LESION REMOVAL: CPT | Mod: PT,,, | Performed by: INTERNAL MEDICINE

## 2024-04-30 PROCEDURE — 25000003 PHARM REV CODE 250: Performed by: INTERNAL MEDICINE

## 2024-04-30 PROCEDURE — D9220A PRA ANESTHESIA: Mod: PT,ANES,, | Performed by: ANESTHESIOLOGY

## 2024-04-30 PROCEDURE — 45385 COLONOSCOPY W/LESION REMOVAL: CPT | Mod: PT | Performed by: INTERNAL MEDICINE

## 2024-04-30 PROCEDURE — 27201012 HC FORCEPS, HOT/COLD, DISP: Performed by: INTERNAL MEDICINE

## 2024-04-30 PROCEDURE — 25000003 PHARM REV CODE 250: Performed by: NURSE ANESTHETIST, CERTIFIED REGISTERED

## 2024-04-30 RX ORDER — PROPOFOL 10 MG/ML
VIAL (ML) INTRAVENOUS
Status: DISCONTINUED | OUTPATIENT
Start: 2024-04-30 | End: 2024-04-30

## 2024-04-30 RX ORDER — LIDOCAINE HYDROCHLORIDE 20 MG/ML
INJECTION INTRAVENOUS
Status: DISCONTINUED | OUTPATIENT
Start: 2024-04-30 | End: 2024-04-30

## 2024-04-30 RX ORDER — SODIUM CHLORIDE 9 MG/ML
INJECTION, SOLUTION INTRAVENOUS CONTINUOUS
Status: DISCONTINUED | OUTPATIENT
Start: 2024-04-30 | End: 2024-04-30 | Stop reason: HOSPADM

## 2024-04-30 RX ADMIN — PROPOFOL 100 MG: 10 INJECTION, EMULSION INTRAVENOUS at 10:04

## 2024-04-30 RX ADMIN — LIDOCAINE HYDROCHLORIDE 50 MG: 20 INJECTION, SOLUTION INTRAVENOUS at 10:04

## 2024-04-30 RX ADMIN — PROPOFOL 50 MG: 10 INJECTION, EMULSION INTRAVENOUS at 10:04

## 2024-04-30 RX ADMIN — SODIUM CHLORIDE: 9 INJECTION, SOLUTION INTRAVENOUS at 08:04

## 2024-04-30 NOTE — H&P
CC: History of colon polyps    73 year old male with above. States that symptoms are absent, no alleviating/exacerbating factors. Positive family history of colorectal CA. Positive personal history of polyps. No bleeding or weight loss.     ROS:  No headache, no fever/chills, no chest pain/SOB, no nausea/vomiting/diarrhea/constipation/GI bleeding/abdominal pain, no dysuria/hematuria.    VSSAF   Exam:   Alert and oriented x 3; no apparent distress   PERRLA, sclera anicteric  CV: Regular rate/rhythm, normal PMI   Lungs: Clear bilaterally with no wheeze/rales   Abdomen: Soft, NT/ND, normal bowel sounds   Ext: No cyanosis, clubbing     Impression:   As above    Plan:   Proceed with endoscopy. Further recs to follow.

## 2024-04-30 NOTE — ANESTHESIA PREPROCEDURE EVALUATION
04/30/2024  Brandon Parson is a 73 y.o., male.      Pre-op Assessment    I have reviewed the Patient Summary Reports.     I have reviewed the Nursing Notes. I have reviewed the NPO Status.   I have reviewed the Medications.     Review of Systems  Social:  Former Smoker       Hematology/Oncology:                --  Immunodeficiency Disorder:                        Cardiovascular:               PVD                              Renal/:  Chronic Renal Disease, CKD        Kidney Function/Disease             Hepatic/GI:  Bowel Prep.   GERD      Gerd          Neurological:    Neuromuscular Disease,                                 Neuromuscular Disease   Endocrine:  Diabetes, type 2    Diabetes                      Psych:  Psychiatric History  depression                Physical Exam  General: Well nourished, Cooperative, Oriented and Alert    Airway:  Mallampati: II   Mouth Opening: Normal  TM Distance: Normal  Tongue: Normal    Dental:  Intact    Chest/Lungs:  Normal Respiratory Rate    Heart:  Rate: Normal        Anesthesia Plan  Type of Anesthesia, risks & benefits discussed:    Anesthesia Type: Gen Natural Airway  Intra-op Monitoring Plan: Standard ASA Monitors  Induction:  IV  Informed Consent: Informed consent signed with the Patient and all parties understand the risks and agree with anesthesia plan.  All questions answered.   ASA Score: 3    Ready For Surgery From Anesthesia Perspective.     .

## 2024-04-30 NOTE — TRANSFER OF CARE
Anesthesia Transfer of Care Note    Patient: Brandon Parson    Procedure(s) Performed: Procedure(s) (LRB):  COLONOSCOPY (N/A)    Patient location: GI    Anesthesia Type: general    Transport from OR: Transported from OR on room air with adequate spontaneous ventilation    Post pain: adequate analgesia    Post assessment: no apparent anesthetic complications    Post vital signs: stable    Level of consciousness: awake    Nausea/Vomiting: no nausea/vomiting    Complications: none    Transfer of care protocol was followed      Last vitals: Visit Vitals  /68 (BP Location: Left arm, Patient Position: Lying)   Pulse 74   Temp 36.6 °C (97.9 °F) (Skin)   Resp 16   Ht 6' (1.829 m)   Wt 81.1 kg (178 lb 12.7 oz)   SpO2 100%   BMI 24.25 kg/m²

## 2024-04-30 NOTE — [employer] PATIENT INSTRUCTIONS
Discharge Summary/Instructions after an Endoscopic Procedure  Patient Name: Brandon Parson  Patient MRN: [id number]  Patient YOB: 1950 Tuesday, April 30, 2024  Manuel Cross MD  Dear patient,  As a result of recent federal legislation (The Federal Cures Act), you may   receive lab or pathology results from your procedure in your [facility]   account before your physician is able to contact you. Your physician or   their representative will relay the results to you with their   recommendations at their soonest availability.  Thank you,  RESTRICTIONS:  During your procedure today, you received medications for sedation.  These   medications may affect your judgment, balance and coordination.  Therefore,   for 24 hours, you have the following restrictions:   - DO NOT drive a car, operate machinery, make legal/financial decisions,   sign important papers or drink alcohol.    ACTIVITY:  Today: no heavy lifting, straining or running due to procedural   sedation/anesthesia.  The following day: return to full activity including work.  DIET:  Eat and drink normally unless instructed otherwise.     TREATMENT FOR COMMON SIDE EFFECTS:  - Mild abdominal pain, nausea, belching, bloating or excessive gas:  rest,   eat lightly and use a heating pad.  - Sore Throat: treat with throat lozenges and/or gargle with warm salt   water.  - Because air was used during the procedure, expelling large amounts of air   from your rectum or belching is normal.  - If a bowel prep was taken, you may not have a bowel movement for 1-3 days.    This is normal.  SYMPTOMS TO WATCH FOR AND REPORT TO YOUR PHYSICIAN:  1. Abdominal pain or bloating, other than gas cramps.  2. Chest pain.  3. Back pain.  4. Signs of infection such as: chills or fever occurring within 24 hours   after the procedure.  5. Rectal bleeding, which would show as bright red, maroon, or black stools.   (A tablespoon of blood from the rectum is not serious, especially if    hemorrhoids are present.)  6. Vomiting.  7. Weakness or dizziness.  GO DIRECTLY TO THE NEAREST EMERGENCY ROOM IF YOU HAVE ANY OF THE FOLLOWING:      Difficulty breathing              Chills and/or fever over 101 F   Persistent vomiting and/or vomiting blood   Severe abdominal pain   Severe chest pain   Black, tarry stools   Bleeding- more than one tablespoon   Any other symptom or condition that you feel may need urgent attention  Your doctor recommends these additional instructions:  If any biopsies were taken, your doctors clinic will contact you in 1 to 2   weeks with any results.  - Patient has a contact number available for emergencies.  The signs and   symptoms of potential delayed complications were discussed with the   patient.  Return to normal activities tomorrow.  Written discharge   instructions were provided to the patient.   - High fiber diet.   - Continue present medications.   - Await pathology results.   - Repeat colonoscopy in 3 years for surveillance.   - Discharge patient to home (ambulatory).   - Return to GI office after studies are complete.  For questions, problems or results please call your physician - Manuel Cross MD at Work:  [phone].  [facility] PHONE NUMBER: [phone]  IF A COMPLICATION OR EMERGENCY SITUATION ARISES AND YOU ARE UNABLE TO REACH   YOUR PHYSICIAN - GO DIRECTLY TO THE EMERGENCY ROOM.  Manuel Cross MD  4/30/2024 10:29:42 AM  This report has been verified and signed electronically.  Dear patient,  As a result of recent federal legislation (The Federal Cures Act), you may   receive lab or pathology results from your procedure in your [facility]   account before your physician is able to contact you. Your physician or   their representative will relay the results to you with their   recommendations at their soonest availability.  Thank you,  [employer]ATION

## 2024-04-30 NOTE — ANESTHESIA POSTPROCEDURE EVALUATION
Anesthesia Post Evaluation    Patient: Brandon Parson    Procedure(s) Performed: Procedure(s) (LRB):  COLONOSCOPY (N/A)    Final Anesthesia Type: general      Patient location during evaluation: PACU  Patient participation: Yes- Able to Participate  Level of consciousness: awake and alert  Post-procedure vital signs: reviewed and stable  Pain management: adequate  Airway patency: patent    PONV status at discharge: No PONV  Anesthetic complications: no      Cardiovascular status: hemodynamically stable  Respiratory status: unassisted and room air  Hydration status: euvolemic  Follow-up not needed.              Vitals Value Taken Time   BP 94/57 04/30/24 1050   Temp 36.7 °C (98.1 °F) 04/30/24 1050   Pulse 77 04/30/24 1037   Resp 20 04/30/24 1050   SpO2 98 % 04/30/24 1050         Event Time   Out of Recovery 11:10:44         Pain/David Score: David Score: 10 (4/30/2024 10:50 AM)

## 2024-05-01 VITALS
TEMPERATURE: 98 F | RESPIRATION RATE: 20 BRPM | SYSTOLIC BLOOD PRESSURE: 94 MMHG | OXYGEN SATURATION: 98 % | BODY MASS INDEX: 24.22 KG/M2 | WEIGHT: 178.81 LBS | DIASTOLIC BLOOD PRESSURE: 57 MMHG | HEART RATE: 77 BPM | HEIGHT: 72 IN

## 2024-05-09 ENCOUNTER — TELEPHONE (OUTPATIENT)
Dept: GASTROENTEROLOGY | Facility: CLINIC | Age: 74
End: 2024-05-09
Payer: MEDICARE

## 2024-05-09 NOTE — TELEPHONE ENCOUNTER
Returned call to patient to provide procedure results. Verbalized understanding. Recall set for 3 years.

## 2024-05-09 NOTE — TELEPHONE ENCOUNTER
----- Message from Lorena Aguayo sent at 5/9/2024  2:40 PM CDT -----  Regarding: Return Call  Type:  Patient Returning Call    Who Called:Pt    Who Left Message for Patient:Unknown    Does the patient know what this is regarding?:Unknown    Would the patient rather a call back or a response via [facility]? Call Back    Best Call Back Number:[phone]      Additional Information: Sts he got a call from the office but doesn't know who contacted him and My chart doesn't show anything as well.    Have a great day. Thank you!

## 2024-06-11 DIAGNOSIS — B20 HIV DISEASE: Primary | ICD-10-CM

## 2024-06-11 RX ORDER — ATORVASTATIN CALCIUM 40 MG/1
TABLET, FILM COATED ORAL
Qty: 90 TABLET | Refills: 0 | Status: SHIPPED | OUTPATIENT
Start: 2024-06-11

## 2024-06-11 RX ORDER — EFAVIRENZ 600 MG/1
600 TABLET, FILM COATED ORAL NIGHTLY
Qty: 30 TABLET | Refills: 5 | Status: SHIPPED | OUTPATIENT
Start: 2024-06-11 | End: 2024-12-08

## 2024-06-11 NOTE — TELEPHONE ENCOUNTER
Care Due:                  Date            Visit Type   Department     Provider  --------------------------------------------------------------------------------                                EP -                              PRIMARY      YAYA Juárez  Last Visit: 01-      CARE ([facility])   MEDICINE       Naccari                              EP -                              PRIMARY      YAY[facility]    Francois Juárez  Next Visit: 07-      CARE ([facility])   MEDICINE       Naccari                                                            Last  Test          Frequency    Reason                     Performed    Due Date  --------------------------------------------------------------------------------    CMP.........  12 months..  atorvastatin,              09- 09-                             fenofibrate, metFORMIN...    HBA1C.......  6 months...  metFORMIN................  03- 09-    Lipid Panel.  12 months..  atorvastatin, fenofibrate  09- 09-    [facility] Embedded Care Due Messages. Reference number: [id number].   6/11/2024 10:52:02 AM CDT

## 2024-06-11 NOTE — TELEPHONE ENCOUNTER
Provider Staff:  Action required for this patient    Requires labs      Please see care gap opportunities below in Care Due Message.    Thanks!  [facility]     Appointments      Date Provider   Last Visit   1/25/2024 Francois Eugene MD   Next Visit   7/25/2024 Francois Eugene MD     Refill Decision Note   Brandon Parson  is requesting a refill authorization.  Brief Assessment and Rationale for Refill:  Approve     Medication Therapy Plan:         Comments:     Note composed:12:54 PM 06/11/2024

## 2024-06-20 ENCOUNTER — OFFICE VISIT (OUTPATIENT)
Dept: INFECTIOUS DISEASES | Facility: CLINIC | Age: 74
End: 2024-06-20
Payer: MEDICARE

## 2024-06-20 ENCOUNTER — LAB VISIT (OUTPATIENT)
Dept: LAB | Facility: HOSPITAL | Age: 74
End: 2024-06-20
Attending: INTERNAL MEDICINE
Payer: MEDICARE

## 2024-06-20 ENCOUNTER — TELEPHONE (OUTPATIENT)
Dept: ENDOCRINOLOGY | Facility: CLINIC | Age: 74
End: 2024-06-20
Payer: MEDICARE

## 2024-06-20 VITALS
HEIGHT: 72 IN | DIASTOLIC BLOOD PRESSURE: 64 MMHG | OXYGEN SATURATION: 97 % | WEIGHT: 175.38 LBS | BODY MASS INDEX: 23.75 KG/M2 | TEMPERATURE: 98 F | SYSTOLIC BLOOD PRESSURE: 118 MMHG | HEART RATE: 60 BPM

## 2024-06-20 DIAGNOSIS — B20 HIV DISEASE: ICD-10-CM

## 2024-06-20 DIAGNOSIS — E11.628 TYPE 2 DIABETES MELLITUS WITH OTHER SKIN COMPLICATION, WITHOUT LONG-TERM CURRENT USE OF INSULIN: ICD-10-CM

## 2024-06-20 DIAGNOSIS — E78.2 MIXED HYPERLIPIDEMIA: ICD-10-CM

## 2024-06-20 DIAGNOSIS — B20 HIV DISEASE: Primary | ICD-10-CM

## 2024-06-20 LAB
ANION GAP SERPL CALC-SCNC: 11 MMOL/L (ref 8–16)
BASOPHILS # BLD AUTO: 0.03 K/UL (ref 0–0.2)
BASOPHILS NFR BLD: 0.4 % (ref 0–1.9)
BUN SERPL-MCNC: 22 MG/DL (ref 8–23)
CALCIUM SERPL-MCNC: 9.2 MG/DL (ref 8.7–10.5)
CHLORIDE SERPL-SCNC: 107 MMOL/L (ref 95–110)
CHOLEST SERPL-MCNC: 134 MG/DL (ref 120–199)
CHOLEST/HDLC SERPL: 3.5 {RATIO} (ref 2–5)
CO2 SERPL-SCNC: 21 MMOL/L (ref 23–29)
CREAT SERPL-MCNC: 1.3 MG/DL (ref 0.5–1.4)
DIFFERENTIAL METHOD BLD: ABNORMAL
EOSINOPHIL # BLD AUTO: 0.2 K/UL (ref 0–0.5)
EOSINOPHIL NFR BLD: 2.3 % (ref 0–8)
ERYTHROCYTE [DISTWIDTH] IN BLOOD BY AUTOMATED COUNT: 13.5 % (ref 11.5–14.5)
EST. GFR  (NO RACE VARIABLE): 57.6 ML/MIN/1.73 M^2
GLUCOSE SERPL-MCNC: 129 MG/DL (ref 70–110)
HCT VFR BLD AUTO: 36.7 % (ref 40–54)
HDLC SERPL-MCNC: 38 MG/DL (ref 40–75)
HDLC SERPL: 28.4 % (ref 20–50)
HGB BLD-MCNC: 12.6 G/DL (ref 14–18)
IMM GRANULOCYTES # BLD AUTO: 0.06 K/UL (ref 0–0.04)
IMM GRANULOCYTES NFR BLD AUTO: 0.7 % (ref 0–0.5)
LDLC SERPL CALC-MCNC: 66.8 MG/DL (ref 63–159)
LYMPHOCYTES # BLD AUTO: 2.2 K/UL (ref 1–4.8)
LYMPHOCYTES NFR BLD: 27.3 % (ref 18–48)
MCH RBC QN AUTO: 31.7 PG (ref 27–31)
MCHC RBC AUTO-ENTMCNC: 34.3 G/DL (ref 32–36)
MCV RBC AUTO: 92 FL (ref 82–98)
MONOCYTES # BLD AUTO: 0.7 K/UL (ref 0.3–1)
MONOCYTES NFR BLD: 8.5 % (ref 4–15)
NEUTROPHILS # BLD AUTO: 4.9 K/UL (ref 1.8–7.7)
NEUTROPHILS NFR BLD: 60.8 % (ref 38–73)
NONHDLC SERPL-MCNC: 96 MG/DL
NRBC BLD-RTO: 0 /100 WBC
PLATELET # BLD AUTO: 227 K/UL (ref 150–450)
PMV BLD AUTO: 9.9 FL (ref 9.2–12.9)
POTASSIUM SERPL-SCNC: 4 MMOL/L (ref 3.5–5.1)
RBC # BLD AUTO: 3.98 M/UL (ref 4.6–6.2)
SODIUM SERPL-SCNC: 139 MMOL/L (ref 136–145)
TRIGL SERPL-MCNC: 146 MG/DL (ref 30–150)
WBC # BLD AUTO: 8.12 K/UL (ref 3.9–12.7)

## 2024-06-20 PROCEDURE — 86361 T CELL ABSOLUTE COUNT: CPT | Performed by: INTERNAL MEDICINE

## 2024-06-20 PROCEDURE — 85025 COMPLETE CBC W/AUTO DIFF WBC: CPT | Performed by: INTERNAL MEDICINE

## 2024-06-20 PROCEDURE — 99999 PR PBB SHADOW E&M-EST. PATIENT-LVL V: CPT | Mod: PBBFAC,HCNC,, | Performed by: INTERNAL MEDICINE

## 2024-06-20 PROCEDURE — 36415 COLL VENOUS BLD VENIPUNCTURE: CPT | Performed by: INTERNAL MEDICINE

## 2024-06-20 PROCEDURE — 80061 LIPID PANEL: CPT | Performed by: INTERNAL MEDICINE

## 2024-06-20 PROCEDURE — 87536 HIV-1 QUANT&REVRSE TRNSCRPJ: CPT | Performed by: INTERNAL MEDICINE

## 2024-06-20 PROCEDURE — 80048 BASIC METABOLIC PNL TOTAL CA: CPT | Performed by: INTERNAL MEDICINE

## 2024-06-20 RX ORDER — INSULIN ASPART 100 [IU]/ML
INJECTION, SUSPENSION SUBCUTANEOUS
Start: 2024-06-20

## 2024-06-20 NOTE — PROGRESS NOTES
Subjective     Patient ID: Brandon Parson is a 73 y.o. male.    Chief Complaint: Follow-up, HIV Positive/AIDS, and follow up from Dr Hoover     HPI  He was last seen by Dr. Hoover on 12/04/2023.  He has continued to do okay.  Main concern has been low level viremia in the range of .  He did have a level that was undetected in 2022.  He is currently on long-term Sustiva and Descovy.  Since last visit he has had a colonoscopy with polypectomy performed.  Pathology was consistent with tubular adenoma.  His A1c continues to improve and is down to 8.4.  He follows up with his endocrinologist and PCP.  Compliant to his HAART.      He has a new puppy and has sustained scratches on the forearms from the polyp.  They have healed for now.  Also has 2 cats.    Review of Systems: 10 system review unremarkable.       Objective     Physical Exam  General:  well-groomed elderly man in no acute distress   CVS: S1 and 2 heard   Respiratory: Clear to auscultation   Abdomen: Full, soft, nontender, no palpable organomegaly   Skin:  No rash appreciated   CNS: No focal deficits   Musculoskeletal system: No joint or bony abnormalities appreciated   LEs: No edema.         Assessment and Plan   1. HIV.  Has chronic low level viremia.  That was having rather low to allow evaluation of genotype.  Obtain labs today and reassess in 4 weeks.  We will obtain genotype if PCR> 500.  Otherwise may continue current regimen and monitor.    2. Hyperlipidemia.  He is currently on atorvastatin and fenofibrate.  Also takes fish oil.  Management as per his PCP.      3. Diabetes mellitus.  HbA1c has improved.  Follow up with his endocrinologist and PCP.    4. Health maintenance.  He had colonoscopy March 20, 2024 with tubular adenoma.  Follow up with GI.      1. HIV disease  -     HIV RNA, QUANTITATIVE, PCR; Future; Expected date: 06/20/2024  -     CD4 T-[place] Cells; Future; Expected date: 06/20/2024  -     CBC Auto Differential; Future; Expected  date: 06/20/2024  -     BASIC METABOLIC PANEL; Future; Expected date: 06/20/2024  -     Lipid Panel; Future; Expected date: 06/20/2024    2. Mixed hyperlipidemia  -     Lipid Panel; Future; Expected date: 06/20/2024    3. Type 2 diabetes mellitus with other skin complication, without long-term current use of insulin  -     Lipid Panel; Future; Expected date: 06/20/2024        I will plan to see again in 4 weeks.         Follow up in about 4 weeks (around 7/18/2024).

## 2024-06-21 LAB
BASOPHILS # BLD AUTO: 0 X10E3/UL (ref 0–0.2)
BASOPHILS NFR BLD AUTO: 1 %
CD3+CD4+ CELLS # BLD: 1118 /UL (ref 359–1519)
CD3+CD4+ CELLS NFR BLD: 46.6 % (ref 30.8–58.5)
EOSINOPHIL # BLD AUTO: 0.2 X10E3/UL (ref 0–0.4)
EOSINOPHIL NFR BLD AUTO: 2 %
ERYTHROCYTE [DISTWIDTH] IN BLOOD BY AUTOMATED COUNT: 13.8 % (ref 11.6–15.4)
HCT VFR BLD AUTO: 38.7 % (ref 37.5–51)
HGB BLD-MCNC: 12.8 G/DL (ref 13–17.7)
IMM GRANULOCYTES # BLD AUTO: 0 X10E3/UL (ref 0–0.1)
IMM GRANULOCYTES NFR BLD AUTO: 1 %
LYMPHOCYTES # BLD AUTO: 2.4 X10E3/UL (ref 0.7–3.1)
LYMPHOCYTES NFR BLD AUTO: 30 %
MCH RBC QN AUTO: 31.3 PG (ref 26.6–33)
MCHC RBC AUTO-ENTMCNC: 33.1 G/DL (ref 31.5–35.7)
MCV RBC AUTO: 95 FL (ref 79–97)
MONOCYTES # BLD AUTO: 0.7 X10E3/UL (ref 0.1–0.9)
MONOCYTES NFR BLD AUTO: 9 %
NEUTROPHILS # BLD AUTO: 4.6 X10E3/UL (ref 1.4–7)
NEUTROPHILS NFR BLD AUTO: 57 %
PLATELET # BLD AUTO: 244 X10E3/UL (ref 150–450)
RBC # BLD AUTO: 4.09 X10E6/UL (ref 4.14–5.8)
WBC # BLD AUTO: 7.9 X10E3/UL (ref 3.4–10.8)

## 2024-06-22 LAB
HIV1 RNA # SERPL NAA+PROBE: 40 COPIES/ML
HIV1 RNA SERPL NAA+PROBE-LOG#: 1.6 LOG10COPY/ML

## 2024-06-23 DIAGNOSIS — E11.628 TYPE 2 DIABETES MELLITUS WITH OTHER SKIN COMPLICATION, WITHOUT LONG-TERM CURRENT USE OF INSULIN: ICD-10-CM

## 2024-06-23 NOTE — TELEPHONE ENCOUNTER
No care due was identified.  [facility] Embedded Care Due Messages. Reference number: [id number].   6/23/2024 8:01:56 AM CDT

## 2024-06-23 NOTE — TELEPHONE ENCOUNTER
Refill Routing Note   Medication(s) are not appropriate for processing by [facility] for the following reason(s):        No active prescription written by provider    [facility] action(s):  Defer             Appointments  past 12m or future 3m with PCP    Date Provider   Last Visit   1/25/2024 Francois Eugene MD   Next Visit   7/25/2024 Francois Eugene MD   ED visits in past 90 days: 0        Note composed:10:34 AM 06/23/2024

## 2024-06-24 RX ORDER — PEN NEEDLE, DIABETIC 31 GX5/16"
NEEDLE, DISPOSABLE MISCELLANEOUS
Qty: 200 EACH | Refills: 3 | Status: SHIPPED | OUTPATIENT
Start: 2024-06-24

## 2024-07-01 DIAGNOSIS — E11.628 TYPE 2 DIABETES MELLITUS WITH OTHER SKIN COMPLICATION, WITHOUT LONG-TERM CURRENT USE OF INSULIN: ICD-10-CM

## 2024-07-01 DIAGNOSIS — E11.22 TYPE 2 DIABETES MELLITUS WITH STAGE 3A CHRONIC KIDNEY DISEASE, WITH LONG-TERM CURRENT USE OF INSULIN: ICD-10-CM

## 2024-07-01 DIAGNOSIS — Z79.4 TYPE 2 DIABETES MELLITUS WITH STAGE 3A CHRONIC KIDNEY DISEASE, WITH LONG-TERM CURRENT USE OF INSULIN: ICD-10-CM

## 2024-07-01 DIAGNOSIS — N18.31 TYPE 2 DIABETES MELLITUS WITH STAGE 3A CHRONIC KIDNEY DISEASE, WITH LONG-TERM CURRENT USE OF INSULIN: ICD-10-CM

## 2024-07-01 RX ORDER — METFORMIN HYDROCHLORIDE 500 MG/1
500 TABLET ORAL 2 TIMES DAILY WITH MEALS
Qty: 180 TABLET | Refills: 4 | Status: SHIPPED | OUTPATIENT
Start: 2024-07-01

## 2024-07-01 RX ORDER — DULAGLUTIDE 4.5 MG/.5ML
INJECTION, SOLUTION SUBCUTANEOUS
Qty: 12 PEN | Refills: 4 | Status: SHIPPED | OUTPATIENT
Start: 2024-07-01

## 2024-07-11 ENCOUNTER — LAB VISIT (OUTPATIENT)
Dept: LAB | Facility: HOSPITAL | Age: 74
End: 2024-07-11
Payer: MEDICARE

## 2024-07-11 DIAGNOSIS — Z79.4 TYPE 2 DIABETES MELLITUS WITH DIABETIC POLYNEUROPATHY, WITH LONG-TERM CURRENT USE OF INSULIN: ICD-10-CM

## 2024-07-11 DIAGNOSIS — E11.42 TYPE 2 DIABETES MELLITUS WITH DIABETIC POLYNEUROPATHY, WITH LONG-TERM CURRENT USE OF INSULIN: ICD-10-CM

## 2024-07-11 LAB
ESTIMATED AVG GLUCOSE: 206 MG/DL (ref 68–131)
HBA1C MFR BLD: 8.8 % (ref 4–5.6)

## 2024-07-11 PROCEDURE — 36415 COLL VENOUS BLD VENIPUNCTURE: CPT | Mod: HCNC,PO | Performed by: NURSE PRACTITIONER

## 2024-07-11 PROCEDURE — 83036 HEMOGLOBIN GLYCOSYLATED A1C: CPT | Mod: HCNC | Performed by: NURSE PRACTITIONER

## 2024-07-18 ENCOUNTER — OFFICE VISIT (OUTPATIENT)
Dept: ENDOCRINOLOGY | Facility: CLINIC | Age: 74
End: 2024-07-18
Payer: MEDICARE

## 2024-07-18 VITALS
HEART RATE: 69 BPM | OXYGEN SATURATION: 98 % | SYSTOLIC BLOOD PRESSURE: 142 MMHG | HEIGHT: 72 IN | DIASTOLIC BLOOD PRESSURE: 74 MMHG | BODY MASS INDEX: 23.91 KG/M2 | WEIGHT: 176.5 LBS

## 2024-07-18 DIAGNOSIS — E78.2 MIXED HYPERLIPIDEMIA: ICD-10-CM

## 2024-07-18 DIAGNOSIS — B20 HIV DISEASE: ICD-10-CM

## 2024-07-18 DIAGNOSIS — Z79.4 TYPE 2 DIABETES MELLITUS WITH STAGE 3A CHRONIC KIDNEY DISEASE, WITH LONG-TERM CURRENT USE OF INSULIN: ICD-10-CM

## 2024-07-18 DIAGNOSIS — N18.31 TYPE 2 DIABETES MELLITUS WITH STAGE 3A CHRONIC KIDNEY DISEASE, WITH LONG-TERM CURRENT USE OF INSULIN: ICD-10-CM

## 2024-07-18 DIAGNOSIS — Z79.4 TYPE 2 DIABETES MELLITUS WITH DIABETIC POLYNEUROPATHY, WITH LONG-TERM CURRENT USE OF INSULIN: Primary | ICD-10-CM

## 2024-07-18 DIAGNOSIS — E11.42 TYPE 2 DIABETES MELLITUS WITH DIABETIC POLYNEUROPATHY, WITH LONG-TERM CURRENT USE OF INSULIN: Primary | ICD-10-CM

## 2024-07-18 DIAGNOSIS — E11.628 TYPE 2 DIABETES MELLITUS WITH OTHER SKIN COMPLICATION, WITHOUT LONG-TERM CURRENT USE OF INSULIN: ICD-10-CM

## 2024-07-18 DIAGNOSIS — E11.22 TYPE 2 DIABETES MELLITUS WITH STAGE 3A CHRONIC KIDNEY DISEASE, WITH LONG-TERM CURRENT USE OF INSULIN: ICD-10-CM

## 2024-07-18 PROCEDURE — 1101F PT FALLS ASSESS-DOCD LE1/YR: CPT | Mod: HCNC,CPTII,S$GLB, | Performed by: NURSE PRACTITIONER

## 2024-07-18 PROCEDURE — 3008F BODY MASS INDEX DOCD: CPT | Mod: HCNC,CPTII,S$GLB, | Performed by: NURSE PRACTITIONER

## 2024-07-18 PROCEDURE — 1126F AMNT PAIN NOTED NONE PRSNT: CPT | Mod: HCNC,CPTII,S$GLB, | Performed by: NURSE PRACTITIONER

## 2024-07-18 PROCEDURE — 3077F SYST BP >= 140 MM HG: CPT | Mod: HCNC,CPTII,S$GLB, | Performed by: NURSE PRACTITIONER

## 2024-07-18 PROCEDURE — 99999 PR PBB SHADOW E&M-EST. PATIENT-LVL IV: CPT | Mod: PBBFAC,HCNC,, | Performed by: NURSE PRACTITIONER

## 2024-07-18 PROCEDURE — 99214 OFFICE O/P EST MOD 30 MIN: CPT | Mod: HCNC,S$GLB,, | Performed by: NURSE PRACTITIONER

## 2024-07-18 PROCEDURE — 1159F MED LIST DOCD IN RCRD: CPT | Mod: HCNC,CPTII,S$GLB, | Performed by: NURSE PRACTITIONER

## 2024-07-18 PROCEDURE — 3052F HG A1C>EQUAL 8.0%<EQUAL 9.0%: CPT | Mod: HCNC,CPTII,S$GLB, | Performed by: NURSE PRACTITIONER

## 2024-07-18 PROCEDURE — 2023F DILAT RTA XM W/O RTNOPTHY: CPT | Mod: HCNC,CPTII,S$GLB, | Performed by: NURSE PRACTITIONER

## 2024-07-18 PROCEDURE — 3078F DIAST BP <80 MM HG: CPT | Mod: HCNC,CPTII,S$GLB, | Performed by: NURSE PRACTITIONER

## 2024-07-18 PROCEDURE — 3288F FALL RISK ASSESSMENT DOCD: CPT | Mod: HCNC,CPTII,S$GLB, | Performed by: NURSE PRACTITIONER

## 2024-07-18 RX ORDER — INSULIN ASPART 100 [IU]/ML
INJECTION, SUSPENSION SUBCUTANEOUS
Start: 2024-07-18

## 2024-07-18 NOTE — PROGRESS NOTES
CC: This 73 y.o. male presents for management of diabetes  and chronic conditions pending review including  HLP, HIV, DM PN    HPI: He was diagnosed with T2DM in ~ 2010. Has never been hospitalized r/t DM.  Family hx of DM: mother     No acute events since last visit  Reports only 1 missed dose of trulicity r/t the shortage  Checking his bg fasting 149-199  Pre-supper  176-200  No hypoglycemia     Diet: Eats 2- 3  Meals a day, snacks- cookie or vanilla wafer during the day  Exercise: walks his dogs 4 times a day for 30 mins  CURRENT DM MEDS: metformin 500 mg bid, Novolog 70/30 22 u breakfast and 22 u supper u bid, Trulicity 4.5 mg weekly  (Thursday)  Standards of Care:  Eye exam: 2/2024 Dr Parker     ROS:   Gen: Appetite good,  weight loss 2 lbs  Eyes: Denies visual disturbances  Resp: no SOB or TRIANA   Cardiac: No palpitations, chest pain   GI: No nausea or vomiting, diarrhea, constipation   /GYN: 2+ nocturia, no burning or pain.   MS/Neuro: Denies numbness/ tingling in BLE; Gait steady, speech clear  Psych: Denies drug/ETOH abuse, + h/o depression.  Other systems: negative.    PE:  GENERAL: Well developed, well nourished.  PSYCH: AAOx3, appropriate mood and affect, pleasant expression, conversant, appears relaxed, well groomed.   EYES: Conjunctiva, corneas clear  NECK: Supple, trachea midline   NEURO: Gait steady  FOOT EXAMINATION: 2/2024     Personally reviewed Past Medical, Surgical, Social History.    BP (!) 142/74 (BP Location: Right arm, Patient Position: Sitting, BP Method: Medium (Manual))   Pulse 69   Ht 6' (1.829 m)   Wt 80 kg (176 lb 7.7 oz)   SpO2 98%   BMI 23.93 kg/m²      Personally reviewed the below labs:      Chemistry        Component Value Date/Time     06/20/2024 1042    K 4.0 06/20/2024 1042     06/20/2024 1042    CO2 21 (L) 06/20/2024 1042    BUN 22 06/20/2024 1042    CREATININE 1.3 06/20/2024 1042     (H) 06/20/2024 1042        Component Value Date/Time    CALCIUM 9.2  06/20/2024 1042    ALKPHOS 51 (L) 09/12/2023 0821    AST 23 09/12/2023 0821    ALT 21 09/12/2023 0821    BILITOT 0.3 09/12/2023 0821    ESTGFRAFRICA 58 (A) 07/29/2022 0934    EGFRNONAA 50 (A) 07/29/2022 0934            Lab Results   Component Value Date    TSH 2.618 12/15/2022       Recent Labs   Lab 06/20/24  1042   LDL Cholesterol 66.8   HDL 38 L   Cholesterol 134        Results for orders placed or performed in visit on 09/08/22   Vitamin D   Result Value Ref Range    Vit D, 25-Hydroxy 98 (H) 30 - 96 ng/mL     No results found for this or any previous visit.    Lab Results   Component Value Date    MICALBCREAT 97.1 (H) 09/12/2023       Hemoglobin A1C   Date Value Ref Range Status   07/11/2024 8.8 (H) 4.0 - 5.6 % Final     Comment:     ADA Screening Guidelines:  5.7-6.4%  Consistent with prediabetes  >or=6.5%  Consistent with diabetes    High levels of fetal hemoglobin interfere with the HbA1C  assay. Heterozygous hemoglobin variants (HbS, HgC, etc)do  not significantly interfere with this assay.   However, presence of multiple variants may affect accuracy.     03/11/2024 8.2 (H) 4.0 - 5.6 % Final     Comment:     ADA Screening Guidelines:  5.7-6.4%  Consistent with prediabetes  >or=6.5%  Consistent with diabetes    High levels of fetal hemoglobin interfere with the HbA1C  assay. Heterozygous hemoglobin variants (HbS, HgC, etc)do  not significantly interfere with this assay.   However, presence of multiple variants may affect accuracy.     12/04/2023 9.0 (H) 4.5 - 6.2 % Final     Comment:     According to ADA guidelines, hemoglobin A1C <7.0% represents  optimal control in non-pregnant diabetic patients.  Different  metrics may apply to specific populations.   Standards of Medical Care in Diabetes - 2016.    For the purpose of screening for the presence of diabetes:  <5.7%     Consistent with the absence of diabetes  5.7-6.4%  Consistent with increasing risk for diabetes   (prediabetes)  >or=6.5%  Consistent with  diabetes    Currently no consensus exists for use of hemoglobin A1C  for diagnosis of diabetes for children.          ASSESSMENT and PLAN:      1. T2DM with hyperglycemia, DM PN, CKD 3a-    CGMS Pro  Increase Novolog 70/30 to 25 u at breakfast and 25 u supper- take insulin prior to the meal;   Continue Trulicity to 4.5 mg weekly; metformin at 500 mg bid      Check bg bid-  log in 1-2 weeks  Declines his own personal sensor    2. HLP -  on statin therapy     3. HIV- follows w Dr Middleton    4. Depression stable, chronic     Follow-up: in 3 months with A1C, [facility]

## 2024-07-25 ENCOUNTER — PROCEDURE VISIT (OUTPATIENT)
Dept: DIABETES | Facility: CLINIC | Age: 74
End: 2024-07-25
Payer: MEDICARE

## 2024-07-25 ENCOUNTER — OFFICE VISIT (OUTPATIENT)
Dept: FAMILY MEDICINE | Facility: CLINIC | Age: 74
End: 2024-07-25
Payer: MEDICARE

## 2024-07-25 VITALS
HEIGHT: 72 IN | HEART RATE: 68 BPM | BODY MASS INDEX: 24.04 KG/M2 | WEIGHT: 177.5 LBS | DIASTOLIC BLOOD PRESSURE: 66 MMHG | OXYGEN SATURATION: 99 % | RESPIRATION RATE: 16 BRPM | SYSTOLIC BLOOD PRESSURE: 140 MMHG

## 2024-07-25 DIAGNOSIS — Z79.4 TYPE 2 DIABETES MELLITUS WITH DIABETIC POLYNEUROPATHY, WITH LONG-TERM CURRENT USE OF INSULIN: ICD-10-CM

## 2024-07-25 DIAGNOSIS — G47.00 INSOMNIA, UNSPECIFIED TYPE: ICD-10-CM

## 2024-07-25 DIAGNOSIS — G47.00 INSOMNIA, UNSPECIFIED TYPE: Primary | ICD-10-CM

## 2024-07-25 DIAGNOSIS — E78.2 MIXED HYPERLIPIDEMIA: ICD-10-CM

## 2024-07-25 DIAGNOSIS — E11.42 TYPE 2 DIABETES MELLITUS WITH DIABETIC POLYNEUROPATHY, WITH LONG-TERM CURRENT USE OF INSULIN: Primary | ICD-10-CM

## 2024-07-25 DIAGNOSIS — F33.9 RECURRENT MAJOR DEPRESSIVE DISORDER, REMISSION STATUS UNSPECIFIED: ICD-10-CM

## 2024-07-25 DIAGNOSIS — E11.42 TYPE 2 DIABETES MELLITUS WITH DIABETIC POLYNEUROPATHY, WITH LONG-TERM CURRENT USE OF INSULIN: ICD-10-CM

## 2024-07-25 DIAGNOSIS — Z79.4 TYPE 2 DIABETES MELLITUS WITH DIABETIC POLYNEUROPATHY, WITH LONG-TERM CURRENT USE OF INSULIN: Primary | ICD-10-CM

## 2024-07-25 DIAGNOSIS — E78.5 HYPERLIPIDEMIA, UNSPECIFIED HYPERLIPIDEMIA TYPE: ICD-10-CM

## 2024-07-25 PROCEDURE — 99999 PR PBB SHADOW E&M-EST. PATIENT-LVL V: CPT | Mod: PBBFAC,HCNC,, | Performed by: STUDENT IN AN ORGANIZED HEALTH CARE EDUCATION/TRAINING PROGRAM

## 2024-07-25 RX ORDER — FENOFIBRATE 134 MG/1
134 CAPSULE ORAL
Qty: 90 CAPSULE | Refills: 0 | Status: SHIPPED | OUTPATIENT
Start: 2024-07-25

## 2024-07-25 RX ORDER — ZOLPIDEM TARTRATE 10 MG/1
10 TABLET ORAL NIGHTLY PRN
Qty: 30 TABLET | Refills: 5 | Status: SHIPPED | OUTPATIENT
Start: 2024-07-25

## 2024-07-25 RX ORDER — SERTRALINE HYDROCHLORIDE 100 MG/1
100 TABLET, FILM COATED ORAL
Qty: 90 TABLET | Refills: 3 | Status: SHIPPED | OUTPATIENT
Start: 2024-07-25

## 2024-07-25 RX ORDER — ZOLPIDEM TARTRATE 5 MG/1
TABLET ORAL
Qty: 30 TABLET | Refills: 5 | OUTPATIENT
Start: 2024-07-25

## 2024-07-25 NOTE — PROGRESS NOTES
[facility] CLINIC NOTE    Patient Name: Brandon Parson  YOB: 1950    PRESENTING HISTORY     History of Present Illness:  Mr. Brandon Parson is a 73 y.o. male     HIV- seeing ID regularly. CD4 count >1000. Viral load lower on last check.     DM2- still struggling. Seeing DM education today for CGM. No low BG readings. I will defer changes to endocrine.     BP fluctuates. Some high readings. Wide pulse pressure. On no meds right now.     HLD- at goal, LDL <70.    Insomnia- only sleeping about 3 hours nightly on 5mg ambien. Previously on higher doses + concurrent bzds which we have successfully stopped.   ROS      OBJECTIVE:   Vital Signs:  Vitals:    07/25/24 0823   BP: (!) 140/66   Pulse: 68   Resp: 16   SpO2: 99%   Weight: 80.5 kg (177 lb 7.5 oz)   Height: 6' (1.829 m)          Physical Exam: Normal, no change.     Physical Exam    ASSESSMENT & PLAN:     Insomnia, unspecified type  -     zolpidem (AMBIEN) 10 mg Tab; Take 1 tablet (10 mg total) by mouth nightly as needed. TAKE ONE TABLET BY MOUTH EVERY NIGHT AS NEEDED  Dispense: 30 tablet; Refill: 5    Type 2 diabetes mellitus with diabetic polyneuropathy, with long-term current use of insulin  See discussion above.     Mixed hyperlipidemia  Stable, continue current medications       Francois Eugene  Internal Medicine      Visit complexity inherent to evaluation and management associated with medical care services that serve as the continuing focal point for all needed health care services and/or with medical care services that are part of ongoing care related to a patient's single, serious condition or a complex condition provided today

## 2024-07-25 NOTE — TELEPHONE ENCOUNTER
Refill Decision Note   Brandon Gwendolyndania  is requesting a refill authorization.  Brief Assessment and Rationale for Refill:  Approve     Medication Therapy Plan:         Pharmacist review requested: Yes   Extended chart review required: Yes   Comments:     Note composed:4:57 PM 07/25/2024

## 2024-07-25 NOTE — TELEPHONE ENCOUNTER
Refill Routing Note   Medication(s) are not appropriate for processing by [facility] for the following reason(s):        Drug-disease interaction  Drug-Disease: fenofibrate micronized and Chronic kidney disease, stage III (moderate)    [facility] action(s):  Defer  Approve             Pharmacist review requested: Yes     Appointments  past 12m or future 3m with PCP    Date Provider   Last Visit   1/25/2024 Francois Eugene MD   Next Visit   7/25/2024 Francois Eugene MD   ED visits in past 90 days: 0        Note composed:3:55 PM 07/25/2024

## 2024-07-25 NOTE — TELEPHONE ENCOUNTER
No care due was identified.  Health [facility] Embedded Care Due Messages. Reference number: [id number].   7/25/2024 8:01:47 AM CDT

## 2024-07-25 NOTE — PROGRESS NOTES
"DIABETES EDUCATOR NOTE   PLACEMENT OF DEXCOM G6 PRO SENSOR  CONTINOUS GLUCOSE MONITORING SYSTEM (CGMS)    Patient is here in clinic today for placement of continuous glucose monitoring sensor.                 Patient verified that they were here for CGMS procedure ordered by their provider and that they have a working glucose meter and supplies at home.     Patient provided with a Dexcom G6 Sensor and a copy of the Blinded CGM Patient Handout.  A detailed explanation of Continuous Glucose Monitoring was provided. Patient informed that this is a blind procedure and that they will not actually see the blood sugar tracing in real time.  Reviewed with patient the  patient education handout called "Dexcom G6 Pro Continuous Glucose Monitoring System; Using Your G6 Pro" to review self-care during the study to avoid sensor loosening or removal ie... bathing, swimming, dressing, and exercising.    Instructed patient to check blood sugar using home glucometer and to record the following on provided patient log sheets: Blood sugar taken at home, meals and snacks, activity, and diabetes medications taken and dosage.    Patient was brought to a private location. Insertion was selected and prepared and allowed to dry. Glucose Sensor Serial Number [id number] was inserted to back of patient's RIGHT abdomen.                 The following forms were given and reviewed in detail with patient and all questions answered:  Blinded CGM Patient Handout  Dexcom G6 Pro Continuous Glucose Monitoring System--Using Your G6 Pro,  For Blinded Patient Only          Time: 15 minutes      "

## 2024-08-01 ENCOUNTER — OFFICE VISIT (OUTPATIENT)
Dept: INFECTIOUS DISEASES | Facility: CLINIC | Age: 74
End: 2024-08-01
Payer: MEDICARE

## 2024-08-01 ENCOUNTER — NUTRITION (OUTPATIENT)
Dept: DIABETES | Facility: CLINIC | Age: 74
End: 2024-08-01
Payer: MEDICARE

## 2024-08-01 VITALS
SYSTOLIC BLOOD PRESSURE: 130 MMHG | BODY MASS INDEX: 24.2 KG/M2 | DIASTOLIC BLOOD PRESSURE: 74 MMHG | HEART RATE: 75 BPM | WEIGHT: 178.63 LBS | OXYGEN SATURATION: 97 % | HEIGHT: 72 IN

## 2024-08-01 DIAGNOSIS — E11.628 TYPE 2 DIABETES MELLITUS WITH OTHER SKIN COMPLICATION, WITHOUT LONG-TERM CURRENT USE OF INSULIN: ICD-10-CM

## 2024-08-01 DIAGNOSIS — Z79.4 TYPE 2 DIABETES MELLITUS WITH DIABETIC POLYNEUROPATHY, WITH LONG-TERM CURRENT USE OF INSULIN: ICD-10-CM

## 2024-08-01 DIAGNOSIS — E78.2 MIXED HYPERLIPIDEMIA: ICD-10-CM

## 2024-08-01 DIAGNOSIS — B20 HIV DISEASE: Primary | ICD-10-CM

## 2024-08-01 DIAGNOSIS — E11.42 TYPE 2 DIABETES MELLITUS WITH DIABETIC POLYNEUROPATHY, WITH LONG-TERM CURRENT USE OF INSULIN: ICD-10-CM

## 2024-08-01 PROCEDURE — 99999 PR PBB SHADOW E&M-EST. PATIENT-LVL IV: CPT | Mod: PBBFAC,HCNC,, | Performed by: INTERNAL MEDICINE

## 2024-08-01 NOTE — PROGRESS NOTES
Subjective :  Here for follow up.    Patient ID: Brandon Parson is a 73 y.o. male.    Chief Complaint: Follow-up (4 follow up )    HPI  He was last seen by Dr. Hoover on 12/04/2023.  He has continued to do okay.  Main concern has been low level viremia in the range of .  He did have a level that was undetected in 2022.  He is currently on long-term Sustiva and Descovy.  Since last visit he has had a colonoscopy with polypectomy performed.  Pathology was consistent with tubular adenoma.  His A1c continues to improve and is down to 8.4.  He follows up with his endocrinologist and PCP.  Compliant to his HAART.      He has a new puppy and has sustained scratches on the forearms from the polyp.  They have healed for now.  Also has 2 cats.    08/01/2024:  No acute issues since the last time we saw.  He continues to do okay.  Lab data reviewed.  HIV PCR 40, CD4 count 1148, CBC normal.  HbA1c 8.8.  Informs me that zolpidem dose was increased from 5 mg to 10 mg to continue to aid with sleeping.    Review of Systems: 10 system review unremarkable.       Objective     Physical Exam  General:  well-groomed elderly man in no acute distress   CVS: S1 and 2 heard   Respiratory: Clear to auscultation   Abdomen: Full, soft, nontender, no palpable organomegaly   Skin:  No rash appreciated   CNS: No focal deficits   Musculoskeletal system: No joint or bony abnormalities appreciated   LEs: No edema.         Assessment and Plan   1. HIV.  Has chronic low level viremia.  This has been stable over the years.  We will obtain genotype if PCR> 500.  Continue current HAART regimen of Descovy and Sustiva.  Reassess again in 6 months.    2. Hyperlipidemia.  He is currently on atorvastatin and fenofibrate.  Also takes fish oil.  Management as per his PCP.  Good control.    3. Diabetes mellitus.  HbA1c has improved.  Follow up with his endocrinologist and PCP.    4. Health maintenance.  He had colonoscopy March 20, 2024 with tubular  adenoma.  Follow up with GI.  Also he will need influenza vaccine any updated COVID-19 vaccine when available.    1. HIV disease    2. Mixed hyperlipidemia    3. Type 2 diabetes mellitus with other skin complication, without long-term current use of insulin          I will plan to see again 6 months.       Follow up in about 6 months (around 2/1/2025).    Review of Systems

## 2024-08-01 NOTE — PROGRESS NOTES
DIABETES EDUCATOR NOTE   Return of the Dexcom G6 Pro Sensor      Patient returned to clinic today to remove Glucose Sensor.      The CGMS Sensor will be scanned and downloaded. All reports will be imported into the patient's electronic medical record.     Endocrine provider will complete data interpretation and make recommendations; will forward recommendations to the ordering provider for follow up with patient.         Pt currently taking Novolog 70/30  25 units BID (taking correctly before meals); Metfomrin 500 mg BID; Trulicity 4.5 mg on WED

## 2024-08-01 NOTE — PATIENT INSTRUCTIONS
1. Labs are stable.  No need to make any adjustments to your HIV medications   2. I will see you again in 6 months

## 2024-08-08 ENCOUNTER — TELEPHONE (OUTPATIENT)
Dept: ENDOCRINOLOGY | Facility: CLINIC | Age: 74
End: 2024-08-08
Payer: MEDICARE

## 2024-08-08 ENCOUNTER — PATIENT OUTREACH (OUTPATIENT)
Dept: ENDOCRINOLOGY | Facility: CLINIC | Age: 74
End: 2024-08-08
Payer: MEDICARE

## 2024-08-08 DIAGNOSIS — N18.31 TYPE 2 DIABETES MELLITUS WITH STAGE 3A CHRONIC KIDNEY DISEASE, WITH LONG-TERM CURRENT USE OF INSULIN: Primary | ICD-10-CM

## 2024-08-08 DIAGNOSIS — Z79.4 TYPE 2 DIABETES MELLITUS WITH STAGE 3A CHRONIC KIDNEY DISEASE, WITH LONG-TERM CURRENT USE OF INSULIN: Primary | ICD-10-CM

## 2024-08-08 DIAGNOSIS — E11.22 TYPE 2 DIABETES MELLITUS WITH STAGE 3A CHRONIC KIDNEY DISEASE, WITH LONG-TERM CURRENT USE OF INSULIN: Primary | ICD-10-CM

## 2024-08-14 RX ORDER — CALCIUM CITRATE/VITAMIN D3 200MG-6.25
TABLET ORAL
Qty: 100 STRIP | Refills: 11 | Status: SHIPPED | OUTPATIENT
Start: 2024-08-14

## 2024-08-19 DIAGNOSIS — E11.628 TYPE 2 DIABETES MELLITUS WITH OTHER SKIN COMPLICATION, WITHOUT LONG-TERM CURRENT USE OF INSULIN: ICD-10-CM

## 2024-08-19 RX ORDER — INSULIN ASPART 100 [IU]/ML
INJECTION, SUSPENSION SUBCUTANEOUS
Qty: 45 ML | Refills: 0 | Status: SHIPPED | OUTPATIENT
Start: 2024-08-19 | End: 2024-08-20 | Stop reason: SDUPTHER

## 2024-08-19 NOTE — TELEPHONE ENCOUNTER
----- Message from Alysha Goldberg sent at 8/19/2024  1:26 PM CDT -----  Contact: pt [phone]  Type: Needs Medical Advice  Who Called:  Pt     Best Call Back Number: [phone]      Additional Information: Pt stated office needs to contact pharmacy regarding  his novalog. Pt state dosage was changed recently and pharmacy needs the ok from providers office before they fill rx.        [address] [address]  Phone: [phone] Fax: [phone]

## 2024-08-20 DIAGNOSIS — E11.628 TYPE 2 DIABETES MELLITUS WITH OTHER SKIN COMPLICATION, WITHOUT LONG-TERM CURRENT USE OF INSULIN: ICD-10-CM

## 2024-08-20 RX ORDER — INSULIN ASPART 100 [IU]/ML
INJECTION, SUSPENSION SUBCUTANEOUS
Qty: 45 ML | Refills: 3 | Status: SHIPPED | OUTPATIENT
Start: 2024-08-20

## 2024-08-20 NOTE — TELEPHONE ENCOUNTER
----- Message from Dawn Sweeney sent at 8/20/2024  9:05 AM CDT -----  Regarding: RX Refill Request/on back order, change request  Contact: Yvette figueroa at [phone]  Type:  RX Refill Request/on back order, change request    Who Called:    [facility] [address] [address]  Phone: [phone] Fax: [phone]      Additional Information:  can flexpen be change to vials. Please call and advise. Thank you    NOVOLOG MIX 70-30FLEXPEN U-100 100 unit/mL (70-30) InPn pen 45 mL 0 8/19/2024 -   Sig: INJECT 25 UNITS breakfast and 30 u supper   Sent to pharmacy as: NOVOLOG MIX 70-30FLEXPEN U-100 100 unit/mL (70-30) InPn pen   E-Prescribing Status: Receipt confirmed by pharmacy (8/19/2024  4:11 PM CDT)

## 2024-08-29 NOTE — TELEPHONE ENCOUNTER
Care Due:                  Date            Visit Type   Department     Provider  --------------------------------------------------------------------------------                                EP -                              PRIMARY      YAYA Juárez  Last Visit: 07-      CARE ([facility])   MEDICINE       Naccari                              EP -                              PRIMARY      [facility]    Francois Juárez  Next Visit: 01-      CARE ([facility])   MEDICINE       Naccari                                                            Last  Test          Frequency    Reason                     Performed    Due Date  --------------------------------------------------------------------------------    CMP.........  12 months..  atorvastatin, fenofibrate  09- 09-    [facility] Embedded Care Due Messages. Reference number: [id number].   8/29/2024 2:18:48 PM CDT

## 2024-08-30 RX ORDER — ATORVASTATIN CALCIUM 40 MG/1
TABLET, FILM COATED ORAL
Qty: 90 TABLET | Refills: 0 | Status: SHIPPED | OUTPATIENT
Start: 2024-08-30

## 2024-08-30 NOTE — TELEPHONE ENCOUNTER
Provider Staff:  Action required for this patient    Requires labs      Please see care gap opportunities below in Care Due Message.    Thanks!  [facility]     Appointments      Date Provider   Last Visit   7/25/2024 Francois Eugene MD   Next Visit   1/28/2025 Francois Eugene MD     Refill Decision Note   Brandon Parson  is requesting a refill authorization.  Brief Assessment and Rationale for Refill:  Approve     Medication Therapy Plan:         Comments:     Note composed:7:35 AM 08/30/2024

## 2024-09-01 NOTE — PROGRESS NOTES
Subjective:    Patient ID:  Brandon Parson is a 73 y.o. male who presents for follow-up of Hyperlipidemia      Problem List Items Addressed This Visit          Cardiac/Vascular    Mixed hyperlipidemia - Primary     Other Visit Diagnoses       Encounter for cardiac risk counseling                HPI    Patient was last seen on 09/06/2023 at which time he was doing okay from a cardiac standpoint.    On assessment today, the patient states that he feels well. Dealing with A1c issues    No chest pain.  No shortness of breath.  Walking dog for activity  - a [employer] dog 3 months old named enrrique         Objective:     Vitals:    09/09/24 0914   BP: (!) 141/72   BP Location: Left arm   Patient Position: Sitting   BP Method: Medium (Automatic)   Pulse: 72   Weight: 81.6 kg (179 lb 14.3 oz)   Height: 6' (1.829 m)       BP Readings from Last 5 Encounters:   09/09/24 (!) 141/72   08/01/24 130/74   07/25/24 (!) 140/66   07/18/24 (!) 142/74   06/20/24 118/64        Physical Exam  Constitutional:       Appearance: He is well-developed.   HENT:      Head: Normocephalic and atraumatic.   Neck:      Vascular: No JVD.   Cardiovascular:      Rate and Rhythm: Normal rate and regular rhythm.      Heart sounds: Normal heart sounds. No murmur heard.     No friction rub. No gallop.   Pulmonary:      Effort: Pulmonary effort is normal. No respiratory distress.      Breath sounds: Normal breath sounds. No wheezing or rales.   Abdominal:      General: Bowel sounds are normal.      Palpations: Abdomen is soft.      Tenderness: There is no abdominal tenderness. There is no guarding or rebound.   Musculoskeletal:      Cervical back: Normal range of motion and neck supple.   Skin:     General: Skin is warm and dry.   Neurological:      Mental Status: He is alert and oriented to person, place, and time.   Psychiatric:         Behavior: Behavior normal.             Current Outpatient Medications   Medication Instructions     "ascorbic acid (vitamin C) (VITAMIN C) 100 mg, Oral, Daily    aspirin 325 mg, Oral, Daily    atorvastatin (LIPITOR) 40 MG tablet TAKE ONE TABLET BY MOUTH DAILY    b complex vitamins capsule 1 capsule, Oral, Daily    BD ULTRA-FINE MINI PEN NEEDLE 31 gauge x 3/16" Ndle USE TWICE DAILY    blood-glucose meter Misc USE TO TEST BLOOD SUGAR 2 TO 3 TIMES DAILY    efavirenz (SUSTIVA) 600 mg, Oral, Nightly    emtricitabine-tenofovir alafen (DESCOVY) 200-25 mg Tab 1 tablet, Oral, Nightly    fenofibrate micronized (LOFIBRA) 134 mg, Oral    fish oil-omega-3 fatty acids 300-1,000 mg capsule 2 capsules, Oral, Daily    HIGH POTENCY MULTIVITAMIN 400 mcg Tab 1 tablet, Oral, Daily    metFORMIN (GLUCOPHAGE) 500 mg, Oral, 2 times daily with meals    NOVOLOG MIX 70-30FLEXPEN U-100 100 unit/mL (70-30) InPn pen INJECT 25 UNITS breakfast and 30 u supper    ondansetron (ZOFRAN-ODT) 4 mg, Oral, Every 6 hours PRN    potassium chloride SA (K-DUR,KLOR-CON) 20 MEQ tablet 20 mEq, Oral, 2 times daily    sertraline (ZOLOFT) 100 mg, Oral    TRUE METRIX GLUCOSE TEST STRIP Strp USE TWICE DAILY    TRUEPLUS LANCETS 30 gauge Misc USE TWICE DAILY TO TEST BLOOD SUGAR    TRULICITY 4.5 mg/0.5 mL pen injector INJECT 4.5mg SUBCUTANEOUSLY EVERY 7 DAYS    zolpidem (AMBIEN) 10 mg, Oral, Nightly PRN, TAKE ONE TABLET BY MOUTH EVERY NIGHT AS NEEDED       Lipid Panel:   Lab Results   Component Value Date    CHOL 134 06/20/2024    HDL 38 (L) 06/20/2024    LDLCALC 66.8 06/20/2024    TRIG 146 06/20/2024    CHOLHDL 28.4 06/20/2024       The 10-year ASCVD risk score (Matteo DK, et al., 2019) is: 43.9%    Values used to calculate the score:      Age: 73 years      Sex: Male      Is Non- : No      Diabetic: Yes      Tobacco smoker: No      Systolic Blood Pressure: 141 mmHg      Is BP treated: No      HDL Cholesterol: 38 mg/dL      Total Cholesterol: 134 mg/dL    Most Recent EKG Results  Results for orders placed or performed during the hospital " encounter of 02/22/22   EKG and show to ED MD    Collection Time: 02/22/22  4:21 PM    Narrative    Test Reason : R55,    Vent. Rate : 062 BPM     Atrial Rate : 062 BPM     P-R Int : 218 ms          QRS Dur : 110 ms      QT Int : 430 ms       P-R-T Axes : 061 -53 073 degrees     QTc Int : 436 ms    Sinus rhythm with 1st degree A-V block  Low voltage QRS  Incomplete right bundle branch block  Left anterior fascicular block  Abnormal ECG  No previous ECGs available  Confirmed by James Thomas MD ([id number]) on 2/26/2022 6:34:19 PM    Referred By: BELLA   SELF           Confirmed By:James Thomas MD       Most Recent Echocardiogram Results  Results for orders placed during the hospital encounter of 02/22/22    Echo Saline Bubble? Yes    Interpretation Summary  · There is no evidence of intracardiac shunting.  · The left ventricle is normal in size with concentric remodeling and normal systolic function.  · The estimated ejection fraction is 58%.  · Normal left ventricular diastolic function.  · Normal right ventricular size with normal right ventricular systolic function.  · Mild left atrial enlargement.  · Mild mitral regurgitation.  · Normal central venous pressure (3 mmHg).  · The estimated PA systolic pressure is 28 mmHg.      Most Recent Nuclear Stress Test Results  Results for orders placed during the hospital encounter of 03/24/22    Nuclear Stress - Cardiology Interpreted    Interpretation Summary    Normal myocardial perfusion scan. There is no evidence of myocardial ischemia or infarction.    There is a  mild intensity fixed perfusion abnormality in the  wall of the left ventricle secondary to diaphragm attenuation.    The gated perfusion images showed an ejection fraction of 66% at rest.    There is normal wall motion at rest.    The EKG portion of this study is negative for ischemia.    There are no prior studies for comparison.      Most Recent Cardiac PET Stress Test Results  No results found for this  or any previous visit.      Most Recent Cardiovascular Angiogram results  No results found for this or any previous visit.      Other Most Recent Cardiology Results  Results for orders placed during the hospital encounter of 08/01/22    CARDIAC MONITORING STRIPS        All pertinent data including labs, imaging, EKGs, and studies listed above were reviewed.  Patient's most recent EKG tracing was personally interpreted by this provider.    Assessment:       1. Mixed hyperlipidemia    2. Encounter for cardiac risk counseling         Plan for treatment of the above diagnoses:     Symptoms OK today  BP borderline today  Most recent echocardiogram reviewed personally     Continue aspirin  Continue atorvastatin 40 mg PO Daily  Continue fenofibrate  Carotid doppler     Continue other cardiac medications  Mediterranean Diet/Cardiovascular Exercise Program    Visit today included increased complexity associated with the care of the episodic problem(s) addressed above in addition to managing the longitudinal care of the patient due to the serious and/or complex managed problem(s) listed above.    Patient queried and all questions were answered.    F/u in 1 year to reassess      Signed:    Paolo Webb MD  9/9/2024 10:38 AM

## 2024-09-06 ENCOUNTER — TELEPHONE (OUTPATIENT)
Dept: CARDIOLOGY | Facility: CLINIC | Age: 74
End: 2024-09-06
Payer: MEDICARE

## 2024-09-09 ENCOUNTER — OFFICE VISIT (OUTPATIENT)
Dept: CARDIOLOGY | Facility: CLINIC | Age: 74
End: 2024-09-09
Payer: MEDICARE

## 2024-09-09 VITALS
SYSTOLIC BLOOD PRESSURE: 141 MMHG | HEART RATE: 72 BPM | HEIGHT: 72 IN | WEIGHT: 179.88 LBS | BODY MASS INDEX: 24.36 KG/M2 | DIASTOLIC BLOOD PRESSURE: 72 MMHG

## 2024-09-09 DIAGNOSIS — R09.89 BRUIT: ICD-10-CM

## 2024-09-09 DIAGNOSIS — E78.2 MIXED HYPERLIPIDEMIA: Primary | ICD-10-CM

## 2024-09-09 DIAGNOSIS — Z71.89 ENCOUNTER FOR CARDIAC RISK COUNSELING: ICD-10-CM

## 2024-09-09 PROCEDURE — 3077F SYST BP >= 140 MM HG: CPT | Mod: HCNC,CPTII,S$GLB, | Performed by: INTERNAL MEDICINE

## 2024-09-09 PROCEDURE — 99999 PR PBB SHADOW E&M-EST. PATIENT-LVL IV: CPT | Mod: PBBFAC,HCNC,, | Performed by: INTERNAL MEDICINE

## 2024-09-09 PROCEDURE — 1159F MED LIST DOCD IN RCRD: CPT | Mod: HCNC,CPTII,S$GLB, | Performed by: INTERNAL MEDICINE

## 2024-09-09 PROCEDURE — 3288F FALL RISK ASSESSMENT DOCD: CPT | Mod: HCNC,CPTII,S$GLB, | Performed by: INTERNAL MEDICINE

## 2024-09-09 PROCEDURE — 3078F DIAST BP <80 MM HG: CPT | Mod: HCNC,CPTII,S$GLB, | Performed by: INTERNAL MEDICINE

## 2024-09-09 PROCEDURE — 3008F BODY MASS INDEX DOCD: CPT | Mod: HCNC,CPTII,S$GLB, | Performed by: INTERNAL MEDICINE

## 2024-09-09 PROCEDURE — 3052F HG A1C>EQUAL 8.0%<EQUAL 9.0%: CPT | Mod: HCNC,CPTII,S$GLB, | Performed by: INTERNAL MEDICINE

## 2024-09-09 PROCEDURE — G2211 COMPLEX E/M VISIT ADD ON: HCPCS | Mod: HCNC,S$GLB,, | Performed by: INTERNAL MEDICINE

## 2024-09-09 PROCEDURE — 1160F RVW MEDS BY RX/DR IN RCRD: CPT | Mod: HCNC,CPTII,S$GLB, | Performed by: INTERNAL MEDICINE

## 2024-09-09 PROCEDURE — 1101F PT FALLS ASSESS-DOCD LE1/YR: CPT | Mod: HCNC,CPTII,S$GLB, | Performed by: INTERNAL MEDICINE

## 2024-09-09 PROCEDURE — 1126F AMNT PAIN NOTED NONE PRSNT: CPT | Mod: HCNC,CPTII,S$GLB, | Performed by: INTERNAL MEDICINE

## 2024-09-09 PROCEDURE — 99214 OFFICE O/P EST MOD 30 MIN: CPT | Mod: HCNC,S$GLB,, | Performed by: INTERNAL MEDICINE

## 2024-09-16 ENCOUNTER — TELEPHONE (OUTPATIENT)
Dept: CARDIOLOGY | Facility: CLINIC | Age: 74
End: 2024-09-16

## 2024-09-16 ENCOUNTER — HOSPITAL ENCOUNTER (OUTPATIENT)
Dept: CARDIOLOGY | Facility: HOSPITAL | Age: 74
Discharge: HOME | End: 2024-09-16
Attending: INTERNAL MEDICINE
Payer: MEDICARE

## 2024-09-16 DIAGNOSIS — R09.89 BRUIT: ICD-10-CM

## 2024-09-16 LAB
LEFT ARM DIASTOLIC BLOOD PRESSURE: 72 MMHG
LEFT ARM SYSTOLIC BLOOD PRESSURE: 141 MMHG
LEFT CBA DIAS: 31 CM/S
LEFT CBA SYS: 122 CM/S
LEFT CCA DIST DIAS: 25 CM/S
LEFT CCA DIST SYS: 95 CM/S
LEFT CCA MID DIAS: 25 CM/S
LEFT CCA MID SYS: 111 CM/S
LEFT CCA PROX DIAS: 28 CM/S
LEFT CCA PROX SYS: 137 CM/S
LEFT ECA DIAS: 23 CM/S
LEFT ECA SYS: 118 CM/S
LEFT ICA DIST DIAS: 33 CM/S
LEFT ICA DIST SYS: 113 CM/S
LEFT ICA MID DIAS: 36 CM/S
LEFT ICA MID SYS: 118 CM/S
LEFT ICA PROX DIAS: 36 CM/S
LEFT ICA PROX SYS: 122 CM/S
LEFT VERTEBRAL DIAS: 14 CM/S
LEFT VERTEBRAL SYS: 51 CM/S
OHS CV CAROTID RIGHT ICA EDV HIGHEST: 36
OHS CV CAROTID ULTRASOUND LEFT ICA/CCA RATIO: 1.28
OHS CV CAROTID ULTRASOUND RIGHT ICA/CCA RATIO: 1.35
OHS CV PV CAROTID LEFT HIGHEST CCA: 137
OHS CV PV CAROTID LEFT HIGHEST ICA: 122
OHS CV PV CAROTID RIGHT HIGHEST CCA: 129
OHS CV PV CAROTID RIGHT HIGHEST ICA: 93
OHS CV US CAROTID LEFT HIGHEST EDV: 36
RIGHT ARM DIASTOLIC BLOOD PRESSURE: 72 MMHG
RIGHT ARM SYSTOLIC BLOOD PRESSURE: 141 MMHG
RIGHT CBA DIAS: 28 CM/S
RIGHT CBA SYS: 84 CM/S
RIGHT CCA DIST DIAS: 20 CM/S
RIGHT CCA DIST SYS: 69 CM/S
RIGHT CCA MID DIAS: 20 CM/S
RIGHT CCA MID SYS: 61 CM/S
RIGHT CCA PROX DIAS: 41 CM/S
RIGHT CCA PROX SYS: 129 CM/S
RIGHT ECA DIAS: 14 CM/S
RIGHT ECA SYS: 61 CM/S
RIGHT ICA DIST DIAS: 36 CM/S
RIGHT ICA DIST SYS: 93 CM/S
RIGHT ICA MID DIAS: 25 CM/S
RIGHT ICA MID SYS: 77 CM/S
RIGHT ICA PROX DIAS: 19 CM/S
RIGHT ICA PROX SYS: 68 CM/S
RIGHT VERTEBRAL DIAS: 18 CM/S
RIGHT VERTEBRAL SYS: 59 CM/S

## 2024-09-16 PROCEDURE — 93880 EXTRACRANIAL BILAT STUDY: CPT | Mod: 26,HCNC,, | Performed by: INTERNAL MEDICINE

## 2024-09-16 PROCEDURE — 93880 EXTRACRANIAL BILAT STUDY: CPT | Mod: HCNC,PO

## 2024-09-16 NOTE — TELEPHONE ENCOUNTER
----- Message from Paolo Webb MD sent at 9/16/2024  1:55 PM CDT -----  No significant blockages seen in the carotid arteries.  This is good news.

## 2024-10-24 DIAGNOSIS — E78.5 HYPERLIPIDEMIA, UNSPECIFIED HYPERLIPIDEMIA TYPE: ICD-10-CM

## 2024-10-24 NOTE — TELEPHONE ENCOUNTER
No care due was identified.  [facility] Embedded Care Due Messages. Reference number: [id number].   10/24/2024 4:03:39 PM CDT

## 2024-10-25 RX ORDER — FENOFIBRATE 134 MG/1
134 CAPSULE ORAL
Qty: 90 CAPSULE | Refills: 4 | Status: SHIPPED | OUTPATIENT
Start: 2024-10-25

## 2024-10-25 NOTE — TELEPHONE ENCOUNTER
Refill Routing Note   Medication(s) are not appropriate for processing by [facility] for the following reason(s):        Required labs outdated    [facility] action(s):  Defer             Appointments  past 12m or future 3m with PCP    Date Provider   Last Visit   7/25/2024 Francois Eugene MD   Next Visit   1/28/2025 Francois Eugene MD   ED visits in past 90 days: 0        Note composed:9:37 AM 10/25/2024

## 2024-10-28 ENCOUNTER — LAB VISIT (OUTPATIENT)
Dept: LAB | Facility: HOSPITAL | Age: 74
End: 2024-10-28
Attending: NURSE PRACTITIONER
Payer: MEDICARE

## 2024-10-28 DIAGNOSIS — Z79.4 TYPE 2 DIABETES MELLITUS WITH DIABETIC POLYNEUROPATHY, WITH LONG-TERM CURRENT USE OF INSULIN: ICD-10-CM

## 2024-10-28 DIAGNOSIS — E11.42 TYPE 2 DIABETES MELLITUS WITH DIABETIC POLYNEUROPATHY, WITH LONG-TERM CURRENT USE OF INSULIN: ICD-10-CM

## 2024-10-28 LAB
ALBUMIN/CREAT UR: 37 UG/MG (ref 0–30)
CREAT UR-MCNC: 108 MG/DL (ref 23–375)
MICROALBUMIN UR DL<=1MG/L-MCNC: 40 UG/ML

## 2024-10-28 PROCEDURE — 82043 UR ALBUMIN QUANTITATIVE: CPT | Mod: HCNC | Performed by: NURSE PRACTITIONER

## 2024-11-04 ENCOUNTER — OFFICE VISIT (OUTPATIENT)
Dept: ENDOCRINOLOGY | Facility: CLINIC | Age: 74
End: 2024-11-04
Payer: MEDICARE

## 2024-11-04 VITALS
DIASTOLIC BLOOD PRESSURE: 76 MMHG | HEART RATE: 67 BPM | HEIGHT: 72 IN | WEIGHT: 182.31 LBS | BODY MASS INDEX: 24.69 KG/M2 | SYSTOLIC BLOOD PRESSURE: 130 MMHG

## 2024-11-04 DIAGNOSIS — B20 HIV DISEASE: ICD-10-CM

## 2024-11-04 DIAGNOSIS — E11.42 TYPE 2 DIABETES MELLITUS WITH DIABETIC POLYNEUROPATHY, WITH LONG-TERM CURRENT USE OF INSULIN: ICD-10-CM

## 2024-11-04 DIAGNOSIS — N18.31 TYPE 2 DIABETES MELLITUS WITH STAGE 3A CHRONIC KIDNEY DISEASE, WITH LONG-TERM CURRENT USE OF INSULIN: Primary | ICD-10-CM

## 2024-11-04 DIAGNOSIS — Z79.4 TYPE 2 DIABETES MELLITUS WITH STAGE 3A CHRONIC KIDNEY DISEASE, WITH LONG-TERM CURRENT USE OF INSULIN: Primary | ICD-10-CM

## 2024-11-04 DIAGNOSIS — E11.628 TYPE 2 DIABETES MELLITUS WITH OTHER SKIN COMPLICATION, WITHOUT LONG-TERM CURRENT USE OF INSULIN: ICD-10-CM

## 2024-11-04 DIAGNOSIS — E78.2 MIXED HYPERLIPIDEMIA: ICD-10-CM

## 2024-11-04 DIAGNOSIS — Z79.4 TYPE 2 DIABETES MELLITUS WITH DIABETIC POLYNEUROPATHY, WITH LONG-TERM CURRENT USE OF INSULIN: ICD-10-CM

## 2024-11-04 DIAGNOSIS — E11.22 TYPE 2 DIABETES MELLITUS WITH STAGE 3A CHRONIC KIDNEY DISEASE, WITH LONG-TERM CURRENT USE OF INSULIN: Primary | ICD-10-CM

## 2024-11-04 PROCEDURE — 99999 PR PBB SHADOW E&M-EST. PATIENT-LVL IV: CPT | Mod: PBBFAC,HCNC,, | Performed by: NURSE PRACTITIONER

## 2024-11-04 RX ORDER — LISINOPRIL 2.5 MG/1
2.5 TABLET ORAL DAILY
Qty: 90 TABLET | Refills: 3 | Status: SHIPPED | OUTPATIENT
Start: 2024-11-04 | End: 2025-11-04

## 2024-11-04 RX ORDER — INSULIN ASPART 100 [IU]/ML
INJECTION, SUSPENSION SUBCUTANEOUS
Start: 2024-11-04

## 2024-11-04 NOTE — PROGRESS NOTES
CC: This 74 y.o. male presents for management of diabetes  and chronic conditions pending review including  HLP, HIV, DM PN    HPI: He was diagnosed with T2DM in ~ 2010. Has never been hospitalized r/t DM.  Family hx of DM: mother     No acute events since last visit  70/30 increased at his last visit to 25 u bid  And then again after his CGMS pro showed pp excursions  However he is still taking 22 u bid      Checking his bg fasting  160s  Pre-supper  140s-150s  No hypoglycemia     Diet: Eats 2- 3  Meals a day, snacks- PB crackers  Exercise: walks his dogs 4 times a day for 30 mins  CURRENT DM MEDS: metformin 500 mg bid, Novolog 70/30 22 u breakfast and 22 u supper u bid, Trulicity 4.5 mg weekly  (Thursday)  Standards of Care:  Eye exam: 2/2024 Dr Parker     ROS:   Gen: Appetite good,  weight gain 6 lbs  Eyes: Denies visual disturbances  Resp: no SOB or TRIANA   Cardiac: No palpitations, chest pain   GI: No nausea or vomiting, diarrhea, constipation   /GYN: 2+ nocturia, no burning or pain.   MS/Neuro: Denies numbness/ tingling in BLE; Gait steady, speech clear  Psych: Denies drug/ETOH abuse, + h/o depression.  Other systems: negative.    PE:  GENERAL: Well developed, well nourished.  PSYCH: AAOx3, appropriate mood and affect, pleasant expression, conversant, appears relaxed, well groomed.   EYES: Conjunctiva, corneas clear  NECK: Supple, trachea midline   NEURO: Gait steady  FOOT EXAMINATION: 2/2024     Personally reviewed Past Medical, Surgical, Social History.    BP (!) 152/80 (BP Location: Right arm, Patient Position: Sitting)   Pulse 67   Ht 6' (1.829 m)   Wt 82.7 kg (182 lb 5.1 oz)   BMI 24.73 kg/m²      Personally reviewed the below labs:      Chemistry        Component Value Date/Time     06/20/2024 1042    K 4.0 06/20/2024 1042     06/20/2024 1042    CO2 21 (L) 06/20/2024 1042    BUN 22 06/20/2024 1042    CREATININE 1.3 06/20/2024 1042     (H) 06/20/2024 1042        Component Value  Date/Time    CALCIUM 9.2 06/20/2024 1042    ALKPHOS 51 (L) 09/12/2023 0821    AST 23 09/12/2023 0821    ALT 21 09/12/2023 0821    BILITOT 0.3 09/12/2023 0821    ESTGFRAFRICA 58 (A) 07/29/2022 0934    EGFRNONAA 50 (A) 07/29/2022 0934            Lab Results   Component Value Date    TSH 2.618 12/15/2022       Recent Labs   Lab 06/20/24  1042   LDL Cholesterol 66.8   HDL 38 L   Cholesterol 134        Results for orders placed or performed in visit on 09/08/22   Vitamin D    Collection Time: 09/08/22 12:19 PM   Result Value Ref Range    Vit D, 25-Hydroxy 98 (H) 30 - 96 ng/mL     No results found for this or any previous visit.    Lab Results   Component Value Date    MICALBCREAT 37.0 (H) 10/28/2024       Hemoglobin A1C   Date Value Ref Range Status   10/28/2024 6.8 (H) 4.0 - 5.6 % Final     Comment:     ADA Screening Guidelines:  5.7-6.4%  Consistent with prediabetes  >or=6.5%  Consistent with diabetes    High levels of fetal hemoglobin interfere with the HbA1C  assay. Heterozygous hemoglobin variants (HbS, HgC, etc)do  not significantly interfere with this assay.   However, presence of multiple variants may affect accuracy.     07/11/2024 8.8 (H) 4.0 - 5.6 % Final     Comment:     ADA Screening Guidelines:  5.7-6.4%  Consistent with prediabetes  >or=6.5%  Consistent with diabetes    High levels of fetal hemoglobin interfere with the HbA1C  assay. Heterozygous hemoglobin variants (HbS, HgC, etc)do  not significantly interfere with this assay.   However, presence of multiple variants may affect accuracy.     03/11/2024 8.2 (H) 4.0 - 5.6 % Final     Comment:     ADA Screening Guidelines:  5.7-6.4%  Consistent with prediabetes  >or=6.5%  Consistent with diabetes    High levels of fetal hemoglobin interfere with the HbA1C  assay. Heterozygous hemoglobin variants (HbS, HgC, etc)do  not significantly interfere with this assay.   However, presence of multiple variants may affect accuracy.          ASSESSMENT and PLAN:      1.  T2DM with hyperglycemia, DM PN, CKD 3a-    Increase Novolog 70/30 to 22 u at breakfast and 25 u supper- take insulin prior to the meal;   Continue Trulicity to 4.5 mg weekly; metformin at 500 mg bid      Check bg bid-    Declines his own personal sensor  + microalbuminuria, start ace i 2.5 mg lisinopril     2. HLP -  on statin therapy     3. HIV- follows w Dr Middleton    4. Depression stable, chronic     Follow-up: in 3 months with A1C

## 2024-11-21 DIAGNOSIS — B20 HIV DISEASE: Primary | ICD-10-CM

## 2024-11-21 RX ORDER — EFAVIRENZ 600 MG/1
600 TABLET, FILM COATED ORAL NIGHTLY
Qty: 30 TABLET | Refills: 5 | Status: SHIPPED | OUTPATIENT
Start: 2024-11-21 | End: 2025-05-20

## 2024-11-21 NOTE — TELEPHONE ENCOUNTER
Refill Routing Note   Medication(s) are not appropriate for processing by [facility] for the following reason(s):        Required labs outdated    [facility] action(s):  Defer     Requires labs : Yes             Appointments  past 12m or future 3m with PCP    Date Provider   Last Visit   7/25/2024 Francois Eugene MD   Next Visit   1/28/2025 Francois Eugene MD   ED visits in past 90 days: 0        Note composed:3:18 PM 11/21/2024

## 2024-11-21 NOTE — TELEPHONE ENCOUNTER
Care Due:                  Date            Visit Type   Department     Provider  --------------------------------------------------------------------------------                                EP -                              PRIMARY      YAYA Juárez  Last Visit: 07-      CARE ([facility])   MEDICINE       Naccari                              EP -                              PRIMARY      [facility]    Francois Davison  Next Visit: 01-      CARE ([facility])   MEDICINE       Naccari                                                            Last  Test          Frequency    Reason                     Performed    Due Date  --------------------------------------------------------------------------------    CMP.........  12 months..  atorvastatin, fenofibrate  09- 09-    Health [facility] Embedded Care Due Messages. Reference number: [id number].   11/21/2024 11:47:41 AM CST

## 2024-11-22 RX ORDER — ATORVASTATIN CALCIUM 40 MG/1
TABLET, FILM COATED ORAL
Qty: 90 TABLET | Refills: 3 | Status: SHIPPED | OUTPATIENT
Start: 2024-11-22

## 2025-01-14 DIAGNOSIS — Z00.00 ENCOUNTER FOR MEDICARE ANNUAL WELLNESS EXAM: ICD-10-CM

## 2025-01-15 DIAGNOSIS — G47.00 INSOMNIA, UNSPECIFIED TYPE: ICD-10-CM

## 2025-01-15 RX ORDER — ZOLPIDEM TARTRATE 10 MG/1
TABLET ORAL
Qty: 30 TABLET | Refills: 0 | Status: SHIPPED | OUTPATIENT
Start: 2025-01-15 | End: 2025-01-28 | Stop reason: SDUPTHER

## 2025-01-15 NOTE — TELEPHONE ENCOUNTER
Unable to retrieve patient chart and identify care due.  [facility] Embedded Care Due Messages. Reference number: [id number].   1/15/2025 8:02:28 AM CST

## 2025-01-28 ENCOUNTER — LAB VISIT (OUTPATIENT)
Dept: LAB | Facility: HOSPITAL | Age: 75
End: 2025-01-28
Payer: MEDICARE

## 2025-01-28 ENCOUNTER — OFFICE VISIT (OUTPATIENT)
Dept: FAMILY MEDICINE | Facility: CLINIC | Age: 75
End: 2025-01-28
Payer: MEDICARE

## 2025-01-28 VITALS
BODY MASS INDEX: 24.63 KG/M2 | SYSTOLIC BLOOD PRESSURE: 126 MMHG | OXYGEN SATURATION: 98 % | WEIGHT: 181.88 LBS | DIASTOLIC BLOOD PRESSURE: 64 MMHG | HEIGHT: 72 IN

## 2025-01-28 DIAGNOSIS — B20 HIV DISEASE: ICD-10-CM

## 2025-01-28 DIAGNOSIS — N18.31 TYPE 2 DIABETES MELLITUS WITH STAGE 3A CHRONIC KIDNEY DISEASE, WITH LONG-TERM CURRENT USE OF INSULIN: ICD-10-CM

## 2025-01-28 DIAGNOSIS — Z23 NEED FOR INFLUENZA VACCINATION: ICD-10-CM

## 2025-01-28 DIAGNOSIS — E78.5 HYPERLIPIDEMIA, UNSPECIFIED HYPERLIPIDEMIA TYPE: ICD-10-CM

## 2025-01-28 DIAGNOSIS — E11.42 TYPE 2 DIABETES MELLITUS WITH DIABETIC POLYNEUROPATHY, WITH LONG-TERM CURRENT USE OF INSULIN: ICD-10-CM

## 2025-01-28 DIAGNOSIS — G47.00 INSOMNIA, UNSPECIFIED TYPE: ICD-10-CM

## 2025-01-28 DIAGNOSIS — E11.22 TYPE 2 DIABETES MELLITUS WITH STAGE 3A CHRONIC KIDNEY DISEASE, WITH LONG-TERM CURRENT USE OF INSULIN: ICD-10-CM

## 2025-01-28 DIAGNOSIS — Z00.00 ROUTINE GENERAL MEDICAL EXAMINATION AT A HEALTH CARE FACILITY: Primary | ICD-10-CM

## 2025-01-28 DIAGNOSIS — Z79.4 TYPE 2 DIABETES MELLITUS WITH STAGE 3A CHRONIC KIDNEY DISEASE, WITH LONG-TERM CURRENT USE OF INSULIN: ICD-10-CM

## 2025-01-28 DIAGNOSIS — Z79.4 TYPE 2 DIABETES MELLITUS WITH DIABETIC POLYNEUROPATHY, WITH LONG-TERM CURRENT USE OF INSULIN: ICD-10-CM

## 2025-01-28 LAB
ESTIMATED AVG GLUCOSE: 180 MG/DL (ref 68–131)
HBA1C MFR BLD: 7.9 % (ref 4–5.6)

## 2025-01-28 PROCEDURE — 3078F DIAST BP <80 MM HG: CPT | Mod: HCNC,CPTII,S$GLB, | Performed by: STUDENT IN AN ORGANIZED HEALTH CARE EDUCATION/TRAINING PROGRAM

## 2025-01-28 PROCEDURE — 90653 IIV ADJUVANT VACCINE IM: CPT | Mod: HCNC,S$GLB,, | Performed by: STUDENT IN AN ORGANIZED HEALTH CARE EDUCATION/TRAINING PROGRAM

## 2025-01-28 PROCEDURE — 99999 PR PBB SHADOW E&M-EST. PATIENT-LVL IV: CPT | Mod: PBBFAC,HCNC,, | Performed by: STUDENT IN AN ORGANIZED HEALTH CARE EDUCATION/TRAINING PROGRAM

## 2025-01-28 PROCEDURE — 99214 OFFICE O/P EST MOD 30 MIN: CPT | Mod: HCNC,25,S$GLB, | Performed by: STUDENT IN AN ORGANIZED HEALTH CARE EDUCATION/TRAINING PROGRAM

## 2025-01-28 PROCEDURE — G0008 ADMIN INFLUENZA VIRUS VAC: HCPCS | Mod: HCNC,S$GLB,, | Performed by: STUDENT IN AN ORGANIZED HEALTH CARE EDUCATION/TRAINING PROGRAM

## 2025-01-28 PROCEDURE — 3074F SYST BP LT 130 MM HG: CPT | Mod: HCNC,CPTII,S$GLB, | Performed by: STUDENT IN AN ORGANIZED HEALTH CARE EDUCATION/TRAINING PROGRAM

## 2025-01-28 PROCEDURE — 3288F FALL RISK ASSESSMENT DOCD: CPT | Mod: HCNC,CPTII,S$GLB, | Performed by: STUDENT IN AN ORGANIZED HEALTH CARE EDUCATION/TRAINING PROGRAM

## 2025-01-28 PROCEDURE — 1160F RVW MEDS BY RX/DR IN RCRD: CPT | Mod: HCNC,CPTII,S$GLB, | Performed by: STUDENT IN AN ORGANIZED HEALTH CARE EDUCATION/TRAINING PROGRAM

## 2025-01-28 PROCEDURE — 1126F AMNT PAIN NOTED NONE PRSNT: CPT | Mod: HCNC,CPTII,S$GLB, | Performed by: STUDENT IN AN ORGANIZED HEALTH CARE EDUCATION/TRAINING PROGRAM

## 2025-01-28 PROCEDURE — 36415 COLL VENOUS BLD VENIPUNCTURE: CPT | Mod: HCNC,PO | Performed by: NURSE PRACTITIONER

## 2025-01-28 PROCEDURE — 83036 HEMOGLOBIN GLYCOSYLATED A1C: CPT | Mod: HCNC | Performed by: NURSE PRACTITIONER

## 2025-01-28 PROCEDURE — 1101F PT FALLS ASSESS-DOCD LE1/YR: CPT | Mod: HCNC,CPTII,S$GLB, | Performed by: STUDENT IN AN ORGANIZED HEALTH CARE EDUCATION/TRAINING PROGRAM

## 2025-01-28 PROCEDURE — 1159F MED LIST DOCD IN RCRD: CPT | Mod: HCNC,CPTII,S$GLB, | Performed by: STUDENT IN AN ORGANIZED HEALTH CARE EDUCATION/TRAINING PROGRAM

## 2025-01-28 RX ORDER — ZOLPIDEM TARTRATE 10 MG/1
10 TABLET ORAL NIGHTLY
Qty: 30 TABLET | Refills: 5 | Status: SHIPPED | OUTPATIENT
Start: 2025-01-28

## 2025-01-28 NOTE — PROGRESS NOTES
Patient ID: Brandon Parson is a 74 y.o. male.    Chief Complaint: Follow-up (6m f/u)    History of Present Illness    CHIEF COMPLAINT:  Patient presents today for follow up.    DIABETES:  He reports morning blood sugar of 135. Last A1c was 6.8. He denies recent hypoglycemic episodes, with one exception over a year ago when his blood sugar was approximately 60 in a doctor's office.    MEDICATIONS:  He takes Ambien 10 mg nightly.    FOLLOW-UP CARE:  He has upcoming appointments scheduled with infectious disease next week, endocrinology in April, and ophthalmology today.            Physical Exam    General: No acute distress. Well-developed. Well-nourished.  Eyes: EOMI. Sclerae anicteric.  HENT: Normocephalic. Atraumatic. Nares patent. Moist oral mucosa.  Cardiovascular: Regular rate. Regular rhythm. No murmurs. No rubs. No gallops. Normal S1, S2.  Respiratory: Normal respiratory effort. Clear to auscultation bilaterally. No rales. No rhonchi. No wheezing.  Musculoskeletal: No  obvious deformity.  Extremities: No lower extremity edema.  Neurological: Alert & oriented x3. No slurred speech. Normal gait.  Psychiatric: Normal mood. Normal affect. Good insight. Good judgment.  Skin: Warm. Dry. No rash.  Neck: All normal.         Assessment & Plan    IMPRESSION:  - Reviewed blood pressure, noting it looks great  - Assessed A1c at 6.8 and morning blood sugar at 135, indicating improved diabetes control  - Evaluated recent urine test results, which showed improvement  - Considered timing of labs in relation to upcoming infectious disease appointment  - Will order comprehensive lab work including blood counts, kidney function, HIV levels, CD4 counts, and cholesterol  - Noted patient is not fasting, which may slightly elevate blood sugar and cholesterol results but should not affect A1c    HIV DISEASE:  - Ordered comprehensive lab work including HIV levels and CD4 counts.  - Scheduled follow-up visit with infectious disease  specialist next week.  - Instructed the patient to complete labs before the infectious disease appointment.    TYPE 2 DIABETES MELLITUS WITH DIABETIC POLYNEUROPATHY:  - Patient's blood sugar was 135 this morning and last HbA1c was 6.8.  - No recent hypoglycemic episodes reported.  - Evaluated glucose control as improved and acknowledged patient's efforts in managing diabetes.  - Scheduled follow-up visit with endocrinologist in April.  - Ordered comprehensive lab work including complete blood count and lipid profile.    LONG TERM (CURRENT) USE OF INSULIN:  - Continued current insulin regimen based on improved blood sugar management and recent urinalysis results.  - Ordered comprehensive lab work including renal function tests.    INSOMNIA:  - Refilled prescription for Ambien 10 mg to be taken nightly.    DIABETES:  - Confirmed no recent hypoglycemic episodes; last occurrence over a year ago with glucose approximately 60 mg/dL.    HYPERTENSION:  - Evaluated patient's blood pressure, which was within normal limits.    GENERAL HEALTH MANAGEMENT:  - Observed improvement in all measured parameters.  - Inquired about patient's physical activity levels.    HIV:  - No unique information to include.    INFLUENZA VACCINATION:  - Discussed optimal timing for influenza vaccination, recommending October.  - Explained vaccine requires a few weeks to provide adequate protection.  - Administered influenza vaccine in office.          Brandon was seen today for follow-up.    Diagnoses and all orders for this visit:    Routine general medical examination at a health care facility    Insomnia, unspecified type  -     zolpidem (AMBIEN) 10 mg Tab; Take 1 tablet (10 mg total) by mouth every evening.   reviewed, appropriate.   Q6 mo f/u for refills.     Hyperlipidemia, unspecified hyperlipidemia type  -     LIPID PANEL; Future    Type 2 diabetes mellitus with diabetic polyneuropathy, with long-term current use of insulin  -     CBC W/  AUTO DIFFERENTIAL; Future  -     COMPREHENSIVE METABOLIC PANEL; Future    HIV disease  -     T-HELPER CELLS (CD4) COUNT; Future  -     HIV RNA, QUANTITATIVE, PCR; Future    Type 2 diabetes mellitus with stage 3a chronic kidney disease, with long-term current use of insulin  Doing great! Keep up the good work    Need for influenza vaccination  -     influenza (adjuvanted) (Fluad) 45 mcg/0.5 mL IM vaccine (> or = 66 yo) 0.5 mL          No follow-ups on file.    This note was generated with the assistance of ambient listening technology. Verbal consent was obtained by the patient and accompanying visitor(s) for the recording of patient appointment to facilitate this note. I attest to having reviewed and edited the generated note for accuracy, though some syntax or spelling errors may persist. Please contact the author of this note for any clarification.

## 2025-02-04 ENCOUNTER — OFFICE VISIT (OUTPATIENT)
Dept: INFECTIOUS DISEASES | Facility: CLINIC | Age: 75
End: 2025-02-04
Payer: MEDICARE

## 2025-02-04 VITALS
BODY MASS INDEX: 24.33 KG/M2 | HEIGHT: 72 IN | SYSTOLIC BLOOD PRESSURE: 118 MMHG | WEIGHT: 179.63 LBS | OXYGEN SATURATION: 98 % | TEMPERATURE: 98 F | DIASTOLIC BLOOD PRESSURE: 62 MMHG | HEART RATE: 87 BPM

## 2025-02-04 DIAGNOSIS — B20 HIV DISEASE: Primary | ICD-10-CM

## 2025-02-04 DIAGNOSIS — E78.2 MIXED HYPERLIPIDEMIA: ICD-10-CM

## 2025-02-04 DIAGNOSIS — E11.628 TYPE 2 DIABETES MELLITUS WITH OTHER SKIN COMPLICATION, WITHOUT LONG-TERM CURRENT USE OF INSULIN: ICD-10-CM

## 2025-02-04 PROCEDURE — 99214 OFFICE O/P EST MOD 30 MIN: CPT | Mod: HCNC,S$GLB,, | Performed by: INTERNAL MEDICINE

## 2025-02-04 PROCEDURE — 1159F MED LIST DOCD IN RCRD: CPT | Mod: HCNC,CPTII,S$GLB, | Performed by: INTERNAL MEDICINE

## 2025-02-04 PROCEDURE — 1101F PT FALLS ASSESS-DOCD LE1/YR: CPT | Mod: HCNC,CPTII,S$GLB, | Performed by: INTERNAL MEDICINE

## 2025-02-04 PROCEDURE — 3051F HG A1C>EQUAL 7.0%<8.0%: CPT | Mod: HCNC,CPTII,S$GLB, | Performed by: INTERNAL MEDICINE

## 2025-02-04 PROCEDURE — 1126F AMNT PAIN NOTED NONE PRSNT: CPT | Mod: HCNC,CPTII,S$GLB, | Performed by: INTERNAL MEDICINE

## 2025-02-04 PROCEDURE — 3074F SYST BP LT 130 MM HG: CPT | Mod: HCNC,CPTII,S$GLB, | Performed by: INTERNAL MEDICINE

## 2025-02-04 PROCEDURE — 3288F FALL RISK ASSESSMENT DOCD: CPT | Mod: HCNC,CPTII,S$GLB, | Performed by: INTERNAL MEDICINE

## 2025-02-04 PROCEDURE — 99999 PR PBB SHADOW E&M-EST. PATIENT-LVL V: CPT | Mod: PBBFAC,HCNC,, | Performed by: INTERNAL MEDICINE

## 2025-02-04 PROCEDURE — 3078F DIAST BP <80 MM HG: CPT | Mod: HCNC,CPTII,S$GLB, | Performed by: INTERNAL MEDICINE

## 2025-02-04 NOTE — PATIENT INSTRUCTIONS
Looks like you had HIV labs on 1/28/25 and results are still pending  We will call you with the results when we have them  I will see you again in 6 months

## 2025-02-17 ENCOUNTER — LAB VISIT (OUTPATIENT)
Dept: LAB | Facility: HOSPITAL | Age: 75
End: 2025-02-17
Attending: STUDENT IN AN ORGANIZED HEALTH CARE EDUCATION/TRAINING PROGRAM
Payer: MEDICARE

## 2025-02-17 DIAGNOSIS — E11.42 TYPE 2 DIABETES MELLITUS WITH DIABETIC POLYNEUROPATHY, WITH LONG-TERM CURRENT USE OF INSULIN: ICD-10-CM

## 2025-02-17 DIAGNOSIS — Z79.4 TYPE 2 DIABETES MELLITUS WITH DIABETIC POLYNEUROPATHY, WITH LONG-TERM CURRENT USE OF INSULIN: ICD-10-CM

## 2025-02-17 DIAGNOSIS — E78.5 HYPERLIPIDEMIA, UNSPECIFIED HYPERLIPIDEMIA TYPE: ICD-10-CM

## 2025-02-17 LAB
BASOPHILS # BLD AUTO: 0.02 K/UL (ref 0–0.2)
BASOPHILS NFR BLD: 0.3 % (ref 0–1.9)
DIFFERENTIAL METHOD BLD: ABNORMAL
EOSINOPHIL # BLD AUTO: 0.2 K/UL (ref 0–0.5)
EOSINOPHIL NFR BLD: 3.4 % (ref 0–8)
ERYTHROCYTE [DISTWIDTH] IN BLOOD BY AUTOMATED COUNT: 14.7 % (ref 11.5–14.5)
HCT VFR BLD AUTO: 33.7 % (ref 40–54)
HGB BLD-MCNC: 10.8 G/DL (ref 14–18)
IMM GRANULOCYTES # BLD AUTO: 0.05 K/UL (ref 0–0.04)
IMM GRANULOCYTES NFR BLD AUTO: 0.7 % (ref 0–0.5)
LYMPHOCYTES # BLD AUTO: 2.5 K/UL (ref 1–4.8)
LYMPHOCYTES NFR BLD: 35.1 % (ref 18–48)
MCH RBC QN AUTO: 31.4 PG (ref 27–31)
MCHC RBC AUTO-ENTMCNC: 32 G/DL (ref 32–36)
MCV RBC AUTO: 98 FL (ref 82–98)
MONOCYTES # BLD AUTO: 0.7 K/UL (ref 0.3–1)
MONOCYTES NFR BLD: 10.6 % (ref 4–15)
NEUTROPHILS # BLD AUTO: 3.5 K/UL (ref 1.8–7.7)
NEUTROPHILS NFR BLD: 49.9 % (ref 38–73)
NRBC BLD-RTO: 0 /100 WBC
PLATELET # BLD AUTO: 231 K/UL (ref 150–450)
PMV BLD AUTO: 10.2 FL (ref 9.2–12.9)
RBC # BLD AUTO: 3.44 M/UL (ref 4.6–6.2)
WBC # BLD AUTO: 7.01 K/UL (ref 3.9–12.7)

## 2025-02-17 PROCEDURE — 36415 COLL VENOUS BLD VENIPUNCTURE: CPT | Mod: HCNC,PO | Performed by: STUDENT IN AN ORGANIZED HEALTH CARE EDUCATION/TRAINING PROGRAM

## 2025-02-17 PROCEDURE — 85025 COMPLETE CBC W/AUTO DIFF WBC: CPT | Mod: HCNC | Performed by: STUDENT IN AN ORGANIZED HEALTH CARE EDUCATION/TRAINING PROGRAM

## 2025-02-17 PROCEDURE — 80053 COMPREHEN METABOLIC PANEL: CPT | Mod: HCNC | Performed by: STUDENT IN AN ORGANIZED HEALTH CARE EDUCATION/TRAINING PROGRAM

## 2025-02-17 PROCEDURE — 80061 LIPID PANEL: CPT | Mod: HCNC | Performed by: STUDENT IN AN ORGANIZED HEALTH CARE EDUCATION/TRAINING PROGRAM

## 2025-02-18 LAB
ALBUMIN SERPL BCP-MCNC: 3.9 G/DL (ref 3.5–5.2)
ALP SERPL-CCNC: 29 U/L (ref 40–150)
ALT SERPL W/O P-5'-P-CCNC: 17 U/L (ref 10–44)
ANION GAP SERPL CALC-SCNC: 11 MMOL/L (ref 8–16)
AST SERPL-CCNC: 29 U/L (ref 10–40)
BILIRUB SERPL-MCNC: 0.4 MG/DL (ref 0.1–1)
BUN SERPL-MCNC: 23 MG/DL (ref 8–23)
CALCIUM SERPL-MCNC: 9.1 MG/DL (ref 8.7–10.5)
CHLORIDE SERPL-SCNC: 108 MMOL/L (ref 95–110)
CHOLEST SERPL-MCNC: 143 MG/DL (ref 120–199)
CHOLEST/HDLC SERPL: 3.7 {RATIO} (ref 2–5)
CO2 SERPL-SCNC: 19 MMOL/L (ref 23–29)
CREAT SERPL-MCNC: 1.5 MG/DL (ref 0.5–1.4)
EST. GFR  (NO RACE VARIABLE): 48.2 ML/MIN/1.73 M^2
GLUCOSE SERPL-MCNC: 98 MG/DL (ref 70–110)
HDLC SERPL-MCNC: 39 MG/DL (ref 40–75)
HDLC SERPL: 27.3 % (ref 20–50)
LDLC SERPL CALC-MCNC: 78.4 MG/DL (ref 63–159)
NONHDLC SERPL-MCNC: 104 MG/DL
POTASSIUM SERPL-SCNC: 4.6 MMOL/L (ref 3.5–5.1)
PROT SERPL-MCNC: 7.3 G/DL (ref 6–8.4)
SODIUM SERPL-SCNC: 138 MMOL/L (ref 136–145)
TRIGL SERPL-MCNC: 128 MG/DL (ref 30–150)

## 2025-02-24 ENCOUNTER — OFFICE VISIT (OUTPATIENT)
Dept: ENDOCRINOLOGY | Facility: CLINIC | Age: 75
End: 2025-02-24
Payer: MEDICARE

## 2025-02-24 VITALS
SYSTOLIC BLOOD PRESSURE: 136 MMHG | HEIGHT: 72 IN | HEART RATE: 69 BPM | BODY MASS INDEX: 24.61 KG/M2 | WEIGHT: 181.69 LBS | DIASTOLIC BLOOD PRESSURE: 70 MMHG

## 2025-02-24 DIAGNOSIS — E11.42 TYPE 2 DIABETES MELLITUS WITH DIABETIC POLYNEUROPATHY, WITH LONG-TERM CURRENT USE OF INSULIN: ICD-10-CM

## 2025-02-24 DIAGNOSIS — E11.628 TYPE 2 DIABETES MELLITUS WITH OTHER SKIN COMPLICATION, WITHOUT LONG-TERM CURRENT USE OF INSULIN: ICD-10-CM

## 2025-02-24 DIAGNOSIS — E78.2 MIXED HYPERLIPIDEMIA: ICD-10-CM

## 2025-02-24 DIAGNOSIS — N18.31 TYPE 2 DIABETES MELLITUS WITH STAGE 3A CHRONIC KIDNEY DISEASE, WITH LONG-TERM CURRENT USE OF INSULIN: Primary | ICD-10-CM

## 2025-02-24 DIAGNOSIS — Z79.4 TYPE 2 DIABETES MELLITUS WITH DIABETIC POLYNEUROPATHY, WITH LONG-TERM CURRENT USE OF INSULIN: ICD-10-CM

## 2025-02-24 DIAGNOSIS — Z79.4 TYPE 2 DIABETES MELLITUS WITH STAGE 3A CHRONIC KIDNEY DISEASE, WITH LONG-TERM CURRENT USE OF INSULIN: Primary | ICD-10-CM

## 2025-02-24 DIAGNOSIS — E11.22 TYPE 2 DIABETES MELLITUS WITH STAGE 3A CHRONIC KIDNEY DISEASE, WITH LONG-TERM CURRENT USE OF INSULIN: Primary | ICD-10-CM

## 2025-02-24 DIAGNOSIS — B20 HIV DISEASE: ICD-10-CM

## 2025-02-24 LAB — GLUCOSE SERPL-MCNC: 142 MG/DL (ref 70–110)

## 2025-02-24 PROCEDURE — 99999 PR PBB SHADOW E&M-EST. PATIENT-LVL III: CPT | Mod: PBBFAC,HCNC,, | Performed by: NURSE PRACTITIONER

## 2025-02-24 PROCEDURE — 82962 GLUCOSE BLOOD TEST: CPT | Mod: HCNC,S$GLB,, | Performed by: NURSE PRACTITIONER

## 2025-02-24 RX ORDER — LANCETS
EACH MISCELLANEOUS
Qty: 200 EACH | Refills: 3 | Status: SHIPPED | OUTPATIENT
Start: 2025-02-24

## 2025-02-24 RX ORDER — INSULIN ASPART 100 [IU]/ML
INJECTION, SUSPENSION SUBCUTANEOUS
Qty: 30 ML | Refills: 3 | Status: SHIPPED | OUTPATIENT
Start: 2025-02-24

## 2025-02-24 RX ORDER — INSULIN PUMP SYRINGE, 3 ML
EACH MISCELLANEOUS
Qty: 1 EACH | Refills: 0 | Status: SHIPPED | OUTPATIENT
Start: 2025-02-24

## 2025-02-24 NOTE — PROGRESS NOTES
CC: This 74 y.o. male presents for management of diabetes  and chronic conditions pending review including  HLP, HIV, DM PN    HPI: He was diagnosed with T2DM in ~ 2010. Has never been hospitalized r/t DM.  Family hx of DM: mother     No acute events since last visit    Checking his bg fasting   this am was 277  240-260s  Pre-supper  250s  No hypoglycemia   Bg 144 in office  Diet: Eats 2- 3  Meals a day, snacks- PB crackers  Exercise: walks his dogs 4 times a day for 30 mins  CURRENT DM MEDS: metformin 500 mg bid, Novolog 70/30 25 u breakfast and 30 u supper u bid, Trulicity 4.5 mg weekly  (Thursday)  Standards of Care:  Eye exam: 2/2025 Dr Parker     ROS:   Gen: Appetite good,  weight stable  Eyes: Denies visual disturbances  Resp: no SOB or TRIANA   Cardiac: No palpitations, chest pain   GI: No nausea or vomiting, diarrhea, constipation   /GYN: 2+ nocturia, no burning or pain.   MS/Neuro: Denies numbness/ tingling in BLE; Gait steady, speech clear  Psych: Denies drug/ETOH abuse, + h/o depression.  Other systems: negative.    PE:  GENERAL: Well developed, well nourished.  PSYCH: AAOx3, appropriate mood and affect, pleasant expression, conversant, appears relaxed, well groomed.   EYES: Conjunctiva, corneas clear  NECK: Supple, trachea midline   NEURO: Gait steady  FOOT EXAMINATION: 2/24/2025  No foot deformity, corns or callus formation,+ Onychomycosis,  nails in good condiiton and well trimmed, no interspace maceration or ulceration noted.  Decreased hair growth present over toes/feet.    Protective sensation decreased bilaterally with 10 gram monofilament.  +2 dorsalis pedis and posterior pulses noted.      Personally reviewed Past Medical, Surgical, Social History.    /70   Pulse 69   Ht 6' (1.829 m)   Wt 82.4 kg (181 lb 10.5 oz)   BMI 24.64 kg/m²      Personally reviewed the below labs:      Chemistry        Component Value Date/Time     02/17/2025 0919    K 4.6 02/17/2025 0919      02/17/2025 0919    CO2 19 (L) 02/17/2025 0919    BUN 23 02/17/2025 0919    CREATININE 1.5 (H) 02/17/2025 0919    GLU 98 02/17/2025 0919        Component Value Date/Time    CALCIUM 9.1 02/17/2025 0919    ALKPHOS 29 (L) 02/17/2025 0919    AST 29 02/17/2025 0919    ALT 17 02/17/2025 0919    BILITOT 0.4 02/17/2025 0919    ESTGFRAFRICA 58 (A) 07/29/2022 0934    EGFRNONAA 50 (A) 07/29/2022 0934            Lab Results   Component Value Date    TSH 2.618 12/15/2022       Recent Labs   Lab 02/17/25 0919   LDL Cholesterol 78.4   HDL 39 L   Cholesterol 143        Results for orders placed or performed in visit on 09/08/22   Vitamin D    Collection Time: 09/08/22 12:19 PM   Result Value Ref Range    Vit D, 25-Hydroxy 98 (H) 30 - 96 ng/mL     No results found for this or any previous visit.    Lab Results   Component Value Date    MICALBCREAT 37.0 (H) 10/28/2024       Hemoglobin A1C   Date Value Ref Range Status   01/28/2025 7.9 (H) 4.0 - 5.6 % Final     Comment:     ADA Screening Guidelines:  5.7-6.4%  Consistent with prediabetes  >or=6.5%  Consistent with diabetes    High levels of fetal hemoglobin interfere with the HbA1C  assay. Heterozygous hemoglobin variants (HbS, HgC, etc)do  not significantly interfere with this assay.   However, presence of multiple variants may affect accuracy.     10/28/2024 6.8 (H) 4.0 - 5.6 % Final     Comment:     ADA Screening Guidelines:  5.7-6.4%  Consistent with prediabetes  >or=6.5%  Consistent with diabetes    High levels of fetal hemoglobin interfere with the HbA1C  assay. Heterozygous hemoglobin variants (HbS, HgC, etc)do  not significantly interfere with this assay.   However, presence of multiple variants may affect accuracy.     07/11/2024 8.8 (H) 4.0 - 5.6 % Final     Comment:     ADA Screening Guidelines:  5.7-6.4%  Consistent with prediabetes  >or=6.5%  Consistent with diabetes    High levels of fetal hemoglobin interfere with the HbA1C  assay. Heterozygous hemoglobin variants (HbS,  HgC, etc)do  not significantly interfere with this assay.   However, presence of multiple variants may affect accuracy.          ASSESSMENT and PLAN:      1. T2DM with hyperglycemia, DM PN, CKD 3a-     Continue Trulicity to 4.5 mg weekly; metformin at 500 mg bid      Continue Novolog 25 u am, 30 u supper  Possibly could be his meter needs to be updated (to account for variable bg)  New Rx for meter and supplies sent to the pharmacy   Check bg bid-  log in 2 weeks for further changes  Declines his own personal sensor  + microalbuminuria, on ace i 2.5 mg lisinopril     2. HLP -  on statin therapy, LDL at goal    3. HIV- follows w Dr Middleton    4. Depression stable, chronic     Follow-up: in 3 months with A1C , [facility]

## 2025-03-31 ENCOUNTER — TELEPHONE (OUTPATIENT)
Dept: FAMILY MEDICINE | Facility: CLINIC | Age: 75
End: 2025-03-31
Payer: MEDICAID

## 2025-03-31 NOTE — TELEPHONE ENCOUNTER
Spoke with Mr. Parson via telephone in regards to scheduling an enhanced annual well visit. Appointment is scheduled for 5/2/25 at 3:00 pm with FORTINO Louis. Mr. Parson agreed to appointment day and time.

## 2025-03-31 NOTE — TELEPHONE ENCOUNTER
----- Message from [place] sent at 3/31/2025  9:49 AM CDT -----  Type: Needs Medical AdviceWho Called:  Patient Symptoms (please be specific):  How long has patient had these symptoms:  Pharmacy name and phone #:  Best Call Back Number: [phone] Information:Patient is requesting a call back to schedule an Annual appt. in April.

## 2025-04-08 ENCOUNTER — TELEPHONE (OUTPATIENT)
Dept: DIABETES | Facility: CLINIC | Age: 75
End: 2025-04-08
Payer: MEDICAID

## 2025-04-08 ENCOUNTER — TELEPHONE (OUTPATIENT)
Dept: ENDOCRINOLOGY | Facility: CLINIC | Age: 75
End: 2025-04-08
Payer: MEDICAID

## 2025-05-19 DIAGNOSIS — B20 HIV DISEASE: Primary | ICD-10-CM

## 2025-05-19 RX ORDER — EFAVIRENZ 600 MG/1
600 TABLET, FILM COATED ORAL NIGHTLY
Qty: 30 TABLET | Refills: 5 | Status: SHIPPED | OUTPATIENT
Start: 2025-05-19 | End: 2025-11-15

## 2025-05-26 ENCOUNTER — RESULTS FOLLOW-UP (OUTPATIENT)
Dept: ENDOCRINOLOGY | Facility: CLINIC | Age: 75
End: 2025-05-26

## 2025-05-26 ENCOUNTER — APPOINTMENT (OUTPATIENT)
Dept: LAB | Facility: HOSPITAL | Age: 75
End: 2025-05-26
Attending: NURSE PRACTITIONER
Payer: MEDICARE

## 2025-06-02 ENCOUNTER — OFFICE VISIT (OUTPATIENT)
Dept: ENDOCRINOLOGY | Facility: CLINIC | Age: 75
End: 2025-06-02
Payer: MEDICARE

## 2025-06-02 VITALS
HEIGHT: 72 IN | SYSTOLIC BLOOD PRESSURE: 142 MMHG | WEIGHT: 183.88 LBS | HEART RATE: 66 BPM | BODY MASS INDEX: 24.91 KG/M2 | DIASTOLIC BLOOD PRESSURE: 70 MMHG

## 2025-06-02 DIAGNOSIS — E11.42 TYPE 2 DIABETES MELLITUS WITH DIABETIC POLYNEUROPATHY, WITH LONG-TERM CURRENT USE OF INSULIN: Primary | ICD-10-CM

## 2025-06-02 DIAGNOSIS — Z79.4 TYPE 2 DIABETES MELLITUS WITH STAGE 3A CHRONIC KIDNEY DISEASE, WITH LONG-TERM CURRENT USE OF INSULIN: ICD-10-CM

## 2025-06-02 DIAGNOSIS — B20 HIV DISEASE: ICD-10-CM

## 2025-06-02 DIAGNOSIS — E11.22 TYPE 2 DIABETES MELLITUS WITH STAGE 3A CHRONIC KIDNEY DISEASE, WITH LONG-TERM CURRENT USE OF INSULIN: ICD-10-CM

## 2025-06-02 DIAGNOSIS — Z79.4 TYPE 2 DIABETES MELLITUS WITH DIABETIC POLYNEUROPATHY, WITH LONG-TERM CURRENT USE OF INSULIN: Primary | ICD-10-CM

## 2025-06-02 DIAGNOSIS — E78.2 MIXED HYPERLIPIDEMIA: ICD-10-CM

## 2025-06-02 DIAGNOSIS — N18.31 TYPE 2 DIABETES MELLITUS WITH STAGE 3A CHRONIC KIDNEY DISEASE, WITH LONG-TERM CURRENT USE OF INSULIN: ICD-10-CM

## 2025-06-02 PROCEDURE — 99999 PR PBB SHADOW E&M-EST. PATIENT-LVL III: CPT | Mod: PBBFAC,HCNC,, | Performed by: NURSE PRACTITIONER

## 2025-07-09 DIAGNOSIS — F33.9 RECURRENT MAJOR DEPRESSIVE DISORDER, REMISSION STATUS UNSPECIFIED: ICD-10-CM

## 2025-07-09 RX ORDER — SERTRALINE HYDROCHLORIDE 100 MG/1
100 TABLET, FILM COATED ORAL
Qty: 90 TABLET | Refills: 1 | Status: SHIPPED | OUTPATIENT
Start: 2025-07-09

## 2025-07-09 NOTE — TELEPHONE ENCOUNTER
Refill Decision Note   Brandon Eb  is requesting a refill authorization.  Brief Assessment and Rationale for Refill:  Approve     Medication Therapy Plan:         Comments:     Note composed:1:18 PM 07/09/2025

## 2025-07-09 NOTE — TELEPHONE ENCOUNTER
No care due was identified.  [facility] Embedded Care Due Messages. Reference number: [id number].   7/09/2025 8:01:33 AM CDT

## 2025-07-16 DIAGNOSIS — N18.31 TYPE 2 DIABETES MELLITUS WITH STAGE 3A CHRONIC KIDNEY DISEASE, WITH LONG-TERM CURRENT USE OF INSULIN: ICD-10-CM

## 2025-07-16 DIAGNOSIS — E11.22 TYPE 2 DIABETES MELLITUS WITH STAGE 3A CHRONIC KIDNEY DISEASE, WITH LONG-TERM CURRENT USE OF INSULIN: ICD-10-CM

## 2025-07-16 DIAGNOSIS — Z79.4 TYPE 2 DIABETES MELLITUS WITH STAGE 3A CHRONIC KIDNEY DISEASE, WITH LONG-TERM CURRENT USE OF INSULIN: ICD-10-CM

## 2025-07-16 DIAGNOSIS — G47.00 INSOMNIA, UNSPECIFIED TYPE: ICD-10-CM

## 2025-07-16 RX ORDER — ZOLPIDEM TARTRATE 10 MG/1
10 TABLET ORAL NIGHTLY
Qty: 30 TABLET | Refills: 0 | Status: SHIPPED | OUTPATIENT
Start: 2025-07-16

## 2025-07-16 RX ORDER — DULAGLUTIDE 4.5 MG/.5ML
INJECTION, SOLUTION SUBCUTANEOUS
Qty: 12 PEN | Refills: 4 | Status: SHIPPED | OUTPATIENT
Start: 2025-07-16

## 2025-07-16 NOTE — TELEPHONE ENCOUNTER
No care due was identified.  [facility] Embedded Care Due Messages. Reference number: [id number].   7/16/2025 11:30:21 AM CDT

## 2025-07-17 DIAGNOSIS — E11.9 TYPE 2 DIABETES MELLITUS WITHOUT COMPLICATION, UNSPECIFIED WHETHER LONG TERM INSULIN USE: ICD-10-CM

## 2025-07-21 ENCOUNTER — OFFICE VISIT (OUTPATIENT)
Dept: FAMILY MEDICINE | Facility: CLINIC | Age: 75
End: 2025-07-21
Payer: MEDICARE

## 2025-07-21 VITALS
WEIGHT: 184.31 LBS | HEART RATE: 57 BPM | BODY MASS INDEX: 24.96 KG/M2 | HEIGHT: 72 IN | TEMPERATURE: 98 F | SYSTOLIC BLOOD PRESSURE: 136 MMHG | DIASTOLIC BLOOD PRESSURE: 66 MMHG | OXYGEN SATURATION: 99 %

## 2025-07-21 DIAGNOSIS — E11.22 TYPE 2 DIABETES MELLITUS WITH STAGE 3A CHRONIC KIDNEY DISEASE, WITH LONG-TERM CURRENT USE OF INSULIN: ICD-10-CM

## 2025-07-21 DIAGNOSIS — E11.69 HYPERLIPIDEMIA ASSOCIATED WITH TYPE 2 DIABETES MELLITUS: ICD-10-CM

## 2025-07-21 DIAGNOSIS — N18.31 TYPE 2 DIABETES MELLITUS WITH STAGE 3A CHRONIC KIDNEY DISEASE, WITH LONG-TERM CURRENT USE OF INSULIN: ICD-10-CM

## 2025-07-21 DIAGNOSIS — Z79.4 TYPE 2 DIABETES MELLITUS WITH STAGE 3A CHRONIC KIDNEY DISEASE, WITH LONG-TERM CURRENT USE OF INSULIN: ICD-10-CM

## 2025-07-21 DIAGNOSIS — Z00.00 ENCOUNTER FOR MEDICARE ANNUAL WELLNESS EXAM: Primary | ICD-10-CM

## 2025-07-21 DIAGNOSIS — B20 HIV DISEASE: ICD-10-CM

## 2025-07-21 DIAGNOSIS — E78.5 HYPERLIPIDEMIA ASSOCIATED WITH TYPE 2 DIABETES MELLITUS: ICD-10-CM

## 2025-07-21 PROCEDURE — 3078F DIAST BP <80 MM HG: CPT | Mod: CPTII,HCNC,S$GLB, | Performed by: NURSE PRACTITIONER

## 2025-07-21 PROCEDURE — G0439 PPPS, SUBSEQ VISIT: HCPCS | Mod: HCNC,S$GLB,, | Performed by: NURSE PRACTITIONER

## 2025-07-21 PROCEDURE — G9919 SCRN ND POS ND PROV OF REC: HCPCS | Mod: CPTII,HCNC,S$GLB, | Performed by: NURSE PRACTITIONER

## 2025-07-21 PROCEDURE — 1160F RVW MEDS BY RX/DR IN RCRD: CPT | Mod: CPTII,HCNC,S$GLB, | Performed by: NURSE PRACTITIONER

## 2025-07-21 PROCEDURE — 3288F FALL RISK ASSESSMENT DOCD: CPT | Mod: CPTII,HCNC,S$GLB, | Performed by: NURSE PRACTITIONER

## 2025-07-21 PROCEDURE — 1124F ACP DISCUSS-NO DSCNMKR DOCD: CPT | Mod: CPTII,HCNC,S$GLB, | Performed by: NURSE PRACTITIONER

## 2025-07-21 PROCEDURE — 1170F FXNL STATUS ASSESSED: CPT | Mod: CPTII,HCNC,S$GLB, | Performed by: NURSE PRACTITIONER

## 2025-07-21 PROCEDURE — 99999 PR PBB SHADOW E&M-EST. PATIENT-LVL V: CPT | Mod: PBBFAC,HCNC,, | Performed by: NURSE PRACTITIONER

## 2025-07-21 PROCEDURE — 3075F SYST BP GE 130 - 139MM HG: CPT | Mod: CPTII,HCNC,S$GLB, | Performed by: NURSE PRACTITIONER

## 2025-07-21 PROCEDURE — 3044F HG A1C LEVEL LT 7.0%: CPT | Mod: CPTII,HCNC,S$GLB, | Performed by: NURSE PRACTITIONER

## 2025-07-21 PROCEDURE — 1101F PT FALLS ASSESS-DOCD LE1/YR: CPT | Mod: CPTII,HCNC,S$GLB, | Performed by: NURSE PRACTITIONER

## 2025-07-21 PROCEDURE — 1159F MED LIST DOCD IN RCRD: CPT | Mod: CPTII,HCNC,S$GLB, | Performed by: NURSE PRACTITIONER

## 2025-07-21 PROCEDURE — 1126F AMNT PAIN NOTED NONE PRSNT: CPT | Mod: CPTII,HCNC,S$GLB, | Performed by: NURSE PRACTITIONER

## 2025-07-21 NOTE — PROGRESS NOTES
Brandon Parson presented for a  Medicare AWV and comprehensive Health Risk Assessment today. The following components were reviewed and updated:    Medical history  Family History  Social history  Allergies and Current Medications  Health Risk Assessment  Health Maintenance  Care Team     Patient screened moderate and/or high risk for one or more social determinants of health (SDOH). Patient connected to community resources through the ED Navigator.      ** See Completed Assessments for Annual Wellness Visit within the encounter summary.**         The following assessments were completed:  Living Situation  CAGE  Depression Screening  Timed Get Up and Go  Whisper Test  Cognitive Function Screening  Nutrition Screening  ADL Screening  PAQ Screening    Clock in media   Opioid documentation:      Patient does not have a current opioid prescription.        Vitals:    07/21/25 0847   BP: 136/66   Pulse: (!) 57   Temp: 97.9 °F (36.6 °C)   TempSrc: Oral   SpO2: 99%   Weight: 83.6 kg (184 lb 4.9 oz)   Height: 6' (1.829 m)     Body mass index is 25 kg/m².  Physical Exam  Constitutional:       Appearance: He is well-developed.   HENT:      Head: Normocephalic and atraumatic.      Right Ear: Hearing normal.      Left Ear: Hearing normal.      Nose: Nose normal.   Eyes:      General: Lids are normal.      Conjunctiva/sclera: Conjunctivae normal.      Pupils: Pupils are equal, round, and reactive to light.   Cardiovascular:      Rate and Rhythm: Normal rate.   Pulmonary:      Effort: Pulmonary effort is normal.   Abdominal:      Palpations: Abdomen is soft.   Musculoskeletal:         General: Normal range of motion.      Cervical back: Normal range of motion and neck supple.   Skin:     General: Skin is warm and dry.   Neurological:      Mental Status: He is alert and oriented to person, place, and time.               Diagnoses and health risks identified today and associated recommendations/orders:    1. Encounter for Medicare  annual wellness exam  Discussed health maintenance guidelines appropriate for age.      - Ambulatory Referral/Consult to Enhanced Annual Wellness Visit (eAWV)    2. Type 2 diabetes mellitus with stage 3a chronic kidney disease, with long-term current use of insulin  Controlled, continue current medication regimen  Last HgA1c - 6.9  ADA diet  Increase physical activity   Followed by pcp      3. HIV disease  Stable, continue current meds  Followed by infectious disease     4. Hyperlipidemia associated with type 2 diabetes mellitus  Controlled, continue current medication regimen  Patient taking statin  Followed by pcp        Provided Brandon with a 5-10 year written screening schedule and personal prevention plan. Recommendations were developed using the USPSTF age appropriate recommendations. Education, counseling, and referrals were provided as needed. After Visit Summary printed and given to patient which includes a list of additional screenings\tests needed.    Follow up for One year for Annual Wellness Visit.    Mary Ortega NP      I offered to discuss advanced care planning, including how to pick a person who would make decisions for you if you were unable to make them for yourself, called a health care power of , and what kind of decisions you might make such as use of life sustaining treatments such as ventilators and tube feeding when faced with a life limiting illness recorded on a living will that they will need to know. (How you want to be cared for as you near the end of your natural life)     X  Patient is unwilling to engage in a discussion regarding advance directives at this time.

## 2025-07-21 NOTE — PATIENT INSTRUCTIONS
Counseling and Referral of Other Preventative  (Italic type indicates deductible and co-insurance are waived)    Patient Name: Brandon Parson  Today's Date: 7/21/2025    Health Maintenance       Date Due Completion Date    COVID-19 Vaccine (3 - Moderna risk series) 03/04/2021 2/4/2021    RSV Vaccine (Age 60+ and Pregnant patients) (1 - 1-dose 75+ series) Never done ---    Diabetic Eye Exam 02/07/2025 2/7/2024    Influenza Vaccine (1) 09/01/2025 1/28/2025    Hemoglobin A1c 11/26/2025 5/26/2025    TETANUS VACCINE 01/25/2026 1/25/2016    Lipid Panel 02/17/2026 2/17/2025    Foot Exam 02/24/2026 2/24/2025    Diabetes Urine Screening 05/26/2026 5/26/2025    Low Dose Statin 06/02/2026 6/2/2025    Colorectal Cancer Screening 04/30/2027 4/30/2024        No orders of the defined types were placed in this encounter.      The following information is provided to all patients.  This information is to help you find resources for any of the problems found today that may be affecting your health:                  Living healthy guide: www.[facility].[place].gov      Understanding Diabetes: www.diabetes.org      Eating healthy: www.cdc.gov/healthyweight      CDC home safety checklist: www.cdc.gov/steadi/patient.html      Agency on Aging: www.goea.[place].[facility]      Alcoholics anonymous (AA): www.aa.org      Physical Activity: www.[place].nih.gov/ub4qsgw      Tobacco use: www.quitwithusla.org

## 2025-08-05 ENCOUNTER — APPOINTMENT (OUTPATIENT)
Dept: LAB | Facility: HOSPITAL | Age: 75
End: 2025-08-05
Attending: PHYSICIAN ASSISTANT
Payer: MEDICARE

## 2025-08-05 ENCOUNTER — OFFICE VISIT (OUTPATIENT)
Dept: FAMILY MEDICINE | Facility: CLINIC | Age: 75
End: 2025-08-05
Payer: MEDICARE

## 2025-08-05 ENCOUNTER — OFFICE VISIT (OUTPATIENT)
Dept: INFECTIOUS DISEASES | Facility: CLINIC | Age: 75
End: 2025-08-05
Payer: MEDICARE

## 2025-08-05 VITALS
WEIGHT: 185.38 LBS | HEIGHT: 73 IN | DIASTOLIC BLOOD PRESSURE: 74 MMHG | SYSTOLIC BLOOD PRESSURE: 128 MMHG | OXYGEN SATURATION: 97 % | BODY MASS INDEX: 24.57 KG/M2 | HEART RATE: 75 BPM | TEMPERATURE: 98 F

## 2025-08-05 VITALS
RESPIRATION RATE: 18 BRPM | BODY MASS INDEX: 25.05 KG/M2 | WEIGHT: 184.94 LBS | DIASTOLIC BLOOD PRESSURE: 68 MMHG | HEART RATE: 64 BPM | OXYGEN SATURATION: 98 % | HEIGHT: 72 IN | SYSTOLIC BLOOD PRESSURE: 132 MMHG

## 2025-08-05 DIAGNOSIS — F33.9 RECURRENT MAJOR DEPRESSIVE DISORDER, REMISSION STATUS UNSPECIFIED: ICD-10-CM

## 2025-08-05 DIAGNOSIS — B20 HIV DISEASE: Primary | ICD-10-CM

## 2025-08-05 DIAGNOSIS — E78.2 MIXED HYPERLIPIDEMIA: ICD-10-CM

## 2025-08-05 DIAGNOSIS — I10 PRIMARY HYPERTENSION: ICD-10-CM

## 2025-08-05 DIAGNOSIS — G47.00 INSOMNIA, UNSPECIFIED TYPE: ICD-10-CM

## 2025-08-05 DIAGNOSIS — N18.31 STAGE 3A CHRONIC KIDNEY DISEASE: ICD-10-CM

## 2025-08-05 DIAGNOSIS — D64.9 NORMOCYTIC ANEMIA: ICD-10-CM

## 2025-08-05 DIAGNOSIS — Z79.4 TYPE 2 DIABETES MELLITUS WITH DIABETIC POLYNEUROPATHY, WITH LONG-TERM CURRENT USE OF INSULIN: ICD-10-CM

## 2025-08-05 DIAGNOSIS — E11.628 TYPE 2 DIABETES MELLITUS WITH OTHER SKIN COMPLICATION, WITHOUT LONG-TERM CURRENT USE OF INSULIN: ICD-10-CM

## 2025-08-05 DIAGNOSIS — E11.69 HYPERLIPIDEMIA ASSOCIATED WITH TYPE 2 DIABETES MELLITUS: Primary | ICD-10-CM

## 2025-08-05 DIAGNOSIS — E11.42 TYPE 2 DIABETES MELLITUS WITH DIABETIC POLYNEUROPATHY, WITH LONG-TERM CURRENT USE OF INSULIN: ICD-10-CM

## 2025-08-05 DIAGNOSIS — E78.5 HYPERLIPIDEMIA ASSOCIATED WITH TYPE 2 DIABETES MELLITUS: Primary | ICD-10-CM

## 2025-08-05 DIAGNOSIS — B20 HIV DISEASE: ICD-10-CM

## 2025-08-05 PROCEDURE — 1159F MED LIST DOCD IN RCRD: CPT | Mod: CPTII,HCNC,S$GLB, | Performed by: INTERNAL MEDICINE

## 2025-08-05 PROCEDURE — 3078F DIAST BP <80 MM HG: CPT | Mod: CPTII,HCNC,S$GLB, | Performed by: INTERNAL MEDICINE

## 2025-08-05 PROCEDURE — 99999 PR PBB SHADOW E&M-EST. PATIENT-LVL V: CPT | Mod: PBBFAC,HCNC,, | Performed by: INTERNAL MEDICINE

## 2025-08-05 PROCEDURE — 1126F AMNT PAIN NOTED NONE PRSNT: CPT | Mod: CPTII,HCNC,S$GLB, | Performed by: INTERNAL MEDICINE

## 2025-08-05 PROCEDURE — 3044F HG A1C LEVEL LT 7.0%: CPT | Mod: CPTII,HCNC,S$GLB, | Performed by: INTERNAL MEDICINE

## 2025-08-05 PROCEDURE — 1101F PT FALLS ASSESS-DOCD LE1/YR: CPT | Mod: CPTII,HCNC,S$GLB, | Performed by: INTERNAL MEDICINE

## 2025-08-05 PROCEDURE — 99999 PR PBB SHADOW E&M-EST. PATIENT-LVL V: CPT | Mod: PBBFAC,HCNC,, | Performed by: PHYSICIAN ASSISTANT

## 2025-08-05 PROCEDURE — 3074F SYST BP LT 130 MM HG: CPT | Mod: CPTII,HCNC,S$GLB, | Performed by: INTERNAL MEDICINE

## 2025-08-05 PROCEDURE — 99214 OFFICE O/P EST MOD 30 MIN: CPT | Mod: HCNC,S$GLB,, | Performed by: INTERNAL MEDICINE

## 2025-08-05 PROCEDURE — 3288F FALL RISK ASSESSMENT DOCD: CPT | Mod: CPTII,HCNC,S$GLB, | Performed by: INTERNAL MEDICINE

## 2025-08-05 NOTE — ASSESSMENT & PLAN NOTE
Patient is on insulin Trulicity and metformin for diabetes management with no low blood sugars reported. Discussed completing upcoming A1C test with endocrinology to ensure diabetes management is up-to-date. Up to date on diabetic eye screening.

## 2025-08-05 NOTE — ASSESSMENT & PLAN NOTE
Continue atorvastatin 40 mg daily, Lofibra 134 mg daily. Cholesterol controlled on previous lipid panel.

## 2025-08-05 NOTE — PROGRESS NOTES
OFFICE VISIT   8/5/2025    Patient ID: Brandon Parson is a 74 y.o. male    SUBJECTIVE:  Follow-up (6 month f/u)      HPI/ROS:  History of Present Illness    Patient presents today for follow up     He reports no low blood sugar episodes. His diabetes is managed with insulin, Trulicity, and Metformin twice daily. Last eye exam was in February with Dr. Duong in [place], and he has been consistently attending ophthalmology follow-ups. Blood counts were noted to be decreased during previous visit. Additional labs were ordered to investigate this finding.   He continues:  - Lisinopril 2.5 mg for blood pressure  - Zoloft without reported issues  - Insulin and Trulicity for diabetes  - Lipitor for cholesterol  - Ambien for sleep, with one refill remaining  - Metformin twice daily for diabetes   He has no acute concerns or complaints today. He has follow up with infectious disease today.          Review of Systems   Constitutional:  Negative for chills, fatigue and fever.   HENT:  Negative for congestion and sore throat.    Eyes: Negative.    Respiratory:  Negative for cough and shortness of breath.    Gastrointestinal:  Negative for constipation, diarrhea, nausea and vomiting.   Genitourinary: Negative.    Musculoskeletal:  Negative for back pain and myalgias.   Neurological: Negative.    Psychiatric/Behavioral:  Negative for sleep disturbance. The patient is not nervous/anxious.        Past Medical History:   Diagnosis Date    Anxiety     Carotid artery disease     Depression     Diabetes mellitus     Diabetes with neurologic complications     GERD (gastroesophageal reflux disease)     HIV (human immunodeficiency virus infection)     Osteomyelitis of great toe of left foot 08/2019    Primary hypertension 8/5/2025    Renal manifestation of secondary diabetes mellitus     Skin cancer 2024    TB lung, latent 2006    treated with INH       Social History[1]    Past Surgical History:   Procedure Laterality Date    CAROTID  ENDARTERECTOMY Right 04/2022    COLONOSCOPY N/A 4/30/2024    Procedure: COLONOSCOPY;  Surgeon: Manuel Mejias MD;  Location: [facility];  Service: Endoscopy;  Laterality: N/A;  lm/no portal -letter mailed.    HERNIORRHAPHY OF RECURRENT INCARCERATED INGUINAL HERNIA Right 8/1/2022    Procedure: REPAIR, HERNIA, INGUINAL, INCARCERATED, RECURRENT;  Surgeon: Mack Ramirez MD;  Location: [facility];  Service: General;  Laterality: Right;    UMBILICAL HERNIA REPAIR         OBJECTIVE:    /68 (BP Location: Right arm, Patient Position: Sitting)   Pulse 64   Resp 18   Ht 6' (1.829 m)   Wt 83.9 kg (184 lb 15.5 oz)   SpO2 98%   BMI 25.09 kg/m²     Current Medications[2]    Physical Exam  Constitutional:       General: He is not in acute distress.     Appearance: Normal appearance. He is not ill-appearing.   HENT:      Head: Normocephalic and atraumatic.      Right Ear: External ear normal.      Left Ear: External ear normal.      Nose: Nose normal.      Mouth/Throat:      Mouth: Mucous membranes are moist.      Pharynx: Oropharynx is clear.   Eyes:      Extraocular Movements: Extraocular movements intact.      Conjunctiva/sclera: Conjunctivae normal.      Pupils: Pupils are equal, round, and reactive to light.   Cardiovascular:      Rate and Rhythm: Normal rate and regular rhythm.      Pulses: Normal pulses.      Heart sounds: Normal heart sounds.   Pulmonary:      Effort: Pulmonary effort is normal. No respiratory distress.      Breath sounds: Normal breath sounds.   Abdominal:      Palpations: Abdomen is soft.   Musculoskeletal:         General: No swelling or deformity. Normal range of motion.      Cervical back: Normal range of motion and neck supple.      Right lower leg: No edema.      Left lower leg: No edema.   Skin:     General: Skin is warm and dry.   Neurological:      General: No focal deficit present.      Mental Status: He is alert and oriented to person, place, and time. Mental status is at  baseline.   Psychiatric:         Mood and Affect: Mood normal.         Behavior: Behavior normal.         Thought Content: Thought content normal.         Judgment: Judgment normal.         Assessment/Plan:  Assessment & Plan    - Reviewed current medications and confirmed Lisinopril 2.5 mg taken as prescribed.  - Noted recent decrease in blood counts from previous visit.   - Plan to review ophthalmology records from Dr. Duong to update diabetic eye screening care gap.    FOLLOW-UP:  - Complete ordered labs at [facility] today.  - Follow up in 6 months.  - Will review lab results and contact patient if needed.          Problem List Items Addressed This Visit       HIV disease    Patient with follow up with Infectious Disease today.         Depression    Stable on Zoloft 100 mg daily.         Chronic kidney disease, stage III (moderate)    Monitoring CMP.          Relevant Orders    Comprehensive Metabolic Panel    Hyperlipidemia associated with type 2 diabetes mellitus - Primary    Continue atorvastatin 40 mg daily, Lofibra 134 mg daily. Cholesterol controlled on previous lipid panel.         Type 2 diabetes mellitus with diabetic polyneuropathy, with long-term current use of insulin    Patient is on insulin Trulicity and metformin for diabetes management with no low blood sugars reported. Discussed completing upcoming A1C test with endocrinology to ensure diabetes management is up-to-date. Up to date on diabetic eye screening.          Relevant Orders    Comprehensive Metabolic Panel    Normocytic anemia    Decreased hemoglobin and hematocrit on previous CBC.  We will repeat and check iron and ferritin.         Relevant Orders    CBC Auto Differential    Ferritin    Iron and TIBC    Primary hypertension    Blood pressure stable in office 128/74.  Continue lisinopril 2.5 mg daily.         Insomnia    Stable on Ambien 10 mg nightly.   reviewed.            Patient verbalizes understanding of  "instructions and healthcare topics reviewed. All of patient's questions were answered.  Patient encouraged to contact office if other questions arise.    Health Maintenance Due   Topic Date Due    COVID-19 Vaccine (3 - Moderna risk series) 03/04/2021    RSV Vaccine (Age 60+ and Pregnant patients) (1 - 1-dose 75+ series) Never done    Diabetic Eye Exam  02/07/2025       Follow up in about 6 months (around 2/5/2026).    This note was generated with the assistance of ambient listening technology. Verbal consent was obtained by the patient and accompanying visitor(s) for the recording of patient appointment to facilitate this note. I attest to having reviewed and edited the generated note for accuracy, though some syntax or spelling errors may persist. Please contact the author of this note for any clarification.     Reviewed complexities inherent to evaluation and management of this patient's acute and chronic problems to deliver optimal long-term care to the patient at time of visit.      Xuan Farias PA-C  Family Medicine Physician Assistant          [1]   Social History  Tobacco Use    Smoking status: Former     Current packs/day: 0.00     Types: Cigarettes     Start date: 1970     Quit date: 1/20/2022     Years since quitting: 3.5    Smokeless tobacco: Never   Substance Use Topics    Alcohol use: No    Drug use: No   [2]   Current Outpatient Medications:     ascorbic acid, vitamin C, (VITAMIN C) 100 MG tablet, Take 100 mg by mouth once daily., Disp: , Rfl:     aspirin 325 MG tablet, Take 1 tablet (325 mg total) by mouth once daily., Disp: 30 tablet, Rfl: 0    atorvastatin (LIPITOR) 40 MG tablet, TAKE ONE TABLET BY MOUTH DAILY, Disp: 90 tablet, Rfl: 3    b complex vitamins capsule, Take 1 capsule by mouth once daily., Disp: , Rfl:     BD ULTRA-FINE MINI PEN NEEDLE 31 gauge x 3/16" Ndle, USE TWICE DAILY, Disp: 200 each, Rfl: 3    blood sugar diagnostic Strp, To check BG 2 times daily, to use with " insurance preferred meter, Disp: 200 strip, Rfl: 3    blood-glucose meter kit, To check BG 2 times daily, to use with insurance preferred meter, Disp: 1 each, Rfl: 0    efavirenz (SUSTIVA) 600 mg Tab, Take 1 tablet (600 mg total) by mouth every evening., Disp: 30 tablet, Rfl: 5    emtricitabine-tenofovir alafen (DESCOVY) 200-25 mg Tab, Take 1 tablet by mouth every evening., Disp: 30 tablet, Rfl: 5    fenofibrate micronized (LOFIBRA) 134 MG Cap, TAKE ONE CAPSULE BY MOUTH EVERY DAY, Disp: 90 capsule, Rfl: 4    fish oil-omega-3 fatty acids 300-1,000 mg capsule, Take 2 capsules by mouth once daily., Disp: , Rfl:     HIGH POTENCY MULTIVITAMIN 400 mcg Tab, Take 1 tablet by mouth once daily., Disp: , Rfl:     lancets Misc, To check BG 2 times daily, to use with insurance preferred meter, Disp: 200 each, Rfl: 3    lisinopriL (PRINIVIL,ZESTRIL) 2.5 MG tablet, Take 1 tablet (2.5 mg total) by mouth once daily., Disp: 90 tablet, Rfl: 3    metFORMIN (GLUCOPHAGE) 500 MG tablet, TAKE ONE TABLET BY MOUTH TWICE DAILY WITH MEALS, Disp: 180 tablet, Rfl: 4    NOVOLOG MIX 70-30FLEXPEN U-100 100 unit/mL (70-30) InPn pen, INJECT 25 UNITS breakfast and 30 u supper, Disp: 30 mL, Rfl: 3    sertraline (ZOLOFT) 100 MG tablet, TAKE ONE TABLET BY MOUTH EVERY DAY, Disp: 90 tablet, Rfl: 1    TRULICITY 4.5 mg/0.5 mL pen injector, INJECT 4.5mg SUBCUTANEOUSLY EVERY 7 DAYS, Disp: 12 Pen, Rfl: 4    zolpidem (AMBIEN) 10 mg Tab, TAKE ONE TABLET BY MOUTH EVERY EVENING, Disp: 30 tablet, Rfl: 0

## 2025-08-05 NOTE — PROGRESS NOTES
Subjective :  Here for follow up.    Patient ID: Brandon Parson is a 74 y.o. male.    Chief Complaint: Follow-up (HIV)    Follow-up      He was last seen by Dr. Hoover on 12/04/2023.  He has continued to do okay.  Main concern has been low level viremia in the range of .  He did have a level that was undetected in 2022.  He is currently on long-term Sustiva and Descovy.  Since last visit he has had a colonoscopy with polypectomy performed.  Pathology was consistent with tubular adenoma.  His A1c continues to improve and is down to 8.4.  He follows up with his endocrinologist and PCP.  Compliant to his HAART.      He has a new puppy and has sustained scratches on the forearms from the polyp.  They have healed for now.  Also has 2 cats.    08/01/2024:  No acute issues since the last time we saw.  He continues to do okay.  Lab data reviewed.  HIV PCR 40, CD4 count 1148, CBC normal.  HbA1c 8.8.  Informs me that zolpidem dose was increased from 5 mg to 10 mg to continue to aid with sleeping.    2/4/25: No acute issues.  He is compliant to his HAART.  Doing well overall.  Since last visit he had influenza vaccine.  He had labs including HIV test that were ordered by his PCP    08/05/2025:  He has no complaints today.  Compliant to his HAART.  New acute issues.  CD4 and HIV PCR ordered last visit not done.  HbA1c was 6.9 on 05/26/2025.  On 02/17/2025 Cholesterol 43, LDL 78, hematocrit 34, creatinine 1.5, crit 29, ALT 17    Review of Systems: 10 system review unremarkable.       Objective     Physical Exam  General:  well-groomed elderly man in no acute distress   CVS: S1 and 2 heard   Respiratory: Clear to auscultation   Abdomen: Full, soft, nontender, no palpable organomegaly   Skin:  No rash appreciated   CNS: No focal deficits   Musculoskeletal system: No joint or bony abnormalities appreciated   LEs: No edema.       Assessment and Plan   1. HIV.  Has chronic low level viremia.  This has been stable over the  years.  On 01/28/2025.  Patient states labs were done.  Results not yet in.  Follow results of labs done 01/28/2025.  I will order labs if the HIV test were not done on that date.  We will obtain genotype if PCR> 500.  Continue current HAART regimen of Descovy and Sustiva.  Reassess again in 6 months.    2. Hyperlipidemia.  He is currently on atorvastatin and fenofibrate.  Also takes fish oil.  Management as per his PCP.  Good control.    3. Diabetes mellitus.  HbA1c 6.9.  Better controlled.  Follow up with his endocrinologist and PCP.    4. Health maintenance.  He had colonoscopy March 20, 2024 with tubular adenoma.  Follow up with GI.  Discussed and recommended continuing annual influenza vaccination.  Also encouraged to get updated COVID vaccination which may become annual.    Spent 30 minutes in his care today.  He will be called with lab results when available.  See again in 6    1. HIV disease  -     CD4 T-[place] Cells; Future; Expected date: 08/05/2025  -     HIV RNA, QUANTITATIVE, PCR; Future; Expected date: 08/05/2025  -     Lipid Panel; Future; Expected date: 08/05/2025    2. Type 2 diabetes mellitus with other skin complication, without long-term current use of insulin    3. Mixed hyperlipidemia  -     Lipid Panel; Future; Expected date: 08/05/2025              I will plan to see again 6 months.       Follow up in about 6 months (around 2/5/2026).    Review of Systems

## 2025-08-05 NOTE — PATIENT INSTRUCTIONS
I have ordered CD4 and viral load for you  We will call you with the results  See you again in 6 months  You can get the Influenza and updated COVID 19 vaccine when they are  out later in the year

## 2025-08-15 DIAGNOSIS — E11.628 TYPE 2 DIABETES MELLITUS WITH OTHER SKIN COMPLICATION, WITHOUT LONG-TERM CURRENT USE OF INSULIN: ICD-10-CM

## 2025-08-15 RX ORDER — METFORMIN HYDROCHLORIDE 500 MG/1
500 TABLET ORAL 2 TIMES DAILY WITH MEALS
Qty: 180 TABLET | Refills: 4 | Status: SHIPPED | OUTPATIENT
Start: 2025-08-15

## 2025-08-28 DIAGNOSIS — N18.31 TYPE 2 DIABETES MELLITUS WITH STAGE 3A CHRONIC KIDNEY DISEASE, WITH LONG-TERM CURRENT USE OF INSULIN: ICD-10-CM

## 2025-08-28 DIAGNOSIS — E11.22 TYPE 2 DIABETES MELLITUS WITH STAGE 3A CHRONIC KIDNEY DISEASE, WITH LONG-TERM CURRENT USE OF INSULIN: ICD-10-CM

## 2025-08-28 DIAGNOSIS — Z79.4 TYPE 2 DIABETES MELLITUS WITH STAGE 3A CHRONIC KIDNEY DISEASE, WITH LONG-TERM CURRENT USE OF INSULIN: ICD-10-CM

## 2025-08-28 RX ORDER — INSULIN ASPART 100 [IU]/ML
INJECTION, SUSPENSION SUBCUTANEOUS
Qty: 30 ML | Refills: 3 | Status: SHIPPED | OUTPATIENT
Start: 2025-08-28

## 2025-09-02 ENCOUNTER — TELEPHONE (OUTPATIENT)
Dept: INFECTIOUS DISEASES | Facility: CLINIC | Age: 75
End: 2025-09-02
Payer: MEDICARE

## (undated) DEVICE — SYR 10CC LUER LOCK

## (undated) DEVICE — STRAP OR TABLE 5IN X 72IN

## (undated) DEVICE — DRESSING TRANS 4X4 TEGADERM

## (undated) DEVICE — CLOSURE SKIN STERI STRIP 1/2X4

## (undated) DEVICE — CLIPPER BLADE MOD 4406 (CAREF)

## (undated) DEVICE — PACK CUSTOM UNIV BASIN SLI

## (undated) DEVICE — GAUZE SPONGE PEANUT STRL

## (undated) DEVICE — DRAIN PENROSE XRAY 12 X 1/4 ST

## (undated) DEVICE — SUT PROLENE 2-0 SH 36IN BLU

## (undated) DEVICE — TOWEL OR DISP STRL BLUE 4/PK

## (undated) DEVICE — SEE MEDLINE ITEM 146292

## (undated) DEVICE — DRAPE MINOR FEN 98X77X121IN

## (undated) DEVICE — SUT 2-0 VICRYL / SH (J417)

## (undated) DEVICE — GLOVE SURG ULTRA TOUCH 7.5

## (undated) DEVICE — ELECTRODE REM PLYHSV RETURN 9

## (undated) DEVICE — SUT 3-0 VICRYL / SH (J416)

## (undated) DEVICE — SLEEVE SCD EXPRESS KNEE MEDIUM

## (undated) DEVICE — NDL SAFETY 21G X 1 1/2 ECLPSE

## (undated) DEVICE — APPLICATOR CHLORAPREP ORN 26ML

## (undated) DEVICE — SUT MONOCRYL 4-0 PS-2

## (undated) DEVICE — SUT PDS II 2-0 CT1